# Patient Record
Sex: FEMALE | Race: WHITE | Employment: OTHER | URBAN - METROPOLITAN AREA
[De-identification: names, ages, dates, MRNs, and addresses within clinical notes are randomized per-mention and may not be internally consistent; named-entity substitution may affect disease eponyms.]

---

## 2017-02-01 ENCOUNTER — OFFICE VISIT (OUTPATIENT)
Dept: CARDIOLOGY CLINIC | Age: 82
End: 2017-02-01

## 2017-02-01 VITALS
SYSTOLIC BLOOD PRESSURE: 158 MMHG | DIASTOLIC BLOOD PRESSURE: 72 MMHG | HEIGHT: 62 IN | HEART RATE: 60 BPM | BODY MASS INDEX: 26.13 KG/M2 | WEIGHT: 142 LBS | OXYGEN SATURATION: 98 %

## 2017-02-01 DIAGNOSIS — R61 DIAPHORESIS: ICD-10-CM

## 2017-02-01 DIAGNOSIS — I25.10 CORONARY ARTERY DISEASE INVOLVING NATIVE CORONARY ARTERY OF NATIVE HEART WITHOUT ANGINA PECTORIS: Primary | ICD-10-CM

## 2017-02-01 DIAGNOSIS — R06.09 DOE (DYSPNEA ON EXERTION): ICD-10-CM

## 2017-02-01 DIAGNOSIS — E78.00 HYPERCHOLESTEROLEMIA: ICD-10-CM

## 2017-02-01 DIAGNOSIS — I10 ESSENTIAL HYPERTENSION: ICD-10-CM

## 2017-02-01 RX ORDER — RANOLAZINE 500 MG/1
TABLET, EXTENDED RELEASE ORAL 2 TIMES DAILY
COMMUNITY
End: 2017-04-21 | Stop reason: ALTCHOICE

## 2017-02-01 RX ORDER — ASPIRIN 81 MG/1
162 TABLET ORAL DAILY
Status: ON HOLD | COMMUNITY
End: 2022-09-22 | Stop reason: SDUPTHER

## 2017-02-01 RX ORDER — ATORVASTATIN CALCIUM 40 MG/1
40 TABLET, FILM COATED ORAL DAILY
Qty: 30 TAB | Refills: 6 | Status: SHIPPED | OUTPATIENT
Start: 2017-02-01 | End: 2017-03-07 | Stop reason: SDUPTHER

## 2017-02-01 RX ORDER — NITROGLYCERIN 0.4 MG/1
TABLET SUBLINGUAL
COMMUNITY

## 2017-02-01 RX ORDER — ACETAMINOPHEN 325 MG/1
TABLET ORAL
COMMUNITY
End: 2022-09-18

## 2017-02-01 RX ORDER — AMLODIPINE BESYLATE 5 MG/1
5 TABLET ORAL DAILY
Qty: 90 TAB | Refills: 3 | Status: SHIPPED | OUTPATIENT
Start: 2017-02-01 | End: 2018-01-07 | Stop reason: SDUPTHER

## 2017-02-01 RX ORDER — ATENOLOL 50 MG/1
50 TABLET ORAL DAILY
Qty: 90 TAB | Refills: 3 | Status: SHIPPED | OUTPATIENT
Start: 2017-02-01 | End: 2018-03-21

## 2017-02-01 NOTE — PATIENT INSTRUCTIONS
Ranolazine (By mouth)   Ranolazine (an-OAC-um-zeen)  Treats chronic angina (chest pain). Brand Name(s):Ranexa   There may be other brand names for this medicine. When This Medicine Should Not Be Used: This medicine is not right for everyone. Do not use it if you had an allergic reaction to ranolazine, or if you have liver cirrhosis. How to Use This Medicine:   Long Acting Tablet  · Take your medicine as directed. Your dose may need to be changed several times to find what works best for you. · Swallow the extended-release tablet whole. Do not crush, break, or chew it. · Read and follow the patient instructions that come with this medicine. Talk to your doctor or pharmacist if you have any questions. · Missed dose: If you miss a dose of this medicine, skip the missed dose and go back to your regular dosing schedule. Do not double doses. · Store the medicine in a closed container at room temperature, away from heat, moisture, and direct light. Drugs and Foods to Avoid:   Ask your doctor or pharmacist before using any other medicine, including over-the-counter medicines, vitamins, and herbal products. · Do not use this medicine together with carbamazepine, clarithromycin, itraconazole, ketoconazole, nefazodone, phenobarbital, phenytoin, rifabutin, rifampin, rifapentine, certain medicines to treat HIV or AIDS (such as indinavir, lopinavir, nelfinavir, ritonavir, saquinavir), or Karan's wort. · Some foods and medicines can affect how ranolazine works. Tell your doctor if you are using cyclosporine, digoxin, diltiazem, erythromycin, fluconazole, lovastatin, metformin, simvastatin, sirolimus, tacrolimus, verapamil, medicine for a heart rhythm problem (such as amiodarone, dofetilide, quinidine, sotalol), medicine for depression, or medicine to treat a mental illness (such as thioridazine, ziprasidone). · Do not eat grapefruit or drink grapefruit juice while you are using this medicine.   Warnings While Using This Medicine:   · Tell your doctor if you are pregnant or breastfeeding, or if you have kidney disease, liver disease, or heart rhythm problems. · This medicine may cause the following problems:  ¨ QT prolongation (heart rhythm problem)  ¨ Kidney failure  · Do not use this medicine to treat a sudden onset of chest pain. · This medicine may make you dizzy or drowsy. Do not drive or do anything else that could be dangerous until you know how this medicine affects you. · Your doctor will do lab tests at regular visits to check on the effects of this medicine. Keep all appointments. · Keep all medicine out of the reach of children. Never share your medicine with anyone. Possible Side Effects While Using This Medicine:   Call your doctor right away if you notice any of these side effects:  · Allergic reaction: Itching or hives, swelling in your face or hands, swelling or tingling in your mouth or throat, chest tightness, trouble breathing  · Decrease in how much or how often you urinate  · Fainting, dizziness, or lightheadedness  · Fast, pounding, or uneven heartbeat  · Rapid weight gain, swelling of your hands, ankles, or feet  · Severe or increased chest pain  If you notice other side effects that you think are caused by this medicine, tell your doctor. Call your doctor for medical advice about side effects. You may report side effects to FDA at 1-881-FDA-6807  © 2016 [address] is for End User's use only and may not be sold, redistributed or otherwise used for commercial purposes. The above information is an  only. It is not intended as medical advice for individual conditions or treatments. Talk to your doctor, nurse or pharmacist before following any medical regimen to see if it is safe and effective for you.

## 2017-02-01 NOTE — PROGRESS NOTES
1. Have you been to the ER, urgent care clinic since your last visit? Hospitalized since your last visit? No  2. Have you seen or consulted any other health care providers outside of the [address] [place] [address] since your last visit? Include any pap smears or colon screening.  No

## 2017-02-01 NOTE — PROGRESS NOTES
HISTORY OF PRESENT ILLNESS  Temitope Canada is a 80 y.o. female. HPI  She is referred to my office for continued cardiac care. She is a very pleasant, young-looking, [de-identified] year-old lady who looks like she is in her early [de-identified]. She does have issues with joint pain and balance and walks with a walker, but she wants to just use a cane. She denied any cardiac symptoms at first, but upon further questioning, she admits to having had frequent episodes of diaphoresis, particularly with exertion such as lifting heavy objects, which is associated with funny feelings in the chest as some quivering and discomfort in the lower substernal and epigastric area, which subsides in about thirty minutes or so (by her estimation) with resting. She does have mild dyspnea on exertion, but she denies exertional chest tightness or pressure. She has had no exertional throat tightness, jaw pain or upper back pain. She has no resting dyspnea, orthopnea or PND. She denies palpitations, dizziness or syncope. She has had no symptoms to indicate TIA or amaurosis fugax. She is from [place]. She has history of cardiac catheterization in 2009, which apparently demonstrated:    1. Patent left main trunk                   2. 50-90% long stenosis of the LAD in the proximal to mid segment                 3. 90% significant stenosis of the proximal first diagonal branch                  4. 90% stenosis of the second diagonal branch                  5. 80% stenosis of the circumflex artery at the origin of the obtuse marginal branch                6. 80% stenosis of the proximal dominant RCA with diffuse changes. She was recommended to have stenting or bypass surgery, which she declined. She has done well over the eight years with no acute coronary event. In the past few years, she has been experiencing diaphoresis and some funny feelings in the chest with lifting heavy objects, but not by walking. She is a nonsmoker.   She has history of hypertension, but no diabetes mellitus. She has been on Zocor for hypercholesterolemia. She does have a family history of coronary artery disease. Review of Systems   Constitutional: Positive for diaphoresis. Negative for malaise/fatigue and weight loss. HENT: Negative for hearing loss. Eyes: Negative for blurred vision and double vision. Respiratory: Positive for shortness of breath. Cardiovascular: Positive for chest pain. Negative for palpitations, orthopnea, claudication, leg swelling and PND. Gastrointestinal: Negative for blood in stool, heartburn and melena. Genitourinary: Negative for dysuria, frequency, hematuria and urgency. Musculoskeletal: Positive for back pain, falls and joint pain. Skin: Negative for itching and rash. Neurological: Positive for dizziness. Negative for loss of consciousness, weakness and headaches. Psychiatric/Behavioral: Negative for depression and memory loss. Physical Exam   Constitutional: She is oriented to person, place, and time. She appears well-developed and well-nourished. HENT:   Head: Normocephalic and atraumatic. Eyes: Conjunctivae are normal. Pupils are equal, round, and reactive to light. Neck: Normal range of motion. Neck supple. No JVD present. Cardiovascular: Normal rate, regular rhythm, S1 normal and S2 normal.   No extrasystoles are present. PMI is not displaced. Exam reveals no gallop and no friction rub. Murmur heard. Harsh early systolic murmur is present with a grade of 1/6  at the upper right sternal border  Pulses:       Carotid pulses are 3+ on the right side, and 3+ on the left side. Pulmonary/Chest: Effort normal. She has no rales. Abdominal: Soft. There is no tenderness. Musculoskeletal: She exhibits no edema. Neurological: She is alert and oriented to person, place, and time. No cranial nerve deficit. Skin: Skin is warm and dry.    Psychiatric: She has a normal mood and affect. Her behavior is normal.     Visit Vitals    /72    Pulse 60    Ht 5' 2\" (1.575 m)    Wt 64.4 kg (142 lb)    SpO2 98%    BMI 25.97 kg/m2       Past Medical History   Diagnosis Date    CAD (coronary artery disease) 2008    Fibromyalgia     Heart failure (HCC)      Pt. states sge gas 5 heart blocks.  Hypercholesterolemia     Hypertension     Thyroid disease        Social History     Social History    Marital status:      Spouse name: N/A    Number of children: N/A    Years of education: N/A     Occupational History    Not on file. Social History Main Topics    Smoking status: Never Smoker    Smokeless tobacco: Never Used    Alcohol use No    Drug use: No    Sexual activity: Not on file     Other Topics Concern    Not on file     Social History Narrative       History reviewed. No pertinent family history. Past Surgical History   Procedure Laterality Date    Hx cholecystectomy         Current Outpatient Prescriptions   Medication Sig Dispense Refill    aspirin delayed-release 81 mg tablet Take  by mouth daily.  acetaminophen (TYLENOL) 325 mg tablet Take  by mouth every four (4) hours as needed for Pain.  DOCUSATE CALCIUM (STOOL SOFTENER PO) Take  by mouth.  nitroglycerin (NITROSTAT) 0.4 mg SL tablet by SubLINGual route every five (5) minutes as needed for Chest Pain.  ANTIOX#10/OM3/DHA/EPA/LUT/ZEAX (I-CAPS PO) Take  by mouth.  ATENOLOL PO Take 50 mg by mouth daily.  levothyroxine (SYNTHROID) 75 mcg tablet Take 75 mcg by mouth Daily (before breakfast).  simvastatin (ZOCOR) 20 mg tablet Take 20 mg by mouth nightly.  amLODIPine (NORVASC) 5 mg tablet Take 5 mg by mouth daily.  Milnacipran (SAVELLA) 50 mg tablet Take 100 mg by mouth daily.  celecoxib (CELEBREX) 200 mg capsule Take 200 mg by mouth two (2) times a day.  CALCIUM CARBONATE/VITAMIN D3 (CALCIUM + D PO) Take 1,200 mg by mouth daily.       psyllium (METAMUCIL) powd Take 1 Packet by mouth. EKG: normal sinus rhythm, nonspecific ST and T waves changes, left axis deviation. ASSESSMENT and PLAN  Encounter Diagnoses   Name Primary?  History of coronary artery disease s/p cath 2009 3vCAD 90%LAD,90% D1,80% Cx,80% prox dRCA Yes    POP (dyspnea on exertion)     Essential hypertension     Hypercholesterolemia     Diaphoresis  Possible exertional angina    This is a very intelligent, young-looking, 55-year-old lady who looks like she is [de-identified] years old. She has known three vessel coronary artery disease, which has been treated medically, although, bypass surgery or stenting was recommended eight years ago. She has not experienced any exertional chest pain, but she does have some dyspnea on exertion with walking. She particularly has had the symptoms of diaphoresis and a funny feeling in her chest when she lifts a heavy object, which subsides upon resting. This has been occurring for the past few years. This may be new anginal symptoms, although, it is not quite diagnostic. It is surprising that she has not had many symptoms despite the severe triple vessel coronary artery disease and minimal medical treatment. We went over her coronary anatomy in detail using charts and discussed the possibility of anginal symptoms by her diaphoresis and funny feeling in her chest.  We discussed possible stenting or coronary bypass grafting despite her advanced age and also further medical treatment. The patient and her son opted for continued medical treatment. She will be continued on the betablocker (atenolol) and aspirin. She is currently on Zocor 20 mg per day, which will be changed to Lipitor 40 mg per day to avoid a drug interaction, as she will be tried on Ranexa. I will try to get some samples of Ranexa from the pharmaceutical company and try her on this to see if her symptoms will improve.   I spent over forty-five minutes with the patient and her son in face-to-face consultation of which greater than fifty percent was spent in counseling and discussion.

## 2017-02-16 ENCOUNTER — TELEPHONE (OUTPATIENT)
Dept: CARDIOLOGY CLINIC | Age: 82
End: 2017-02-16

## 2017-02-16 NOTE — TELEPHONE ENCOUNTER
Patient's son called stating they decided to stop Renexa d/t GI upset, including nausea, heartburn, and diarrhea, denies SOB, chest pain, and diaphoresis. Please advise.   Thank you

## 2017-02-28 NOTE — TELEPHONE ENCOUNTER
Son states patients has discontinued one dose of Ranexa and symptoms subsided. Will discontinue other dosage if symptoms arise.

## 2017-03-07 DIAGNOSIS — E78.00 HYPERCHOLESTEROLEMIA: ICD-10-CM

## 2017-03-07 RX ORDER — ATORVASTATIN CALCIUM 40 MG/1
40 TABLET, FILM COATED ORAL DAILY
Qty: 90 TAB | Refills: 3 | Status: SHIPPED | OUTPATIENT
Start: 2017-03-07 | End: 2017-12-29 | Stop reason: SDUPTHER

## 2017-04-06 ENCOUNTER — HOSPITAL ENCOUNTER (OUTPATIENT)
Dept: PHYSICAL THERAPY | Age: 82
Discharge: HOME | End: 2017-04-06
Payer: MEDICARE

## 2017-04-06 ENCOUNTER — HOSPITAL ENCOUNTER (OUTPATIENT)
Dept: GENERAL RADIOLOGY | Age: 82
Discharge: HOME | End: 2017-04-06
Payer: MEDICARE

## 2017-04-06 ENCOUNTER — HOSPITAL ENCOUNTER (OUTPATIENT)
Dept: VASCULAR SURGERY | Age: 82
Discharge: HOME | End: 2017-04-06
Payer: MEDICARE

## 2017-04-06 DIAGNOSIS — R60.0 EDEMA, LEG: ICD-10-CM

## 2017-04-06 DIAGNOSIS — R60.0 PEDAL EDEMA: ICD-10-CM

## 2017-04-06 PROCEDURE — G8984 CARRY CURRENT STATUS: HCPCS

## 2017-04-06 PROCEDURE — G8985 CARRY GOAL STATUS: HCPCS

## 2017-04-06 PROCEDURE — 71020 XR CHEST PA LAT: CPT

## 2017-04-06 PROCEDURE — 97032 APPL MODALITY 1+ESTIM EA 15: CPT

## 2017-04-06 PROCEDURE — 97162 PT EVAL MOD COMPLEX 30 MIN: CPT

## 2017-04-06 PROCEDURE — 93970 EXTREMITY STUDY: CPT

## 2017-04-06 NOTE — PROGRESS NOTES
[facility] [address]  [address] [address] [facility] [address] [address]  [phone] fax    Plan of Care/ Statement of Necessity for Physical Therapy Services    Patient name: Sharyle Bearded Start of Care: 2017   Referral source: Jose Angel Boggs MD : [id number]    Medical Diagnosis: Pain in right wrist [M25.531]  Pain in right elbow [M25.521]   Onset Date:longstanding    Treatment Diagnosis: decrease tolerance to ADLs and activities due to R lateral elbow and R lateral wrist pain, LOM, decrease strength   Prior Hospitalization: see medical history Provider#: [id number]   Medications: Verified on Patient summary List    Comorbidities: Carpal tunnel R UE , fibromyalgia, heart disease, arthritis, thyroid problems, HTN, hearing impaired   Prior Level of Function: I,uses Rollator, I all areas of ADLs and activities, enjoys knitting and reading      The Plan of Care and following information is based on the information from the initial evaluation. Assessment/ key information: 81 YO female CCO pain in the right wrist and elbow that she describes as being long standing. insideous onset of R UE involvement approx 10 years ago. Eventually leading to surgery for a pinched nerve and she has had trouble with it since that time. She is right hand dominant. Pain today is a 7 in the R wrist and elbow but it can radiate up into the R arm musculature. Previous Treatment/Compliance: medication, 2 wrist injections, R elbow surgery for the pinched nerve, she reports this is the first PT for this involvement. Pain 7, FOTO wrist 40 , Elbow 40. AROM R elbow F/E 130/-5  Wrist F/E 50/50, MMT R shoulder F 4+, Abd 4+, Elbow F/E 4+ and wrist F/E 4+. Moderate to severe TTP R lateral epicondyle region of the elbow and R snuff box tendons, TTP and STC R forearm extensor muscle belly.     Patient demonstrates the potential to make gains with improved ROM, strength, endurance/activity tolerance, functional FOTO survey score and all within a reasonable time frame so as to increase their functional independence with ADLs and activities for carryover to Improved quality of life , tolerance to knitting and reading, and tolerance to household activities. Patient requires skilled Physical Therapy so as to monitor their response to and modify their treatment plan accordingly. Patient appears to be an appropriate candidate for skilled outpatient Physical Therapy.       Evaluation Complexity History HIGH Complexity :3+ comorbidities / personal factors will impact the outcome/ POC ; Examination HIGH Complexity : 4+ Standardized tests and measures addressing body structure, function, activity limitation and / or participation in recreation  ;Presentation LOW Complexity : Stable, uncomplicated  ;Clinical Decision Making MEDIUM Complexity : FOTO score of 26-74  Overall Complexity Rating: LOW   Problem List: pain affecting function, decrease ROM, decrease strength, decrease ADL/ functional abilitiies, decrease activity tolerance and other FOTO Elbow 44, Wrist 40   Treatment Plan may include any combination of the following: Therapeutic exercise, Therapeutic activities, Neuromuscular re-education, Physical agent/modality, Manual therapy and Patient education  Patient / Family readiness to learn indicated by: asking questions, trying to perform skills and interest  Persons(s) to be included in education: patient (P)  Barriers to Learning/Limitations: None  Patient Goal (s): lessen the pain  Patient Self Reported Health Status: good  Rehabilitation Potential: good    Short Term Goals:  To be accomplished in 5 treatments:   1 patient will have established and be independent with HEP to aid with progression of skilled PT program   EVAL issued   CURRENT   2 patient will have pain 5/10 to aid with increase tolerance to ADLS and activties   EVAL 7   CURRENT   3 patient will have FOTO wrist improved to 49 to show increase tolerance to ADLS   EVAL 40   CURRENT     Long Term Goals: To be accomplished in 10 treatments:   1 patient will have pain 3/10 to aid with increase tolerance to ADLS and activties   EVAL 7   CURRENT   2 patient will have FOTO wrist improved to 58 to show increase tolerance to ADLS   EVAL 40   CURRENT   3 patient will have MMT R elbow, wrist to 5 to aid with increase tolerance to ADLS and activities   EVAL 4+ for F/E at the elbow and wrist    CURRENT   4 patient will report overall 50% improvement to aid with increase tolerance to ADLS and activities ie knitting   EVAL   CURRENT  Frequency / Duration: Patient to be seen 2 times per week for 10 treatments. Patient/ Caregiver education and instruction: Diagnosis, prognosis, self care, activity modification and exercises   [x]  Plan of care has been reviewed with LASHON    G-Chirag (GP)   Carry   Current  CL= 60-79%    Goal  CK= 40-59%   The severity rating is based on clinical judgment and the FOTO score. Certification Period: 4/6/17-5/6/17  Elle Alexis, PT 4/6/2017 1:01 PM    ________________________________________________________________________    I certify that the above Therapy Services are being furnished while the patient is under my care. I agree with the treatment plan and certify that this therapy is necessary.     Physician's Signature:____________________  Date:____________Time: _________    Please sign and return to [facility] [address] [place] [address], [address] [place] [address] [address]  [phone] fax

## 2017-04-06 NOTE — PROCEDURES
[facility]  *** FINAL REPORT ***    Name: Gerardo Newby  MRN: [id number]    Outpatient  : 1924  HIS Order #: [id number]  [address] [facility] Visit #: [id number]  Date: 2017    TYPE OF TEST: Peripheral Venous Testing    REASON FOR TEST  Limb swelling    Right Leg:-  Deep venous thrombosis:           No  Superficial venous thrombosis:    No  Deep venous insufficiency:        Not examined  Superficial venous insufficiency: Not examined    Left Leg:-  Deep venous thrombosis:           Not examined  Superficial venous thrombosis:    Not examined  Deep venous insufficiency:        No  Superficial venous insufficiency: Not examined      INTERPRETATION/FINDINGS  Duplex images were obtained using 2-D gray scale, color flow, and  spectral Doppler analysis. Right leg :  1. Deep vein(s) visualized include the common femoral, proximal  femoral, mid femoral, distal femoral, popliteal(above knee),  popliteal(fossa), popliteal(below knee), posterior tibial and peroneal   veins. 2. No evidence of deep venous thrombosis detected in the veins  visualized. 3. Superficial vein(s) visualized include the great saphenous vein. 4. No evidence of superficial thrombosis detected. Left leg :  1. Deep vein(s) visualized include the common femoral, proximal  femoral, mid femoral, distal femoral, popliteal(above knee),  popliteal(fossa), popliteal(below knee), posterior tibial and peroneal   veins. 2. Superficial vein(s) visualized include the great saphenous vein. ADDITIONAL COMMENTS  Deep venous reflux noted in the right peroneal vein. The reflux time is   4.2 sec. I have personally reviewed the data relevant to the interpretation of  this  study. TECHNOLOGIST: Daniel Painter, [facility], RVT/  Signed: 2017 09:37 AM    PHYSICIAN: Alfonzo Durán D.O.   Signed: 2017 09:10 AM

## 2017-04-06 NOTE — PROGRESS NOTES
PT DAILY TREATMENT NOTE/ELBOW EVAL 3-16    Patient Name: Adryan Del Valle  Date:2017  : 1924  [x]  Patient  Verified  Payor: [place] MEDICARE / Plan: [place] MEDICARE PART A & B / Product Type: Medicare /    In time:105 Out time:149  Total Treatment Time (min): 44  Total Timed Codes (min): 16  1:1 Treatment Time ( [address] only):16  Visit #:1 of 10    Treatment Area: Pain in right wrist [M25.531]  Pain in right elbow [M25.521]    SUBJECTIVE  Pain Level (0-10 scale): 7  [x]constant []intermittent []improving [x]worsening []no change since onset  Worse with knitting, using the arm (ie washing windows), Better with grabbing and holding the wrist when it spasms, heat  Any medication changes, allergies to medications, adverse drug reactions, diagnosis change, or new procedure performed?: [x] No    [] Yes (see summary sheet for update)  Subjective functional status/changes:     PLOF: I with Rollator,   Limitations to PLOF: pain, spasms in the R arm  Mechanism of Injury: insideous onset of R UE involvement approx 10 years ago. Eventually leading to surgery for a pinched nerve and she has had trouble with it since that time  Current symptoms/Complaints:89 YO female CCO pain in the right wrist and elbow that she describes as being long standing. insideous onset of R UE involvement approx 10 years ago. Eventually leading to surgery for a pinched nerve and she has had trouble with it since that time. She is right hand dominant. Pain today is a 7 in the R wrist and elbow but it can radiate up into the R arm musculature. Previous Treatment/Compliance: medication, 2 wrist injections, R elbow surgery for the pinched nerve, she reports this is the first PT for this involvement. PMHx/Surgical Hx:Carpal tunnel R UE , fibromyalgia, heart disease, arthritis, thyroid problems, HTN, hearing  impaired  Work Hx: retired  Living Situation: lives in a 2 story house, resides on the first floor , 4 steps to enter with a rail.  Lives with son and his wife  Pt Goals: lessen the pain   Barriers: [x]pain []financial []time []transportation []other  Motivation: high  Substance use: []Alcohol []Tobacco []other:   FABQ Score: []low []elevate  Cognition: A & O x 4  Other:    OBJECTIVE/EXAMINATION  Domestic Life: retired, enjoys knitting and reading  Activity/Recreational Limitations: pain  Mobility: I with rollator   Self Care:  I         Modality rationale: decrease inflammation, decrease pain and increase tissue extensibility to improve the patients ability to aid with increase tolerance to ADLS and activities   Min Type Additional Details    [] Estim:  []Unatt       []IFC  []Premod                        []Other:  []w/ice   []w/heat  Position:  Location:   8 [x] Estim: [x]Att    []TENS instruct  []NMES                    [x]Other: LTO []w/US   []w/ice   []w/heat  Position:seated  Location:R wrist laterally at snuff box and R elbow laterally at epicondyle    []  Traction: [] Cervical       []Lumbar                       [] Prone          []Supine                       []Intermittent   []Continuous Lbs:  [] before manual  [] after manual    []  Ultrasound: []Continuous   [] Pulsed                           []1MHz   []3MHz Location:  W/cm2:    []  Iontophoresis with dexamethasone         Location: [] Take home patch   [] In clinic    []  Ice     []  heat  []  Ice massage  []  Laser   []  Anodyne Position:  Location:    []  Laser with stim  []  Other: Position:  Location:    []  Vasopneumatic Device Pressure:       [] lo [] med [] hi   Temperature: [] lo [] med [] hi   [] Skin assessment post-treatment:  []intact []redness- no adverse reaction    []redness  adverse reaction:     28 min [x]Eval                  []Re-Eval       8 min Therapeutic Exercise:  [x] See flow sheet :   Rationale: increase ROM, increase strength and improve coordination to improve the patients ability to aid with increase tolerance to ADLs and activities     min Therapeutic Activity:  []  See flow sheet :   Rationale:   to improve the patients ability to       min Neuromuscular Re-education:  []  See flow sheet :   Rationale:   to improve the patients ability to      min Manual Therapy:     Rationale:  to      min Gait Training:  ___ feet with ___ device on level surfaces with ___ level of assist   Rationale:           With   [] TE   [] TA   [] neuro   [] other: Patient Education: [x] Review HEP    [] Progressed/Changed HEP based on:   [] positioning   [] body mechanics   [] transfers   [] heat/ice application    [] other:      Other Objective/Functional Measures: Falls risk is low to moderate, uses Rollator , UA to tolerate SLS    Physical Therapy Evaluation- Elbow    Upper Extremity ROM                        [] Unable to assess at this time   WNL N/A ROM as follows:    Left Shoulder [] []  F 160   Right Shoulder [] [] F 160       WNL N/A Flex Ext Sup Pro Pain   Left Elbow []  [] 145 0 90 90 [] Yes   [] No   Right Elbow [] [] 130 -5 90 90 [] Yes   [] No   Left Wrist [] [] 50 40 pulling   [] Yes   [] No   Right Wrist [] [] 50 50 pulling along radial border   [] Yes   [] No     Upper Extremity Strength: (0-5)  [] Unable to assess at this time   WNL N/A Flex Ext IR ER Abd Add   L Shoulder [] [] 4+    4+    R Shoulder [] [] 4+    4+       WNL N/A Flex Ext Sup Pro Pain   Left Elbow [] [] 5 5   [] Yes   [] No   Right Elbow [] [] 4+ cubital fossa 4+   [x] Yes   [] No       MMT     R   Wrist F/E  4+    Flexibility:                    [] Unable to assess at this time        Pain  Common Flexor Group WNL Min Mod Severe [] Yes   [] No   (L) Tightness = [] [] [] [] [] Yes   [] No   (R) Tightness =  [] [] [] [] [] Yes   [] No   Common Extensor Group     [] Yes   [] No   (L) Tightness = [] [] [] [] [] Yes   [] No   (R) Tightness = [] [x] [] [] [] Yes   [] No     Palpation  [] Min  [x] Mod  [x] Severe    Location:TTP R lateral epicondyle region of the elbow and R snuff box tendons  [] Min  [x] Mod [] Severe    Location:TTP and STC R forearm extensor muscle belly  [] Min  [] Mod  [] Severe    Location:         Strength:  Dynamometer position 2   Right (lbs) Left (lbs) Pain/location   Elbow Flexed: (90 deg)      Elbow Extended:         Optional Tests:  Resisted Finger Test:   2nd & 3rd finger extension (ECRB or ECRL):[] Neg   [] Pos  3rd finger extension (PIN): [] Neg   [] Pos    Neural Mobility Test:    Radial Nerve: [] Neg   [] Pos    Describe:  Median Nerve: [] Neg   [] Pos    Describe:  Ulnar Nerve:  [] Neg   [] Pos    Describe:       Pain Level (0-10 scale) post treatment:7    ASSESSMENT/Changes in Function:Patient demonstrates the potential to make gains with improved ROM, strength, endurance/activity tolerance, functional FOTO survey score and all within a reasonable time frame so as to increase their functional independence with ADLs and activities for carryover to  Improved quality of life , tolerance to knitting and reading, and tolerance to household activities. Patient requires skilled Physical Therapy so as to monitor their response to and modify their treatment plan accordingly. Patient appears to be an appropriate candidate for skilled outpatient Physical Therapy. Patient will continue to benefit from skilled PT services to modify and progress therapeutic interventions, address functional mobility deficits, address ROM deficits, address strength deficits, analyze and address soft tissue restrictions, analyze and cue movement patterns, analyze and modify body mechanics/ergonomics, assess and modify postural abnormalities and instruct in home and community integration to attain remaining goals.      [x]  See Plan of Care  []  See progress note/recertification  []  See Discharge Summary         Progress towards goals / Updated goals:       PLAN  [x]  Upgrade activities as tolerated     [x]  Continue plan of care  []  Update interventions per flow sheet       []  Discharge due to:_  [] Other:_      Marcus Martell, PT 4/6/2017  1:00 PM

## 2017-04-11 ENCOUNTER — HOSPITAL ENCOUNTER (OUTPATIENT)
Dept: PHYSICAL THERAPY | Age: 82
Discharge: HOME | End: 2017-04-11
Payer: MEDICARE

## 2017-04-11 PROCEDURE — 97110 THERAPEUTIC EXERCISES: CPT

## 2017-04-11 PROCEDURE — 97140 MANUAL THERAPY 1/> REGIONS: CPT

## 2017-04-11 PROCEDURE — 97032 APPL MODALITY 1+ESTIM EA 15: CPT

## 2017-04-13 ENCOUNTER — HOSPITAL ENCOUNTER (OUTPATIENT)
Dept: PHYSICAL THERAPY | Age: 82
Discharge: HOME | End: 2017-04-13
Payer: MEDICARE

## 2017-04-13 PROCEDURE — 97140 MANUAL THERAPY 1/> REGIONS: CPT

## 2017-04-13 PROCEDURE — 97110 THERAPEUTIC EXERCISES: CPT

## 2017-04-13 PROCEDURE — 97035 APP MDLTY 1+ULTRASOUND EA 15: CPT

## 2017-04-13 NOTE — PROGRESS NOTES
PT DAILY TREATMENT NOTE - [facility] 3-16    Patient Name: Sharifa Woods  Date:2017  : 1924  [x]  Patient  Verified  Payor: Chip Richardson / Plan: [place] MEDICARE PART A & B / Product Type: Medicare /    In time:1030  Out time:1129  Total Treatment Time (min): 54  Total Timed Codes (min):44  1:1 Treatment Time ( [address] only): 40  Visit #: 3 of 10    Treatment Area: Pain in right wrist [M25.531]  Pain in right elbow [M25.521]    SUBJECTIVE  Pain Level (0-10 scale): 8  Any medication changes, allergies to medications, adverse drug reactions, diagnosis change, or new procedure performed?: [x] No    [] Yes (see summary sheet for update)  Subjective functional status/changes:   [] No changes reported  Increase pain today in the wrist > elbow. She states she was holding a baby.      OBJECTIVE    Modality rationale: decrease inflammation, decrease pain and increase tissue extensibility to improve the patients ability to aid with increase tolerance to ADLs and activities   Min Type Additional Details    [] Estim:  []Unatt       []IFC  []Premod                        []Other:  []w/ice   []w/heat  Position:  Location:    [] Estim: []Att    []TENS instruct  []NMES                    []Other:  []w/US   []w/ice   []w/heat  Position:  Location:    []  Traction: [] Cervical       []Lumbar                       [] Prone          []Supine                       []Intermittent   []Continuous Lbs:  [] before manual  [] after manual   14 [x]  Ultrasound: [x]Continuous   [] Pulsed                   14        []1MHz   [x]3MHz Location:R wrist and radial border and lateral R elbow  W/cm2:1.3    []  Iontophoresis with dexamethasone         Location: [] Take home patch   [] In clinic   10 []  Ice     [x]  Heat PRE  []  Ice massage  []  Laser   []  Anodyne Position:seated  Location:R elbow and wrist    []  Laser with stim  []  Other: Position:  Location:    []  Vasopneumatic Device Pressure:       [] lo [] med [] hi   Temperature: [] lo [] med [] hi   [] Skin assessment post-treatment:  []intact []redness- no adverse reaction    []redness  adverse reaction:      min []Eval                  []Re-Eval       17 min Therapeutic Exercise:  [x] See flow sheet :   Rationale: increase ROM, increase strength and improve coordination to improve the patients ability to aid with increase tolerance to ADLS and activities     min Therapeutic Activity:  []  See flow sheet :   Rationale:   to improve the patients ability to       min Neuromuscular Re-education:  []  See flow sheet :   Rationale:   to improve the patients ability to     13 min Manual Therapy:  STM DTM Effleurage R elbow and wrist and forearm   Rationale: decrease pain, increase ROM, increase tissue extensibility and decrease trigger points to aid with increase tolerance to ADLs and activities     min Gait Training:  ___ feet with ___ device on level surfaces with ___ level of assist   Rationale: With   [] TE   [] TA   [] neuro   [] other: Patient Education: [x] Review HEP    [] Progressed/Changed HEP based on:   [] positioning   [] body mechanics   [] transfers   [] heat/ice application    [] other:      Other Objective/Functional Measures:  VC exercises and technique. Changed LTO to US today and will monitor response. Pain Level (0-10 scale) post treatment: <8    ASSESSMENT/Changes in Function: increased pain today at the wrist> elbow and ? Due to holding 15# baby the last couple of days. Changed LTO to US and we will monitor her response. Patient will continue to benefit from skilled PT services to modify and progress therapeutic interventions, address functional mobility deficits, address ROM deficits, address strength deficits, analyze and address soft tissue restrictions, analyze and cue movement patterns, analyze and modify body mechanics/ergonomics, assess and modify postural abnormalities and instruct in home and community integration to attain remaining goals.      [x] See Plan of Care  []  See progress note/recertification  []  See Discharge Summary         Progress towards goals / Updated goals:   Short Term Goals: To be accomplished in 5 treatments:  1 patient will have established and be independent with HEP to aid with progression of skilled PT program  EVAL issued  CURRENT progressing 4/11/17, 4/13/17  2 patient will have pain 5/10 to aid with increase tolerance to ADLS and activties  EVAL 7  CURRENT 5 4/11/17   8 4/13/17  3 patient will have FOTO wrist improved to 49 to show increase tolerance to ADLS  EVAL 40  CURRENT   Long Term Goals:  To be accomplished in 10 treatments:  1 patient will have pain 3/10 to aid with increase tolerance to ADLS and activties  EVAL 7  CURRENT progressing 5 4/11/17    8 4/13/17  2 patient will have FOTO wrist improved to 58 to show increase tolerance to ADLS  EVAL 40  CURRENT  3 patient will have MMT R elbow, wrist to 5 to aid with increase tolerance to ADLS and activities  EVAL 4+ for F/E at the elbow and wrist   CURRENT  4 patient will report overall 50% improvement to aid with increase tolerance to ADLS and activities ie knitting  EVAL  CURRENT    PLAN  [x]  Upgrade activities as tolerated     [x]  Continue plan of care  []  Update interventions per flow sheet       []  Discharge due to:_  []  Other:_      Sergey Sands PT 4/13/2017  10:33 AM

## 2017-04-14 DIAGNOSIS — I25.10 CORONARY ARTERY DISEASE INVOLVING NATIVE CORONARY ARTERY OF NATIVE HEART WITHOUT ANGINA PECTORIS: ICD-10-CM

## 2017-04-14 RX ORDER — RANOLAZINE 500 MG/1
TABLET, EXTENDED RELEASE ORAL 2 TIMES DAILY
Status: CANCELLED | OUTPATIENT
Start: 2017-04-14

## 2017-04-18 ENCOUNTER — HOSPITAL ENCOUNTER (OUTPATIENT)
Dept: PHYSICAL THERAPY | Age: 82
Discharge: HOME | End: 2017-04-18
Payer: MEDICARE

## 2017-04-18 PROCEDURE — 97035 APP MDLTY 1+ULTRASOUND EA 15: CPT

## 2017-04-18 PROCEDURE — 97110 THERAPEUTIC EXERCISES: CPT

## 2017-04-18 PROCEDURE — 97140 MANUAL THERAPY 1/> REGIONS: CPT

## 2017-04-18 NOTE — PROGRESS NOTES
PT DAILY TREATMENT NOTE - [facility] 3-16    Patient Name: Myron Mejias  Date:2017  : 1924  [x]  Patient  Verified  Payor: [place] MEDICARE / Plan: [place] MEDICARE PART A & B / Product Type: Medicare /    In time:1038  Out time:1139  Total Treatment Time (min): 59  Total Timed Codes (min):49  1:1 Treatment Time [facility] only):49  Visit #: 4 of 10    Treatment Area: Pain in right wrist [M25.531]  Pain in right elbow [M25.521]    SUBJECTIVE  Pain Level (0-10 scale): 4  Any medication changes, allergies to medications, adverse drug reactions, diagnosis change, or new procedure performed?: [x] No    [] Yes (see summary sheet for update)  Subjective functional status/changes:   [] No changes reported  Doing better    OBJECTIVE    Modality rationale: decrease inflammation, decrease pain and increase tissue extensibility to improve the patients ability to aid with increase tolerance to ADLs and activities   Min Type Additional Details    [] Estim:  []Unatt       []IFC  []Premod                        []Other:  []w/ice   []w/heat  Position:  Location:    [] Estim: []Att    []TENS instruct  []NMES                    []Other:  []w/US   []w/ice   []w/heat  Position:  Location:    []  Traction: [] Cervical       []Lumbar                       [] Prone          []Supine                       []Intermittent   []Continuous Lbs:  [] before manual  [] after manual    [x]  Ultrasound: [x]Continuous   [] Pulsed                  16         [x]1MHz   []3MHz Location: R lateral elbow and radial wrist  W/cm2:1.3    []  Iontophoresis with dexamethasone         Location: [] Take home patch   [] In clinic   10 []  Ice     [x]  Heat pre[]  Ice massage  []  Laser   []  Anodyne Position:seated  Location:R elbow and wrist    []  Laser with stim  []  Other: Position:  Location:    []  Vasopneumatic Device Pressure:       [] lo [] med [] hi   Temperature: [] lo [] med [] hi   [] Skin assessment post-treatment:  []intact []redness- no adverse reaction    []redness  adverse reaction:      min []Eval                  []Re-Eval       20 min Therapeutic Exercise:  [x] See flow sheet :   Rationale: increase ROM, increase strength and improve coordination to improve the patients ability to aid with increase tolerance to ADLs and activities     min Therapeutic Activity:  []  See flow sheet :   Rationale:   to improve the patients ability to       min Neuromuscular Re-education:  []  See flow sheet :   Rationale:   to improve the patients ability to     13 min Manual Therapy:  STM Effleurage and TPR R forearm, elbow and wrist    Rationale: decrease pain, increase ROM, increase tissue extensibility and decrease trigger points to aid with increase tolerance to ADLs     min Gait Training:  ___ feet with ___ device on level surfaces with ___ level of assist   Rationale: With   [] TE   [] TA   [] neuro   [] other: Patient Education: [x] Review HEP    [] Progressed/Changed HEP based on:   [] positioning   [] body mechanics   [] transfers   [] heat/ice application    [] other:      Other Objective/Functional Measures: added velcro board     Pain Level (0-10 scale) post treatment: <4    ASSESSMENT/Changes in Function: tolerated well. Decreased pain. Patient will continue to benefit from skilled PT services to modify and progress therapeutic interventions, address functional mobility deficits, address ROM deficits, address strength deficits, analyze and address soft tissue restrictions, analyze and cue movement patterns, analyze and modify body mechanics/ergonomics, assess and modify postural abnormalities and instruct in home and community integration to attain remaining goals. [x]  See Plan of Care  []  See progress note/recertification  []  See Discharge Summary         Progress towards goals / Updated goals:   Short Term Goals:  To be accomplished in 5 treatments:  1 patient will have established and be independent with HEP to aid with progression of skilled PT program  EVAL issued  CURRENT progressing 4/11/17, 4/13/17 4/18/17  2 patient will have pain 5/10 to aid with increase tolerance to ADLS and activties  EVAL 7  CURRENT 4 4/18/17  3 patient will have FOTO wrist improved to 49 to show increase tolerance to ADLS  EVAL 40  CURRENT recheck next session  Long Term Goals:  To be accomplished in 10 treatments:  1 patient will have pain 3/10 to aid with increase tolerance to ADLS and activties  EVAL 7  CURRENT 4 4/18/17  2 patient will have FOTO wrist improved to 58 to show increase tolerance to ADLS  EVAL 40  CURRENT recheck next session  3 patient will have MMT R elbow, wrist to 5 to aid with increase tolerance to ADLS and activities  EVAL 4+ for F/E at the elbow and wrist   CURRENT  4 patient will report overall 50% improvement to aid with increase tolerance to ADLS and activities ie knitting  EVAL  CURRENT       PLAN  [x]  Upgrade activities as tolerated     [x]  Continue plan of care  []  Update interventions per flow sheet       []  Discharge due to:_  [x]  Other:_  [address], PT 4/18/2017  10:35 AM

## 2017-04-20 ENCOUNTER — HOSPITAL ENCOUNTER (OUTPATIENT)
Dept: PHYSICAL THERAPY | Age: 82
Discharge: HOME | End: 2017-04-20
Payer: MEDICARE

## 2017-04-20 PROCEDURE — 97035 APP MDLTY 1+ULTRASOUND EA 15: CPT

## 2017-04-20 PROCEDURE — 97140 MANUAL THERAPY 1/> REGIONS: CPT

## 2017-04-20 NOTE — PROGRESS NOTES
PT DAILY TREATMENT NOTE - [facility] 3-16    Patient Name: China Caldera  Date:2017  : 1924  [x]  Patient  Verified  Payor: Romana Estimable / Plan: [place] MEDICARE PART A & B / Product Type: Medicare /    In time:1035  Out time:1136  Total Treatment Time (min): 61  Total Timed Codes (min): 51  1:1 Treatment Time ( [address] only)33  Visit #: 5 of 10    Treatment Area: Pain in right wrist [M25.531]  Pain in right elbow [M25.521]    SUBJECTIVE  Pain Level (0-10 scale): 8  Any medication changes, allergies to medications, adverse drug reactions, diagnosis change, or new procedure performed?: [x] No    [] Yes (see summary sheet for update)  Subjective functional status/changes:   [] No changes reported  Increased pain , pains in the forearm last night (radial border up to elbow and the shoulder)- not sure why, I have not been holding the baby.     OBJECTIVE    Modality rationale: decrease inflammation, decrease pain and increase tissue extensibility to improve the patients ability to aid with increase tolerance to ADLs and activities   Min Type Additional Details    [] Estim:  []Unatt       []IFC  []Premod                        []Other:  []w/ice   []w/heat  Position:  Location:    [] Estim: []Att    []TENS instruct  []NMES                    []Other:  []w/US   []w/ice   []w/heat  Position:  Location:    []  Traction: [] Cervical       []Lumbar                       [] Prone          []Supine                       []Intermittent   []Continuous Lbs:  [] before manual  [] after manual    [x]  Ultrasound: [x]Continuous   [] Pulsed        16                   []1MHz   [x]3MHz Location:R lateral elbow and extensor muscles, thumb and radial border of forearm  W/cm2:1.3    []  Iontophoresis with dexamethasone         Location: [] Take home patch   [] In clinic   10 []  Ice     [x]  Heat pre []  Ice massage  []  Laser   []  Anodyne Position:seated  Location:R elbow and wrist/forearm    []  Laser with stim  []  Other: Position:  Location:    []  Vasopneumatic Device Pressure:       [] lo [] med [] hi   Temperature: [] lo [] med [] hi   [] Skin assessment post-treatment:  []intact []redness- no adverse reaction    []redness  adverse reaction:      min []Eval                  []Re-Eval       22 min Therapeutic Exercise:  [x] See flow sheet :   Rationale: increase ROM, increase strength and improve coordination to improve the patients ability to aid with increase tolerance to ADLs and activities     min Therapeutic Activity:  []  See flow sheet :   Rationale:   to improve the patients ability to       min Neuromuscular Re-education:  []  See flow sheet :   Rationale:   to improve the patients ability to     13 min Manual Therapy:  STM effleurage R elbow and forearm   Rationale: decrease pain, increase ROM, increase tissue extensibility and decrease trigger points to aid with increase tolerance to ADLs and activities     min Gait Training:  ___ feet with ___ device on level surfaces with ___ level of assist   Rationale: With   [] TE   [] TA   [] neuro   [] other: Patient Education: [x] Review HEP    [] Progressed/Changed HEP based on:   [] positioning   [] body mechanics   [] transfers   [] heat/ice application    [] other:      Other Objective/Functional Measures: VC and supervision with exercises    Pain Level (0-10 scale) post treatment: 5    ASSESSMENT/Changes in Function: residual pain in the forearm and elbow, slow progress overall with carryover with decreased pain. Both FOTO scores slightly decreased.     Patient will continue to benefit from skilled PT services to modify and progress therapeutic interventions, address functional mobility deficits, address ROM deficits, address strength deficits, analyze and address soft tissue restrictions, analyze and cue movement patterns, analyze and modify body mechanics/ergonomics, assess and modify postural abnormalities and instruct in home and community integration to attain remaining goals. [x]  See Plan of Care  []  See progress note/recertification  []  See Discharge Summary         Progress towards goals / Updated goals:  Short Term Goals: To be accomplished in 5 treatments:  1 patient will have established and be independent with HEP to aid with progression of skilled PT program  EVAL issued  CURRENT progressing 4/11/17, 4/13/17 4/18/17   2 patient will have pain 5/10 to aid with increase tolerance to ADLS and activties  EVAL 7  CURRENT 8 4/20/173 patient will have FOTO wrist improved to 49 to show increase tolerance to ADLS  EVAL 40  CURRENT elbow 43, wrist 41    4/20/17  Long Term Goals:  To be accomplished in 10 treatments:  1 patient will have pain 3/10 to aid with increase tolerance to ADLS and activties  EVAL 7  CURRENT 8 4/20/17  2 patient will have FOTO wrist improved to 58 to show increase tolerance to ADLS  EVAL 40  CURRENT elbow 43, wrist 41 4/20/17  3 patient will have MMT R elbow, wrist to 5 to aid with increase tolerance to ADLS and activities  EVAL 4+ for F/E at the elbow and wrist   CURRENT  4 patient will report overall 50% improvement to aid with increase tolerance to ADLS and activities ie knitting  EVAL  CURRENT          PLAN  [x]  Upgrade activities as tolerated     [x]  Continue plan of care  []  Update interventions per flow sheet       []  Discharge due to:_  []  Other:_      Rosie Castillo, PT 4/20/2017  11:56 AM

## 2017-04-21 RX ORDER — ISOSORBIDE MONONITRATE 60 MG/1
60 TABLET, EXTENDED RELEASE ORAL
Qty: 30 TAB | Refills: 6 | Status: SHIPPED | OUTPATIENT
Start: 2017-04-21 | End: 2017-10-05 | Stop reason: SDUPTHER

## 2017-04-21 NOTE — TELEPHONE ENCOUNTER
Patient's son called inquiring if there was a less expensive alternative to Ranexa. Per liliane Bautista to change to Imdur 60 mg one tab daily.  Verbal order and read back per Jacklyn Mcgarry MD    Son aware

## 2017-04-24 ENCOUNTER — HOSPITAL ENCOUNTER (OUTPATIENT)
Dept: PHYSICAL THERAPY | Age: 82
Discharge: HOME | End: 2017-04-24
Payer: MEDICARE

## 2017-04-24 PROCEDURE — 97110 THERAPEUTIC EXERCISES: CPT

## 2017-04-24 PROCEDURE — 97140 MANUAL THERAPY 1/> REGIONS: CPT

## 2017-04-24 PROCEDURE — 97032 APPL MODALITY 1+ESTIM EA 15: CPT

## 2017-04-24 NOTE — PROGRESS NOTES
PT DAILY TREATMENT NOTE - [facility]     Patient Name: Jet Marc  Date:2017  : 1924  [x]  Patient  Verified  Payor: Mima Tristan / Plan: [place] MEDIC[place] A & B / Product Type: Medicare /    In time:10:30  Out time:11:22  Total Treatment Time (min): 48  Total Timed Codes (min): 38  1:1 Treatment Time ( [address] only): 38  Visit #: 6 of 10    Treatment Area: Pain in right wrist [M25.531]  Pain in right elbow [M25.521]    SUBJECTIVE  Pain Level (0-10 scale): 0  Any medication changes, allergies to medications, adverse drug reactions, diagnosis change, or new procedure performed?: [x] No    [] Yes (see summary sheet for update)  Subjective functional status/changes:   [] No changes reported  No pain.     OBJECTIVE    Modality rationale: decrease edema, decrease inflammation, decrease pain and increase tissue extensibility to improve the patients ability to perform ADL   Min Type Additional Details    [] Estim:  []Unatt       []IFC  []Premod                        []Other:  []w/ice   []w/heat  Position:  Location:    [] Estim: []Att    []TENS instruct  []NMES                    []Other:  []w/US   []w/ice   []w/heat  Position:  Location:    []  Traction: [] Cervical       []Lumbar                       [] Prone          []Supine                       []Intermittent   []Continuous Lbs:  [] before manual  [] after manual    []  Ultrasound: []Continuous   [] Pulsed                           []1MHz   []3MHz W/cm2:  Location:    []  Iontophoresis with dexamethasone         Location: [] Take home patch   [] In clinic   10 []  Ice     [x]  heat   post  []  Ice massage  []  Laser   []  Anodyne Position:seated  Location:(R)  Elbow/wrist    []  Laser with stim  []  Other:  Position:  Location:    []  Vasopneumatic Device Pressure:       [] lo [] med [] hi   Temperature: [] lo [] med [] hi   [x] Skin assessment post-treatment:  [x]intact []redness- no adverse reaction    []redness  adverse reaction:      min []Eval []Re-Eval       23 min Therapeutic Exercise:  [x] See flow sheet :   Rationale: increase ROM and increase strength to improve the patients ability to perform ADL      min Therapeutic Activity:  []  See flow sheet :   Rationale:   to improve the patients ability to       min Neuromuscular Re-education:  []  See flow sheet :   Rationale:   to improve the patients ability to     15 min Manual Therapy:  Effleruage/PROM  Elbow/wrist   Rationale: decrease pain, increase ROM, increase tissue extensibility and decrease edema  to perform  ADL      min Gait Training:  ___ feet with ___ device on level surfaces with ___ level of assist   Rationale: With   [x] TE   [] TA   [] neuro   [] other: Patient Education: [x] Review HEP    [] Progressed/Changed HEP based on:   [] positioning   [] body mechanics   [] transfers   [] heat/ice application    [] other:      Other Objective/Functional Measures:  Tender at  Medial  elbow    Pain Level (0-10 scale) post treatment: 0    ASSESSMENT/Changes in Function: Responded  Fairly  Well  To each there ex. Discussed  With  Pt  On  Not  Using  US  Today  Due  To no pain want  To see  It  There ex  Is  Helping. Patient will continue to benefit from skilled PT services to address functional mobility deficits, address ROM deficits, address strength deficits, analyze and address soft tissue restrictions and instruct in home and community integration to attain remaining goals. [x]  See Plan of Care  []  See progress note/recertification  []  See Discharge Summary         Progress towards goals / Updated goals:  Short Term Goals:  To be accomplished in 5 treatments:  1 patient will have established and be independent with HEP to aid with progression of skilled PT program  EVAL issued  CURRENT progressing 4/11/17, 4/13/17 4/18/17   2 patient will have pain 5/10 to aid with increase tolerance to ADLS and activties  EVAL 7  CURRENT 8 4/20/173 patient will have FOTO wrist improved to 49 to show increase tolerance to ADLS  EVAL 40  CURRENT elbow 43, wrist 41 4/20/17  Long Term Goals:  To be accomplished in 10 treatments:  1 patient will have pain 3/10 to aid with increase tolerance to ADLS and activties  EVAL 7  CURRENT 8 4/20/17  2 patient will have FOTO wrist improved to 58 to show increase tolerance to ADLS  EVAL 40  CURRENT elbow 43, wrist 41 4/20/17  3 patient will have MMT R elbow, wrist to 5 to aid with increase tolerance to ADLS and activities  EVAL 4+ for F/E at the elbow and wrist   CURRENT  4 patient will report overall 50% improvement to aid with increase tolerance to ADLS and activities ie knitting  EVAL  CURRENT    PLAN  []  Upgrade activities as tolerated     []  Continue plan of care  []  Update interventions per flow sheet       []  Discharge due to:_  []  Other:_      Balbina Michael, PTA 4/24/2017  10:31 AM    Future Appointments  Date Time Provider Olga Aragon   4/25/2017 10:30 AM Migdalia Anaya, PT [facility]   5/2/2017 10:30 AM Migdaliatj Anaya, PT [facility]   5/3/2017 8:40 AM Emma Webster MD [address] [place] [address]   5/4/2017 10:30 AM Migdalia Anaya, PT [facility]

## 2017-04-25 ENCOUNTER — HOSPITAL ENCOUNTER (OUTPATIENT)
Dept: PHYSICAL THERAPY | Age: 82
Discharge: HOME | End: 2017-04-25
Payer: MEDICARE

## 2017-04-25 PROCEDURE — 97140 MANUAL THERAPY 1/> REGIONS: CPT

## 2017-04-25 NOTE — PROGRESS NOTES
PT DAILY TREATMENT NOTE - [facility]    Patient Name: Hussain Amor  Date:2017  : 1924  [x]  Patient  Verified  Payor: Windy Lieberman / Plan: [place] MEDICARE PART A & B / Product Type: Medicare /    In time:1030  Out time:1130  Total Treatment Time (min):51  Total Timed Codes (min): 41  1:1 Treatment Time [facility] only):28  Visit #: 7 of 10    Treatment Area: Pain in right wrist [M25.531]  Pain in right elbow [M25.521]    SUBJECTIVE  Pain Level (0-10 scale):0  Any medication changes, allergies to medications, adverse drug reactions, diagnosis change, or new procedure performed?: [x] No    [] Yes (see summary sheet for update)  Subjective functional status/changes:   [] No changes reported  States she had a pain from elbow down to the inner arm to the wrist. It just hit me and then it went away.    She reports numbness in R hand at arrival.  OBJECTIVE    Modality rationale: decrease inflammation, decrease pain and increase tissue extensibility to improve the patients ability to aid with increase tolerance to ADLs and activities   Min Type Additional Details    [] Estim:  []Unatt       []IFC  []Premod                        []Other:  []w/ice   []w/heat  Position:  Location:    [] Estim: []Att    []TENS instruct  []NMES                    []Other:  []w/US   []w/ice   []w/heat  Position:  Location:    []  Traction: [] Cervical       []Lumbar                       [] Prone          []Supine                       []Intermittent   []Continuous Lbs:  [] before manual  [] after manual    []  Ultrasound: []Continuous   [] Pulsed                           []1MHz   []3MHz Location:  W/cm2:    []  Iontophoresis with dexamethasone         Location: [] Take home patch   [] In clinic   10 []  Ice     [x]  heat  post  []  Ice massage  []  Laser   []  Anodyne Position:seated  Location:R elbow and wrist    []  Laser with stim  []  Other: Position:  Location:    []  Vasopneumatic Device Pressure:       [] lo [] med [] hi Temperature: [] lo [] med [] hi   [] Skin assessment post-treatment:  []intact []redness- no adverse reaction    []redness  adverse reaction:       min []Eval                  []Re-Eval       21 min Therapeutic Exercise:  [x] See flow sheet :   Rationale: increase ROM, increase strength and improve coordination to improve the patients ability to aid with increase tolerance to ADLS and activities     min Therapeutic Activity:  []  See flow sheet :   Rationale:   to improve the patients ability to       min Neuromuscular Re-education:  []  See flow sheet :   Rationale:   to improve the patients ability to     20 min Manual Therapy:  STM Effleurage, TPR R forearm, gentle mobs R forearm   Rationale: decrease pain, increase ROM, increase tissue extensibility and decrease trigger points to aid with increase tolerance to ADLS and activities     min Gait Training:  ___ feet with ___ device on level surfaces with ___ level of assist   Rationale: With   [] TE   [] TA   [] neuro   [] other: Patient Education: [x] Review HEP    [] Progressed/Changed HEP based on:   [] positioning   [] body mechanics   [] transfers   [] heat/ice application    [] other:      Other Objective/Functional Measures: VC exercises and technique     Pain Level (0-10 scale) post treatment: 0    ASSESSMENT/Changes in Function: decrease pain past 2 sessions. Patient will continue to benefit from skilled PT services to modify and progress therapeutic interventions, address functional mobility deficits, address ROM deficits, address strength deficits, analyze and address soft tissue restrictions, analyze and cue movement patterns, analyze and modify body mechanics/ergonomics, assess and modify postural abnormalities and instruct in home and community integration to attain remaining goals.      [x]  See Plan of Care  []  See progress note/recertification  []  See Discharge Summary         Progress towards goals / Updated goals:   Short Term Goals: To be accomplished in 5 treatments:  1 patient will have established and be independent with HEP to aid with progression of skilled PT program  EVAL issued  CURRENT progressing 4/11/17, 4/13/17 4/18/17 4/25/17  2 patient will have pain 5/10 to aid with increase tolerance to ADLS and activties  EVAL 7  CURRENT0 4/25/17  3 patient will have FOTO wrist improved to 49 to show increase tolerance to ADLS  EVAL 40  CURRENT elbow 43, wrist 41 4/20/17  Long Term Goals:  To be accomplished in 10 treatments:  1 patient will have pain 3/10 to aid with increase tolerance to ADLS and activties  EVAL 7  CURRENT 0 4/25/17  2 patient will have FOTO wrist improved to 58 to show increase tolerance to ADLS  EVAL 40  CURRENT elbow 43, wrist 41 4/20/17  3 patient will have MMT R elbow, wrist to 5 to aid with increase tolerance to ADLS and activities  EVAL 4+ for F/E at the elbow and wrist   CURRENT  4 patient will report overall 50% improvement to aid with increase tolerance to ADLS and activities ie knitting  EVAL  CURRENT    PLAN  [x]  Upgrade activities as tolerated     [x]  Continue plan of care  []  Update interventions per flow sheet       []  Discharge due to:_  []  Other:_      Dulce Maria Wellington, PT 4/25/2017  10:56 AM

## 2017-05-02 ENCOUNTER — HOSPITAL ENCOUNTER (OUTPATIENT)
Dept: PHYSICAL THERAPY | Age: 82
Discharge: HOME | End: 2017-05-02
Payer: MEDICARE

## 2017-05-02 PROCEDURE — 97110 THERAPEUTIC EXERCISES: CPT

## 2017-05-02 PROCEDURE — 97140 MANUAL THERAPY 1/> REGIONS: CPT

## 2017-05-02 PROCEDURE — 97035 APP MDLTY 1+ULTRASOUND EA 15: CPT

## 2017-05-03 ENCOUNTER — OFFICE VISIT (OUTPATIENT)
Dept: CARDIOLOGY CLINIC | Age: 82
End: 2017-05-03

## 2017-05-03 VITALS
OXYGEN SATURATION: 98 % | HEART RATE: 48 BPM | DIASTOLIC BLOOD PRESSURE: 70 MMHG | HEIGHT: 62 IN | BODY MASS INDEX: 26.68 KG/M2 | SYSTOLIC BLOOD PRESSURE: 150 MMHG | WEIGHT: 145 LBS

## 2017-05-03 DIAGNOSIS — Z98.890 HISTORY OF CARDIAC CATH: ICD-10-CM

## 2017-05-03 DIAGNOSIS — I10 ESSENTIAL HYPERTENSION: ICD-10-CM

## 2017-05-03 DIAGNOSIS — R61 DIAPHORESIS: ICD-10-CM

## 2017-05-03 DIAGNOSIS — I20.8 EXERTIONAL ANGINA (HCC): Primary | ICD-10-CM

## 2017-05-03 DIAGNOSIS — R06.09 DOE (DYSPNEA ON EXERTION): ICD-10-CM

## 2017-05-03 DIAGNOSIS — E78.00 HYPERCHOLESTEROLEMIA: ICD-10-CM

## 2017-05-03 NOTE — MR AVS SNAPSHOT
Visit Information Date & Time Provider Department Dept. Phone Encounter #  
 5/3/2017  8:40 AM Sanket Laws MD [facility] [phone] [id number] Follow-up Instructions Return in about 3 months (around 8/3/2017). Upcoming Health Maintenance Date Due DTaP/Tdap/Td series (1 - Tdap) 2/17/1945 ZOSTER VACCINE AGE 60> 2/17/1984 GLAUCOMA SCREENING Q2Y 2/17/1989 OSTEOPOROSIS SCREENING (DEXA) 2/17/1989 Pneumococcal 65+ Low/Medium Risk (1 of 2 - PCV13) 2/17/1989 MEDICARE YEARLY EXAM 2/17/1989 INFLUENZA AGE 9 TO ADULT 8/1/2017 Allergies as of 5/3/2017  Review Complete On: 5/3/2017 By: Sanket Laws MD  
  
 Severity Noted Reaction Type Reactions Codeine Medium 11/03/2016    Hives Dapiprazole  11/03/2016    Vertigo Elavil  11/03/2016    Vertigo Voltaren [Diclofenac Sodium]  11/03/2016    Vertigo Current Immunizations  Never Reviewed No immunizations on file. Not reviewed this visit You Were Diagnosed With   
  
 Codes Comments History of cardiac cath    -  Primary ICD-10-CM: E60.902 ICD-9-CM: V45.89   
 POP (dyspnea on exertion)     ICD-10-CM: R06.09 
ICD-9-CM: 786.09 Essential hypertension     ICD-10-CM: I10 
ICD-9-CM: 401.9 Hypercholesterolemia     ICD-10-CM: E78.00 ICD-9-CM: 272.0 Diaphoresis     ICD-10-CM: R61 
ICD-9-CM: 780.8 Vitals BP Pulse Height(growth percentile) Weight(growth percentile) SpO2 BMI  
 150/70 (!) 48 5' 2\" (1.575 m) 145 lb (65.8 kg) 98% 26.52 kg/m2 Smoking Status Never Smoker Vitals History BMI and BSA Data Body Mass Index Body Surface Area  
 26.52 kg/m 2 1.7 m 2 Preferred Pharmacy Pharmacy Name Phone [facility], [address] [place] [address] [address] Your Updated Medication List  
  
   
This list is accurate as of: 5/3/17  9:47 AM.  Always use your most recent med list.  
  
  
 amLODIPine 5 mg tablet Commonly known as:  Herlene Pupa Take 1 Tab by mouth daily. aspirin delayed-release 81 mg tablet Take 162 mg by mouth daily. atenolol 50 mg tablet Commonly known as:  TENORMIN Take 1 Tab by mouth daily. atorvastatin 40 mg tablet Commonly known as:  LIPITOR Take 1 Tab by mouth daily. CALCIUM + D PO Take 1,200 mg by mouth daily. CeleBREX 200 mg capsule Generic drug:  celecoxib Take 200 mg by mouth two (2) times a day. I-CAPS PO Take  by mouth.  
  
 isosorbide mononitrate ER 60 mg CR tablet Commonly known as:  IMDUR Take 1 Tab by mouth every morning. nitroglycerin 0.4 mg SL tablet Commonly known as:  NITROSTAT  
by SubLINGual route every five (5) minutes as needed for Chest Pain. psyllium Powd Commonly known as:  METAMUCIL Take 1 Packet by mouth. STOOL SOFTENER PO Take  by mouth. SYNTHROID 75 mcg tablet Generic drug:  levothyroxine Take 75 mcg by mouth Daily (before breakfast). TYLENOL 325 mg tablet Generic drug:  acetaminophen Take  by mouth every four (4) hours as needed for Pain. We Performed the Following AMB POC EKG ROUTINE W/ 12 LEADS, INTER & REP [67651 CPT(R)] Follow-up Instructions Return in about 3 months (around 8/3/2017). To-Do List   
 05/04/2017 10:30 AM  
  Appointment with Valentina Bhatia PT at [facility] ([phone]) Introducing [facility]! Tha Rivera introduces [employer] patient portal. Now you can access parts of your medical record, email your doctor's office, and request medication refills online. 1. In your internet browser, go to [email or web address] 2. Click on the First Time User? Click Here link in the Sign In box. You will see the New Member Sign Up page. 3. Enter your [employer] Access Code exactly as it appears below.  You will not need to use this code after youve completed the sign-up process. If you do not sign up before the expiration date, you must request a new code. · [employer] Access Code: [email or web address] Expires: 7/3/2017  1:51 PM 
 
4. Enter the last four digits of your Social Security Number (xxxx) and Date of Birth (mm/dd/yyyy) as indicated and click Submit. You will be taken to the next sign-up page. 5. Create a [employer] ID. This will be your [employer] login ID and cannot be changed, so think of one that is secure and easy to remember. 6. Create a [employer] password. You can change your password at any time. 7. Enter your Password Reset Question and Answer. This can be used at a later time if you forget your password. 8. Enter your e-mail address. You will receive e-mail notification when new information is available in [address]. 9. Click Sign Up. You can now view and download portions of your medical record. 10. Click the Download Summary menu link to download a portable copy of your medical information. If you have questions, please visit the Frequently Asked Questions section of the [employer] website. Remember, [employer] is NOT to be used for urgent needs. For medical emergencies, dial 911. Now available from your iPhone and Android! Please provide this summary of care documentation to your next provider. Your primary care clinician is listed as Arti Leo. If you have any questions after today's visit, please call [phone].

## 2017-05-03 NOTE — PROGRESS NOTES
1. Have you been to the ER, urgent care clinic since your last visit? Hospitalized since your last visit? no  2. Have you seen or consulted any other health care providers outside of the [facility] since your last visit? Include any pap smears or colon screening.  no

## 2017-05-03 NOTE — PROGRESS NOTES
HISTORY OF PRESENT ILLNESS  Kodi Wilson is a 80 y.o. female. HPI  She has run out of the Ranexa and she has not filled the prescription for Imdur. She continues with the exertional diaphoresis, chest tightness and pressure, and shortness of breath. She has not been doing much activity because of the fear of sweating and chest tightness. She denies resting dyspnea, orthopnea or PND. She has had no resting chest tightness. She denies palpitations, dizziness or syncope. She has had no symptoms to indicate TIA or amaurosis fugax. She states that she cannot tell the difference on or off of Ranexa since she avoided any physical activities while she was on the Ranexa. [place]. She has history of cardiac catheterization in 2009, which apparently demonstrated:   1. Patent left main trunk    2. 50-90% long stenosis of the LAD in the proximal to mid segment    3. 90% significant stenosis of the proximal first diagonal branch    4. 90% stenosis of the second diagonal branch    5. 80% stenosis of the circumflex artery at the origin of the obtuse marginal branch    6. 80% stenosis of the proximal dominant RCA with diffuse changes.      She was recommended to have stenting or bypass surgery, which she declined. She has done well over the eight years with no acute coronary event. In the past few years, she has been experiencing diaphoresis and some funny feelings in the chest with lifting heavy objects, but not by walking.      She is a nonsmoker. She has history of hypertension, but no diabetes mellitus. She has been on Zocor for hypercholesterolemia. She does have a family history of coronary artery disease. Review of Systems   Constitutional: Positive for diaphoresis and malaise/fatigue. Negative for weight loss. HENT: Negative for hearing loss. Eyes: Negative for blurred vision and double vision. Respiratory: Positive for shortness of breath. Cardiovascular: Positive for chest pain. Negative for palpitations, orthopnea, claudication, leg swelling and PND. Gastrointestinal: Negative for blood in stool, heartburn and melena. Genitourinary: Negative for dysuria, frequency, hematuria and urgency. Musculoskeletal: Negative for back pain and joint pain. Skin: Negative for itching and rash. Neurological: Negative for dizziness, loss of consciousness, weakness and headaches. Psychiatric/Behavioral: Negative for depression and memory loss. Physical Exam   Constitutional: She is oriented to person, place, and time. She appears well-developed and well-nourished. HENT:   Head: Normocephalic and atraumatic. Eyes: Conjunctivae are normal. Pupils are equal, round, and reactive to light. Neck: Normal range of motion. Neck supple. No JVD present. Cardiovascular: Regular rhythm, S1 normal and S2 normal.   No extrasystoles are present. Bradycardia present. PMI is not displaced. Exam reveals no gallop and no friction rub. Murmur heard. Harsh early systolic murmur is present with a grade of 1/6  at the upper right sternal border  Pulses:       Carotid pulses are 3+ on the right side, and 3+ on the left side. Pulmonary/Chest: Effort normal. She has no rales. Abdominal: Soft. There is no tenderness. Musculoskeletal: She exhibits no edema. Neurological: She is alert and oriented to person, place, and time. No cranial nerve deficit. Skin: Skin is warm and dry. Psychiatric: She has a normal mood and affect. Her behavior is normal.     Visit Vitals    /70    Pulse (!) 48    Ht 5' 2\" (1.575 m)    Wt 65.8 kg (145 lb)    SpO2 98%    BMI 26.52 kg/m2       Past Medical History:   Diagnosis Date    CAD (coronary artery disease) 2008    Fibromyalgia     Heart failure (HCC)     Pt. states sge gas 5 heart blocks.     Hypercholesterolemia     Hypertension     Thyroid disease        Social History     Social History    Marital status:      Spouse name: N/A    Number of children: N/A    Years of education: N/A     Occupational History    Not on file. Social History Main Topics    Smoking status: Never Smoker    Smokeless tobacco: Never Used    Alcohol use No    Drug use: No    Sexual activity: Not on file     Other Topics Concern    Not on file     Social History Narrative       History reviewed. No pertinent family history. Past Surgical History:   Procedure Laterality Date    HX CHOLECYSTECTOMY         Current Outpatient Prescriptions   Medication Sig Dispense Refill    atorvastatin (LIPITOR) 40 mg tablet Take 1 Tab by mouth daily. 90 Tab 3    aspirin delayed-release 81 mg tablet Take 162 mg by mouth daily.  acetaminophen (TYLENOL) 325 mg tablet Take  by mouth every four (4) hours as needed for Pain.  DOCUSATE CALCIUM (STOOL SOFTENER PO) Take  by mouth.  nitroglycerin (NITROSTAT) 0.4 mg SL tablet by SubLINGual route every five (5) minutes as needed for Chest Pain.  ANTIOX#10/OM3/DHA/EPA/LUT/ZEAX (I-CAPS PO) Take  by mouth.  amLODIPine (NORVASC) 5 mg tablet Take 1 Tab by mouth daily. 90 Tab 3    atenolol (TENORMIN) 50 mg tablet Take 1 Tab by mouth daily. 90 Tab 3    levothyroxine (SYNTHROID) 75 mcg tablet Take 75 mcg by mouth Daily (before breakfast).  celecoxib (CELEBREX) 200 mg capsule Take 200 mg by mouth two (2) times a day.  CALCIUM CARBONATE/VITAMIN D3 (CALCIUM + D PO) Take 1,200 mg by mouth daily.  psyllium (METAMUCIL) powd Take 1 Packet by mouth.  isosorbide mononitrate ER (IMDUR) 60 mg CR tablet Take 1 Tab by mouth every morning. (Patient not taking: Reported on 5/3/2017) 30 Tab 6       EKG: nonspecific ST and T waves changes, sinus bradycardia, PAC's noted, HR slower than 2/1/17  . ASSESSMENT and PLAN  Encounter Diagnoses   Name Primary?     Exertional angina (HCC) Yes    History of coronary artery disease s/p cath 2009 3vCAD 90%LAD,90% D1,80% Cx,80% prox dRCA     POP (dyspnea on exertion)     Essential hypertension     Hypercholesterolemia     Diaphoresis    This patient appears to have had a great deal of exertional angina, which has been minimized by reducing activities. Her exertional diaphoresis, chest fullness, and shortness of breath appear to be all related to exertional angina. This was discussed with the patient and her son and they seemed to have a good understanding. They are reluctant to go back on the Ranexa because of the high cost.  We will try Imdur first.  They were advised that she does have enough coronary anatomy to cause anginal symptoms. At age [de-identified], after eight years, I would be quite reluctant to consider coronary bypass grafting at this time. I am not able to increase the betablocker any further because of the resting bradycardia. Her blood pressure of 150/70 appears to be good enough for this patient at age [de-identified] particularly with her ongoing exertional angina.

## 2017-05-04 ENCOUNTER — HOSPITAL ENCOUNTER (OUTPATIENT)
Dept: PHYSICAL THERAPY | Age: 82
Discharge: HOME | End: 2017-05-04
Payer: MEDICARE

## 2017-05-04 PROCEDURE — 97035 APP MDLTY 1+ULTRASOUND EA 15: CPT

## 2017-05-04 PROCEDURE — 97110 THERAPEUTIC EXERCISES: CPT

## 2017-05-04 PROCEDURE — 97140 MANUAL THERAPY 1/> REGIONS: CPT

## 2017-05-04 NOTE — PROGRESS NOTES
PT DAILY TREATMENT NOTE - [facility] 3-16    Patient Name: Lainey Patel  Date:2017  : 1924  [x]  Patient  Verified  Payor: [place] MEDICARE / Plan: [place] MEDICARE PART A & B / Product Type: Medicare /    In time:1020  Out time:1130  Total Treatment Time (min): 67  Total Timed Codes (min):57  1:1 Treatment Time ( [address] only): 62  Visit #: 8 of 10    Treatment Area: Pain in right wrist [M25.531]  Pain in right elbow [M25.521]    SUBJECTIVE  Pain Level (0-10 scale): 3  Any medication changes, allergies to medications, adverse drug reactions, diagnosis change, or new procedure performed?: [x] No    [] Yes (see summary sheet for update)  Subjective functional status/changes:   [] No changes reported  It is hurting some today. I get the pains shooting sometimes still in the elbow and arm.     OBJECTIVE    Modality rationale: decrease inflammation, decrease pain and increase tissue extensibility to improve the patients ability to aid with increase tolerance to ADLs   Min Type Additional Details    [] Estim:  []Unatt       []IFC  []Premod                        []Other:  []w/ice   []w/heat  Position:  Location:    [] Estim: []Att    []TENS instruct  []NMES                    []Other:  []w/US   []w/ice   []w/heat  Position:  Location:    []  Traction: [] Cervical       []Lumbar                       [] Prone          []Supine                       []Intermittent   []Continuous Lbs:  [] before manual  [] after manual   1 [x]  Ultrasound: [x]Continuous   [] Pulsed         12                  [x]1MHz   []3MHz Location: R Radial forearm and medial elbow/lateral elbow/olecranon  W/cm2:    []  Iontophoresis with dexamethasone         Location: [] Take home patch   [] In clinic   10 []  Ice     [x]  Heat pre  []  Ice massage  []  Laser   []  Anodyne Position:seated  Location:R elbow and R wrist    []  Laser with stim  []  Other: Position:  Location:    []  Vasopneumatic Device Pressure:       [] lo [] med [] hi   Temperature: [] lo [] med [] hi   [] Skin assessment post-treatment:  []intact []redness- no adverse reaction    []redness  adverse reaction:       min []Eval                  []Re-Eval       35 min Therapeutic Exercise:  [x] See flow sheet :   Rationale: increase ROM, increase strength and improve coordination to improve the patients ability to aid with increase tolerance to ADLS and activities     min Therapeutic Activity:  []  See flow sheet :   Rationale:   to improve the patients ability to       min Neuromuscular Re-education:  []  See flow sheet :   Rationale:   to improve the patients ability to     10 min Manual Therapy:  STM Effleurage R forearm and elbow   Rationale: decrease pain, increase ROM, increase tissue extensibility and decrease trigger points to aid with increase tolerance to ADLS and activities     min Gait Training:  ___ feet with ___ device on level surfaces with ___ level of assist   Rationale: With   [] TE   [] TA   [] neuro   [] other: Patient Education: [x] Review HEP    [] Progressed/Changed HEP based on:   [] positioning   [] body mechanics   [] transfers   [] heat/ice application    [] other:      Other Objective/Functional Measures: Increased web to red level, tbar to green level, added 1/2 kg to bicep and wrist curls. Pain Level (0-10 scale) post treatment: <3    ASSESSMENT/Changes in Function:residual olecranon and medial/lateral elbow pain, radial forearm TTP and pain. Patient will continue to benefit from skilled PT services to modify and progress therapeutic interventions, address functional mobility deficits, address ROM deficits, address strength deficits, analyze and address soft tissue restrictions, analyze and cue movement patterns, analyze and modify body mechanics/ergonomics, assess and modify postural abnormalities and instruct in home and community integration to attain remaining goals.      [x]  See Plan of Care  []  See progress note/recertification  []  See Discharge Summary         Progress towards goals / Updated goals:   Short Term Goals: To be accomplished in 5 treatments:  1 patient will have established and be independent with HEP to aid with progression of skilled PT program  EVAL issued  CURRENT progressing 4/11/17, 4/13/17 4/18/17 4/25/17  2 patient will have pain 5/10 to aid with increase tolerance to ADLS and activties  EVAL 7  CURRENT0 4/25/17  3 patient will have FOTO wrist improved to 49 to show increase tolerance to ADLS  EVAL 40  CURRENT elbow 43, wrist 41 4/20/17  Long Term Goals: To be accomplished in 10 treatments:  1 patient will have pain 3/10 to aid with increase tolerance to ADLS and activties  EVAL 7  CURRENT 0 4/25/17  2 patient will have FOTO wrist improved to 58 to show increase tolerance to ADLS  EVAL 40  CURRENT elbow 43, wrist 41 4/20/17  3 patient will have MMT R elbow, wrist to 5 to aid with increase tolerance to ADLS and activities  EVAL 4+ for F/E at the elbow and wrist   CURRENT  4 patient will report overall 50% improvement to aid with increase tolerance to ADLS and activities ie knitting  EVAL  CURRENT    PLAN  [x]  Upgrade activities as tolerated     [x]  Continue plan of care  []  Update interventions per flow sheet       []  Discharge due to:_  [x]  Other:_ recheck goals for MD note, recheck FOTO, ?  DC to attempted self management     Edward Bassett, PT 5/4/2017  10:32 AM

## 2017-05-09 ENCOUNTER — HOSPITAL ENCOUNTER (OUTPATIENT)
Dept: PHYSICAL THERAPY | Age: 82
Discharge: HOME | End: 2017-05-09
Payer: MEDICARE

## 2017-05-09 PROCEDURE — 97140 MANUAL THERAPY 1/> REGIONS: CPT

## 2017-05-09 PROCEDURE — G8985 CARRY GOAL STATUS: HCPCS

## 2017-05-09 PROCEDURE — 97035 APP MDLTY 1+ULTRASOUND EA 15: CPT

## 2017-05-09 PROCEDURE — G8986 CARRY D/C STATUS: HCPCS

## 2017-05-09 NOTE — PROGRESS NOTES
PT DISCHARGE DAILY NOTE AND [id number]    Date:2017  Patient name: Gaby Page Start of Care: 2017   Referral source: Kelly Nassar MD : [id number]    Medical Diagnosis: Pain in right wrist [M25.531]  Pain in right elbow [M25.521] Onset Date:longstanding    Treatment Diagnosis: decrease tolerance to ADLs and activities due to R lateral elbow and R lateral wrist pain, LOM, decrease strength   Prior Hospitalization: see medical history Provider#: [id number]   Medications: Verified on Patient summary List   Comorbidities: Carpal tunnel R UE , fibromyalgia, heart disease, arthritis, thyroid problems, HTN, hearing impaired  Prior Level of Function: I,uses Rollator, I all areas of ADLs and activities, enjoys knitting and reading        Visits from Start of Care: 10    Missed Visits: 0    Reporting Period : 17 to 17    [x]  Patient  Verified  Payor: [place] MEDICARE / Plan: VA MEDICARE PART A & B / Product Type: Medicare /    In time:1128  Out time:1240  Total Treatment Time (min): 48  Total Timed Codes (min): 48  1:1 Treatment Time ( [address] only): 30   Visit #: 10 of 10    SUBJECTIVE  Pain Level (0-10 scale): 1  Any medication changes, allergies to medications, adverse drug reactions, diagnosis change, or new procedure performed?: [x] No    [] Yes (see summary sheet for update)  Subjective functional status/changes:   [] No changes reported  Pt reports that she is doing better and is able to perform most of her usual activities. She reports increased pain after turning her mattress. Pt reports 85% improvement since start of treatment.   Pain is no longer waking me up at night    OBJECTIVE    Modality rationale: decrease inflammation, decrease pain and increase tissue extensibility to improve the patients ability to perform increased ADL   Min Type Additional Details    [] Estim:  []Unatt       []IFC  []Premod                        []Other:  []w/ice   []w/heat  Position:  Location:    [] Estim: []Att []TENS instruct  []NMES                    []Other:  []w/US   []w/ice   []w/heat  Position:  Location:    []  Traction: [] Cervical       []Lumbar                       [] Prone          []Supine                       []Intermittent   []Continuous Lbs:  [] before manual  [] after manual    [x]  Ultrasound: [x]Continuous   [] Pulsed      8                     [x]1MHz   []3MHz W/cm2: 1.8  Location: (R) wrist extensors and distal triceps tendon    []  Iontophoresis with dexamethasone         Location: [] Take home patch   [] In clinic    []  Ice     []  heat  []  Ice massage  []  Laser   []  Anodyne Position:  Location:    []  Laser with stim  []  Other:  Position:  Location:    []  Vasopneumatic Device Pressure:       [] lo [] med [] hi   Temperature: [] lo [] med [] hi   [x] Skin assessment post-treatment:  [x]intact []redness- no adverse reaction    []redness  adverse reaction:     20 min Therapeutic Exercise:  [x] See flow sheet :   Rationale: increase ROM, increase strength and improve coordination to improve the patients ability to perform increased ADL    20 min Manual Therapy:  STM/DTM (R) forearm and distal triceps tendon   Rationale: decrease pain, increase ROM, increase tissue extensibility and decrease trigger points to perform increased ADL        With   [x] TE   [] TA   [] neuro   [] other: Patient Education: [x] Review HEP    [] Progressed/Changed HEP based on:   [] positioning   [] body mechanics   [] transfers   [] heat/ice application    [] other:      Other Objective/Functional Measures:   - Pt continues to have multiple tender points in (R) wrist extensor muscles  - MMT (R) Wrist Ext 5 Flx 4+, elbow Flx 4+ Ext 5-  - Good ROM (R) Elbow and wrist      Pain Level (0-10 scale) post treatment: 0    Summary of Care:  Progress towards goals / Updated goals:  Short Term Goals:  To be accomplished in 5 treatments:  1 patient will have established and be independent with HEP to aid with progression of skilled PT program  EVAL issued  CURRENT Pt reports compliance 5/9/17  2 patient will have pain 5/10 to aid with increase tolerance to ADLS and activties  EVAL 7  CURRENT Met 0 4/25/17  3 patient will have FOTO wrist improved to 49 to show increase tolerance to ADLS  EVAL 40  CURRENT Elbow 63, Wrist 61 5/9/17  Long Term Goals: To be accomplished in 10 treatments:  1 patient will have pain 3/10 to aid with increase tolerance to ADLS and activties  EVAL 7  CURRENT Met at pain 1 5/9/17  2 patient will have FOTO wrist improved to 58 to show increase tolerance to ADLS  EVAL 40  CURRENT Met at Wrist 61 and Elbow 63 5/9/17  3 patient will have MMT R elbow, wrist to 5 to aid with increase tolerance to ADLS and activities  EVAL 4+ for F/E at the elbow and wrist   CURRENT Progressing at Gross 4+-5-/5 5/9/17  4 patient will report overall 50% improvement to aid with increase tolerance to ADLS and activities ie knitting  EVAL  CURRENT Met at 85% 5/9/17    ASSESSMENT/Changes in Function: Patient has shown good progress with this treatment program. Pain as of last visit was 1/10. Patient has shown decreased pain and increased strength and mobility. Patient reports 85% improvement with overall involvement. FOTO score is Elbow 63 and Wrist 61. All STG/LTGs achieved as identified above. Fall Risk Assessment: Patient demonstrates a low Fall Risk with use of 4WW aid. G-Codes (GP)  Carry    Goal  CK= 40-59%   D/C  CJ= 20-39%    The severity rating is based on clinical judgment and the FOTO score.       Thank you for this referral!     PLAN  [x]Discontinue therapy: [x]Patient has reached or is progressing toward set goals      []Patient is non-compliant or has abdicated      []Due to lack of appreciable progress towards set goals    Pritesh Diamond, PT 5/9/2017  12:17 PM

## 2017-08-09 ENCOUNTER — OFFICE VISIT (OUTPATIENT)
Dept: CARDIOLOGY CLINIC | Age: 82
End: 2017-08-09

## 2017-08-09 VITALS
HEIGHT: 62 IN | SYSTOLIC BLOOD PRESSURE: 140 MMHG | OXYGEN SATURATION: 98 % | WEIGHT: 143 LBS | DIASTOLIC BLOOD PRESSURE: 80 MMHG | BODY MASS INDEX: 26.31 KG/M2 | HEART RATE: 46 BPM

## 2017-08-09 DIAGNOSIS — R00.1 BRADYCARDIA: ICD-10-CM

## 2017-08-09 DIAGNOSIS — R61 DIAPHORESIS: ICD-10-CM

## 2017-08-09 DIAGNOSIS — I10 ESSENTIAL HYPERTENSION: ICD-10-CM

## 2017-08-09 DIAGNOSIS — I25.10 CORONARY ARTERY DISEASE INVOLVING NATIVE CORONARY ARTERY OF NATIVE HEART WITHOUT ANGINA PECTORIS: Primary | ICD-10-CM

## 2017-08-09 DIAGNOSIS — E78.00 HYPERCHOLESTEROLEMIA: ICD-10-CM

## 2017-08-09 DIAGNOSIS — I20.8 EXERTIONAL ANGINA (HCC): ICD-10-CM

## 2017-08-09 NOTE — MR AVS SNAPSHOT
Visit Information Date & Time Provider Department Dept. Phone Encounter #  
 8/9/2017  9:40 AM Monica Rondon MD [facility] [address] [id number] Upcoming Health Maintenance Date Due DTaP/Tdap/Td series (1 - Tdap) 2/17/1945 ZOSTER VACCINE AGE 60> 12/17/1983 GLAUCOMA SCREENING Q2Y 2/17/1989 OSTEOPOROSIS SCREENING (DEXA) 2/17/1989 Pneumococcal 65+ Low/Medium Risk (1 of 2 - PCV13) 2/17/1989 MEDICARE YEARLY EXAM 2/17/1989 INFLUENZA AGE 9 TO ADULT 8/1/2017 Allergies as of 8/9/2017  Review Complete On: 8/9/2017 By: Monica Rondon MD  
  
 Severity Noted Reaction Type Reactions Codeine Medium 11/03/2016    Hives Dapiprazole  11/03/2016    Vertigo Elavil  11/03/2016    Vertigo Voltaren [Diclofenac Sodium]  11/03/2016    Vertigo Current Immunizations  Never Reviewed No immunizations on file. Not reviewed this visit You Were Diagnosed With   
  
 Codes Comments Exertional angina (HCC)    -  Primary ICD-10-CM: I20.8 ICD-9-CM: 413.9 Coronary artery disease involving native coronary artery of native heart without angina pectoris     ICD-10-CM: I25.10 ICD-9-CM: 414.01 Essential hypertension     ICD-10-CM: I10 
ICD-9-CM: 401.9 Hypercholesterolemia     ICD-10-CM: E78.00 ICD-9-CM: 272.0 Diaphoresis     ICD-10-CM: R61 
ICD-9-CM: 780.8 Vitals BP Pulse Height(growth percentile) Weight(growth percentile) SpO2 BMI  
 140/80 (!) 46 5' 2\" (1.575 m) 143 lb (64.9 kg) 98% 26.16 kg/m2 Smoking Status Never Smoker Vitals History BMI and BSA Data Body Mass Index Body Surface Area  
 26.16 kg/m 2 1.68 m 2 Preferred Pharmacy Pharmacy Name Phone 305 [facility], [address] [place] [address] [address] Your Updated Medication List  
  
   
This list is accurate as of: 8/9/17 11:15 AM.  Always use your most recent med list.  
  
  
  
  
 amLODIPine 5 mg tablet Commonly known as:  [place] Take 1 Tab by mouth daily. aspirin delayed-release 81 mg tablet Take 162 mg by mouth daily. atenolol 50 mg tablet Commonly known as:  TENORMIN Take 1 Tab by mouth daily. atorvastatin 40 mg tablet Commonly known as:  LIPITOR Take 1 Tab by mouth daily. CALCIUM + D PO Take 1,200 mg by mouth daily. CeleBREX 200 mg capsule Generic drug:  celecoxib Take 200 mg by mouth two (2) times a day. I-CAPS PO Take  by mouth.  
  
 isosorbide mononitrate ER 60 mg CR tablet Commonly known as:  IMDUR Take 1 Tab by mouth every morning. nitroglycerin 0.4 mg SL tablet Commonly known as:  NITROSTAT  
by SubLINGual route every five (5) minutes as needed for Chest Pain. psyllium Powd Commonly known as:  METAMUCIL Take 1 Packet by mouth. STOOL SOFTENER PO Take  by mouth. SYNTHROID 75 mcg tablet Generic drug:  levothyroxine Take 75 mcg by mouth Daily (before breakfast). TYLENOL 325 mg tablet Generic drug:  acetaminophen Take  by mouth every four (4) hours as needed for Pain. We Performed the Following AMB POC EKG ROUTINE W/ 12 LEADS, INTER & REP [71500 CPT(R)] Patient Instructions Continue current medications. If you have any further questions or concerns, please contact our office. [phone] Introducing [facility]! José Miguel Morales introduces [employer] patient portal. Now you can access parts of your medical record, email your doctor's office, and request medication refills online. 1. In your internet browser, go to [email or web address] 2. Click on the First Time User? Click Here link in the Sign In box. You will see the New Member Sign Up page. 3. Enter your [employer] Access Code exactly as it appears below. You will not need to use this code after youve completed the sign-up process.  If you do not sign up before the expiration date, you must request a new code. · [employer] Access Code: [email or web address] Expires: 10/25/2017  4:27 PM 
 
4. Enter the last four digits of your Social Security Number (xxxx) and Date of Birth (mm/dd/yyyy) as indicated and click Submit. You will be taken to the next sign-up page. 5. Create a [employer] ID. This will be your [employer] login ID and cannot be changed, so think of one that is secure and easy to remember. 6. Create a [employer] password. You can change your password at any time. 7. Enter your Password Reset Question and Answer. This can be used at a later time if you forget your password. 8. Enter your e-mail address. You will receive e-mail notification when new information is available in [address] 9. Click Sign Up. You can now view and download portions of your medical record. 10. Click the Download Summary menu link to download a portable copy of your medical information. If you have questions, please visit the Frequently Asked Questions section of the [employer] website. Remember, [employer] is NOT to be used for urgent needs. For medical emergencies, dial 911. Now available from your iPhone and Android! Please provide this summary of care documentation to your next provider. Your primary care clinician is listed as [facility] [employer]. If you have any questions after today's visit, please call [phone].

## 2017-08-09 NOTE — PROGRESS NOTES
1. Have you been to the ER, urgent care clinic since your last visit? Hospitalized since your last visit? no  2. Have you seen or consulted any other health care providers outside of the [facility] since your last visit? Include any pap smears or colon screening.   no

## 2017-08-09 NOTE — PROGRESS NOTES
HISTORY OF PRESENT ILLNESS  Angelina Stephen is a 80 y.o. female. HPI  She is still looking very good for her age and is quite sharp in her mentality. She indicates that she has not had much chest pain or shortness of breath since she has been using Imdur twice daily. She has not been taking Ranexa. She denies dyspnea, orthopnea or PND. She denies palpitations, dizziness or syncope. She also denies excessive diaphoresis with exertion. She denies any symptoms to indicate TIA or amaurosis fugax. [place] is from [place]. She has history of cardiac catheterization in 2009, which apparently demonstrated:   1. Patent left main trunk    2. 50-90% long stenosis of the LAD in the proximal to mid segment    3. 90% significant stenosis of the proximal first diagonal branch    4. 90% stenosis of the second diagonal branch    5. 80% stenosis of the circumflex artery at the origin of the obtuse marginal branch    6. 80% stenosis of the proximal dominant RCA with diffuse changes.       She was recommended to have stenting or bypass surgery, which she declined. She has done well over the eight years with no acute coronary event. In the past few years, she has been experiencing diaphoresis and some funny feelings in the chest with lifting heavy objects, but not by walking.       She is a nonsmoker. She has history of hypertension, but no diabetes mellitus. She has been on Zocor for hypercholesterolemia. She does have a family history of coronary artery disease. Review of Systems   Constitutional: Negative for malaise/fatigue and weight loss. HENT: Positive for hearing loss. Eyes: Negative for blurred vision and double vision. Respiratory: Negative for shortness of breath. Cardiovascular: Positive for chest pain. Negative for palpitations, orthopnea, claudication, leg swelling and PND. Gastrointestinal: Negative for blood in stool, heartburn and melena.    Genitourinary: Negative for dysuria, frequency, hematuria and urgency. Musculoskeletal: Positive for falls. Negative for back pain and joint pain. Skin: Negative for itching and rash. Neurological: Positive for headaches. Negative for dizziness, loss of consciousness and weakness. Psychiatric/Behavioral: Negative for depression and memory loss. Physical Exam   Constitutional: She is oriented to person, place, and time. She appears well-developed and well-nourished. HENT:   Head: Normocephalic and atraumatic. Eyes: Conjunctivae are normal. Pupils are equal, round, and reactive to light. Neck: Normal range of motion. Neck supple. No JVD present. Cardiovascular: Normal rate, regular rhythm, S1 normal and S2 normal.   No extrasystoles are present. PMI is not displaced. Exam reveals no gallop and no friction rub. Murmur heard. Harsh early systolic murmur is present with a grade of 1/6  at the upper right sternal border  Pulses:       Carotid pulses are 3+ on the right side, and 3+ on the left side. Pulmonary/Chest: Effort normal. She has no rales. Abdominal: Soft. There is no tenderness. Musculoskeletal: She exhibits no edema. Neurological: She is alert and oriented to person, place, and time. No cranial nerve deficit. Skin: Skin is warm and dry. Psychiatric: She has a normal mood and affect. Her behavior is normal.     Visit Vitals    /80    Pulse (!) 46    Ht 5' 2\" (1.575 m)    Wt 64.9 kg (143 lb)    SpO2 98%    BMI 26.16 kg/m2       Past Medical History:   Diagnosis Date    CAD (coronary artery disease) 2008    Fibromyalgia     Heart failure (HCC)     Pt. states sge gas 5 heart blocks.  Hypercholesterolemia     Hypertension     Thyroid disease        Social History     Social History    Marital status:      Spouse name: N/A    Number of children: N/A    Years of education: N/A     Occupational History    Not on file.      Social History Main Topics    Smoking status: Never Smoker    Smokeless tobacco: Never Used    Alcohol use No    Drug use: No    Sexual activity: Not on file     Other Topics Concern    Not on file     Social History Narrative       History reviewed. No pertinent family history. Past Surgical History:   Procedure Laterality Date    HX CHOLECYSTECTOMY         Current Outpatient Prescriptions   Medication Sig Dispense Refill    isosorbide mononitrate ER (IMDUR) 60 mg CR tablet Take 1 Tab by mouth every morning. 30 Tab 6    atorvastatin (LIPITOR) 40 mg tablet Take 1 Tab by mouth daily. 90 Tab 3    aspirin delayed-release 81 mg tablet Take 162 mg by mouth daily.  acetaminophen (TYLENOL) 325 mg tablet Take  by mouth every four (4) hours as needed for Pain.  DOCUSATE CALCIUM (STOOL SOFTENER PO) Take  by mouth.  nitroglycerin (NITROSTAT) 0.4 mg SL tablet by SubLINGual route every five (5) minutes as needed for Chest Pain.  ANTIOX#10/OM3/DHA/EPA/LUT/ZEAX (I-CAPS PO) Take  by mouth.  amLODIPine (NORVASC) 5 mg tablet Take 1 Tab by mouth daily. 90 Tab 3    atenolol (TENORMIN) 50 mg tablet Take 1 Tab by mouth daily. 90 Tab 3    levothyroxine (SYNTHROID) 75 mcg tablet Take 75 mcg by mouth Daily (before breakfast).  celecoxib (CELEBREX) 200 mg capsule Take 200 mg by mouth two (2) times a day.  CALCIUM CARBONATE/VITAMIN D3 (CALCIUM + D PO) Take 1,200 mg by mouth daily.  psyllium (METAMUCIL) powd Take 1 Packet by mouth. EKG: unchanged from previous tracings, nonspecific ST and T waves changes, sinus bradycardia. ASSESSMENT and PLAN  Encounter Diagnoses   Name Primary?  History of coronary artery disease s/p cath 2009 3vCAD 90%LAD,90% D1,80% Cx,80% prox dRCA Yes    Exertional angina ([address]     Essential hypertension     Hypercholesterolemia     Diaphoresis     Bradycardia    She has been doing very well.   She is no longer experiencing exertional-type chest pain as angina since she has been on Imdur despite the fact that she has not been taking Ranexa. She still looks pretty good for her age of [de-identified]. She has no physical findings of decompensated congestive heart failure. She does have sinus bradycardia, but she has had no symptoms from the bradycardia. Because of the bradycardia, I have not been able to increase the dose of her betablocker thus far. It does not appear to be necessary at this time since she has been essentially asymptomatic, surprisingly, since being on the Imdur. At this point, I would simply continue on the current medical regimen. There appears to be no clear-cut indication for pacemaker implantation at this time.

## 2017-08-09 NOTE — PATIENT INSTRUCTIONS
Continue current medications.   If you have any further questions or concerns, please contact our office. [phone]

## 2017-10-05 RX ORDER — ISOSORBIDE MONONITRATE 60 MG/1
TABLET, EXTENDED RELEASE ORAL
Qty: 90 TAB | Refills: 3 | Status: SHIPPED | OUTPATIENT
Start: 2017-10-05 | End: 2018-10-05 | Stop reason: SDUPTHER

## 2017-12-20 ENCOUNTER — HOSPITAL ENCOUNTER (OUTPATIENT)
Dept: GENERAL RADIOLOGY | Age: 82
Discharge: HOME | End: 2017-12-20
Attending: NURSE PRACTITIONER
Payer: MEDICARE

## 2017-12-20 ENCOUNTER — HOSPITAL ENCOUNTER (OUTPATIENT)
Dept: MAMMOGRAPHY | Age: 82
Discharge: HOME | End: 2017-12-20
Attending: NURSE PRACTITIONER
Payer: MEDICARE

## 2017-12-20 DIAGNOSIS — Z12.31 VISIT FOR SCREENING MAMMOGRAM: ICD-10-CM

## 2017-12-20 DIAGNOSIS — R05.9 COUGH: ICD-10-CM

## 2017-12-20 PROCEDURE — 77067 SCR MAMMO BI INCL CAD: CPT

## 2017-12-20 PROCEDURE — 71020 XR CHEST PA LAT: CPT

## 2017-12-29 DIAGNOSIS — E78.00 HYPERCHOLESTEROLEMIA: ICD-10-CM

## 2017-12-29 RX ORDER — ATORVASTATIN CALCIUM 40 MG/1
TABLET, FILM COATED ORAL
Qty: 90 TAB | Refills: 3 | Status: SHIPPED | OUTPATIENT
Start: 2017-12-29 | End: 2018-12-05 | Stop reason: SDUPTHER

## 2018-01-08 RX ORDER — AMLODIPINE BESYLATE 5 MG/1
TABLET ORAL
Qty: 90 TAB | Refills: 3 | Status: SHIPPED | OUTPATIENT
Start: 2018-01-08 | End: 2018-12-02 | Stop reason: SDUPTHER

## 2018-01-23 ENCOUNTER — HOSPITAL ENCOUNTER (OUTPATIENT)
Dept: PHYSICAL THERAPY | Age: 83
Discharge: HOME | End: 2018-01-23
Payer: MEDICARE

## 2018-01-23 PROCEDURE — G8985 CARRY GOAL STATUS: HCPCS

## 2018-01-23 PROCEDURE — 97110 THERAPEUTIC EXERCISES: CPT

## 2018-01-23 PROCEDURE — G8984 CARRY CURRENT STATUS: HCPCS

## 2018-01-23 PROCEDURE — 97161 PT EVAL LOW COMPLEX 20 MIN: CPT

## 2018-01-23 NOTE — PROGRESS NOTES
PT DAILY TREATMENT NOTE/ELBOW EVAL 3-16    Patient Name: Amber Praedes  Date:2018  : 1924  [x]  Patient  Verified  [place]or: [facility] [place] / Plan: [place] MEDICARE PART A & B / Product Type: Medicare /    In time:915  Out time:1000  Total Treatment Time (min): 45  Total Timed Codes (min): 16  1:1 Treatment Time ( only): 16   Visit #: 1 of     Treatment Area: Pain in left elbow [M25.522]    SUBJECTIVE  Pain Level (0-10 scale): 5  [x]constant []intermittent []improving [x]worsening []no change since onset  WORSE  Not wearing the brace , using it for activities, doing dishes, BETTER wearing the brace, keeping it warm  Any medication changes, allergies to medications, adverse drug reactions, diagnosis change, or new procedure performed?: [x] No    [] Yes (see summary sheet for update)  Subjective functional status/changes:     PLOF: I all areas of ADLs and activities, no issues with the right wrist and UE. Has been using rollator for mobility, household chores  Limitations to PLOF: pain  Mechanism of Injury: onset approx 1+ year ago, insideous onset  Current symptoms/Complaints: 79 YO female diagnosed as above and with S/S consistent with above diagnosis presents to skilled outpatient PT. CCO pain in the R arm, wrist and forearm to the elbow on the radial side from the thumb. Insideous onset 1+ year ago. Pain today is 5/10 WORSE  Not wearing the brace , using it for activities, doing dishes, BETTER wearing the brace, keeping it warm. She is not able to help as much with the household chores due to the pain , nor is she able to tolerate knitting due to the pain. Previous Treatment/Compliance: semi rigid canvas wrist supports B, heat, injection X 3. PMHx/Surgical Hx: heart disease, fibromyalgia, arthritis, thyroid problems, HTN, hearing impaired  Work Hx: retired  Living Situation:  Lives in a 2 story house, resides on the 1st floor, lives with son and daughter in law.    Pt Goals: no pain  Barriers: [x]pain []financial []time []transportation []other  Motivation: good  Substance use: []Alcohol []Tobacco []other:   FABQ Score: []low []elevate  Cognition: A & O x 4   Other:    OBJECTIVE/EXAMINATION  Domestic Life: sedentary lifestyle. Decrease tolerance to household chores and knitting due to the pain  Activity/Recreational Limitations: pain  Mobility: Rollator  Self Care:  I         Modality rationale: decrease inflammation and decrease pain to improve the patients ability to aid with increase tolerance to ADLs and activiities   Min Type Additional Details    [] Estim:  []Unatt       []IFC  []Premod                        []Other:  []w/ice   []w/heat  Position:  Location:   8 [x] Estim: [x]Att    []TENS instruct  []NMES                    [x]Other: LTO []w/US   []w/ice   []w/heat  Position:seated  Location:R snuff box    []  Traction: [] Cervical       []Lumbar                       [] Prone          []Supine                       []Intermittent   []Continuous Lbs:  [] before manual  [] after manual    []  Ultrasound: []Continuous   [] Pulsed                           []1MHz   []3MHz Location:  W/cm2:    []  Iontophoresis with dexamethasone         Location: [] Take home patch   [] In clinic    []  Ice     []  heat  []  Ice massage  []  Laser   []  Anodyne Position:  Location:    []  Laser with stim  []  Other: Position:  Location:    []  Vasopneumatic Device Pressure:       [] lo [] med [] hi   Temperature: [] lo [] med [] hi   [] Skin assessment post-treatment:  []intact []redness- no adverse reaction    []redness  adverse reaction:     29 min [x]Eval                  []Re-Eval       8 min Therapeutic Exercise:  [x] See flow sheet :   Rationale: increase ROM, increase strength and improve coordination to improve the patients ability to aid with increase tolerance to ADLS and activities     min Therapeutic Activity:  []  See flow sheet :   Rationale:   to improve the patients ability to       min Neuromuscular Re-education:  []  See flow sheet :   Rationale:   to improve the patients ability to      min Manual Therapy:     Rationale:  to       min Gait Training:  ___ feet with ___ device on level surfaces with ___ level of assist   Rationale: With   [] TE   [] TA   [] neuro   [] other: Patient Education: [x] Review HEP    [] Progressed/Changed HEP based on:   [] positioning   [] body mechanics   [] transfers   [] heat/ice application    [] other:      Other Objective/Functional Measures: FALLS risk is low to moderate. Recently reports a fall and demonstrates hands out in front to catch herself.      Physical Therapy Evaluation- Elbow    Upper Extremity ROM                        [] Unable to assess at this time   WNL N/A ROM as follows:    Left Shoulder [x] []     Right Shoulder [x] []       WNL N/A Flex Ext Sup Pro Pain   Left Elbow []  []     [] Yes   [] No   Right Elbow [] [] 140 0 UD [address] Pain [address] 30 [] Yes   [x] No   Left Wrist [] []     [] Yes   [] No   Right Wrist [] [] 30 pain 52 85 70 pulling [x] Yes   [] No     Upper Extremity Strength: (0-5)   [] Unable to assess at this time   WNL N/A Flex Ext IR ER Abd Add   L Shoulder [] []         R Shoulder [] []            WNL N/A Flex Ext Sup Pro Pain   Left Elbow [] []     [] Yes   [] No   Right Elbow [] []     [] Yes   [] No     Flexibility:                    [] Unable to assess at this time        Pain  Common Flexor Group WNL Min Mod Severe [] Yes   [] No   (L) Tightness = [] [] [] [] [] Yes   [] No   (R) Tightness =  [] [] [] [] [] Yes   [] No   Common Extensor Group     [] Yes   [] No   (L) Tightness = [] [] [] [] [] Yes   [] No   (R) Tightness = [] [] [] [] [] Yes   [] No     Palpation  [] Min  [x] Mod  [x] Severe    Location: TTP at the anatomical snuff box R hand,   [] Min  [x] Mod  [x] Severe    Location: pain with resisted thumb extension  [] Min  [x] Mod  [x] Severe    Location:hypersensitive to light touch Radial forearm       Strength:  Dynamometer position 2   Right (lbs) Left (lbs) Pain/location   Elbow Flexed: (90 deg) 20,22,25 45,35,30    Lateral pinch: lbs 10,12,11 13,13,12      Optional Tests:  Resisted Finger Test:   2nd & 3rd finger extension (ECRB or ECRL):[] Neg   [] Pos  3rd finger extension (PIN): [] Neg   [] Pos    Neural Mobility Test:    Radial Nerve: [] Neg   [] Pos    Describe:  Median Nerve: [] Neg   [] Pos    Describe:  Ulnar Nerve:  [] Neg   [] Pos    Describe:       Pain Level (0-10 scale) post treatment: 5    ASSESSMENT/Changes in Function: Patient demonstrates the potential to make gains with improved ROM, strength, endurance/activity tolerance, functional FOTO survey score and all within a reasonable time frame so as to increase their functional independence with ADLs and activities for carryover to  Improved quality of life and tolerance to household chores and knitting. Patient requires skilled Physical Therapy so as to monitor their response to and modify their treatment plan accordingly. Patient appears to be an appropriate candidate for skilled outpatient Physical Therapy. Patient will continue to benefit from skilled PT services to modify and progress therapeutic interventions, address functional mobility deficits, address ROM deficits, address strength deficits, analyze and address soft tissue restrictions, analyze and cue movement patterns, analyze and modify body mechanics/ergonomics, assess and modify postural abnormalities and instruct in home and community integration to attain remaining goals.      [x]  See Plan of Care  []  See progress note/recertification  []  See Discharge Summary         Progress towards goals / Updated goals:       PLAN  [x]  Upgrade activities as tolerated     [x]  Continue plan of care  []  Update interventions per flow sheet       []  Discharge due to:_  []  Other:_      Ashwini Mccracken, PT 1/23/2018  9:16 AM

## 2018-01-23 NOTE — MR AVS SNAPSHOT
[address] [place] [address] [phone] Patient: Eva Selby MRN: [id number] :[id number] Visit Information Date & Time Provider Department Dept. Phone Encounter #  
 2018  9:00 AM Tali Adams, PT [facility]  [facility] [phone] [id number] Your Appointments 2018 10:40 AM  
Follow Up with Emma Henry MD  
[facility] ([facility]) Appt Note: 6 month follow up [place] [address] [address] [address] [phone] [address] [place] [address] [address] Upcoming Health Maintenance Date Due DTaP/Tdap/Td series (1 - Tdap) 1945 ZOSTER VACCINE AGE 60> 1983 GLAUCOMA SCREENING Q2Y 1989 OSTEOPOROSIS SCREENING (DEXA) 1989 Pneumococcal 65+ Low/Medium Risk (1 of 2 - PCV13) 1989 MEDICARE YEARLY EXAM 1989 Influenza Age 5 to Adult 2017 Allergies as of 2018  Review Complete On: 2017 By: Sary Huynh Severity Noted Reaction Type Reactions Codeine Medium 2016    Hives Dapiprazole  2016    Vertigo Elavil  2016    Vertigo Voltaren [Diclofenac Sodium]  2016    Vertigo Current Immunizations  Never Reviewed No immunizations on file. Not reviewed this visit Vitals OB Status Smoking Status Postmenopausal Never Smoker Your Updated Medication List  
  
ASK your doctor about these medications   
 amLODIPine 5 mg tablet Commonly known as:  Naomi Raymond TAKE 1 TABLET BY MOUTH  DAILY  
  
 aspirin delayed-release 81 mg tablet Take 162 mg by mouth daily. atenolol 50 mg tablet Commonly known as:  TENORMIN Take 1 Tab by mouth daily. atorvastatin 40 mg tablet Commonly known as:  LIPITOR  
TAKE 1 TABLET BY MOUTH  DAILY  CALCIUM + D PO  
 Take 1,200 mg by mouth daily. CeleBREX 200 mg capsule Generic drug:  celecoxib Take 200 mg by mouth two (2) times a day. I-CAPS PO Take  by mouth.  
  
 isosorbide mononitrate ER 60 mg CR tablet Commonly known as:  IMDUR  
TAKE 1 TABLET BY MOUTH  EVERY MORNING  
  
 nitroglycerin 0.4 mg SL tablet Commonly known as:  NITROSTAT  
by SubLINGual route every five (5) minutes as needed for Chest Pain. psyllium Powd Commonly known as:  METAMUCIL Take 1 Packet by mouth. STOOL SOFTENER PO Take  by mouth. SYNTHROID 75 mcg tablet Generic drug:  levothyroxine Take 75 mcg by mouth Daily (before breakfast). TYLENOL 325 mg tablet Generic drug:  acetaminophen Take  by mouth every four (4) hours as needed for Pain. To-Do List   
 01/23/2018  9:00 AM  
  Appointment with Opal Zee PT at [facility] ([phone]) Introducing [address]! Randall Villalba introduces [employer] patient portal. Now you can access parts of your medical record, email your doctor's office, and request medication refills online. 1. In your internet browser, go to [email or web address] 2. Click on the First Time User? Click Here link in the Sign In box. You will see the New Member Sign Up page. 3. Enter your [employer] Access Code exactly as it appears below. You will not need to use this code after youve completed the sign-up process. If you do not sign up before the expiration date, you must request a new code. · [employer] Access Code: [email or web address] Expires: 2/27/2018  3:09 PM 
 
4. Enter the last four digits of your Social Security Number (xxxx) and Date of Birth (mm/dd/yyyy) as indicated and click Submit. You will be taken to the next sign-up page. 5. Create a [employer] ID. This will be your [employer] login ID and cannot be changed, so think of one that is secure and easy to remember. 6. Create a [employer] password. You can change your password at any time. 7. Enter your Password Reset Question and Answer. This can be used at a later time if you forget your password. 8. Enter your e-mail address. You will receive e-mail notification when new information is available in [address] 9. Click Sign Up. You can now view and download portions of your medical record. 10. Click the Download Summary menu link to download a portable copy of your medical information. If you have questions, please visit the Frequently Asked Questions section of the [employer] website. Remember, [employer] is NOT to be used for urgent needs. For medical emergencies, dial 911. Now available from your iPhone and Android! Please provide this summary of care documentation to your next provider. Your primary care clinician is listed as [facility]. If you have any questions after today's visit, please call [phone].

## 2018-01-23 NOTE — PROGRESS NOTES
[facility] [address], [address] [place] [address], [address]  [phone] fax      Plan of Care/ Statement of Necessity for Physical Therapy Services    Patient name: Marianna De Anda Start of Care: 2018   Referral source: Keyla Garza MD : 1924    Medical Diagnosis: Pain in left elbow [M25.522]   Onset Date:1+ year ago    Treatment Diagnosis: decrease tolerance to ADLS and activities due to R wrist pain, LOM, decrease strength   Prior Hospitalization: see medical history Provider#: [id number]   Medications: Verified on Patient summary List    Comorbidities: PMHx/Surgical Hx: heart disease, fibromyalgia, arthritis, thyroid problems, HTN, hearing impaired   Prior Level of Function: I all areas of ADLs and activities, no issues with the right wrist and UE. Has been using rollator for mobility, household chores      The Plan of Care and following information is based on the information from the initial evaluation. Assessment/ figueredo information:89 YO female diagnosed as above and with S/S consistent with above diagnosis presents to skilled outpatient PT. CCO pain in the R arm, wrist and forearm to the elbow on the radial side from the thumb. Insideous onset 1+ year ago. Pain today is 5/10 WORSE  Not wearing the brace , using it for activities, doing dishes, BETTER wearing the brace, keeping it warm. She is not able to help as much with the household chores due to the pain , nor is she able to tolerate knitting due to the pain. Previous Treatment/Compliance: semi rigid canvas wrist supports B, heat, injection X 3. Pain 5, FOTO 53, AROM R shoulder WNL, R elbow F 140, E 0, R wrist F 30    E 52 UD and RD 30 Pron 70  Supin 85  MMT NA. Moderate to severe TTP at the anatomical snuff box R hand, pain with resisted thumb extension  and hypersensitive to light touch Radial forearm .  Strength R UE in pounds average 33, lateral pinch average 11   Patient demonstrates the potential to make gains with improved ROM, strength, endurance/activity tolerance, functional FOTO survey score and all within a reasonable time frame so as to increase their functional independence with ADLs and activities for carryover to  Improved quality of life and tolerance to household chores and knitting. Patient requires skilled Physical Therapy so as to monitor their response to and modify their treatment plan accordingly. Patient appears to be an appropriate candidate for skilled outpatient Physical Therapy.                             Evaluation Complexity History MEDIUM  Complexity : 1-2 comorbidities / personal factors will impact the outcome/ POC ; Examination HIGH Complexity : 4+ Standardized tests and measures addressing body structure, function, activity limitation and / or participation in recreation  ;Presentation LOW Complexity : Stable, uncomplicated  ;Clinical Decision Making MEDIUM Complexity : FOTO score of 26-74  Overall Complexity Rating: LOW   Problem List: pain affecting function, decrease ROM, decrease strength, edema affecting function, impaired gait/ balance, decrease ADL/ functional abilitiies, decrease activity tolerance and other FOTO 53   Treatment Plan may include any combination of the following: Therapeutic exercise, Therapeutic activities, Neuromuscular re-education, Physical agent/modality, Manual therapy and Patient education   IONOTOPHORESIS WITH DEXAMETHASONE  Patient / Family readiness to learn indicated by: asking questions, trying to perform skills and interest  Persons(s) to be included in education: patient (P) family/son and daughter in law  Barriers to Learning/Limitations: None  Patient Goal (s): no pain  Patient Self Reported Health Status: good  Rehabilitation Potential: good    Short Term Goals:  To be accomplished in 5 treatments:   1 patient will have established and be I with HEP to aid with progression of skilled PT program   ASHLEY issued    CURRENT   2 patient will have pain 3/10 to aid with increase tolerance to ADLS and activities   EVAL 5   CURRENT   3 patient will have FOTO 56 to aid with increase tolerance to activities and ADLS including knitting and grandchild interaction   EVAL 53   CURRENT  Long Term Goals: To be accomplished in 12-20 treatments:   1 patient will have pain 2/10 to aid with increase tolerance to ADLS and activities   EVAL 5   CURRENT   2 patient will have FOTO 59 to aid with increase tolerance to activities and ADLS including knitting and grandchild interaction   EVAL 53   CURRENT   3 patient will report 30% improvement to aid with increase tolerance to knitting and washing dishes   EVAL     CURRENT    Frequency / Duration: Patient to be seen 2-3 times per week for 12-20 treatments. Patient/ Caregiver education and instruction: Diagnosis, prognosis, self care, activity modification, brace/ splint application and exercises   [x]  Plan of care has been reviewed with LASHON MCMILLAN-Chirag (GP)   Carry   Current  CK= 40-59%    Goal  CK= 40-59%   The severity rating is based on clinical judgment and the FOTO score. Certification Period: 1/23/18- 3/24/18  Simone Cotter, PT 1/23/2018 10:04 AM    ________________________________________________________________________    I certify that the above Therapy Services are being furnished while the patient is under my care. I agree with the treatment plan and certify that this therapy is necessary.     [address] [place] [address] Signature:____________________  Date:____________Time: _________    Please sign and return to [facility] [address] [place] [address], [address] [place] [address], [address] [place], [place] [address] [address]  [phone] fax

## 2018-01-26 ENCOUNTER — HOSPITAL ENCOUNTER (OUTPATIENT)
Dept: PHYSICAL THERAPY | Age: 83
Discharge: HOME | End: 2018-01-26
Payer: MEDICARE

## 2018-01-26 PROCEDURE — 97035 APP MDLTY 1+ULTRASOUND EA 15: CPT | Performed by: PHYSICAL THERAPIST

## 2018-01-26 PROCEDURE — 97140 MANUAL THERAPY 1/> REGIONS: CPT | Performed by: PHYSICAL THERAPIST

## 2018-01-26 PROCEDURE — 97110 THERAPEUTIC EXERCISES: CPT | Performed by: PHYSICAL THERAPIST

## 2018-01-26 NOTE — PROGRESS NOTES
PT DAILY TREATMENT NOTE - [facility]     Patient Name: Eva Selby  Date:2018  : 1924  [x]  Patient  Verified  Payor: Guzman Dotson / Plan: [place] MED[place]ARE PART [place] & B / Product Type: Medicare /    In time:11:30  Out time:12:20  Total Treatment Time (min): 50  Total Timed Codes (min): 40  1:1 Treatment Time ( only): 40   Visit #: 2 of     Treatment Area: Pain in left elbow [M25.522]    SUBJECTIVE  Pain Level (0-10 scale): 6  Any medication changes, allergies to medications, adverse drug reactions, diagnosis change, or new procedure performed?: [x] No    [] Yes (see summary sheet for update)  Subjective functional status/changes:   [] No changes reported  I have more soreness today. OBJECTIVE    Modality rationale: decrease inflammation, decrease pain and increase tissue extensibility to improve the patients ability to improve mobility    Min Type Additional Details    [] Estim:  []Unatt       []IFC  []Premod                        []Other:  []w/ice   []w/heat  Position:  Location:    [] Estim: []Att    []TENS instruct  []NMES                    []Other:  []w/US   []w/ice   []w/heat  Position:  Location:    []  Traction: [] Cervical       []Lumbar                       [] Prone          []Supine                       []Intermittent   []Continuous Lbs:  [] before manual  [] after manual   8 [x]  Ultrasound: []Continuous   [x] Pulsed                           []1MHz   [x]3MHz W/cm2: 1.0  Location: rt thenar evonne. snuff box, and post wrist     []  Iontophoresis with dexamethasone         Location: [] Take home patch   [] In clinic   10 []  Ice     [x]  heat  []  Ice massage  []  Laser   []  Anodyne Position:  Location:    []  Laser with stim  []  Other:  Position:  Location:    []  Vasopneumatic Device Pressure:       [] lo [] med [] hi   Temperature: [] lo [] med [] hi   [x] Skin assessment post-treatment:  [x]intact [x]redness- no adverse reaction    []redness  adverse reaction:          22 min Therapeutic Exercise:  [x] See flow sheet :   Rationale: increase ROM, increase strength, improve coordination, improve balance and increase proprioception to improve the patients ability to improve mobility       10 min Manual Therapy:  STM, Carpal bone mobs, R/U ant/post mobs, MFR/TPR in wrist ext. Wrist distraction tolerated fair-   Rationale: decrease pain, increase ROM, increase tissue extensibility and decrease trigger points to improve ADL tolerance. With   [x] TE   [] TA   [] neuro   [] other: Patient Education: [x] Review HEP    [x] Progressed/Changed HEP based on:   [] positioning   [] body mechanics   [] transfers   [] heat/ice application    [] other:      Other Objective/Functional Measures: DeQuervain's test (+) still today, moderate TTP in wrist ext at origin and insertion. Pain and restriction with capitate joint mobs in both directions. Pain Level (0-10 scale) post treatment: 0    ASSESSMENT/Changes in Function: pt responded very well to US and therex, however fait to manual therapy due to moderate TTP in reported areas. Pt did report relief of pain post session. Continue as needed. No signed order for Ionto this date. Patient will continue to benefit from skilled PT services to modify and progress therapeutic interventions, address functional mobility deficits, address ROM deficits, address strength deficits, analyze and address soft tissue restrictions, analyze and cue movement patterns, analyze and modify body mechanics/ergonomics and assess and modify postural abnormalities to attain remaining goals. []  See Plan of Care  []  See progress note/recertification  []  See Discharge Summary         Progress towards goals / Updated goals:  Short Term Goals:  To be accomplished in 5 treatments:                        1 patient will have established and be I with HEP to aid with progression of skilled PT program                        EVAL issued CURRENT: reported compliance with HEP and brace                          2 patient will have pain 3/10 to aid with increase tolerance to ADLS and activities                        EVAL 5                        CURRENT: 6 today decreased to 0 post session. 3 patient will have FOTO 56 to aid with increase tolerance to activities and ADLS including knitting and grandchild interaction                        EVAL 53                        CURRENT: n/a      Long Term Goals:  To be accomplished in 12-20 treatments:                        1 patient will have pain 2/10 to aid with increase tolerance to ADLS and activities                        EVAL 5                        CURRENT  6                          2 patient will have FOTO 59 to aid with increase tolerance to activities and ADLS including knitting and grandchild interaction                        EVAL 53                        CURRENT: n/a                          3 patient will report 30% improvement to aid with increase tolerance to knitting and washing dishes                        EVAL                                     CURRENT: no change     PLAN  [x]  Upgrade activities as tolerated     [x]  Continue plan of care  []  Update interventions per flow sheet       []  Discharge due to:_  []  Other:_      Susie Cruz PT 1/26/2018  12:24 PM    Future Appointments  Date Time Provider Olga Aragon   1/30/2018 11:30 AM Marilia Maya PTA [facility]   2/1/2018 1:30 PM Marilia Moseley PTA [facility]   2/6/2018 11:30 AM Simone Cotter PT [facility]   2/8/2018 10:30 AM Simone Cotter PT [facility]   2/13/2018 10:40 AM Coleman Solano MD [address] [place] [address]   2/14/2018 11:30 AM Simone Cotter PT [facility]   2/16/2018 11:30 AM Simone Cotter PT [facility]   2/20/2018 11:30 AM Marilia Moseley PTA [facility]   2/22/2018 1:30 PM Marilia Moseley PTA [facility]   2/27/2018 11:30 AM Simone Cotter PT [facility]

## 2018-01-30 ENCOUNTER — HOSPITAL ENCOUNTER (OUTPATIENT)
Dept: PHYSICAL THERAPY | Age: 83
Discharge: HOME | End: 2018-01-30
Payer: MEDICARE

## 2018-01-30 PROCEDURE — 97140 MANUAL THERAPY 1/> REGIONS: CPT

## 2018-01-30 PROCEDURE — 97110 THERAPEUTIC EXERCISES: CPT

## 2018-02-01 ENCOUNTER — APPOINTMENT (OUTPATIENT)
Dept: PHYSICAL THERAPY | Age: 83
End: 2018-02-01
Payer: MEDICARE

## 2018-02-06 ENCOUNTER — HOSPITAL ENCOUNTER (OUTPATIENT)
Dept: PHYSICAL THERAPY | Age: 83
Discharge: HOME | End: 2018-02-06
Payer: MEDICARE

## 2018-02-06 PROCEDURE — 97140 MANUAL THERAPY 1/> REGIONS: CPT

## 2018-02-06 PROCEDURE — 97110 THERAPEUTIC EXERCISES: CPT

## 2018-02-06 PROCEDURE — 97032 APPL MODALITY 1+ESTIM EA 15: CPT

## 2018-02-06 NOTE — PROGRESS NOTES
PT DAILY TREATMENT NOTE - [facility]     Patient Name: Gustavo Muñiz  Date:2018  : 1924  [x]  Patient  Verified  Payor: [place] MEDICARE / Plan: [place] MEDICARE PART A & B / Product Type: Medicare /    In time:1134  Out time:1223  Total Treatment Time (min): 45  Total Timed Codes (min): 45  1:1 Treatment Time ( [address] only): 45   Visit #:3 of     Treatment Area: Pain in left elbow [M25.522]    SUBJECTIVE  Pain Level (0-10 scale): 5  Any medication changes, allergies to medications, adverse drug reactions, diagnosis change, or new procedure performed?: [x] No    [] Yes (see summary sheet for update)  Subjective functional status/changes:   [] No changes reported  My wrist is hurting today.      OBJECTIVE    Modality rationale: decrease inflammation, decrease pain and increase tissue extensibility to improve the patients ability to aid with increase tolerance to ADLS and activities   Min Type Additional Details    [] Estim:  []Unatt       []IFC  []Premod                        []Other:  []w/ice   []w/heat  Position:  Location:   10 [x] Estim: [x]Att    []TENS instruct  []NMES                    [x]Other:LTO  []w/US   []w/ice   []w/heat  Position:seated  Location:R extensor pollicus region    []  Traction: [] Cervical       []Lumbar                       [] Prone          []Supine                       []Intermittent   []Continuous Lbs:  [] before manual  [] after manual    []  Ultrasound: []Continuous   [] Pulsed                           []1MHz   []3MHz W/cm2:  Location:    []  Iontophoresis with dexamethasone         Location: [] Take home patch   [] In clinic   PD []  Ice     []  heat  []  Ice massage  []  Laser   []  Anodyne Position:  Location:    []  Laser with stim  []  Other:  Position:  Location:    []  Vasopneumatic Device Pressure:       [] lo [] med [] hi   Temperature: [] lo [] med [] hi   [] Skin assessment post-treatment:  []intact []redness- no adverse reaction    []redness  adverse reaction: min []Eval                  []Re-Eval       20 min Therapeutic Exercise:  [x] See flow sheet :   Rationale: increase ROM, increase strength and improve coordination to improve the patients ability to aid with increase tolerance to ADLs and activities     min Therapeutic Activity:  []  See flow sheet :   Rationale:   to improve the patients ability to       min Neuromuscular Re-education:  []  See flow sheet :   Rationale:   to improve the patients ability to     15 min Manual Therapy:  STM/DTM/TPR effleurage R forearm and PROM R wrist.    Rationale: decrease pain, increase ROM, increase tissue extensibility and decrease trigger points to aid with increase tolerance to ADLs and activities     min Gait Training:  ___ feet with ___ device on level surfaces with ___ level of assist   Rationale: With   [] TE   [] TA   [] neuro   [] other: Patient Education: [x] Review HEP    [] Progressed/Changed HEP based on:   [] positioning   [] body mechanics   [] transfers   [] heat/ice application    [] other:      Other Objective/Functional Measures: VC exercises and tech. Held velcro board due to increased pain today in the wrist     Pain Level (0-10 scale) post treatment: no number, <5 ,\" it is feeling better\"    ASSESSMENT/Changes in Function: tolerated well with reports of decrease pain post session. Receives benefit from semirigid wrist support for decrease shooting pains. Patient will continue to benefit from skilled PT services to modify and progress therapeutic interventions, address functional mobility deficits, address ROM deficits, address strength deficits, analyze and address soft tissue restrictions, analyze and cue movement patterns, analyze and modify body mechanics/ergonomics, assess and modify postural abnormalities and instruct in home and community integration to attain remaining goals.      [x]  See Plan of Care  []  See progress note/recertification  []  See Discharge Summary Progress towards goals / Updated goals:  Short Term Goals: To be accomplished in 5 treatments:                        1 patient will have established and be I with HEP to aid with progression of skilled PT program                        EVAL issued                                     CURRENT: reported compliance with HEP and brace  Progressing 2/6/18                            9 patient will have pain 3/10 to aid with increase tolerance to ADLS and activities                        EVAL 5                        CURRENT:  4   Before  Treatment   Following  0  1/30/18   5 2/6/18                            3 patient will have FOTO 56 to aid with increase tolerance to activities and ADLS including knitting and grandchild interaction                        EVAL 48  [address] [facility] [address] be accomplished in 12-20 treatments:                        1 patient will have pain 2/10 to aid with increase tolerance to ADLS and activities                        EVAL 5                        CURRENT  6     5 2/6/18                            2 patient will have FOTO 59 to aid with increase tolerance to activities and ADLS including knitting and grandchild interaction                        EVAL 48                        CURRENT:  RECHECK NEXT [address] [facility]                        1 patient will report 30% improvement to aid with increase tolerance to knitting and washing dishes                        EVAL                                     CURRENT: no change        PLAN  [x]  Upgrade activities as tolerated     [x]  Continue plan of care  []  Update interventions per flow sheet       []  Discharge due to:_  []  Other:_      Lennie Tariq PT 2/6/2018  11:38 AM    Future Appointments  Date Time Provider Olga Aragon   2/8/2018 10:30 AM Lennie Tariq PT [facility]   2/13/2018 10:40 AM Tod Cates MD [address] [place] [address]   2/14/2018 11:30 AM Lennie Tariq PT [facility] 2/16/2018 11:30 AM Richard Salinas, PT [facility]   2/20/2018 11:30 AM Marilia Moseley, PTA [facility]   2/22/2018 1:30 PM Marilia Moseley, PTA [facility]   2/27/2018 11:30 AM Chantale Lyons, PT [facility]   3/1/2018 10:00 AM Chantale Lyons, PT [facility]

## 2018-02-08 ENCOUNTER — HOSPITAL ENCOUNTER (OUTPATIENT)
Dept: PHYSICAL THERAPY | Age: 83
Discharge: HOME | End: 2018-02-08
Payer: MEDICARE

## 2018-02-08 PROCEDURE — 97032 APPL MODALITY 1+ESTIM EA 15: CPT

## 2018-02-08 PROCEDURE — 97140 MANUAL THERAPY 1/> REGIONS: CPT

## 2018-02-08 NOTE — PROGRESS NOTES
PT DAILY TREATMENT NOTE - [facility]     Patient Name: Meg Bonilla  Date:2018  : 1924  [x]  Patient  Verified  Payor: VA MEDICARE / Plan: [place] MEDICARE PART A & B / Product Type: Medicare /    In EAGX:1104  Out time:1130  Total Treatment Time (min): 47  Total Timed Codes (min): 47  1:1 Treatment Time ( [address] only): 25   Visit #: 5 of     Treatment Area: Pain in left elbow [M25.522]    SUBJECTIVE  Pain Level (0-10 scale): 5  Any medication changes, allergies to medications, adverse drug reactions, diagnosis change, or new procedure performed?: [x] No    [] Yes (see summary sheet for update)  Subjective functional status/changes:   [] No changes reported  I woke up this morning and it wasn't bothering me. I thought Maldonado Najera is wrong? \" well it started bothering me the more I started doing things with it. Sometimes I wear the brace at night and sometimes I do not. Last night I did not wear it. I can't do my crocheting or knitting anymore.      OBJECTIVE    Modality rationale: decrease inflammation, decrease pain and increase tissue extensibility to improve the patients ability to aid with increase toelrance ot ADLs and activities   Min Type Additional Details    [] Estim:  []Unatt       []IFC  []Premod                        []Other:  []w/ice   []w/heat  Position:  Location:   10 [x] Estim: [x]Att    []TENS instruct  []NMES                    [x]Other: LTO []w/US   []w/ice   []w/heat  Position:seated  Location:R wrist, radial forearm, snuff box    []  Traction: [] Cervical       []Lumbar                       [] Prone          []Supine                       []Intermittent   []Continuous Lbs:  [] before manual  [] after manual    []  Ultrasound: []Continuous   [] Pulsed                           []1MHz   []3MHz W/cm2:  Location:    []  Iontophoresis with dexamethasone         Location: [] Take home patch   [] In clinic   PD []  Ice     []  heat  []  Ice massage  []  Laser   []  Anodyne Position:  Location: []  Laser with stim  []  Other:  Position:  Location:    []  Vasopneumatic Device Pressure:       [] lo [] med [] hi   Temperature: [] lo [] med [] hi   [] Skin assessment post-treatment:  []intact []redness- no adverse reaction    []redness  adverse reaction:      min []Eval                  []Re-Eval       22 min Therapeutic Exercise:  [x] See flow sheet :   Rationale: increase ROM, increase strength and improve coordination to improve the patients ability to aid with increase tolerance to ADLs and activities     min Therapeutic Activity:  []  See flow sheet :   Rationale:   to improve the patients ability to       min Neuromuscular Re-education:  []  See flow sheet :   Rationale:  to improve the patients ability to     15 min Manual Therapy:   STM/DTM/TPR effleurage R forearm and PROM R wrist.    Rationale: decrease pain, increase ROM, increase tissue extensibility and decrease trigger points to aid with increase tolerance to ADLS and activities     min Gait Training:  ___ feet with ___ device on level surfaces with ___ level of assist   Rationale: With   [] TE   [] TA   [] neuro   [] other: Patient Education: [x] Review HEP    [] Progressed/Changed HEP based on:   [] positioning   [] body mechanics   [] transfers   [] heat/ice application    [] other:      Other Objective/Functional Measures: VC exercises and tech   FOTO 47  Pain Level (0-10 scale) post treatment: <5    ASSESSMENT/Changes in Function: residual pain and TTP along R snuff box region. Decreased FOTO noted today.     Patient will continue to benefit from skilled PT services to modify and progress therapeutic interventions, address functional mobility deficits, address ROM deficits, address strength deficits, analyze and address soft tissue restrictions, analyze and cue movement patterns, analyze and modify body mechanics/ergonomics, assess and modify postural abnormalities and instruct in home and community integration to attain remaining goals.      [x]  See Plan of Care  []  See progress note/recertification  []  See Discharge Summary         Progress towards goals / Updated goals:  Short Term Goals: To be accomplished in 5 treatments:                        5 patient will have established and be I with HEP to aid with progression of skilled PT program                        EVAL issued                                     CURRENT: reported compliance with HEP and brace  Progressing 2/6/18 2/8/18                            1 patient will have pain 3/10 to aid with increase tolerance to ADLS and activities                        EVAL 5                        CURRENT:  4   Before  Treatment   Following  0  1/30/18   5 2/6/18 2/8/18                            0 patient will have FOTO 56 to aid with increase tolerance to activities and ADLS including knitting and grandchild interaction                        EVAL 48                        CURRENT:47  2/8/18  [address] be accomplished in 12-20 treatments:                        5 patient will have pain 2/10 to aid with increase tolerance to ADLS and activities                        EVAL 5                        CURRENT  6     5 2/6/18 2/8/18                            9 patient will have FOTO 59 to aid with increase tolerance to activities and ADLS including knitting and grandchild interaction                        EVAL 48                        CURRENT:  47  2/8/18                        3 patient will report 30% improvement to aid with increase tolerance to knitting and washing dishes                        EVAL                                     CURRENT: no change     PLAN  [x]  Upgrade activities as tolerated     [x]  Continue plan of care  []  Update interventions per flow sheet       []  Discharge due to:_  []  Other:_      Tariq Blake PT 2/8/2018  10:28 AM    Future Appointments  Date Time Provider Olga Aragon   2/8/2018 10:30 AM Tariq Blake PT [facility] 2/13/2018 10:40 AM Torrey Cruz MD [address] [place] [address]   2/14/2018 11:30 AM Ivis Pap, PT [facility]   2/16/2018 11:30 AM Pantera Trent, PT [facility]   2/20/2018 11:30 AM Marilia Moseley, PTA [facility]   2/22/2018 1:30 PM Marilia Moseley, PTA [facility]   2/27/2018 11:30 AM Ivis Pap, PT [facility]   3/1/2018 10:00 AM Ivis Pap, PT [facility]

## 2018-02-12 RX ORDER — METOPROLOL SUCCINATE 50 MG/1
50 TABLET, EXTENDED RELEASE ORAL DAILY
Qty: 30 TAB | Refills: 6 | Status: SHIPPED | OUTPATIENT
Start: 2018-02-12 | End: 2018-02-13 | Stop reason: SDUPTHER

## 2018-02-12 RX ORDER — ATENOLOL 50 MG/1
50 TABLET ORAL DAILY
Qty: 90 TAB | Refills: 3 | Status: CANCELLED | OUTPATIENT
Start: 2018-02-12

## 2018-02-13 RX ORDER — METOPROLOL SUCCINATE 50 MG/1
50 TABLET, EXTENDED RELEASE ORAL DAILY
Qty: 90 TAB | Refills: 3 | Status: SHIPPED | OUTPATIENT
Start: 2018-02-13 | End: 2018-12-02 | Stop reason: SDUPTHER

## 2018-02-14 ENCOUNTER — HOSPITAL ENCOUNTER (OUTPATIENT)
Dept: PHYSICAL THERAPY | Age: 83
Discharge: HOME | End: 2018-02-14
Payer: MEDICARE

## 2018-02-14 PROCEDURE — 97110 THERAPEUTIC EXERCISES: CPT

## 2018-02-14 PROCEDURE — 97032 APPL MODALITY 1+ESTIM EA 15: CPT

## 2018-02-14 PROCEDURE — 97140 MANUAL THERAPY 1/> REGIONS: CPT

## 2018-02-14 NOTE — PROGRESS NOTES
PT DAILY TREATMENT NOTE - [facility]     Patient Name: Mary Ling  Date:2018  : 1924  [x]  Patient  Verified  Payor: Branden Bill / Plan: [place] MEDICARE PART A & B / Product Type: Medicare /    In time:1137  Out time:1216  Total Treatment Time (min): 39  Total Timed Codes (min): 39  1:1 Treatment Time ( [address] only): 44   Visit #: 6 of     Treatment Area: Pain in left elbow [M25.522]    SUBJECTIVE  Pain Level (0-10 scale): 6  Any medication changes, allergies to medications, adverse drug reactions, diagnosis change, or new procedure performed?: [x] No    [] Yes (see summary sheet for update)  Subjective functional status/changes:   [] No changes reported  Shooting pains up my forearm. It is hurting more today.      OBJECTIVE    Modality rationale: decrease inflammation, decrease pain and increase tissue extensibility to improve the patients ability to aid with increase tolerance to ADLs and activities   Min Type Additional Details    [] Estim:  []Unatt       []IFC  []Premod                        []Other:  []w/ice   []w/heat  Position:  Location:   10 [x] Estim: [x]Att    []TENS instruct  []NMES                    [x]Other: LTO []w/US   []w/ice   []w/heat  Position:  Location:    []  Traction: [] Cervical       []Lumbar                       [] Prone          []Supine                       []Intermittent   []Continuous Lbs:  [] before manual  [] after manual    []  Ultrasound: []Continuous   [] Pulsed                           []1MHz   []3MHz W/cm2:  Location:    []  Iontophoresis with dexamethasone         Location: [] Take home patch   [] In clinic    []  Ice     []  heat  []  Ice massage  []  Laser   []  Anodyne Position:  Location:    []  Laser with stim  []  Other:  Position:  Location:    []  Vasopneumatic Device Pressure:       [] lo [] med [] hi   Temperature: [] lo [] med [] hi   [] Skin assessment post-treatment:  []intact []redness- no adverse reaction    []redness  adverse reaction: min []Eval                  []Re-Eval       17 min Therapeutic Exercise:  [x] See flow sheet :   Rationale: increase ROM, increase strength and improve coordination to improve the patients ability to aid with increase tolerance to ADLS and activities     min Therapeutic Activity:  []  See flow sheet :   Rationale:   to improve the patients ability to       min Neuromuscular Re-education:  []  See flow sheet :   Rationale:   to improve the patients ability to     12 min Manual Therapy:  Gentle thumb mobs, effleurage wrist and forearm R, gentle CFM at snuff box   Rationale: decrease pain and inflammation, increase joint moblity to increase tolerance to ADLs and activities     min Gait Training:  ___ feet with ___ device on level surfaces with ___ level of assist   Rationale: With   [] TE   [] TA   [] neuro   [] other: Patient Education: [x] Review HEP    [] Progressed/Changed HEP based on:   [] positioning   [] body mechanics   [] transfers   [] heat/ice application    [] other:      Other Objective/Functional Measures: VC exercises and tech     Pain Level (0-10 scale) post treatment: 5    ASSESSMENT/Changes in Function: justo well. Increase pain today with reports of shooting pain up the forearm to the elbow. Patient will continue to benefit from skilled PT services to modify and progress therapeutic interventions, address functional mobility deficits, address ROM deficits, address strength deficits, analyze and address soft tissue restrictions, analyze and cue movement patterns, analyze and modify body mechanics/ergonomics, assess and modify postural abnormalities and instruct in home and community integration to attain remaining goals.      [x]  See Plan of Care  []  See progress note/recertification  []  See Discharge Summary         Progress towards goals / Updated goals:   Short Term Goals: To be accomplished in 5 treatments:                        7 patient will have established and be I with HEP to aid with progression of skilled PT program                        EVAL issued                                     CURRENT: reported compliance with HEP and brace  Progressing 2/6/18 2/8/18 2/14/18                            5 patient will have pain 3/10 to aid with increase tolerance to ADLS and activities                        EVAL 5                        CURRENT:  4   Before  Treatment   Following  0  1/30/18   5 2/6/18 2/8/18    6 2/14/18                            3 patient will have FOTO 56 to aid with increase tolerance to activities and ADLS including knitting and grandchild interaction                        EVAL 48                        CURRENT:47  2/8/18  [address] be accomplished in 12-20 treatments:                        9 patient will have pain 2/10 to aid with increase tolerance to ADLS and activities                        EVAL 5                        CURRENT  6     5 2/6/18 2/8/18    6 2/14/18                            2 patient will have FOTO 59 to aid with increase tolerance to activities and ADLS including knitting and grandchild interaction                        EVAL 53                        CURRENT:  47  2/8/18                        3 patient will report 30% improvement to aid with increase tolerance to knitting and washing dishes                        EVAL                                     CURRENT: no change   PLAN  [x]  Upgrade activities as tolerated     [x]  Continue plan of care  []  Update interventions per flow sheet       []  Discharge due to:_  []  Other:_      Hadley Avery, PT 2/14/2018  11:33 AM    Future Appointments  Date Time Provider Olga Aragon   2/16/2018 11:30 AM Festus Arevalo PT [facility]   2/20/2018 11:30 AM Marilia Moseley, PTA [facility]   2/22/2018 1:30 PM Marilia Moseley, PTA [facility]   2/27/2018 11:30 AM Hadley Avery PT [facility]   3/1/2018 10:00 AM Hadley Avery PT [facility]   3/21/2018 9:40 AM Tomeka Kelsey MD [facility] [address]

## 2018-02-16 ENCOUNTER — APPOINTMENT (OUTPATIENT)
Dept: PHYSICAL THERAPY | Age: 83
End: 2018-02-16
Payer: MEDICARE

## 2018-02-20 ENCOUNTER — HOSPITAL ENCOUNTER (OUTPATIENT)
Dept: PHYSICAL THERAPY | Age: 83
Discharge: HOME | End: 2018-02-20
Payer: MEDICARE

## 2018-02-20 PROCEDURE — 97110 THERAPEUTIC EXERCISES: CPT

## 2018-02-20 PROCEDURE — 97140 MANUAL THERAPY 1/> REGIONS: CPT

## 2018-02-20 NOTE — PROGRESS NOTES
PT DAILY TREATMENT NOTE - [facility]     Patient Name: Jerry Mcmahon  Date:2018  : 1924  [x]  Patient  Verified  Payor: Roes Offer / Plan: [place] MEDICARE PART A & B / Product Type: Medicare /    In time: 11:37 Out time:12:12  Total Treatment Time (min): 28  Total Timed Codes (min): 28  1:1 Treatment Time ( only): 28  Visit #: 7 of     Treatment Area: Pain in left elbow [M25.522]    SUBJECTIVE  Pain Level (0-10 scale): 9  Any medication changes, allergies to medications, adverse drug reactions, diagnosis change, or new procedure performed?: [x] No    [] Yes (see summary sheet for update)  Subjective functional status/changes:   [] No changes reported  Its  Aching.     OBJECTIVE    Modality rationale: decrease edema, decrease inflammation, decrease pain and increase tissue extensibility to improve the patients ability to perform ADL    Min Type Additional Details    [] Estim:  []Unatt       []IFC  []Premod                        []Other:  []w/ice   []w/heat  Position:  Location:    [] Estim: []Att    []TENS instruct  []NMES                    []Other:  []w/US   []w/ice   []w/heat  Position:  Location:    []  Traction: [] Cervical       []Lumbar                       [] Prone          []Supine                       []Intermittent   []Continuous Lbs:  [] before manual  [] after manual   8 [x]  Ultrasound: [x]Continuous   [] Pulsed                           [x]1MHz   []3MHz W/cm2: 1.3  Location:(R)  Forearm/wrist/thumb    []  Iontophoresis with dexamethasone         Location: [] Take home patch   [] In clinic    []  Ice     []  heat  []  Ice massage  []  Laser   []  Anodyne Position:  Location:    []  Laser with stim  []  Other:  Position:  Location:    []  Vasopneumatic Device Pressure:       [] lo [] med [] hi   Temperature: [] lo [] med [] hi   [x] Skin assessment post-treatment:  [x]intact []redness- no adverse reaction    []redness  adverse reaction:      min []Eval                  []Re-Eval 12 min Therapeutic Exercise:  [x] See flow sheet :   Rationale: increase ROM and increase strength to improve the patients ability to perform ADL      min Therapeutic Activity:  []  See flow sheet :   Rationale:   to improve the patients ability to      min Neuromuscular Re-education:  []  See flow sheet :   Rationale:   to improve the patients ability to     8 min Manual Therapy:  effleurage   Rationale: decrease pain, increase ROM, increase tissue extensibility and decrease edema  to perform ADL      min Gait Training:  ___ feet with ___ device on level surfaces with ___ level of assist   Rationale: With   [x] TE   [] TA   [] neuro   [] other: Patient Education: [x] Review HEP    [] Progressed/Changed HEP based on:   [] positioning   [] body mechanics   [] transfers   [] heat/ice application    [] other:      Other Objective/Functional Measures: Fair  Response  To each there ex. Pain Level (0-10 scale) post treatment: 9  ASSESSMENT/Changes in Function: Continued  With moderate  Pain level. TTP  At  Forearm. Patient will continue to benefit from skilled PT services to address functional mobility deficits, address ROM deficits, address strength deficits, analyze and address soft tissue restrictions, analyze and cue movement patterns and instruct in home and community integration to attain remaining goals.      [x]  See Plan of Care  []  See progress note/recertification  []  See Discharge Summary         Progress towards goals / Updated goals:  Short Term Goals: To be accomplished in 5 treatments:                        1 patient will have established and be I with HEP to aid with progression of skilled PT program                        EVAL issued                                     CURRENT: reported compliance with HEP and brace  Progressing 2/6/18 2/8/18 2/14/18                            4 patient will have pain 3/10 to aid with increase tolerance to ADLS and activities                        EVAL 5                        CURRENT:  4   Before  Treatment   Following  0  1/30/18   5 2/6/18 2/8/18    6 2/14/18                            2 patient will have FOTO 56 to aid with increase tolerance to activities and ADLS including knitting and grandchild interaction                        EVAL 48                        CURRENT:47  2/8/18  [address] be accomplished in 12-20 treatments:                        9 patient will have pain 2/10 to aid with increase tolerance to ADLS and activities                        EVAL 5                        CURRENT  6     5 2/6/18 2/8/18    6 2/14/18                            2 patient will have FOTO 59 to aid with increase tolerance to activities and ADLS including knitting and grandchild interaction                        EVAL 53                        CURRENT: [facility]  2/8/18                        3 patient will report 30% improvement to aid with increase tolerance to knitting and washing dishes                        EVAL                                     CURRENT: no change     PLAN  []  Upgrade activities as tolerated     [x]  Continue plan of care  []  Update interventions per flow sheet       []  Discharge due to:_  [x]  Other:_  RECERT   [place]     [facility] 2/20/2018  11:38 AM    Future Appointments  Date Time Provider Olga Aragon   2/22/2018 1:30 PM [facility]   2/27/2018 11:30 AM Reyes Daniels, PT [facility]   3/1/2018 10:00 AM Reyes Daniels, PT [facility]   3/8/2018 10:30 AM Itzel Joseph PT [facility]   3/21/2018 9:40 AM Orlando Jurado MD [address] [place] [address]

## 2018-02-22 ENCOUNTER — HOSPITAL ENCOUNTER (OUTPATIENT)
Dept: PHYSICAL THERAPY | Age: 83
Discharge: HOME | End: 2018-02-22
Payer: MEDICARE

## 2018-02-22 PROCEDURE — 97035 APP MDLTY 1+ULTRASOUND EA 15: CPT

## 2018-02-22 NOTE — PROGRESS NOTES
PT DAILY TREATMENT NOTE - [facility]     Patient Name: Mary Ling  Date:2018  : 1924  [x]  Patient  Verified  Payor: VA MEDICARE / Plan: [place] MEDICARE PART A & B / Product Type: Medicare /    In time:1:33  Out time:2:28  Total Treatment Time (min): 30  Total Timed Codes (min):  20  1:1 Treatment Time ( [address] only): 20  Visit #: 8 of     Treatment Area: Pain in left elbow [M25.522]    SUBJECTIVE  Pain Level (0-10 scale): 7-8  Any medication changes, allergies to medications, adverse drug reactions, diagnosis change, or new procedure performed?: [x] No    [] Yes (see summary sheet for update)  Subjective functional status/changes:   [] No changes reported  I  Can give  You my  Wrist.    OBJECTIVE    Modality rationale: decrease edema, decrease inflammation, decrease pain and increase tissue extensibility to improve the patients ability to perform ADL   Min Type Additional Details    [] Estim:  []Unatt       []IFC  []Premod                        []Other:  []w/ice   []w/heat  Position:  Location:    [] Estim: []Att    []TENS instruct  []NMES                    []Other:  []w/US   []w/ice   []w/heat  Position:  Location:    []  Traction: [] Cervical       []Lumbar                       [] Prone          []Supine                       []Intermittent   []Continuous Lbs:  [] before manual  [] after manual   8 [x]  Ultrasound: [x]Continuous   [] Pulsed                           [x]1MHz   []3MHz W/cm2:1.3  Location:(R) wrist    []  Iontophoresis with dexamethasone         Location: [] Take home patch   [] In clinic   10 []  Ice     [x]  Heat  post  []  Ice massage  []  Laser   []  Anodyne Position:seated  Location:(R) wrist    []  Laser with stim  []  Other:  Position:  Location:    []  Vasopneumatic Device Pressure:       [] lo [] med [] hi   Temperature: [] lo [] med [] hi   [x] Skin assessment post-treatment:  [x]intact []redness- no adverse reaction    []redness  adverse reaction:      min []Eval []Re-Eval       12 min Therapeutic Exercise:  [] See flow sheet :   Rationale: increase ROM and increase strength to improve the patients ability to perform ADL     min Therapeutic Activity:  []  See flow sheet :   Rationale:   to improve the patients ability to       min Neuromuscular Re-education:  []  See flow sheet :   Rationale:   to improve the patients ability to      min Manual Therapy:     Rationale:  to      min Gait Training:  ___ feet with ___ device on level surfaces with ___ level of assist   Rationale: With   [x] TE   [] TA   [] neuro   [] other: Patient Education: [x] Review HEP    [] Progressed/Changed HEP based on:   [] positioning   [] body mechanics   [] transfers   [] heat/ice application    [] other:      Other Objective/Functional Measures:  TTP at  wrist    Pain Level (0-10 scale) post treatment: 5    ASSESSMENT/Changes in Function: Fair  Response  To each there ex. Patient will continue to benefit from skilled PT services to address functional mobility deficits, address ROM deficits, address strength deficits, analyze and address soft tissue restrictions, analyze and cue movement patterns and instruct in home and community integration to attain remaining goals.      [x]  See Plan of Care  []  See progress note/recertification  []  See Discharge Summary         Progress towards goals / Updated goals:  Short Term Goals: To be accomplished in 5 treatments:                        1 patient will have established and be I with HEP to aid with progression of skilled PT program                        EVAL issued                                     CURRENT: reported compliance with HEP and brace  Progressing 2/6/18 2/8/18 2/14/18                            3 patient will have pain 3/10 to aid with increase tolerance to ADLS and activities                        EVAL 5                        CURRENT:  4   Before  Treatment   Following  0  1/30/18   5 2/6/18 2/8/18    6 2/14/18                            3 patient will have FOTO 56 to aid with increase tolerance to activities and ADLS including knitting and grandchild interaction                        EVAL 48                        CURRENT:47  2/8/18  [address] be accomplished in 12-20 treatments:                        6 patient will have pain 2/10 to aid with increase tolerance to ADLS and activities                        EVAL 5                        CURRENT  6     5 2/6/18 2/8/18    6 2/14/18                            0 patient will have FOTO 59 to aid with increase tolerance to activities and ADLS including knitting and grandchild interaction                        EVAL 53                        CURRENT: [facility]  2/8/18                        3 patient will report 30% improvement to aid with increase tolerance to knitting and washing dishes                        EVAL                                     CURRENT: no change      PLAN  []  Upgrade activities as tolerated     []  Continue plan of care  []  Update interventions per flow sheet       []  Discharge due to:_  [x]  Other:_  REASSESS GOALS/RECERT  [place]    Marilia Moseley, PTA 2/22/2018  2:38 PM    Future Appointments  Date Time Provider Olga Aragon   2/27/2018 11:30 AM Rivas Thompson, PT [facility]   3/1/2018 10:00 AM Rivas Thompson PT [facility]   3/8/2018 10:30 AM Kinga Gao, PT [facility]   3/21/2018 9:40 AM Melony Jesus MD [address] [place] [address]

## 2018-02-27 ENCOUNTER — HOSPITAL ENCOUNTER (OUTPATIENT)
Dept: PHYSICAL THERAPY | Age: 83
Discharge: HOME | End: 2018-02-27
Payer: MEDICARE

## 2018-02-27 PROCEDURE — 97140 MANUAL THERAPY 1/> REGIONS: CPT

## 2018-02-27 PROCEDURE — 97035 APP MDLTY 1+ULTRASOUND EA 15: CPT

## 2018-02-27 NOTE — PROGRESS NOTES
PT DAILY TREATMENT NOTE - [facility]     Patient Name: Valerie Horton  Date:2018  : 1924  [x]  Patient  Verified  Payor: Meena Jasmine / Plan: VA MEDICARE PART A & B / Product Type: Medicare /    In time:1133  Out time:1223  Total Treatment Time (min): 50  Total Timed Codes (min): 40  1:1 Treatment Time ( [address] only): 23   Visit #: 9 of     Treatment Area: Pain in left elbow [M25.522]    SUBJECTIVE  Pain Level (0-10 scale): 5  Any medication changes, allergies to medications, adverse drug reactions, diagnosis change, or new procedure performed?: [x] No    [] Yes (see summary sheet for update)  Subjective functional status/changes:   [] No changes reported  The ultrasound helped some.  I had some shooting pains up the arm on  (extensor forearm )    OBJECTIVE    Modality rationale: decrease inflammation, decrease pain and increase tissue extensibility to improve the patients ability to aid with increase tolerance to ADLs and activities   Min Type Additional Details    [] Estim:  []Unatt       []IFC  []Premod                        []Other:  []w/ice   []w/heat  Position:  Location:    [] Estim: []Att    []TENS instruct  []NMES                    []Other:  []w/US   []w/ice   []w/heat  Position:  Location:    []  Traction: [] Cervical       []Lumbar                       [] Prone          []Supine                       []Intermittent   []Continuous Lbs:  [] before manual  [] after manual   8 [x]  Ultrasound: [x]Continuous   [] Pulsed                           [x]1MHz   []3MHz W/cm2:1.3  Location:right wrist, thenar extensor tendons, forearm extensors    []  Iontophoresis with dexamethasone         Location: [] Take home patch   [] In clinic   10 []  Ice     [x]  Heat post exercise  []  Ice massage  []  Laser   []  Anodyne Position:  Location:    []  Laser with stim  []  Other:  Position:  Location:    []  Vasopneumatic Device Pressure:       [] lo [] med [] hi   Temperature: [] lo [] med [] hi   [x] Skin assessment post-treatment:  [x]intact [x]redness- no adverse reaction    []redness  adverse reaction:      min []Eval                  []Re-Eval       17 min Therapeutic Exercise:  [x] See flow sheet :   Rationale: increase ROM, increase strength and improve coordination to improve the patients ability to aid with increase tolerance to ADLS nd activities     min Therapeutic Activity:  []  See flow sheet :   Rationale:   to improve the patients ability to       min Neuromuscular Re-education:  []  See flow sheet :   Rationale:   to improve the patients ability to     15 min Manual Therapy:  STM Effleurage R wrist and forearm   Rationale: decrease pain, increase ROM, increase tissue extensibility and decrease trigger points to aid with increase tolerance to ADLs and activities     min Gait Training:  ___ feet with ___ device on level surfaces with ___ level of assist   Rationale: With   [] TE   [] TA   [] neuro   [] other: Patient Education: [x] Review HEP    [] Progressed/Changed HEP based on:   [] positioning   [] body mechanics   [] transfers   [] heat/ice application    [] other:      Other Objective/Functional Measures: VC exercises and tech     Pain Level (0-10 scale) post treatment: 5    ASSESSMENT/Changes in Function: residual pain right forearm and thenar extensor muscles. STC at extensor muscle bellies    Patient will continue to benefit from skilled PT services to modify and progress therapeutic interventions, address functional mobility deficits, address ROM deficits, address strength deficits, analyze and address soft tissue restrictions, analyze and cue movement patterns, analyze and modify body mechanics/ergonomics, assess and modify postural abnormalities and instruct in home and community integration to attain remaining goals.      [x]  See Plan of Care  []  See progress note/recertification  []  See Discharge Summary         Progress towards goals / Updated goals:  Short Term Goals: To be accomplished in 5 treatments:                        9 patient will have established and be I with HEP to aid with progression of skilled PT program                        EVAL issued                                     CURRENT: reported compliance with HEP and brace  Progressing 2/6/18 2/8/18 2/14/18 2/27/18                            2 patient will have pain 3/10 to aid with increase tolerance to ADLS and activities                        EVAL 5                        CURRENT:  4   Before  Treatment   Following  0  1/30/18   5 2/6/18 2/8/18    6 2/14/18    5 2/27/18                            3 patient will have FOTO 56 to aid with increase tolerance to activities and ADLS including knitting and grandchild interaction                        EVAL 48                        CURRENT:47  2/8/18  [address] be accomplished in 12-20 treatments:                        5 patient will have pain 2/10 to aid with increase tolerance to ADLS and activities                        EVAL 5                        CURRENT  6     5 2/6/18 2/8/18    6 2/14/18   5 2/27/18                          2 patient will have FOTO 59 to aid with increase tolerance to activities and ADLS including knitting and grandchild interaction                        EVAL 53                        CURRENT: [facility]  2/8/18                        3 patient will report 30% improvement to aid with increase tolerance to knitting and washing dishes                        EVAL                                     CURRENT: no change     PLAN  [x]  Upgrade activities as tolerated     [x]  Continue plan of care  []  Update interventions per flow sheet       []  Discharge due to:_  []  Other:_      Kun Zaldivar PT 2/27/2018  11:27 AM    Future Appointments  Date Time Provider Olga Aragon   2/27/2018 11:30 AM Kun Zaldivar PT [facility]   3/1/2018 10:00 AM Kun [facility]divar PT [facility]   3/8/2018 10:30 AM Allie Hill PT [facility]   3/21/2018 9:40 AM April Gramajo MD [address] [place] [address]

## 2018-03-01 ENCOUNTER — HOSPITAL ENCOUNTER (OUTPATIENT)
Dept: PHYSICAL THERAPY | Age: 83
Discharge: HOME | End: 2018-03-01
Payer: MEDICARE

## 2018-03-01 PROCEDURE — 97110 THERAPEUTIC EXERCISES: CPT

## 2018-03-01 PROCEDURE — 97140 MANUAL THERAPY 1/> REGIONS: CPT

## 2018-03-01 PROCEDURE — G8985 CARRY GOAL STATUS: HCPCS

## 2018-03-01 PROCEDURE — 97035 APP MDLTY 1+ULTRASOUND EA 15: CPT

## 2018-03-01 PROCEDURE — G8984 CARRY CURRENT STATUS: HCPCS

## 2018-03-01 NOTE — PROGRESS NOTES
PT DAILY TREATMENT NOTE - [facility]     Patient Name: Ramírez Azevedo  Date:3/1/2018  : 1924  [x]  Patient  Verified  Payor: [place] MED[place]ARE / Plan: [place] MEDICARE PART A & B / Product Type: Medicare /    In time:955  Out time:1054  Total Treatment Time (min): 53  Total Timed Codes (min): 43  1:1 Treatment Time ( only): 37  Visit #: 10 of     Treatment Area: Pain in left elbow [M25.522]    SUBJECTIVE  Pain Level (0-10 scale): 6  Any medication changes, allergies to medications, adverse drug reactions, diagnosis change, or new procedure performed?: [x] No    [] Yes (see summary sheet for update)  Subjective functional status/changes:   [] No changes reported  Sometimes when I try to hold the pen to do the puzzle in the morning it will shoot up my thumb. It is about 20% better.      OBJECTIVE    Modality rationale: decrease inflammation, decrease pain and increase tissue extensibility to improve the patients ability to aid with increase tolerance to ADLS and activiities   Min Type Additional Details    [] Estim:  []Unatt       []IFC  []Premod                        []Other:  []w/ice   []w/heat  Position:  Location:    [] Estim: []Att    []TENS instruct  []NMES                    []Other:  []w/US   []w/ice   []w/heat  Position:  Location:    []  Traction: [] Cervical       []Lumbar                       [] Prone          []Supine                       []Intermittent   []Continuous Lbs:  [] before manual  [] after manual   8 [x]  Ultrasound: [x]Continuous   [] Pulsed                           [x]1MHz   []3MHz W/cm2:1.3  Location:right wrist, thenar extensor tendons, forearm extensors    []  Iontophoresis with dexamethasone         Location: [] Take home patch   [] In clinic   10 []  Ice     [x]  Heat post exercises  []  Ice massage  []  Laser   []  Anodyne Position:seated  Location:Right wrist    []  Laser with stim  []  Other:  Position:  Location:    []  Vasopneumatic Device Pressure:       [] lo [] med [] hi   Temperature: [] lo [] med [] hi   [] Skin assessment post-treatment:  []intact []redness- no adverse reaction    []redness  adverse reaction:      min []Eval                  []Re-Eval       20 min Therapeutic Exercise:  [] See flow sheet :   Rationale: increase ROM, increase strength and improve coordination to improve the patients ability to aid with increase tolerance to ADLs and activities     min Therapeutic Activity:  []  See flow sheet :   Rationale:   to improve the patients ability to       min Neuromuscular Re-education:  []  See flow sheet :   Rationale:   to improve the patients ability to     15 min Manual Therapy:  STM CFM Thumb joint mobs, TPR extensor muscle bellies, effleurage Right wrist and forearm   Rationale: decrease pain, increase ROM, increase tissue extensibility and decrease trigger points to aid with increase tolerance to ADLS and activities     min Gait Training:  ___ feet with ___ device on level surfaces with ___ level of assist   Rationale: With   [] TE   [] TA   [] neuro   [] other: Patient Education: [x] Review HEP    [] Progressed/Changed HEP based on:   [] positioning   [] body mechanics   [] transfers   [] heat/ice application    [] other:      Other Objective/Functional Measures: VC exercises and tech FOTO 50     Pain Level (0-10 scale) post treatment: 5    ASSESSMENT/Changes in Function: tolerated well. Patient will continue to benefit from skilled PT services to modify and progress therapeutic interventions, address functional mobility deficits, address ROM deficits, address strength deficits, analyze and address soft tissue restrictions, analyze and cue movement patterns, analyze and modify body mechanics/ergonomics and assess and modify postural abnormalities to attain remaining goals.      [x]  See Plan of Care  [x]  See progress note/recertification  []  See Discharge Summary         Progress towards goals / Updated goals:  Short Term Goals: To be accomplished in 5 treatments:                        9 patient will have established and be I with HEP to aid with progression of skilled PT program                        EVAL issued                                     CURRENT: reported compliance with HEP and brace  Progressing 2/6/18   2/8/18   2/14/18  2/27/18   3/1/18                            0 patient will have pain 3/10 to aid with increase tolerance to ADLS and activities                        EVAL 5                        CURRENT:  4   Before  Treatment   Following  0  1/30/18   5 2/6/18 2/8/18    6 2/14/18    5 2/27/18  6 3/1/18                            3 patient will have FOTO 56 to aid with increase tolerance to activities and ADLS including knitting and grandchild interaction                        EVAL 48                        CURRENT:47  2/8/18   50   3/1/18  [address] be accomplished in 12-20 treatments:                        5 patient will have pain 2/10 to aid with increase tolerance to ADLS and activities                        EVAL 5                        CURRENT  6     5 2/6/18 2/8/18    6 2/14/18   5 2/27/18     6 3/1/18                        2 patient will have FOTO 59 to aid with increase tolerance to activities and ADLS including knitting and grandchild interaction                        EVAL 53                        CURRENT:  47  2/8/18     50 3/1/18                        3 patient will report 30% improvement to aid with increase tolerance to knitting and washing dishes                        EVAL                                     CURRENT: 20%    3/1/18    PLAN  [x]  Upgrade activities as tolerated     [x]  Continue plan of care  []  Update interventions per flow sheet       []  Discharge due to:_  []  Other:_      Jacquie Fairchild PT 3/1/2018  10:19 AM    Future Appointments  Date Time Provider Olga Aragon   3/8/2018 10:30 AM Abeba Grimes PT [id number]   3/21/2018 9:40 AM MD Dmitriy Nielsen [address]ass

## 2018-03-01 NOTE — PROGRESS NOTES
[facility] CHELITA [address], [address] [place] [address], [address] [place], [place] [address]y [address],[address]  [phone] fax      Medicare Progress Report    Patient name: Skyla Moore Start of Care: 18   Referral source: Suze Fisher MD : 1924   Medical/Treatment Diagnosis: Pain in left elbow [M25.522] Onset Date:1+ year agp     Prior Hospitalization: see medical history Provider#: [id number]   Medications: Verified on Patient Summary List    Comorbidities: PMHx/Surgical Hx: heart disease, fibromyalgia, arthritis, thyroid problems, HTN, hearing impaired   Prior Level of Function: I all areas of ADLs and activities, no issues with the right wrist and UE. Has been using rollator for mobility, household chores    Visits from Start of Care: 10    Missed Visits: 0    Reporting Period: 18 to 3/1/18    Subjective Reports: It is about 20% better. It still shoots pain at times up the arm and I have trouble even holding a pen some times. Current Status/ treatment goals Objective measures   1. patient will have established and be I with HEP to aid with progression of skilled PT program               [x] met                 [] not met  [x] progressing  reports compliance   2. patient will have pain 3/10 to aid with increase tolerance to ADLS and activities   [] met                 [x] not met  [] progressing 6/10   3. patient will have FOTO 56 to aid with increase tolerance to activities and ADLS including knitting and grandchild interaction                  [] met                 [] not met  [x] progressing 50   4.   patient will report 30% improvement to aid with increase tolerance to knitting and washing dishes           [] met                 [] not met  [x] progressing 20%     Key functional changes: increased tolerance to the exercises     Problems/ barriers to goal attainment: residual pain     Assessment / Recommendations:Patient demonstrates the potential to make further gains with improving ROM, strength, endurance/activity tolerance, functional FOTO survey score  and all within a reasonable time frame so as to further increase their functional independence with ADLs and activities for carryover to  Improved quality of life and tolerance to household chores and crocheting. Patient requires skilled Physical Therapy so as to monitor their response to and modify their treatment plan accordingly. Patient appears to be an appropriate candidate for skilled outpatient Physical Therapy. Problem List: pain affecting function, decrease ROM, decrease strength, decrease ADL/ functional abilitiies, decrease activity tolerance and decrease flexibility/ joint mobility   Treatment Plan: Therapeutic exercise, Therapeutic activities, Neuromuscular re-education, Physical agent/modality, Manual therapy and Patient education    Patient Goal (s) has been updated and includes: decrease the pain     Updated Goals to be accomplished in 6-10 treatments:                        1 patient will have pain 4/10 to aid with increase tolerance to ADLS and activities                        RH 6                        CURRENT                          8 patient will have FOTO 59 to aid with increase tolerance to activities and ADLS including knitting and grandchild interaction                        DY 41                        LISANEZ:                          6 patient will report 50% improvement to aid with increase tolerance to knitting and washing dishes                        PN 20%                                 CURRENT:      Frequency / Duration: Patient to be seen 2-3 times per week for 6-10 treatments    G-Codes (GP)   Carry   Current  CK= 40-59%    Goal  CK= 40-59%   The severity rating is based on clinical judgment and the FOTO score.         Tali Adams, PT 3/1/2018 10:23 AM

## 2018-03-08 ENCOUNTER — HOSPITAL ENCOUNTER (OUTPATIENT)
Dept: PHYSICAL THERAPY | Age: 83
Discharge: HOME | End: 2018-03-08
Payer: MEDICARE

## 2018-03-08 PROCEDURE — 97140 MANUAL THERAPY 1/> REGIONS: CPT

## 2018-03-08 PROCEDURE — 97032 APPL MODALITY 1+ESTIM EA 15: CPT

## 2018-03-08 NOTE — PROGRESS NOTES
PT DAILY TREATMENT NOTE - [facility] 3-16    Patient Name: Homer Saha  Date:3/8/2018  : 1924  [x]  Patient  Verified  Payor: [place] MEDICARE / Plan: [place] MEDICARE PART A & B / Product Type: Medicare /    In JOGM:9465  Out time:`37  Total Treatment Time (min): 45  Total Timed Codes (min): 35  1:1 Treatment Time ( only): 24   Visit #: 1  of 6-10    Treatment Area: Pain in left elbow [M25.522]    SUBJECTIVE  Pain Level (0-10 scale): 6  Any medication changes, allergies to medications, adverse drug reactions, diagnosis change, or new procedure performed?: [x] No    [] Yes (see summary sheet for update)  Subjective functional status/changes:   [] No changes reported  Wrist is sore    OBJECTIVE  Modality rationale: decrease inflammation, decrease pain and increase tissue extensibility to improve the patients ability to perform increased ADL   Min Type Additional Details    [] Estim:  []Unatt       []IFC  []Premod                        []Other:  []w/ice   []w/heat  Position:  Location:   10 [x] Estim: [x]Att    []TENS instruct  []NMES                    [x]Other: LTO []w/US   []w/ice   []w/heat  Position: Seated  Location: Right wrist extensor muscles    []  Traction: [] Cervical       []Lumbar                       [] Prone          []Supine                       []Intermittent   []Continuous Lbs:  [] before manual  [] after manual    []  Ultrasound: []Continuous   [] Pulsed                           []1MHz   []3MHz Location:  W/cm2:    []  Iontophoresis with dexamethasone         Location: [] Take home patch   [] In clinic   10 []  Ice     [x]  heat  []  Ice massage  []  Laser   []  Anodyne Position: seated  Location: Right Wrist    []  Laser with stim  []  Other: Position:  Location:    []  Vasopneumatic Device Pressure:       [] lo [] med [] hi   Temperature: [] lo [] med [] hi   [x] Skin assessment post-treatment:  [x]intact []redness- no adverse reaction    []redness  adverse reaction:     11 min Therapeutic Exercise:  [x] See flow sheet :   Rationale: increase ROM, increase strength and improve coordination to improve the patients ability to perform increased ADL  14 min Manual Therapy:  STM/DTM/CFM/TPR Right wrist extensor muscles, Effleurage right wrist extensor muscles   Rationale: decrease pain, increase ROM, increase tissue extensibility and decrease trigger points to tolerate increased activity levels    With   [x] TE   [] TA   [] neuro   [] other: Patient Education: [x] Review HEP    [] Progressed/Changed HEP based on:   [] positioning   [] body mechanics   [] transfers   [] heat/ice application    [] other:      Other Objective/Functional Measures:   - Pain with some of the exercises  - Pain relieved with MHP at wrist  - Multiple tender points along the right wrist extensor muscles  - Pt reports feeling better after treatment today      Pain Level (0-10 scale) post treatment: 6    ASSESSMENT/Changes in Function:      Patient will continue to benefit from skilled PT services to modify and progress therapeutic interventions, address functional mobility deficits, address ROM deficits, address strength deficits, analyze and address soft tissue restrictions and analyze and cue movement patterns to attain remaining goals.      []  See Plan of Care  []  See progress note/recertification  []  See Discharge Summary         Progress towards goals / Updated goals:  Updated Goals to be accomplished in 6-10 treatments:                        1 patient will have pain 4/10 to aid with increase tolerance to ADLS and activities                        PN 6                        CURRENT  Pain continues at 6/10 3/8/18                        2 patient will have FOTO 59 to aid with increase tolerance to activities and ADLS including knitting and grandchild interaction                        TY 56                        LDWDXXH:                          3 patient will report 50% improvement to aid with increase tolerance to knitting and washing dishes                        RI 20%                                 CURRENT:      PLAN  [x]  Upgrade activities as tolerated     [x]  Continue plan of care  []  Update interventions per flow sheet       []  Discharge due to:_  []  Other:_      Allie Hill, PT 3/8/2018  11:00 AM    Future Appointments  Date Time Provider Olga Aragon   3/9/2018 10:30 AM [facility] CT RM 2 [facility]   3/21/2018 9:40 AM Shemar Young MD [address] [place] [address]

## 2018-03-09 ENCOUNTER — HOSPITAL ENCOUNTER (OUTPATIENT)
Dept: CT IMAGING | Age: 83
Discharge: HOME | End: 2018-03-09
Attending: NURSE PRACTITIONER
Payer: MEDICARE

## 2018-03-09 DIAGNOSIS — R91.1 PULMONARY NODULE: ICD-10-CM

## 2018-03-09 PROCEDURE — 71250 CT THORAX DX C-: CPT

## 2018-03-21 ENCOUNTER — OFFICE VISIT (OUTPATIENT)
Dept: CARDIOLOGY CLINIC | Age: 83
End: 2018-03-21

## 2018-03-21 VITALS
HEIGHT: 62 IN | SYSTOLIC BLOOD PRESSURE: 140 MMHG | OXYGEN SATURATION: 98 % | BODY MASS INDEX: 26.87 KG/M2 | HEART RATE: 62 BPM | WEIGHT: 146 LBS | DIASTOLIC BLOOD PRESSURE: 60 MMHG

## 2018-03-21 DIAGNOSIS — I10 ESSENTIAL HYPERTENSION: ICD-10-CM

## 2018-03-21 DIAGNOSIS — I25.10 CORONARY ARTERY DISEASE INVOLVING NATIVE CORONARY ARTERY OF NATIVE HEART WITHOUT ANGINA PECTORIS: Primary | ICD-10-CM

## 2018-03-21 DIAGNOSIS — I20.8 EXERTIONAL ANGINA (HCC): ICD-10-CM

## 2018-03-21 DIAGNOSIS — E78.00 HYPERCHOLESTEROLEMIA: ICD-10-CM

## 2018-03-21 RX ORDER — HYDROCHLOROTHIAZIDE 12.5 MG/1
12.5 CAPSULE ORAL AS NEEDED
COMMUNITY
End: 2020-06-18 | Stop reason: ALTCHOICE

## 2018-03-21 NOTE — PROGRESS NOTES
1. Have you been to the ER, urgent care clinic since your last visit? Hospitalized since your last visit? No    2. Have you seen or consulted any other health care providers outside of the [address] [place] [address] since your last visit? Include any pap smears or colon screening.  No

## 2018-03-21 NOTE — MR AVS SNAPSHOT
[address] [address] [address] [address] [phone] Patient: Laura Duong MRN: [id number] :[id number] Visit Information Date & Time Provider Department Dept. Phone Encounter #  
 3/21/2018  9:40 AM Florence Le MD [facility] [address] [id number] Upcoming Health Maintenance Date Due DTaP/Tdap/Td series (1 - Tdap) 1945 ZOSTER VACCINE AGE 60> 1983 GLAUCOMA SCREENING Q2Y 1989 Bone Densitometry (Dexa) Screening 1989 Pneumococcal 65+ Low/Medium Risk (1 of 2 - PCV13) 1989 Influenza Age 5 to Adult 2017 MEDICARE YEARLY EXAM 3/14/2018 Allergies as of 3/21/2018  Review Complete On: 3/21/2018 By: Florence Le MD  
  
 Severity Noted Reaction Type Reactions Codeine Medium 2016    Hives Dapiprazole  2016    Vertigo Elavil  2016    Vertigo Voltaren [Diclofenac Sodium]  2016    Vertigo Current Immunizations  Never Reviewed No immunizations on file. Not reviewed this visit You Were Diagnosed With   
  
 Codes Comments Exertional angina (HCC)    -  Primary ICD-10-CM: I20.8 ICD-9-CM: 413.9 Essential hypertension     ICD-10-CM: I10 
ICD-9-CM: 401.9 Hypercholesterolemia     ICD-10-CM: E78.00 ICD-9-CM: 272.0 Bradycardia     ICD-10-CM: R00.1 ICD-9-CM: 427.89 Diaphoresis     ICD-10-CM: R61 
ICD-9-CM: 780.8 Vitals BP Pulse Height(growth percentile) Weight(growth percentile) SpO2 BMI  
 140/60 62 5' 2\" (1.575 m) 146 lb (66.2 kg) 98% 26.7 kg/m2 OB Status Smoking Status Postmenopausal Never Smoker BMI and BSA Data Body Mass Index Body Surface Area  
 26.7 kg/m 2 1.7 m 2 Preferred Pharmacy Pharmacy Name Phone [address] [facility], [address] [facility] [address] Your Updated Medication List  
  
   
 This list is accurate as of 3/21/18 11:06 AM.  Always use your most recent med list. amLODIPine 5 mg tablet Commonly known as:  Berlin Nearing TAKE 1 TABLET BY MOUTH  DAILY  
  
 aspirin delayed-release 81 mg tablet Take 162 mg by mouth daily. atorvastatin 40 mg tablet Commonly known as:  LIPITOR  
TAKE 1 TABLET BY MOUTH  DAILY CALCIUM + D PO Take 1,200 mg by mouth daily. CeleBREX 200 mg capsule Generic drug:  celecoxib Take 200 mg by mouth two (2) times a day. hydroCHLOROthiazide 12.5 mg capsule Commonly known as:  Nany Shutters Take 12.5 mg by mouth daily. isosorbide mononitrate ER 60 mg CR tablet Commonly known as:  IMDUR  
TAKE 1 TABLET BY MOUTH  EVERY MORNING  
  
 metoprolol succinate 50 mg XL tablet Commonly known as:  TOPROL-XL Take 1 Tab by mouth daily. nitroglycerin 0.4 mg SL tablet Commonly known as:  NITROSTAT  
by SubLINGual route every five (5) minutes as needed for Chest Pain. psyllium Powd Commonly known as:  METAMUCIL Take 1 Packet by mouth. STOOL SOFTENER PO Take  by mouth. SYNTHROID 75 mcg tablet Generic drug:  levothyroxine Take 75 mcg by mouth Daily (before breakfast). TYLENOL 325 mg tablet Generic drug:  acetaminophen Take  by mouth every four (4) hours as needed for Pain. We Performed the Following AMB POC EKG ROUTINE W/ 12 LEADS, INTER & REP [06837 CPT(R)] Introducing [address]! Hafsa Peters introduces [employer] patient portal. Now you can access parts of your medical record, email your doctor's office, and request medication refills online. 1. In your internet browser, go to [email or web address]. Call [email or web address] 2. Click on the First Time User? Click Here link in the Sign In box. You will see the New Member Sign Up page. 3. Enter your [facility] Access Code exactly as it appears below.  You will not need to use this code after youve completed the sign-up process. If you do not sign up before the expiration date, you must request a new code. · [employer] Access Code: [email or web address] Expires: 5/30/2018 11:15 AM 
 
4. Enter the last four digits of your Social Security Number (xxxx) and Date of Birth (mm/dd/yyyy) as indicated and click Submit. You will be taken to the next sign-up page. 5. Create a [employer] ID. This will be your [employer] login ID and cannot be changed, so think of one that is secure and easy to remember. 6. Create a [employer] password. You can change your password at any time. 7. Enter your Password Reset Question and Answer. This can be used at a later time if you forget your password. 8. Enter your e-mail address. You will receive e-mail notification when new information is available in [address]. 9. Click Sign Up. You can now view and download portions of your medical record. 10. Click the Download Summary menu link to download a portable copy of your medical information. If you have questions, please visit the Frequently Asked Questions section of the [employer] website. Remember, [employer] is NOT to be used for urgent needs. For medical emergencies, dial 911. Now available from your iPhone and Android! Please provide this summary of care documentation to your next provider. Your primary care clinician is listed as [facility]. If you have any questions after today's visit, please call [phone].

## 2018-03-21 NOTE — PROGRESS NOTES
HISTORY OF PRESENT ILLNESS  Laura Duong is a 80 y.o. female. HPI  Cardiacwise she has been feeling well. She denies any cardiac symptoms. She has had no chest pain, dyspnea, orthopnea or PND. She has had no palpitations, dizziness or syncope. She denies any symptoms to indicate TIA or amaurosis fugax. She states that Ranexa did not make any difference in the past.  She has been evaluated for a mass density in the right upper lobe. She is from [place]. She has history of cardiac catheterization in 2009, which apparently demonstrated:   1. Patent left main trunk    2. 50-90% long stenosis of the LAD in the proximal to mid segment    3. 90% significant stenosis of the proximal first diagonal branch    4. 90% stenosis of the second diagonal branch    5. 80% stenosis of the circumflex artery at the origin of the obtuse marginal branch    6. 80% stenosis of the proximal dominant RCA with diffuse changes.       She was recommended to have stenting or bypass surgery, which she declined. She has done well over the eight years with no acute coronary event. In the past few years, she has been experiencing diaphoresis and some funny feelings in the chest with lifting heavy objects, but not by walking.       She is a nonsmoker. She has history of hypertension, but no diabetes mellitus. She has been on Zocor for hypercholesterolemia. She does have a family history of coronary artery disease. Review of Systems   Constitutional: Negative for malaise/fatigue and weight loss. HENT: Positive for hearing loss. Eyes: Negative for blurred vision and double vision. Respiratory: Negative for shortness of breath. Cardiovascular: Negative for chest pain, palpitations, orthopnea, claudication, leg swelling and PND. Gastrointestinal: Negative for blood in stool, heartburn and melena. Genitourinary: Positive for frequency. Negative for dysuria, hematuria and urgency.    Musculoskeletal: Negative for back pain and joint pain. Skin: Negative for itching and rash. Neurological: Negative for dizziness, loss of consciousness and weakness. Psychiatric/Behavioral: Negative for depression and memory loss. Physical Exam   Constitutional: She is oriented to person, place, and time. She appears well-developed and well-nourished. HENT:   Head: Normocephalic and atraumatic. Eyes: Conjunctivae are normal. Pupils are equal, round, and reactive to light. Neck: Normal range of motion. Neck supple. No JVD present. Cardiovascular: Normal rate, regular rhythm, S1 normal and S2 normal.   No extrasystoles are present. PMI is not displaced. Exam reveals no gallop and no friction rub. Murmur heard. Harsh early systolic murmur is present with a grade of 1/6  at the upper right sternal border  Pulses:       Carotid pulses are 3+ on the right side, and 3+ on the left side. Pulmonary/Chest: Effort normal. She has no rales. Abdominal: Soft. There is no tenderness. Musculoskeletal: She exhibits no edema. Neurological: She is alert and oriented to person, place, and time. No cranial nerve deficit. Skin: Skin is warm and dry. Psychiatric: She has a normal mood and affect. Her behavior is normal.     Visit Vitals    /60    Pulse 62    Ht 5' 2\" (1.575 m)    Wt 66.2 kg (146 lb)    SpO2 98%    BMI 26.7 kg/m2       Past Medical History:   Diagnosis Date    CAD (coronary artery disease) 2008    Fibromyalgia     Heart failure (HCC)     Pt. states sge gas 5 heart blocks.  Hypercholesterolemia     Hypertension     Menopause     Thyroid disease        Social History     Social History    Marital status:      Spouse name: N/A    Number of children: N/A    Years of education: N/A     Occupational History    Not on file.      Social History Main Topics    Smoking status: Never Smoker    Smokeless tobacco: Never Used    Alcohol use No    Drug use: No    Sexual activity: Not on file     Other Topics Concern    Not on file     Social History Narrative       Family History   Problem Relation Age of Onset    Breast Cancer Mother [de-identified]       Past Surgical History:   Procedure Laterality Date    HX CHOLECYSTECTOMY      HX HYSTERECTOMY         Current Outpatient Prescriptions   Medication Sig Dispense Refill    hydroCHLOROthiazide (MICROZIDE) 12.5 mg capsule Take 12.5 mg by mouth daily.  metoprolol succinate (TOPROL-XL) 50 mg XL tablet Take 1 Tab by mouth daily. 90 Tab 3    amLODIPine (NORVASC) 5 mg tablet TAKE 1 TABLET BY MOUTH  DAILY 90 Tab 3    atorvastatin (LIPITOR) 40 mg tablet TAKE 1 TABLET BY MOUTH  DAILY 90 Tab 3    isosorbide mononitrate ER (IMDUR) 60 mg CR tablet TAKE 1 TABLET BY MOUTH  EVERY MORNING 90 Tab 3    aspirin delayed-release 81 mg tablet Take 162 mg by mouth daily.  levothyroxine (SYNTHROID) 75 mcg tablet Take 75 mcg by mouth Daily (before breakfast).  CALCIUM CARBONATE/VITAMIN D3 (CALCIUM + D PO) Take 1,200 mg by mouth daily.  psyllium (METAMUCIL) powd Take 1 Packet by mouth.  acetaminophen (TYLENOL) 325 mg tablet Take  by mouth every four (4) hours as needed for Pain.  DOCUSATE CALCIUM (STOOL SOFTENER PO) Take  by mouth.  nitroglycerin (NITROSTAT) 0.4 mg SL tablet by SubLINGual route every five (5) minutes as needed for Chest Pain.  celecoxib (CELEBREX) 200 mg capsule Take 200 mg by mouth two (2) times a day. EKG: normal EKG, normal sinus rhythm, unchanged from previous tracings, nonspecific ST and T waves changes, left axis deviation. ASSESSMENT and PLAN  Encounter Diagnoses   Name Primary?  CAD of native heart without angina pectoris,History of coronary artery disease s/p cath 2009 3vCAD 90%LAD,90% D1,80% Cx,80% prox dRCA Yes    Exertional angina ([address]     Essential hypertension     Hypercholesterolemia    She has been doing very well, in fact suprisingly well with her known multivessel coronary disease.   With her day-to-day activities she has had no anginal chest pain. She shows no signs of cardiac decompensation. Her blood pressure has been adequate. At this time, she will be continued on current medical regimen.

## 2018-04-09 ENCOUNTER — HOSPITAL ENCOUNTER (OUTPATIENT)
Dept: GENERAL RADIOLOGY | Age: 83
Discharge: HOME | End: 2018-04-09
Payer: MEDICARE

## 2018-04-09 DIAGNOSIS — J40 BRONCHITIS: ICD-10-CM

## 2018-04-09 PROCEDURE — 71046 X-RAY EXAM CHEST 2 VIEWS: CPT

## 2018-04-13 NOTE — PROGRESS NOTES
[facility] [address], [address] [place] [address], [address]  [phone] fax      Discharge Summary    Patient name: Rui Robbins     Start of Care: 18  Referral source: Rowena Francisco MD    : 1924  Medical/Treatment Diagnosis: Pain in left elbow [M25.522]  Onset Date:1+ year ago  Prior Hospitalization: see medical history   Provider#: [id number]  Comorbidities: PMHx/Surgical Hx: heart disease, fibromyalgia, arthritis, thyroid problems, HTN, hearing impaired   Prior Level of Function: I all areas of ADLs and activities, no issues with the right wrist and UE. Has been using rollator for mobility, household choresMedications: Verified on Patient Summary List    Visits from Start of Care: 11   Missed Visits: 0  Reporting Period : 18 to 3/8/18      Summary of Care:Please see latest MD note for specifics. Patient last session pain level was 6. She has exercises for her HEP and she should follow up with her MD as needed. Thank you.    Goal:patient will have established and be I with HEP to aid with progression of skilled PT program  Status at last note/certification:eval  Status at discharge: met    Goal: patient will have pain 3/10 to aid with increase tolerance to ADLS and activities  Status at last note/certification:eval  Status at discharge: not met    Goal:patient will have FOTO 56 to aid with increase tolerance to activities and ADLS including knitting and grandchild interaction  Status at last note/certification:eval  Status at discharge: not met, 50    Goal: patient will report 30% improvement to aid with increase tolerance to knitting and washing dishes  Status at last note/certification:eval  Status at discharge: not met, 20%    G-Codes (GP)  No G codes to report due to unplanned discharge    ASSESSMENT/RECOMMENDATIONS:  []Discontinue therapy progressing towards or have reached established goals  []Discontinue therapy due to lack of appreciable progress towards goals  [x]Discontinue therapy due to lack of attendance or compliance  [x]Other:no further contact  Thank you for this referral.     Reyes Daniels, PT 4/13/2018 9:33 AM

## 2018-10-02 ENCOUNTER — OFFICE VISIT (OUTPATIENT)
Dept: CARDIOLOGY CLINIC | Age: 83
End: 2018-10-02

## 2018-10-02 VITALS
HEART RATE: 61 BPM | WEIGHT: 145 LBS | SYSTOLIC BLOOD PRESSURE: 142 MMHG | DIASTOLIC BLOOD PRESSURE: 58 MMHG | OXYGEN SATURATION: 97 % | BODY MASS INDEX: 26.68 KG/M2 | HEIGHT: 62 IN

## 2018-10-02 DIAGNOSIS — I25.10 CORONARY ARTERY DISEASE INVOLVING NATIVE CORONARY ARTERY OF NATIVE HEART WITHOUT ANGINA PECTORIS: Primary | ICD-10-CM

## 2018-10-02 DIAGNOSIS — I10 ESSENTIAL HYPERTENSION: ICD-10-CM

## 2018-10-02 DIAGNOSIS — E78.00 HYPERCHOLESTEROLEMIA: ICD-10-CM

## 2018-10-02 NOTE — PROGRESS NOTES
HISTORY OF PRESENT ILLNESS  Zunilda Morales is a 80 y.o. female. HPI  She has been doing very well. She looks very good, much younger than her stated age of 80. She even looks younger than age 80. She denies any cardiac symptoms. She denies chest pain, dyspnea, orthopnea, PND. She denies palpitation, dizziness or syncope. She denies any symptoms of TIA or amaurosis fugax. Her blood pressure has been under control and adequate. She is from [place]. She has history of cardiac catheterization in 2009, which apparently demonstrated:   1. Patent left main trunk    2. 50-90% long stenosis of the LAD in the proximal to mid segment    3. 90% significant stenosis of the proximal first diagonal branch    4. 90% stenosis of the second diagonal branch    5. 80% stenosis of the circumflex artery at the origin of the obtuse marginal branch    6. 80% stenosis of the proximal dominant RCA with diffuse changes.       She was recommended to have stenting or bypass surgery, which she declined. She has done well over the eight years with no acute coronary event. In the past few years, she has been experiencing diaphoresis and some funny feelings in the chest with lifting heavy objects, but not by walking.       She is a nonsmoker. She has history of hypertension, but no diabetes mellitus. She has been on Zocor for hypercholesterolemia. She does have a family history of coronary artery disease. Review of Systems   Constitutional: Negative for malaise/fatigue and weight loss. HENT: Negative for hearing loss. Eyes: Negative for blurred vision and double vision. Respiratory: Negative for shortness of breath. Cardiovascular: Negative for chest pain, palpitations, orthopnea, claudication, leg swelling and PND. Gastrointestinal: Negative for blood in stool, heartburn and melena. Genitourinary: Negative for dysuria, frequency, hematuria and urgency. Musculoskeletal: Positive for joint pain. Negative for back pain. Skin: Negative for itching and rash. Neurological: Negative for dizziness, loss of consciousness and weakness. Psychiatric/Behavioral: Negative for depression and memory loss. Physical Exam   Constitutional: She is oriented to person, place, and time. She appears well-developed and well-nourished. HENT:   Head: Normocephalic and atraumatic. Eyes: Conjunctivae are normal. Pupils are equal, round, and reactive to light. Neck: Normal range of motion. Neck supple. No JVD present. Cardiovascular: Normal rate, regular rhythm, S1 normal and S2 normal.   No extrasystoles are present. PMI is not displaced. Exam reveals no gallop and no friction rub. Murmur heard. Harsh early systolic murmur is present with a grade of 1/6  at the upper right sternal border  Pulses:       Carotid pulses are 3+ on the right side, and 3+ on the left side. Pulmonary/Chest: Effort normal. She has no rales. Abdominal: Soft. There is no tenderness. Musculoskeletal: She exhibits no edema. Neurological: She is alert and oriented to person, place, and time. No cranial nerve deficit. Skin: Skin is warm and dry. Psychiatric: She has a normal mood and affect. Visit Vitals    /58    Pulse 61    Ht 5' 2\" (1.575 m)    Wt 65.8 kg (145 lb)    SpO2 97%    BMI 26.52 kg/m2       Past Medical History:   Diagnosis Date    CAD (coronary artery disease) 2008    Fibromyalgia     Heart failure (HCC)     Pt. states sge gas 5 heart blocks.  Hypercholesterolemia     Hypertension     Menopause     Thyroid disease        Social History     Social History    Marital status:      Spouse name: N/A    Number of children: N/A    Years of education: N/A     Occupational History    Not on file.      Social History Main Topics    Smoking status: Never Smoker    Smokeless tobacco: Never Used    Alcohol use No    Drug use: No    Sexual activity: Not on file     Other Topics Concern    Not on file     Social History Narrative       Family History   Problem Relation Age of Onset    Breast Cancer Mother [de-identified]       Past Surgical History:   Procedure Laterality Date    HX CHOLECYSTECTOMY      HX HYSTERECTOMY         Current Outpatient Prescriptions   Medication Sig Dispense Refill    hydroCHLOROthiazide (MICROZIDE) 12.5 mg capsule Take 12.5 mg by mouth daily.  metoprolol succinate (TOPROL-XL) 50 mg XL tablet Take 1 Tab by mouth daily. 90 Tab 3    amLODIPine (NORVASC) 5 mg tablet TAKE 1 TABLET BY MOUTH  DAILY 90 Tab 3    atorvastatin (LIPITOR) 40 mg tablet TAKE 1 TABLET BY MOUTH  DAILY 90 Tab 3    isosorbide mononitrate ER (IMDUR) 60 mg CR tablet TAKE 1 TABLET BY MOUTH  EVERY MORNING 90 Tab 3    aspirin delayed-release 81 mg tablet Take 162 mg by mouth daily.  acetaminophen (TYLENOL) 325 mg tablet Take  by mouth every four (4) hours as needed for Pain.  DOCUSATE CALCIUM (STOOL SOFTENER PO) Take  by mouth.  nitroglycerin (NITROSTAT) 0.4 mg SL tablet by SubLINGual route every five (5) minutes as needed for Chest Pain.  levothyroxine (SYNTHROID) 75 mcg tablet Take 75 mcg by mouth Daily (before breakfast).  celecoxib (CELEBREX) 200 mg capsule Take 200 mg by mouth two (2) times a day.  CALCIUM CARBONATE/VITAMIN D3 (CALCIUM + D PO) Take 1,200 mg by mouth daily. EKG: normal EKG, normal sinus rhythm, unchanged from previous tracings, nonspecific ST and T waves changes, left axis deviation. ASSESSMENT and PLAN  Encounter Diagnoses   Name Primary?  Coronary artery disease involving native coronary artery of native heart without angina pectoris,s/p cath 2009 3vCAD 90%LAD,90% D1,80% Cx,80% prox dRCA Yes    Essential hypertension     Hypercholesterolemia    She looks wonderful and younger than her age, at least by 8 years. She denies any cardiac symptoms. She has had no symptoms to indicate angina or cardiac decompensation. Her blood pressure has been under control and adequate. Since she has been doing so well, I would not interfere with it and I will simply continue her on current treatment for now.

## 2018-10-02 NOTE — MR AVS SNAPSHOT
[address] [address] [address] [address] [phone] Patient: Richelle Ribera MRN: [id number] :[id number] Visit Information Date & Time Provider Department Dept. Phone Encounter #  
 10/2/2018 10:40 AM Valeriano Armenta MD [facility] [address] [id number] Upcoming Health Maintenance Date Due DTaP/Tdap/Td series (1 - Tdap) 1945 Shingrix Vaccine Age 50> (1 of 2) 1974 GLAUCOMA SCREENING Q2Y 1989 Bone Densitometry (Dexa) Screening 1989 Pneumococcal 65+ Low/Medium Risk (1 of 2 - PCV13) 1989 MEDICARE YEARLY EXAM 3/14/2018 Influenza Age 5 to Adult 2018 Allergies as of 10/2/2018  Review Complete On: 3/21/2018 By: Valeriano Armenta MD  
  
 Severity Noted Reaction Type Reactions Codeine Medium 2016    Hives Dapiprazole  2016    Vertigo Elavil  2016    Vertigo Voltaren [Diclofenac Sodium]  2016    Vertigo Current Immunizations  Never Reviewed No immunizations on file. Not reviewed this visit You Were Diagnosed With   
  
 Codes Comments Coronary artery disease involving native coronary artery of native heart without angina pectoris    -  Primary ICD-10-CM: I25.10 ICD-9-CM: 414.01 Exertional angina (HCC)     ICD-10-CM: I20.8 ICD-9-CM: 413.9 Essential hypertension     ICD-10-CM: I10 
ICD-9-CM: 401.9 Hypercholesterolemia     ICD-10-CM: E78.00 ICD-9-CM: 272.0 Vitals BP Pulse Height(growth percentile) Weight(growth percentile) SpO2 BMI  
 142/58 61 5' 2\" (1.575 m) 145 lb (65.8 kg) 97% 26.52 kg/m2 OB Status Smoking Status Postmenopausal Never Smoker Vitals History BMI and BSA Data Body Mass Index Body Surface Area  
 26.52 kg/m 2 1.7 m 2 Preferred Pharmacy Pharmacy Name Phone 305 [facility], [address] [place] [address] [address] Alexx Lindsey Your Updated Medication List  
  
   
This list is accurate as of 10/2/18 11:19 AM.  Always use your most recent med list. amLODIPine 5 mg tablet Commonly known as:  Deveron Reji TAKE 1 TABLET BY MOUTH  DAILY  
  
 aspirin delayed-release 81 mg tablet Take 162 mg by mouth daily. atorvastatin 40 mg tablet Commonly known as:  LIPITOR  
TAKE 1 TABLET BY MOUTH  DAILY CALCIUM + D PO Take 1,200 mg by mouth daily. CeleBREX 200 mg capsule Generic drug:  celecoxib Take 200 mg by mouth two (2) times a day. hydroCHLOROthiazide 12.5 mg capsule Commonly known as:  Mara Lull Take 12.5 mg by mouth daily. isosorbide mononitrate ER 60 mg CR tablet Commonly known as:  IMDUR  
TAKE 1 TABLET BY MOUTH  EVERY MORNING  
  
 metoprolol succinate 50 mg XL tablet Commonly known as:  TOPROL-XL Take 1 Tab by mouth daily. nitroglycerin 0.4 mg SL tablet Commonly known as:  NITROSTAT  
by SubLINGual route every five (5) minutes as needed for Chest Pain. STOOL SOFTENER PO Take  by mouth. SYNTHROID 75 mcg tablet Generic drug:  levothyroxine Take 75 mcg by mouth Daily (before breakfast). TYLENOL 325 mg tablet Generic drug:  acetaminophen Take  by mouth every four (4) hours as needed for Pain. We Performed the Following AMB POC EKG ROUTINE W/ 12 LEADS, INTER & REP [01586 CPT(R)] Introducing [facility]! Tania Oreilly introduces [facility] patient portal. Now you can access parts of your medical record, email your doctor's office, and request medication refills online. 1. In your internet browser, go to [email or web address] 2. Click on the First Time User? Click Here link in the Sign In box. You will see the New Member Sign Up page. 3. Enter your [facility] Access Code exactly as it appears below.  You will not need to use this code after youve completed the sign-up process. If you do not sign up before the expiration date, you must request a new code. · [employer] Access Code: [email or web address] Expires: 12/31/2018 11:19 AM 
 
4. Enter the last four digits of your Social Security Number (xxxx) and Date of Birth (mm/dd/yyyy) as indicated and click Submit. You will be taken to the next sign-up page. 5. Create a [employer] ID. This will be your [employer] login ID and cannot be changed, so think of one that is secure and easy to remember. 6. Create a [employer] password. You can change your password at any time. 7. Enter your Password Reset Question and Answer. This can be used at a later time if you forget your password. 8. Enter your e-mail address. You will receive e-mail notification when new information is available in [address] 9. Click Sign Up. You can now view and download portions of your medical record. 10. Click the Download Summary menu link to download a portable copy of your medical information. If you have questions, please visit the Frequently Asked Questions section of the [employer] website. Remember, [facility] is NOT to be used for urgent needs. For medical emergencies, dial 911. Now available from your iPhone and Android! Please provide this summary of care documentation to your next provider. Your primary care clinician is listed as [facility]. If you have any questions after today's visit, please call [phone].

## 2018-10-05 RX ORDER — ISOSORBIDE MONONITRATE 60 MG/1
TABLET, EXTENDED RELEASE ORAL
Qty: 90 TAB | Refills: 3 | Status: SHIPPED | OUTPATIENT
Start: 2018-10-05

## 2018-12-05 DIAGNOSIS — E78.00 HYPERCHOLESTEROLEMIA: ICD-10-CM

## 2018-12-05 RX ORDER — AMLODIPINE BESYLATE 5 MG/1
TABLET ORAL
Qty: 90 TAB | Refills: 3 | Status: SHIPPED | OUTPATIENT
Start: 2018-12-05 | End: 2020-06-22 | Stop reason: SDUPTHER

## 2018-12-05 RX ORDER — ATORVASTATIN CALCIUM 40 MG/1
TABLET, FILM COATED ORAL
Qty: 90 TAB | Refills: 3 | Status: SHIPPED | OUTPATIENT
Start: 2018-12-05 | End: 2019-10-07 | Stop reason: ALTCHOICE

## 2018-12-05 RX ORDER — METOPROLOL SUCCINATE 50 MG/1
TABLET, EXTENDED RELEASE ORAL
Qty: 90 TAB | Refills: 3 | Status: SHIPPED | OUTPATIENT
Start: 2018-12-05 | End: 2021-06-01

## 2019-01-11 ENCOUNTER — HOSPITAL ENCOUNTER (OUTPATIENT)
Dept: GENERAL RADIOLOGY | Age: 84
Discharge: HOME | End: 2019-01-11
Payer: MEDICARE

## 2019-01-11 DIAGNOSIS — J40 BRONCHITIS: ICD-10-CM

## 2019-01-11 PROCEDURE — 71046 X-RAY EXAM CHEST 2 VIEWS: CPT

## 2019-04-02 ENCOUNTER — OFFICE VISIT (OUTPATIENT)
Dept: CARDIOLOGY CLINIC | Age: 84
End: 2019-04-02

## 2019-04-02 VITALS
SYSTOLIC BLOOD PRESSURE: 160 MMHG | HEIGHT: 62 IN | WEIGHT: 144 LBS | BODY MASS INDEX: 26.5 KG/M2 | DIASTOLIC BLOOD PRESSURE: 52 MMHG | OXYGEN SATURATION: 97 % | HEART RATE: 57 BPM

## 2019-04-02 DIAGNOSIS — E78.00 HYPERCHOLESTEROLEMIA: ICD-10-CM

## 2019-04-02 DIAGNOSIS — I10 ESSENTIAL HYPERTENSION: ICD-10-CM

## 2019-04-02 DIAGNOSIS — I25.10 CORONARY ARTERY DISEASE INVOLVING NATIVE CORONARY ARTERY OF NATIVE HEART WITHOUT ANGINA PECTORIS: Primary | ICD-10-CM

## 2019-04-02 NOTE — PROGRESS NOTES
HISTORY OF PRESENT ILLNESS  Adilia Rhoades is a 80 y.o. female. HPI  She looks amazingly good. She looks much younger than her stated age of 80. She denies any cardiac symptoms whatsoever. She has had no chest pain, dyspnea, orthopnea or PND. She has had no palpitations, dizziness or syncope. She denies any symptoms to indicate TIA or amaurosis fugax. Her blood pressure has been under control, particularly at home. She is from [place]. She has history of cardiac catheterization in 2009, which apparently demonstrated:   1. Patent left main trunk    2. 50-90% long stenosis of the LAD in the proximal to mid segment    3. 90% significant stenosis of the proximal first diagonal branch    4. 90% stenosis of the second diagonal branch    5. 80% stenosis of the circumflex artery at the origin of the obtuse marginal branch    6. 80% stenosis of the proximal dominant RCA with diffuse changes.       She was recommended to have stenting or bypass surgery, which she declined. She has done well over the eight years with no acute coronary event. In the past few years, she has been experiencing diaphoresis and some funny feelings in the chest with lifting heavy objects, but not by walking.       She is a nonsmoker. She has history of hypertension, but no diabetes mellitus. She has been on Zocor for hypercholesterolemia. She does have a family history of coronary artery disease. Review of Systems   Constitutional: Negative for malaise/fatigue and weight loss. HENT: Positive for hearing loss. Eyes: Negative for blurred vision and double vision. Respiratory: Negative for shortness of breath. Cardiovascular: Negative for chest pain, palpitations, orthopnea, claudication, leg swelling and PND. Gastrointestinal: Negative for blood in stool, heartburn and melena. Genitourinary: Negative for dysuria, frequency, hematuria and urgency. Musculoskeletal: Positive for joint pain. Negative for back pain.    Skin: Negative for itching and rash. Neurological: Negative for dizziness and loss of consciousness. Psychiatric/Behavioral: Negative for depression and memory loss. Physical Exam   Constitutional: She is oriented to person, place, and time. She appears well-developed and well-nourished. HENT:   Head: Normocephalic and atraumatic. Eyes: Pupils are equal, round, and reactive to light. Conjunctivae are normal.   Neck: Normal range of motion. Neck supple. No JVD present. Cardiovascular: Normal rate, regular rhythm, S1 normal and S2 normal.  No extrasystoles are present. PMI is not displaced. Exam reveals no gallop and no friction rub. Murmur heard. Harsh early systolic murmur is present with a grade of 1/6 at the upper right sternal border. Pulses:       Carotid pulses are 3+ on the right side, and 3+ on the left side. Pulmonary/Chest: Effort normal. She has no rales. Abdominal: Soft. There is no tenderness. Musculoskeletal: She exhibits no edema. Neurological: She is alert and oriented to person, place, and time. No cranial nerve deficit. Skin: Skin is warm and dry. Psychiatric: She has a normal mood and affect. Her behavior is normal.     Visit Vitals  /52   Pulse (!) 57   Ht 5' 2\" (1.575 m)   Wt 65.3 kg (144 lb)   SpO2 97%   BMI 26.34 kg/m²       Past Medical History:   Diagnosis Date    CAD (coronary artery disease) 2008    Fibromyalgia     Heart failure (HCC)     Pt. states sge gas 5 heart blocks.     Hypercholesterolemia     Hypertension     Menopause     Thyroid disease        Social History     Socioeconomic History    Marital status:      Spouse name: Not on file    Number of children: Not on file    Years of education: Not on file    Highest education level: Not on file   Occupational History    Not on file   Social Needs    Financial resource strain: Not on file    Food insecurity:     Worry: Not on file     Inability: Not on file    Transportation needs: Medical: Not on file     Non-medical: Not on file   Tobacco Use    Smoking status: Never Smoker    Smokeless tobacco: Never Used   Substance and Sexual Activity    Alcohol use: No    Drug use: No    Sexual activity: Not on file   Lifestyle    Physical activity:     Days per week: Not on file     Minutes per session: Not on file    Stress: Not on file   Relationships    Social connections:     Talks on phone: Not on file     Gets together: Not on file     Attends Catholic service: Not on file     Active member of club or organization: Not on file     Attends meetings of clubs or organizations: Not on file     Relationship status: Not on file    Intimate partner violence:     Fear of current or ex partner: Not on file     Emotionally abused: Not on file     Physically abused: Not on file     Forced sexual activity: Not on file   Other Topics Concern    Not on file   Social History Narrative    Not on file       Family History   Problem Relation Age of Onset    Breast Cancer Mother [de-identified]       Past Surgical History:   Procedure Laterality Date    HX CHOLECYSTECTOMY      HX HYSTERECTOMY         Current Outpatient Medications   Medication Sig Dispense Refill    amLODIPine (NORVASC) 5 mg tablet TAKE 1 TABLET BY MOUTH  DAILY 90 Tab 3    metoprolol succinate (TOPROL-XL) 50 mg XL tablet TAKE 1 TABLET BY MOUTH  DAILY 90 Tab 3    atorvastatin (LIPITOR) 40 mg tablet TAKE 1 TABLET BY MOUTH  DAILY 90 Tab 3    isosorbide mononitrate ER (IMDUR) 60 mg CR tablet TAKE 1 TABLET BY MOUTH  EVERY MORNING 90 Tab 3    hydroCHLOROthiazide (MICROZIDE) 12.5 mg capsule Take 12.5 mg by mouth as needed.  aspirin delayed-release 81 mg tablet Take 162 mg by mouth daily.  acetaminophen (TYLENOL) 325 mg tablet Take  by mouth every four (4) hours as needed for Pain.  DOCUSATE CALCIUM (STOOL SOFTENER PO) Take  by mouth.       nitroglycerin (NITROSTAT) 0.4 mg SL tablet by SubLINGual route every five (5) minutes as needed for Chest Pain.  levothyroxine (SYNTHROID) 75 mcg tablet Take 75 mcg by mouth Daily (before breakfast).  celecoxib (CELEBREX) 200 mg capsule Take 200 mg by mouth daily.  CALCIUM CARBONATE/VITAMIN D3 (CALCIUM + D PO) Take 1,200 mg by mouth daily. EKG: normal EKG, normal sinus rhythm, unchanged from previous tracings, nonspecific ST and T waves changes, left axis deviation. ASSESSMENT and PLAN  Encounter Diagnoses   Name Primary?  Coronary artery disease involving native coronary artery of native heart without angina pectoris,s/p cath 2009 3vCAD 90%LAD,90% D1,80% Cx,80% prox dRCA Yes    Essential hypertension     Hypercholesterolemia    She has been doing extremely well. She looks very good and much younger than her stated age of 80, at least ten years younger. Despite the known multivessel high grade coronary artery disease, she has been essentially asymptomatic on current medical regimen, which will be continued. I would not consider any intervention at this time. Her blood pressure has been under control.

## 2019-04-02 NOTE — PROGRESS NOTES
Jonathon Alvarez presents today for   Chief Complaint   Patient presents with    Follow-up     6 month - no cardiac complaints       Marquita Mo A [address] preferred language for health care discussion is english/other. Is someone accompanying this pt? Yes, son     Is the patient using any DME equipment during [address]? Walker     Depression Screening:  No flowsheet data found. Learning Assessment:  Learning Assessment 2/1/2017   PRIMARY LEARNER Patient   HIGHEST LEVEL OF EDUCATION - PRIMARY LEARNER  GRADUATED HIGH SCHOOL OR GED   BARRIERS PRIMARY LEARNER NONE   CO-LEARNER CAREGIVER No   PRIMARY LANGUAGE ENGLISH    NEED No   LEARNER PREFERENCE PRIMARY READING   ANSWERED BY patient   RELATIONSHIP SELF       Abuse Screening:  No flowsheet data found. Fall Risk  No flowsheet data found. Pt currently taking Antiplatelet therapy? ASA     Coordination of Care:  1. Have you been to the ER, urgent care clinic since your last visit? Hospitalized since your last visit? NO     2. Have you seen or consulted any other health care providers outside of the [address] [place] [address] since your last visit? Include any pap smears or colon screening.  NO

## 2019-07-08 ENCOUNTER — HOSPITAL ENCOUNTER (OUTPATIENT)
Dept: NON INVASIVE DIAGNOSTICS | Age: 84
Discharge: HOME | End: 2019-07-08
Attending: NURSE PRACTITIONER
Payer: MEDICARE

## 2019-07-08 ENCOUNTER — HOSPITAL ENCOUNTER (OUTPATIENT)
Dept: ULTRASOUND IMAGING | Age: 84
Discharge: HOME | End: 2019-07-08
Attending: NURSE PRACTITIONER
Payer: MEDICARE

## 2019-07-08 DIAGNOSIS — R74.8 ELEVATED LIVER ENZYMES: ICD-10-CM

## 2019-07-08 DIAGNOSIS — R55 SYNCOPE AND COLLAPSE: ICD-10-CM

## 2019-07-08 PROCEDURE — 93226 XTRNL ECG REC<48 HR SCAN A/R: CPT

## 2019-07-08 PROCEDURE — 76705 ECHO EXAM OF ABDOMEN: CPT

## 2019-08-20 ENCOUNTER — HOSPITAL ENCOUNTER (OUTPATIENT)
Dept: LAB | Age: 84
Discharge: HOME | End: 2019-08-20
Payer: MEDICARE

## 2019-08-20 LAB
ALBUMIN SERPL-MCNC: 3.6 G/DL (ref 3.4–5)
ALBUMIN/GLOB SERPL: 1.2 {RATIO} (ref 0.8–1.7)
ALP SERPL-CCNC: 89 U/L (ref 45–117)
ALT SERPL-CCNC: 27 U/L (ref 13–56)
ANION GAP SERPL CALC-SCNC: 4 MMOL/L (ref 3–18)
AST SERPL-CCNC: 26 U/L (ref 10–38)
BASOPHILS # BLD: 0 K/UL (ref 0–0.1)
BASOPHILS NFR BLD: 0 % (ref 0–2)
BILIRUB SERPL-MCNC: 2 MG/DL (ref 0.2–1)
BUN SERPL-MCNC: 15 MG/DL (ref 7–18)
BUN/CREAT SERPL: 19 (ref 12–20)
CALCIUM SERPL-MCNC: 8.2 MG/DL (ref 8.5–10.1)
CHLORIDE SERPL-SCNC: 105 MMOL/L (ref 100–111)
CHOLEST SERPL-MCNC: 165 MG/DL
CO2 SERPL-SCNC: 31 MMOL/L (ref 21–32)
CREAT SERPL-MCNC: 0.79 MG/DL (ref 0.6–1.3)
DIFFERENTIAL METHOD BLD: ABNORMAL
EOSINOPHIL # BLD: 0 K/UL (ref 0–0.4)
EOSINOPHIL NFR BLD: 0 % (ref 0–5)
ERYTHROCYTE [DISTWIDTH] IN BLOOD BY AUTOMATED COUNT: 12.9 % (ref 11.6–14.5)
GLOBULIN SER CALC-MCNC: 2.9 G/DL (ref 2–4)
GLUCOSE SERPL-MCNC: 94 MG/DL (ref 74–99)
HCT VFR BLD AUTO: 37.6 % (ref 35–45)
HDLC SERPL-MCNC: 47 MG/DL (ref 40–60)
HDLC SERPL: 3.5 {RATIO} (ref 0–5)
HGB BLD-MCNC: 12.7 G/DL (ref 12–16)
LDLC SERPL CALC-MCNC: 86.8 MG/DL (ref 0–100)
LIPID PROFILE,FLP: ABNORMAL
LYMPHOCYTES # BLD: 1.3 K/UL (ref 0.9–3.6)
LYMPHOCYTES NFR BLD: 27 % (ref 21–52)
MCH RBC QN AUTO: 31.1 PG (ref 24–34)
MCHC RBC AUTO-ENTMCNC: 33.8 G/DL (ref 31–37)
MCV RBC AUTO: 91.9 FL (ref 74–97)
MONOCYTES # BLD: 0.5 K/UL (ref 0.05–1.2)
MONOCYTES NFR BLD: 10 % (ref 3–10)
NEUTS SEG # BLD: 2.9 K/UL (ref 1.8–8)
NEUTS SEG NFR BLD: 63 % (ref 40–73)
PLATELET # BLD AUTO: 135 K/UL (ref 135–420)
PMV BLD AUTO: 9.6 FL (ref 9.2–11.8)
POTASSIUM SERPL-SCNC: 3.8 MMOL/L (ref 3.5–5.5)
PROT SERPL-MCNC: 6.5 G/DL (ref 6.4–8.2)
RBC # BLD AUTO: 4.09 M/UL (ref 4.2–5.3)
SODIUM SERPL-SCNC: 140 MMOL/L (ref 136–145)
T4 FREE SERPL-MCNC: 1.3 NG/DL (ref 0.7–1.5)
TRIGL SERPL-MCNC: 156 MG/DL (ref ?–150)
TSH SERPL DL<=0.05 MIU/L-ACNC: 0.85 UIU/ML (ref 0.36–3.74)
VLDLC SERPL CALC-MCNC: 31.2 MG/DL
WBC # BLD AUTO: 4.7 K/UL (ref 4.6–13.2)

## 2019-08-20 PROCEDURE — 80061 LIPID PANEL: CPT

## 2019-08-20 PROCEDURE — 84439 ASSAY OF FREE THYROXINE: CPT

## 2019-08-20 PROCEDURE — 85025 COMPLETE CBC W/AUTO DIFF WBC: CPT

## 2019-08-20 PROCEDURE — 80053 COMPREHEN METABOLIC PANEL: CPT

## 2019-08-20 PROCEDURE — 84443 ASSAY THYROID STIM HORMONE: CPT

## 2019-08-20 PROCEDURE — 36415 COLL VENOUS BLD VENIPUNCTURE: CPT

## 2019-10-07 ENCOUNTER — OFFICE VISIT (OUTPATIENT)
Dept: CARDIOLOGY CLINIC | Age: 84
End: 2019-10-07

## 2019-10-07 VITALS
OXYGEN SATURATION: 98 % | SYSTOLIC BLOOD PRESSURE: 130 MMHG | HEIGHT: 62 IN | WEIGHT: 138 LBS | HEART RATE: 61 BPM | BODY MASS INDEX: 25.4 KG/M2 | DIASTOLIC BLOOD PRESSURE: 72 MMHG

## 2019-10-07 DIAGNOSIS — I25.10 CORONARY ARTERY DISEASE INVOLVING NATIVE CORONARY ARTERY OF NATIVE HEART WITHOUT ANGINA PECTORIS: Primary | ICD-10-CM

## 2019-10-07 DIAGNOSIS — E78.00 HYPERCHOLESTEREMIA: ICD-10-CM

## 2019-10-07 DIAGNOSIS — I10 ESSENTIAL HYPERTENSION: ICD-10-CM

## 2019-10-07 NOTE — PROGRESS NOTES
HISTORY OF PRESENT ILLNESS  Matt Gardner is a 80 y.o. female. HPI  She is looking amazingly good for 89. She is very alert and intelligent. She hears well and sees well. She denies any cardiac symptoms. She has had no chest pain, dyspnea, orthopnea, PND. She denies palpitations, dizziness or syncope. She has had no symptoms to indicate TIA or amaurosis fugax. She does do grocery shopping and walks with a walker. She is from [place]. She has history of cardiac catheterization in 2009, which apparently demonstrated:   1. Patent left main trunk    2. 50-90% long stenosis of the LAD in the proximal to mid segment    3. 90% significant stenosis of the proximal first diagonal branch    4. 90% stenosis of the second diagonal branch    5. 80% stenosis of the circumflex artery at the origin of the obtuse marginal branch    6. 80% stenosis of the proximal dominant RCA with diffuse changes.       She was recommended to have stenting or bypass surgery, which she declined. She has done well over the eight years with no acute coronary event. In the past few years, she has been experiencing diaphoresis and some funny feelings in the chest with lifting heavy objects, but not by walking.       She is a nonsmoker. She has history of hypertension, but no diabetes mellitus. She has been on Zocor for hypercholesterolemia. She does have a family history of coronary artery disease.   Review of Systems   Constitutional: Negative for malaise/fatigue and weight loss. HENT: Negative for hearing loss. Eyes: Negative for blurred vision and double vision. Respiratory: Negative for shortness of breath. Cardiovascular: Negative for chest pain, palpitations, orthopnea, claudication, leg swelling and PND. Gastrointestinal: Negative for blood in stool, heartburn and melena. Genitourinary: Negative for dysuria, frequency, hematuria and urgency. Musculoskeletal: Positive for joint pain. Negative for back pain.    Skin: Negative for itching and rash. Neurological: Negative for dizziness and loss of consciousness. Psychiatric/Behavioral: Negative for depression and memory loss. Physical Exam   Constitutional: She is oriented to person, place, and time. She appears well-developed and well-nourished. HENT:   Head: Normocephalic and atraumatic. Eyes: Pupils are equal, round, and reactive to light. Conjunctivae are normal.   Neck: Normal range of motion. Neck supple. No JVD present. Cardiovascular: Normal rate, regular rhythm, S1 normal and S2 normal.  No extrasystoles are present. PMI is not displaced. Exam reveals no gallop and no friction rub. Murmur heard. Harsh early systolic murmur is present with a grade of 1/6 at the upper right sternal border. Pulses:       Carotid pulses are 3+ on the right side, and 3+ on the left side. Pulmonary/Chest: Effort normal. She has no rales. Abdominal: Soft. There is no tenderness. Musculoskeletal: She exhibits no edema. Neurological: She is alert and oriented to person, place, and time. No cranial nerve deficit. Skin: Skin is warm and dry. Psychiatric: She has a normal mood and affect. Her behavior is normal.     Visit Vitals  /72   Pulse 61   Ht 5' 2\" (1.575 m)   Wt 62.6 kg (138 lb)   SpO2 98%   BMI 25.24 kg/m²       Past Medical History:   Diagnosis Date    CAD (coronary artery disease) 2008    Fibromyalgia     Heart failure (HCC)     Pt. states sge gas 5 heart blocks.     Hypercholesterolemia     Hypertension     Menopause     Thyroid disease        Social History     Socioeconomic History    Marital status:      Spouse name: Not on file    Number of children: Not on file    Years of education: Not on file    Highest education level: Not on file   Occupational History    Not on file   Social Needs    Financial resource strain: Not on file    Food insecurity:     Worry: Not on file     Inability: Not on file    Transportation needs:     Medical: Not on file     Non-medical: Not on file   Tobacco Use    Smoking status: Never Smoker    Smokeless tobacco: Never Used   Substance and Sexual Activity    Alcohol use: No    Drug use: No    Sexual activity: Not on file   Lifestyle    Physical activity:     Days per week: Not on file     Minutes per session: Not on file    Stress: Not on file   Relationships    Social connections:     Talks on phone: Not on file     Gets together: Not on file     Attends Confucianism service: Not on file     Active member of club or organization: Not on file     Attends meetings of clubs or organizations: Not on file     Relationship status: Not on file    Intimate partner violence:     Fear of current or ex partner: Not on file     Emotionally abused: Not on file     Physically abused: Not on file     Forced sexual activity: Not on file   Other Topics Concern    Not on file   Social History Narrative    Not on file       Family History   Problem Relation Age of Onset    Breast Cancer Mother [de-identified]       Past Surgical History:   Procedure Laterality Date    HX CHOLECYSTECTOMY      HX HYSTERECTOMY         Current Outpatient Medications   Medication Sig Dispense Refill    amLODIPine (NORVASC) 5 mg tablet TAKE 1 TABLET BY MOUTH  DAILY 90 Tab 3    metoprolol succinate (TOPROL-XL) 50 mg XL tablet TAKE 1 TABLET BY MOUTH  DAILY 90 Tab 3    isosorbide mononitrate ER (IMDUR) 60 mg CR tablet TAKE 1 TABLET BY MOUTH  EVERY MORNING 90 Tab 3    aspirin delayed-release 81 mg tablet Take 162 mg by mouth daily.  acetaminophen (TYLENOL) 325 mg tablet Take  by mouth every four (4) hours as needed for Pain.  DOCUSATE CALCIUM (STOOL SOFTENER PO) Take  by mouth.  nitroglycerin (NITROSTAT) 0.4 mg SL tablet by SubLINGual route every five (5) minutes as needed for Chest Pain.  levothyroxine (SYNTHROID) 75 mcg tablet Take 75 mcg by mouth Daily (before breakfast).       CALCIUM CARBONATE/VITAMIN D3 (CALCIUM + D PO) Take 1,200 mg by mouth daily.  hydroCHLOROthiazide (MICROZIDE) 12.5 mg capsule Take 12.5 mg by mouth as needed. EKG: unchanged from previous tracings, normal sinus rhythm, nonspecific ST and T waves changes, LAE  . ASSESSMENT and PLAN  Encounter Diagnoses   Name Primary?  Coronary artery disease involving native coronary artery of native heart without angina pectoris,s/p cath 2009 3vCAD 90%LAD,90% D1,80% Cx,80% prox dRCA Yes    Hypercholesteremia     Essential hypertension    She has been doing extremely well. She has had no symptoms to indicate angina or cardiac decompensation. She looks very good for her age and much younger than her stated age of 80. She  is still able to do grocery shopping walking with a walker. Her blood pressure has been under control. For now, continue on current medical regimen.

## 2019-10-07 NOTE — PROGRESS NOTES
Maira George presents today for   Chief Complaint   Patient presents with    Coronary Artery Disease       Denisha Fries [facility] preferred language for health care discussion is english/other. Is someone accompanying this pt? Yes son    Is the patient using any DME equipment during OV? Yes walker    Depression Screening:  3 most recent PHQ Screens 10/7/2019   Little interest or pleasure in doing things Not at all   Feeling down, depressed, irritable, or hopeless Not at all   Total Score PHQ 2 0       Learning Assessment:  Learning Assessment 2/1/2017   PRIMARY LEARNER Patient   HIGHEST LEVEL OF EDUCATION - PRIMARY LEARNER  GRADUATED HIGH SCHOOL OR GED   BARRIERS PRIMARY LEARNER NONE   CO-LEARNER CAREGIVER No   PRIMARY LANGUAGE ENGLISH    NEED No   LEARNER PREFERENCE PRIMARY READING   ANSWERED BY patient   RELATIONSHIP SELF       Abuse Screening:  No flowsheet data found. Fall Risk  Fall Risk Assessment, last 12 mths 10/7/2019   Able to walk? Yes   Fall in past 12 months? No       Pt currently taking Anticoagulant therapy? no    Coordination of Care:  1. Have you been to the ER, urgent care clinic since your last visit? Hospitalized since your last visit? no    2. Have you seen or consulted any other health care providers outside of the [address] [place] [address] since your last visit? Include any pap smears or colon screening.  No

## 2020-06-18 ENCOUNTER — OFFICE VISIT (OUTPATIENT)
Dept: CARDIOLOGY CLINIC | Age: 85
End: 2020-06-18

## 2020-06-18 VITALS
DIASTOLIC BLOOD PRESSURE: 74 MMHG | OXYGEN SATURATION: 97 % | HEIGHT: 62 IN | SYSTOLIC BLOOD PRESSURE: 202 MMHG | HEART RATE: 56 BPM | WEIGHT: 136 LBS | BODY MASS INDEX: 25.03 KG/M2

## 2020-06-18 DIAGNOSIS — I25.10 CORONARY ARTERY DISEASE INVOLVING NATIVE CORONARY ARTERY OF NATIVE HEART WITHOUT ANGINA PECTORIS: Primary | ICD-10-CM

## 2020-06-18 NOTE — PROGRESS NOTES
HISTORY OF PRESENT ILLNESS  Janell Lanier is a 80 y.o. female. ASSESSMENT and PLAN    Ms. Rosa Caro has history of CAD. Because of abnormal testing back in 2009, she underwent coronary angiography at the [facility] showing proximal LAD disease, proximal circumflex/OM disease, and proximal dominant RCA disease. At that time, options for percutaneous revascularization as well as surgical revascularization were given; she declined and chose to go with medical therapy. For the last 11 years, she has done quite well. From cardiac standpoint, she is doing well. As she has never had chest pains in the past, she continues without chest pains. Despite her age, she remains active and denies any changes in her exercise capacity. Her exercise capacity is limited by bilateral lower extremity claudication. Her claudication is not bad enough to change her lifestyle. Her blood pressure is elevated today. I have increased her Norvasc. I do not want to be aggressively treating her blood pressure if she has symptoms of orthostasis. Obviously, if her blood pressure remains elevated, further increase in her antihypertensive medication is needed. Her heart rate shows asymptomatic sinus bradycardia. There is no evidence of decompensated CHF noted. Her weight today is 136 pounds which is near her baseline. Her target LDL is less than 70, she is unable to tolerate statins. She remains on aspirin daily. I will see her back in 6 months. Thank you. Encounter Diagnoses   Name Primary?     Coronary artery disease involving native coronary artery of native heart without angina pectoris,s/p cath 2009 3vCAD 90%LAD,90% D1,80% Cx,80% prox dRCA Yes     the following changes in treatment are made: See above  lab results and schedule of future lab studies reviewed with patient  reviewed diet, exercise and weight control  cardiovascular risk and specific lipid/LDL goals reviewed  use of aspirin to prevent MI and TIA's discussed      HPI   Today, Ms. [address] has no complaints of chest pains or increased dyspnea on exertion. She denies any changes in her exercise capacity. However, she has recently noted bilateral lower extremity below knee discomfort when she stands up. She denies any orthopnea or PND. She denies any palpitations or dizziness. She is accompanied by her son, with whom she lives. Review of Systems   Respiratory: Negative for shortness of breath. Cardiovascular: Positive for claudication. Negative for chest pain, palpitations, orthopnea, leg swelling and PND. All other systems reviewed and are negative. Physical Exam  Vitals signs and nursing note reviewed. Constitutional:       Appearance: Normal appearance. HENT:      Head: Normocephalic. Eyes:      Extraocular Movements: Extraocular movements intact. Conjunctiva/sclera: Conjunctivae normal.   Neck:      Musculoskeletal: No neck rigidity. Cardiovascular:      Rate and Rhythm: Regular rhythm. Bradycardia present. Pulmonary:      Breath sounds: Normal breath sounds. Abdominal:      General: Bowel sounds are normal.      Palpations: Abdomen is soft. Musculoskeletal:         General: No swelling. Skin:     General: Skin is warm and dry. Neurological:      General: No focal deficit present. Mental Status: She is alert and oriented to person, place, and time. Psychiatric:         Mood and Affect: Mood normal.         Behavior: Behavior normal.         Thought Content: Thought content normal.         PCP: Valencia Kay NP    Past Medical History:   Diagnosis Date    CAD (coronary artery disease) 2008    Fibromyalgia     Heart failure ([facility] [address]     Pt. states sge gas 5 heart blocks.     Hypercholesterolemia     Hypertension     Menopause     Thyroid disease        Past Surgical History:   Procedure Laterality Date    HX CHOLECYSTECTOMY      HX HYSTERECTOMY         Current Outpatient Medications   Medication Sig Dispense Refill    amLODIPine (NORVASC) 5 mg tablet TAKE 1 TABLET BY MOUTH  DAILY 90 Tab 3    metoprolol succinate (TOPROL-XL) 50 mg XL tablet TAKE 1 TABLET BY MOUTH  DAILY 90 Tab 3    isosorbide mononitrate ER (IMDUR) 60 mg CR tablet TAKE 1 TABLET BY MOUTH  EVERY MORNING 90 Tab 3    aspirin delayed-release 81 mg tablet Take 162 mg by mouth daily.  acetaminophen (TYLENOL) 325 mg tablet Take  by mouth every four (4) hours as needed for Pain.  nitroglycerin (NITROSTAT) 0.4 mg SL tablet by SubLINGual route every five (5) minutes as needed for Chest Pain.  levothyroxine (SYNTHROID) 75 mcg tablet Take 75 mcg by mouth Daily (before breakfast). The patient has a family history of    Social History     Tobacco Use    Smoking status: Never Smoker    Smokeless tobacco: Never Used   Substance Use Topics    Alcohol use: No    Drug use: No       Lab Results   Component Value Date/Time    Cholesterol, total 165 08/20/2019 09:43 AM    HDL Cholesterol 47 08/20/2019 09:43 AM    LDL, calculated 86.8 08/20/2019 09:43 AM    Triglyceride 156 (H) 08/20/2019 09:43 AM    CHOL/HDL Ratio 3.5 08/20/2019 09:43 AM        BP Readings from Last 3 Encounters:   06/18/20 (!) 202/74   10/07/19 130/72   04/02/19 160/52        Pulse Readings from Last 3 Encounters:   06/18/20 (!) 56   10/07/19 61   04/02/19 (!) 57       Wt Readings from Last 3 Encounters:   06/18/20 61.7 kg (136 lb)   10/07/19 62.6 kg (138 lb)   04/02/19 65.3 kg (144 lb)         EKG: unchanged from previous tracings, nonspecific ST and T waves changes, sinus bradycardia.

## 2020-06-18 NOTE — PROGRESS NOTES
Anna Manning presents today for   Chief Complaint   Patient presents with    Coronary Artery Disease     6 month follow up     Claudication     burning in both legs from knees down to ankles, pain when walking, x2 months        Anna Manning preferred language for health care discussion is english/other. Is someone accompanying this pt? Son     Is the patient using any DME equipment during [address]? Rolator     Depression Screening:  3 most recent PHQ Screens 6/18/2020   Little interest or pleasure in doing things Not at all   Feeling down, depressed, irritable, or hopeless Not at all   Total Score PHQ 2 0       Learning Assessment:  Learning Assessment 2/1/2017   PRIMARY LEARNER Patient   HIGHEST LEVEL OF EDUCATION - PRIMARY LEARNER  GRADUATED HIGH SCHOOL OR GED   BARRIERS PRIMARY LEARNER NONE   CO-LEARNER CAREGIVER No   PRIMARY LANGUAGE ENGLISH    NEED No   LEARNER PREFERENCE PRIMARY READING   ANSWERED BY patient   RELATIONSHIP SELF       Abuse Screening:  No flowsheet data found. Fall Risk  Fall Risk Assessment, last 12 mths 6/18/2020   Able to walk? Yes   Fall in past 12 months? Yes   Fall with injury? No   Number of falls in past 12 months 8 or more   Fall Risk Score 8       Pt currently taking Anticoagulant therapy? ASA 162mg every day     Coordination of Care:  1. Have you been to the ER, urgent care clinic since your last visit? Hospitalized since your last visit? no    2. Have you seen or consulted any other health care providers outside of the [address] [place] [address] since your last visit? Include any pap smears or colon screening.  no

## 2020-06-22 RX ORDER — AMLODIPINE BESYLATE 10 MG/1
TABLET ORAL
Qty: 30 TAB | Refills: 6 | Status: SHIPPED | OUTPATIENT
Start: 2020-06-22 | End: 2021-02-19

## 2020-12-01 ENCOUNTER — OFFICE VISIT (OUTPATIENT)
Dept: CARDIOLOGY CLINIC | Age: 85
End: 2020-12-01
Payer: MEDICARE

## 2020-12-01 VITALS
OXYGEN SATURATION: 96 % | HEIGHT: 62 IN | SYSTOLIC BLOOD PRESSURE: 206 MMHG | HEART RATE: 61 BPM | DIASTOLIC BLOOD PRESSURE: 66 MMHG | BODY MASS INDEX: 24.84 KG/M2 | WEIGHT: 135 LBS

## 2020-12-01 DIAGNOSIS — R55 SYNCOPE AND COLLAPSE: ICD-10-CM

## 2020-12-01 DIAGNOSIS — I25.10 CORONARY ARTERY DISEASE INVOLVING NATIVE CORONARY ARTERY OF NATIVE HEART WITHOUT ANGINA PECTORIS: Primary | ICD-10-CM

## 2020-12-01 DIAGNOSIS — E78.00 HYPERCHOLESTEREMIA: ICD-10-CM

## 2020-12-01 DIAGNOSIS — I10 ESSENTIAL HYPERTENSION: ICD-10-CM

## 2020-12-01 PROCEDURE — G8427 DOCREV CUR MEDS BY ELIG CLIN: HCPCS | Performed by: INTERNAL MEDICINE

## 2020-12-01 PROCEDURE — G8510 SCR DEP NEG, NO PLAN REQD: HCPCS | Performed by: INTERNAL MEDICINE

## 2020-12-01 PROCEDURE — G8536 NO DOC ELDER MAL SCRN: HCPCS | Performed by: INTERNAL MEDICINE

## 2020-12-01 PROCEDURE — G8420 CALC BMI NORM PARAMETERS: HCPCS | Performed by: INTERNAL MEDICINE

## 2020-12-01 PROCEDURE — 93000 ELECTROCARDIOGRAM COMPLETE: CPT | Performed by: INTERNAL MEDICINE

## 2020-12-01 PROCEDURE — 99214 OFFICE O/P EST MOD 30 MIN: CPT | Performed by: INTERNAL MEDICINE

## 2020-12-01 RX ORDER — ATORVASTATIN CALCIUM 40 MG/1
TABLET, FILM COATED ORAL DAILY
COMMUNITY
End: 2021-12-01

## 2020-12-01 NOTE — PROGRESS NOTES
HISTORY OF PRESENT ILLNESS  Vilma Flannery is a 80 y.o. female. ASSESSMENT and PLAN    Ms. Laura Scherer has history of CAD. Because of abnormal testing back in 2009, she underwent coronary angiography at the [facility] showing proximal LAD disease, proximal circumflex/OM disease, and proximal dominant RCA disease. At that time, options for percutaneous revascularization as well as surgical revascularization were given; she declined and chose to go with medical therapy. For the last 11 years, she has done quite well. · CAD:    Symptomatically stable. · BP:    Elevated today. She remains on amlodipine 10, lisinopril 10, Imdur 60.  · HR:    Stable. · CHF:    There is no evidence of decompensated CHF noted. · Weight:    Her weight today is 135 pounds. This is at baseline. · Cholesterol:   Target LDL <70. Lipitor 40. · Anti-platelet:   Remains on ASA. Her blood pressure is elevated as noted above. She states that her home cuff measures blood pressure in the normal range. She has had this correlated in the past.  I advised her to bring in her blood pressure cuff to her next doctor's visit which will be on Monday. If her blood pressure remains elevated, I would increase her lisinopril initially. All this may be white coat syndrome. This was discussed at length with the patient and her son who accompanied her. All questions were answered. I will see her back in 6 months. Thank you. Encounter Diagnoses   Name Primary?     Coronary artery disease involving native coronary artery of native heart without angina pectoris,s/p cath 2009 3vCAD 90%LAD,90% D1,80% Cx,80% prox dRCA Yes    Hypercholesteremia     Essential hypertension     Syncope and collapse      current treatment plan is effective, no change in therapy  lab results and schedule of future lab studies reviewed with patient  cardiovascular risk and specific lipid/LDL goals reviewed  use of aspirin to prevent MI and TIA's discussed      HPI   Today, Ms. [address] has no complaints of chest pains, or increased dyspnea on exertion. Despite her age, she still takes care of her self. She does use a walker to ambulate. She denies any orthopnea or PND. She denies any palpitations or dizziness. Review of Systems   Respiratory: Negative for shortness of breath. Cardiovascular: Negative for chest pain, palpitations, orthopnea, claudication, leg swelling and PND. All other systems reviewed and are negative. Physical Exam  Vitals signs and nursing note reviewed. Constitutional:       Appearance: Normal appearance. HENT:      Head: Normocephalic. Eyes:      Conjunctiva/sclera: Conjunctivae normal.   Neck:      Musculoskeletal: No neck rigidity. Cardiovascular:      Rate and Rhythm: Normal rate and regular rhythm. Pulmonary:      Breath sounds: Normal breath sounds. Abdominal:      Palpations: Abdomen is soft. Musculoskeletal:         General: No swelling. Skin:     General: Skin is warm and dry. Neurological:      General: No focal deficit present. Mental Status: She is alert and oriented to person, place, and time. Psychiatric:         Mood and Affect: Mood normal.         Behavior: Behavior normal.         PCP: Sharon Greer NP    Past Medical History:   Diagnosis Date    CAD (coronary artery disease) 2008    Fibromyalgia     Heart failure ([facility] [address]     Pt. states sge gas 5 heart blocks.  Hypercholesterolemia     Hypertension     Menopause     Thyroid disease        Past Surgical History:   Procedure Laterality Date    HX CHOLECYSTECTOMY      HX HYSTERECTOMY         Current Outpatient Medications   Medication Sig Dispense Refill    atorvastatin (Lipitor) 40 mg tablet Take  by mouth daily.       amLODIPine (NORVASC) 10 mg tablet TAKE 1 TABLET BY MOUTH  DAILY 30 Tab 6    metoprolol succinate (TOPROL-XL) 50 mg XL tablet TAKE 1 TABLET BY MOUTH  DAILY 90 Tab 3    isosorbide mononitrate ER (IMDUR) 60 mg CR tablet TAKE 1 TABLET BY MOUTH  EVERY MORNING 90 Tab 3    aspirin delayed-release 81 mg tablet Take 162 mg by mouth daily.  acetaminophen (TYLENOL) 325 mg tablet Take  by mouth every four (4) hours as needed for Pain.  nitroglycerin (NITROSTAT) 0.4 mg SL tablet by SubLINGual route every five (5) minutes as needed for Chest Pain.  levothyroxine (SYNTHROID) 75 mcg tablet Take 75 mcg by mouth Daily (before breakfast). The patient has a family history of    Social History     Tobacco Use    Smoking status: Never Smoker    Smokeless tobacco: Never Used   Substance Use Topics    Alcohol use: No    Drug use: No       Lab Results   Component Value Date/Time    Cholesterol, total 165 08/20/2019 09:43 AM    HDL Cholesterol 47 08/20/2019 09:43 AM    LDL, calculated 86.8 08/20/2019 09:43 AM    Triglyceride 156 (H) 08/20/2019 09:43 AM    CHOL/HDL Ratio 3.5 08/20/2019 09:43 AM        BP Readings from Last 3 Encounters:   12/01/20 (!) 206/66   06/18/20 (!) 202/74   10/07/19 130/72        Pulse Readings from Last 3 Encounters:   12/01/20 61   06/18/20 (!) 56   10/07/19 61       Wt Readings from Last 3 Encounters:   12/01/20 61.2 kg (135 lb)   06/18/20 61.7 kg (136 lb)   10/07/19 62.6 kg (138 lb)         EKG: unchanged from previous tracings, normal sinus rhythm, nonspecific ST and T waves changes.

## 2020-12-01 NOTE — PROGRESS NOTES
Prabhakar Guzman presents today for   Chief Complaint   Patient presents with    Follow-up     6 month follow up     Swelling     ankles, mild       Trudy A [address] preferred language for health care discussion is english/other. Is someone accompanying this pt? Son     Is the patient using any DME equipment during [address]? Walker     Depression Screening:  3 most recent PHQ Screens 12/1/2020   Little interest or pleasure in doing things Not at all   Feeling down, depressed, irritable, or hopeless Not at all   Total Score PHQ 2 0       Learning Assessment:  Learning Assessment 2/1/2017   PRIMARY LEARNER Patient   HIGHEST LEVEL OF EDUCATION - PRIMARY LEARNER  GRADUATED HIGH SCHOOL OR GED   BARRIERS PRIMARY LEARNER NONE   CO-LEARNER CAREGIVER No   PRIMARY LANGUAGE ENGLISH    NEED No   LEARNER PREFERENCE PRIMARY READING   ANSWERED BY patient   RELATIONSHIP SELF       Abuse Screening:  No flowsheet data found. Fall Risk  Fall Risk Assessment, last 12 mths 12/1/2020   Able to walk? Yes   Fall in past 12 months? No   Fall with injury? -   Number of falls in past 12 months -   Fall Risk Score -       Pt currently taking Anticoagulant therapy? ASA 81mg every day     Coordination of Care:  1. Have you been to the ER, urgent care clinic since your last visit? Hospitalized since your last visit? no    2. Have you seen or consulted any other health care providers outside of the [address] [place] [address] since your last visit? Include any pap smears or colon screening.  no

## 2020-12-19 ENCOUNTER — HOSPITAL ENCOUNTER (EMERGENCY)
Age: 85
Discharge: HOME | End: 2020-12-19
Attending: EMERGENCY MEDICINE
Payer: MEDICARE

## 2020-12-19 ENCOUNTER — APPOINTMENT (OUTPATIENT)
Dept: GENERAL RADIOLOGY | Age: 85
End: 2020-12-19
Attending: EMERGENCY MEDICINE
Payer: MEDICARE

## 2020-12-19 VITALS
RESPIRATION RATE: 16 BRPM | DIASTOLIC BLOOD PRESSURE: 58 MMHG | TEMPERATURE: 98.2 F | HEART RATE: 54 BPM | SYSTOLIC BLOOD PRESSURE: 158 MMHG | OXYGEN SATURATION: 100 %

## 2020-12-19 DIAGNOSIS — S39.012A LUMBAR STRAIN, INITIAL ENCOUNTER: ICD-10-CM

## 2020-12-19 DIAGNOSIS — S30.0XXA LUMBAR CONTUSION, INITIAL ENCOUNTER: Primary | ICD-10-CM

## 2020-12-19 PROCEDURE — 99283 EMERGENCY DEPT VISIT LOW MDM: CPT

## 2020-12-19 PROCEDURE — 72100 X-RAY EXAM L-S SPINE 2/3 VWS: CPT

## 2020-12-19 PROCEDURE — 74011250637 HC RX REV CODE- 250/637: Performed by: EMERGENCY MEDICINE

## 2020-12-19 RX ORDER — TRAMADOL HYDROCHLORIDE 50 MG/1
50 TABLET ORAL
Qty: 12 TAB | Refills: 0 | Status: SHIPPED | OUTPATIENT
Start: 2020-12-19 | End: 2020-12-22

## 2020-12-19 RX ORDER — ACETAMINOPHEN 325 MG/1
650 TABLET ORAL ONCE
Status: DISCONTINUED | OUTPATIENT
Start: 2020-12-19 | End: 2020-12-19

## 2020-12-19 RX ORDER — HYDROCODONE BITARTRATE AND ACETAMINOPHEN 5; 325 MG/1; MG/1
1 TABLET ORAL ONCE
Status: COMPLETED | OUTPATIENT
Start: 2020-12-19 | End: 2020-12-19

## 2020-12-19 RX ADMIN — HYDROCODONE BITARTRATE AND ACETAMINOPHEN 1 TABLET: 5; 325 TABLET ORAL at 13:54

## 2020-12-19 NOTE — ED PROVIDER NOTES
EMERGENCY DEPARTMENT HISTORY AND PHYSICAL EXAM    1:16 PM      Date: 12/19/2020  Patient Name: Lanette Arreola    History of Presenting Illness     Chief Complaint   Patient presents with    Fall         History Provided By: Patient    Additional History (Context): Lanette Arreola is a 80 y.o. female with Past medical history of CAD, hypertension who presents with chief complaint of moderate to severe low back pain for the past week. States that the pain began when she lost her balance falling against the flat side of the door, hitting her low back. She reports that she was situating some close at the time of the injury, but she denies falling down at all, although triage note mentions she fell on some close. She reports that she had back surgery over 30 years ago. She took a Tylenol about 5 AM today but that did not help. No bowel or bladder dysfunction, numbness, weakness, abdominal pain, chest pain, dizziness, fever, shortness of breath, nausea, and no other complaints. PCP: Serjio Mclean NP        Past History     Past Medical History:  Past Medical History:   Diagnosis Date    CAD (coronary artery disease) 2008    Fibromyalgia     Heart failure ([facility] [address]     Pt. states sge gas 5 heart blocks.  Hypercholesterolemia     Hypertension     Menopause     Thyroid disease        Past Surgical History:  Past Surgical History:   Procedure Laterality Date    HX CHOLECYSTECTOMY      HX HYSTERECTOMY         Family History:  Family History   Problem Relation Age of Onset    Breast Cancer Mother [de-identified]       Social History:  Social History     Tobacco Use    Smoking status: Never Smoker    Smokeless tobacco: Never Used   Substance Use Topics    Alcohol use: No    Drug use: No       Allergies:   Allergies   Allergen Reactions    Codeine Hives    Dapiprazole Vertigo    Elavil Vertigo    Voltaren [Diclofenac Sodium] Vertigo         Review of Systems       Review of Systems   Constitutional: Negative for chills and fever. HENT: Negative for rhinorrhea and trouble swallowing. Eyes: Negative for visual disturbance. Respiratory: Negative for cough and shortness of breath. Cardiovascular: Negative for chest pain. Gastrointestinal: Negative for abdominal pain, nausea and vomiting. Endocrine: Negative for polyuria. Genitourinary: Negative for difficulty urinating, dysuria and flank pain. Musculoskeletal: Positive for back pain. Negative for arthralgias and neck pain. Skin: Negative for pallor and rash. Neurological: Negative for dizziness, syncope, weakness, numbness and headaches. Hematological: Does not bruise/bleed easily. Psychiatric/Behavioral: Negative for confusion and dysphoric mood. All other systems reviewed and are negative. Physical Exam     Visit Vitals  BP (!) 158/58   Pulse (!) 54   Temp 98.2 °F (36.8 °C)   Resp 16   SpO2 100%         Physical Exam  Vitals signs and nursing note reviewed. Constitutional:       Appearance: She is well-developed. She is not ill-appearing, toxic-appearing or diaphoretic. Comments: Appears in pain   HENT:      Head: Normocephalic and atraumatic. Eyes:      General: No scleral icterus. Conjunctiva/sclera: Conjunctivae normal.      Pupils: Pupils are equal, round, and reactive to light. Neck:      Musculoskeletal: Normal range of motion and neck supple. No muscular tenderness. Cardiovascular:      Rate and Rhythm: Normal rate. Pulses: Normal pulses. Comments: Capillary refill < 3 seconds  Pulmonary:      Effort: Pulmonary effort is normal. No respiratory distress. Breath sounds: Normal breath sounds. No wheezing. Abdominal:      General: Bowel sounds are normal. There is no distension. Palpations: Abdomen is soft. Tenderness: There is no abdominal tenderness. Musculoskeletal: Normal range of motion. General: Tenderness and signs of injury present. Right lower leg: No edema.       Left lower leg: No edema. Comments: Tender to palpate low midline lumbar region, no bony step-offs or crepitus. Has tenderness of the right paraspinal lumbar region    Her pain appears worse than when she turns or moves in certain directions, but able to sit up without difficulty. Negative straight leg    Has full range of motion bilateral upper and lower extremity but does have some pain at the low back associated when she flexes the right knee    Has symmetric distal dorsalis pedal pulses normal capillary refill. Skin:     General: Skin is warm and dry. Neurological:      General: No focal deficit present. Mental Status: She is alert and oriented to person, place, and time. Cranial Nerves: No cranial nerve deficit. Sensory: No sensory deficit. Motor: No weakness. Psychiatric:         Mood and Affect: Mood normal.           Diagnostic Study Results     Labs -  No results found for this or any previous visit (from the past 12 hour(s)). Radiologic Studies -   XR SPINE LUMB 2 OR 3 V   Final Result   IMPRESSION:      1. Nothing definitely acute. 2.  Moderate degenerative changes as above. 3.  Significant atherosclerotic vascular calcification. 4.  Probable bilateral renal calculi. Medical Decision Making   I am the first provider for this patient. I reviewed the vital signs, available nursing notes, past medical history, past surgical history, family history and social history. Vital Signs-Reviewed the patient's vital signs. Records Reviewed: Nursing Notes and Old Medical Records (Time of Review: 1:38 PM)    Provider Notes (Medical Decision Making): DDX: Lumbar contusion, fracture, subluxation, spasm, strain    No saddle anesthesia, bladder or bowel dysfunction, no red flags for compression.   Get x-ray lumbar spine, give analgesia    MDM    Medications   HYDROcodone-acetaminophen (NORCO) 5-325 mg per tablet 1 Tab (1 Tab Oral Given 12/19/20 4784)           ED Course: Progress Notes, Reevaluation, and Consults: We will have her follow-up orthopedic and spine. I have reassessed the patient. I have discussed the workup, results and plan with the patient and patient is in agreement. Patient is feeling better. Patient will be prescribed tramadol. Patient was discharge in stable condition. Patient was given outpatient follow up. Patient is to return to emergency department if any new or worsening condition. Diagnosis     Clinical Impression:   1. Lumbar contusion, initial encounter    2. Lumbar strain, initial encounter        Disposition: Discharged    Follow-up Information     Follow up With Specialties Details Why Contact Info    [address]  Schedule an appointment as soon as possible for a visit in 2 days  [address]    Susan Mendoza NP Nurse Practitioner Schedule an appointment as soon as possible for a visit in 1 week  [address] [facility]  [address]  [phone]      [address] [facility] EMERGENCY DEPT Emergency Medicine  As needed, If symptoms worsen [address] [facility]  [phone]           Patient's Medications   Start Taking    TRAMADOL (ULTRAM) 50 MG TABLET    Take 1 Tab by mouth every six (6) hours as needed for Pain for up to 3 days. Max Daily Amount: 200 mg. Continue Taking    ACETAMINOPHEN (TYLENOL) 325 MG TABLET    Take  by mouth every four (4) hours as needed for Pain. AMLODIPINE (NORVASC) 10 MG TABLET    TAKE 1 TABLET BY MOUTH  DAILY    ASPIRIN DELAYED-RELEASE 81 MG TABLET    Take 162 mg by mouth daily. ATORVASTATIN (LIPITOR) 40 MG TABLET    Take  by mouth daily. ISOSORBIDE MONONITRATE ER (IMDUR) 60 MG CR TABLET    TAKE 1 TABLET BY MOUTH  EVERY MORNING    LEVOTHYROXINE (SYNTHROID) 75 MCG TABLET    Take 75 mcg by mouth Daily (before breakfast). METOPROLOL SUCCINATE (TOPROL-XL) 50 MG XL TABLET    TAKE 1 TABLET BY MOUTH  DAILY    NITROGLYCERIN (NITROSTAT) 0.4 MG SL TABLET    by SubLINGual route every five (5) minutes as needed for Chest Pain. These Medications have changed    No medications on file   Stop Taking    No medications on file         DO Zack Montemayor medical dictation software was used for portions of this report. Unintended transcription errors may occur. My signature above authenticates this document and my orders, the final    diagnosis (es), discharge prescription (s), and instructions in the Epic    record.

## 2020-12-19 NOTE — ED NOTES
Francisco Vega is a 80 y.o. female that was discharged in stable condition. The patients diagnosis, condition and treatment were explained to  patient and aftercare instructions were given. The patient verbalized understanding. Patient armband removed and shredded.

## 2020-12-19 NOTE — DISCHARGE INSTRUCTIONS
Patient Education      If you were prescribed any medication take as directed. Do not drive or use heavy equipment if prescribed narcotics. Follow up with your primary care physician or with specialist as directed. Return to the emergency room with any new or worsening conditions. Back Strain: Care Instructions  Overview     A back strain happens when you overstretch, or pull, a muscle in your back. You may hurt your back in an accident or when you exercise or lift something. Sometimes you may not know how you hurt your back. Most back pain will get better with rest and time. You can take care of yourself at home to help your back heal.  Follow-up care is a key part of your treatment and safety. Be sure to make and go to all appointments, and call your doctor if you are having problems. It's also a good idea to know your test results and keep a list of the medicines you take. How can you care for yourself at home? · Try to stay as active as you can, but stop or reduce any activity that causes pain. · Put ice or a cold pack on the sore muscle for 10 to 20 minutes at a time to stop swelling. Try this every 1 to 2 hours for 3 days (when you are awake) or until the swelling goes down. Put a thin cloth between the ice pack and your skin. · After 2 or 3 days, apply a heating pad on low or a warm cloth to your back. Some doctors suggest that you go back and forth between hot and cold treatments. · Take pain medicines exactly as directed. ? If the doctor gave you a prescription medicine for pain, take it as prescribed. ? If you are not taking a prescription pain medicine, ask your doctor if you can take an over-the-counter medicine. · Try sleeping on your side with a pillow between your legs. Or put a pillow under your knees when you lie on your back. These measures can ease pain in your lower back. · Return to your usual level of activity slowly. When should you call for help?    Call 911 anytime you think you may need emergency care. For example, call if:    · You are unable to move a leg at all. Call your doctor now or seek immediate medical care if:    · You have new or worse symptoms in your legs, belly, or buttocks. Symptoms may include:  ? Numbness or tingling. ? Weakness. ? Pain.     · You lose bladder or bowel control. Watch closely for changes in your health, and be sure to contact your doctor if:    · You have a fever, lose weight, or don't feel well.     · You are not getting better as expected. Where can you learn more? Go to http://www.gray.com/  Enter B868 in the search box to learn more about \"Back Strain: Care Instructions. \"  Current as of: March 2, 2020               Content Version: 12.6  © 4169-2536 [employer]. Care instructions adapted under license by [employer] (which disclaims liability or warranty for this information). If you have questions about a medical condition or this instruction, always ask your healthcare professional. [place] [address] any warranty or liability for your use of this information. Patient Education        Low Back Contusion: Care Instructions  Your Care Instructions     Contusion is the medical term for a bruise. When you have a low back bruise, it's often caused by a direct blow or an impact, such as falling against a counter or table. Bruises are common sports injuries. Most people think of a bruise as a black-and-blue spot. This happens when small blood vessels get torn and leak blood under the skin. But bones, muscles, and organs can also get bruised. If these deep tissues are damaged, you may not always see a bruise. The doctor will examine your bruise. You may also have tests to make sure you do not have a more serious injury, such as a broken bone or nerve damage. Tests may include X-rays or other imaging tests like a CT scan or MRI.   Low back bruises may cause pain and swelling. But if there is no serious damage, they will often get better with home treatment in several days to a few weeks. The doctor has checked you carefully, but problems can develop later. If you notice any problems or new symptoms, get medical treatment right away. Follow-up care is a key part of your treatment and safety. Be sure to make and go to all appointments, and call your doctor if you are having problems. It's also a good idea to know your test results and keep a list of the medicines you take. How can you care for yourself at home? · Put ice or a cold pack on the sore area for 10 to 20 minutes at a time to stop swelling. Put a thin cloth between the ice pack and your skin. · Be safe with medicines. Read and follow all instructions on the label. ? If the doctor gave you a prescription medicine for pain, take it as prescribed. ? If you are not taking a prescription pain medicine, ask your doctor if you can take an over-the-counter medicine. · For the first day or two of pain, take it easy. But as soon as possible, get back to your normal daily life and activities. · Get gentle exercise, such as walking. Movement keeps your spine flexible and helps your muscles stay strong. When should you call for help? Call 911 anytime you think you may need emergency care. For example, call if:    · You are unable to move a leg at all. Call your doctor now or seek immediate medical care if:    · You have new or worse symptoms in your legs or buttocks. Symptoms may include:  ? Numbness or tingling. ? Weakness. ? Pain.     · You lose bladder or bowel control.     · You have blood in your urine. Watch closely for changes in your health, and be sure to contact your doctor if:    · You do not get better as expected. Where can you learn more? Go to http://www.gray.com/  Enter V337 in the search box to learn more about \"Low Back Contusion: Care Instructions. \"  Current as of: June 26, 2019               Content Version: 12.6  © 5085-0753 [employer]. Care instructions adapted under license by [employer] (which disclaims liability or warranty for this information). If you have questions about a medical condition or this instruction, always ask your healthcare professional. [address] any warranty or liability for your use of this information.

## 2020-12-19 NOTE — ED TRIAGE NOTES
Patient fell either the 10th or the 11th of this month, she c/o pain to the lower back she had back surgery over 30 years ago. Rate pain 10/10. Patient hit her back on the wall when she fell and she landed on some clothes.

## 2021-02-19 RX ORDER — AMLODIPINE BESYLATE 10 MG/1
TABLET ORAL
Qty: 90 TAB | Refills: 2 | Status: SHIPPED | OUTPATIENT
Start: 2021-02-19 | End: 2021-11-10

## 2021-06-01 ENCOUNTER — OFFICE VISIT (OUTPATIENT)
Dept: CARDIOLOGY CLINIC | Age: 86
End: 2021-06-01
Payer: MEDICARE

## 2021-06-01 VITALS
WEIGHT: 129 LBS | BODY MASS INDEX: 23.74 KG/M2 | HEART RATE: 52 BPM | OXYGEN SATURATION: 99 % | HEIGHT: 62 IN | DIASTOLIC BLOOD PRESSURE: 58 MMHG | SYSTOLIC BLOOD PRESSURE: 150 MMHG

## 2021-06-01 DIAGNOSIS — I25.10 CORONARY ARTERY DISEASE INVOLVING NATIVE CORONARY ARTERY OF NATIVE HEART WITHOUT ANGINA PECTORIS: Primary | ICD-10-CM

## 2021-06-01 PROCEDURE — 1090F PRES/ABSN URINE INCON ASSESS: CPT | Performed by: INTERNAL MEDICINE

## 2021-06-01 PROCEDURE — 1101F PT FALLS ASSESS-DOCD LE1/YR: CPT | Performed by: INTERNAL MEDICINE

## 2021-06-01 PROCEDURE — 99214 OFFICE O/P EST MOD 30 MIN: CPT | Performed by: INTERNAL MEDICINE

## 2021-06-01 PROCEDURE — G8427 DOCREV CUR MEDS BY ELIG CLIN: HCPCS | Performed by: INTERNAL MEDICINE

## 2021-06-01 PROCEDURE — 93000 ELECTROCARDIOGRAM COMPLETE: CPT | Performed by: INTERNAL MEDICINE

## 2021-06-01 PROCEDURE — G8420 CALC BMI NORM PARAMETERS: HCPCS | Performed by: INTERNAL MEDICINE

## 2021-06-01 PROCEDURE — G8510 SCR DEP NEG, NO PLAN REQD: HCPCS | Performed by: INTERNAL MEDICINE

## 2021-06-01 PROCEDURE — G8536 NO DOC ELDER MAL SCRN: HCPCS | Performed by: INTERNAL MEDICINE

## 2021-06-01 RX ORDER — ASCORBIC ACID 500 MG
500 TABLET ORAL DAILY
COMMUNITY

## 2021-06-01 RX ORDER — METOPROLOL SUCCINATE 25 MG/1
25 TABLET, EXTENDED RELEASE ORAL DAILY
Qty: 30 TABLET | Refills: 5 | Status: SHIPPED | OUTPATIENT
Start: 2021-06-01 | End: 2021-11-23

## 2021-06-01 NOTE — PROGRESS NOTES
Ray Hummel presents today for   Chief Complaint   Patient presents with    Follow-up     6 month f/u       Ray Hummel preferred language for health care discussion is english/other. Is someone accompanying this pt? no    Is the patient using any DME equipment during [address]? no    Depression Screening:  3 most recent PHQ Screens 6/1/2021   Little interest or pleasure in doing things Not at all   Feeling down, depressed, irritable, or hopeless Not at all   Total Score PHQ 2 0       Learning Assessment:  Learning Assessment 2/1/2017   PRIMARY LEARNER Patient   HIGHEST LEVEL OF EDUCATION - PRIMARY LEARNER  GRADUATED HIGH SCHOOL OR GED   BARRIERS PRIMARY LEARNER NONE   CO-LEARNER CAREGIVER No   PRIMARY LANGUAGE ENGLISH    NEED No   LEARNER PREFERENCE PRIMARY READING   ANSWERED BY patient   RELATIONSHIP SELF       Abuse Screening:  Abuse Screening Questionnaire 6/1/2021   Do you ever feel afraid of your partner? N   Are you in a relationship with someone who physically or mentally threatens you? N   Is it safe for you to go home? Y       Fall Risk  Fall Risk Assessment, last 12 mths 6/1/2021   Able to walk? Yes   Fall in past 12 months? 0   Do you feel unsteady? 0   Are you worried about falling 0   Number of falls in past 12 months -   Fall with injury? -       Pt currently taking Anticoagulant therapy? ASA 81mg    Coordination of Care:  1. Have you been to the ER, urgent care clinic since your last visit? Hospitalized since your last visit? no    2. Have you seen or consulted any other health care providers outside of the [address] [place] [address] since your last visit? Include any pap smears or colon screening.  no

## 2021-06-01 NOTE — PROGRESS NOTES
HISTORY OF PRESENT ILLNESS  Kendra Mcguire is a 80 y.o. female. ASSESSMENT and PLAN    Ms. Napier Haydee history of CAD.  Because of abnormal testing back in 2009, she underwent coronary angiography at the [facility] showing proximal LAD disease, proximal circumflex/OM disease, and proximal dominant RCA disease.  At that time, options for percutaneous revascularization as well as surgical revascularization were given; she declined and chose to go with medical therapy.  For the last 11 years, she has done quite well. · CAD:    Symptomatically stable. · BP:    Mildly elevated today but acceptable. · Rhythm:    Asymptomatic sinus bradycardia. Her Toprol-XL will be decreased to 25 mg daily. · CHF:    There is no evidence of decompensated CHF noted. · Weight:     Her weight today is 129 pounds. Her baseline weight is 135 pounds. · Cholesterol:   Target LDL <70. Her Lipitor was discontinued in 2020. · Anti-platelet:   Remains on ASA. I will see her back in 6 months.  Thank you. Encounter Diagnoses   Name Primary?  Coronary artery disease involving native coronary artery of native heart without angina pectoris Yes     current treatment plan is effective, no change in therapy  lab results and schedule of future lab studies reviewed with patient  cardiovascular risk and specific lipid/LDL goals reviewed  use of aspirin to prevent MI and TIA's discussed      HPI   Today, Ms. [address] has no complaints of chest pains. She has occasional headaches. Her exercise capacity is limited by her poor balance and weakness. She denies any orthopnea or PND. She denies any palpitations. She has fallen a few times when she has lost her balance but denies syncope. Review of Systems   Respiratory: Negative for shortness of breath. Cardiovascular: Negative for chest pain, palpitations, orthopnea, claudication, leg swelling and PND. Musculoskeletal: Positive for falls.    All other systems reviewed and are negative. Physical Exam  Vitals and nursing note reviewed. Constitutional:       Appearance: Normal appearance. HENT:      Head: Normocephalic. Eyes:      Conjunctiva/sclera: Conjunctivae normal.   Neck:      Vascular: No carotid bruit. Cardiovascular:      Rate and Rhythm: Regular rhythm. Bradycardia present. Pulmonary:      Breath sounds: Normal breath sounds. Abdominal:      Palpations: Abdomen is soft. Musculoskeletal:         General: No swelling. Cervical back: No rigidity. Skin:     General: Skin is warm and dry. Neurological:      General: No focal deficit present. Mental Status: She is alert and oriented to person, place, and time. Psychiatric:         Mood and Affect: Mood normal.         Behavior: Behavior normal.         PCP: Cydney Andre NP    Past Medical History:   Diagnosis Date    CAD (coronary artery disease) 2008    Fibromyalgia     Heart failure ([facility] [address]     Pt. states sge gas 5 heart blocks.  Hypercholesterolemia     Hypertension     Menopause     Thyroid disease        Past Surgical History:   Procedure Laterality Date    HX CHOLECYSTECTOMY      HX HYSTERECTOMY         Current Outpatient Medications   Medication Sig Dispense Refill    ascorbic acid, vitamin C, (Vitamin C) 500 mg tablet Take  by mouth.  amLODIPine (NORVASC) 10 mg tablet TAKE 1 TABLET BY MOUTH EVERY DAY 90 Tab 2    metoprolol succinate (TOPROL-XL) 50 mg XL tablet TAKE 1 TABLET BY MOUTH  DAILY 90 Tab 3    isosorbide mononitrate ER (IMDUR) 60 mg CR tablet TAKE 1 TABLET BY MOUTH  EVERY MORNING 90 Tab 3    aspirin delayed-release 81 mg tablet Take 162 mg by mouth daily.  acetaminophen (TYLENOL) 325 mg tablet Take  by mouth every four (4) hours as needed for Pain.  nitroglycerin (NITROSTAT) 0.4 mg SL tablet by SubLINGual route every five (5) minutes as needed for Chest Pain.  atorvastatin (Lipitor) 40 mg tablet Take  by mouth daily.  (Patient not taking: Reported on 6/1/2021)      levothyroxine (SYNTHROID) 75 mcg tablet Take 75 mcg by mouth Daily (before breakfast). The patient has a family history of    Social History     Tobacco Use    Smoking status: Never Smoker    Smokeless tobacco: Never Used   Substance Use Topics    Alcohol use: No    Drug use: No       Lab Results   Component Value Date/Time    Cholesterol, total 165 08/20/2019 09:43 AM    HDL Cholesterol 47 08/20/2019 09:43 AM    LDL, calculated 86.8 08/20/2019 09:43 AM    Triglyceride 156 (H) 08/20/2019 09:43 AM    CHOL/HDL Ratio 3.5 08/20/2019 09:43 AM        BP Readings from Last 3 Encounters:   06/01/21 (!) 150/58   12/19/20 (!) 158/58   12/01/20 (!) 206/66        Pulse Readings from Last 3 Encounters:   06/01/21 (!) 52   12/19/20 (!) 54   12/01/20 61       Wt Readings from Last 3 Encounters:   06/01/21 58.5 kg (129 lb)   12/01/20 61.2 kg (135 lb)   06/18/20 61.7 kg (136 lb)         EKG: unchanged from previous tracings, nonspecific ST and T waves changes, sinus bradycardia.

## 2021-11-10 RX ORDER — AMLODIPINE BESYLATE 10 MG/1
TABLET ORAL
Qty: 90 TABLET | Refills: 3 | Status: SHIPPED | OUTPATIENT
Start: 2021-11-10

## 2021-11-23 RX ORDER — METOPROLOL SUCCINATE 25 MG/1
TABLET, EXTENDED RELEASE ORAL
Qty: 90 TABLET | Refills: 1 | Status: SHIPPED | OUTPATIENT
Start: 2021-11-23 | End: 2022-06-01

## 2021-12-01 ENCOUNTER — OFFICE VISIT (OUTPATIENT)
Dept: CARDIOLOGY CLINIC | Age: 86
End: 2021-12-01
Payer: MEDICARE

## 2021-12-01 VITALS
DIASTOLIC BLOOD PRESSURE: 97 MMHG | HEART RATE: 70 BPM | SYSTOLIC BLOOD PRESSURE: 177 MMHG | WEIGHT: 131 LBS | OXYGEN SATURATION: 98 % | BODY MASS INDEX: 23.96 KG/M2

## 2021-12-01 DIAGNOSIS — I25.10 CORONARY ARTERY DISEASE INVOLVING NATIVE CORONARY ARTERY OF NATIVE HEART WITHOUT ANGINA PECTORIS: Primary | ICD-10-CM

## 2021-12-01 PROCEDURE — 1090F PRES/ABSN URINE INCON ASSESS: CPT | Performed by: INTERNAL MEDICINE

## 2021-12-01 PROCEDURE — G8510 SCR DEP NEG, NO PLAN REQD: HCPCS | Performed by: INTERNAL MEDICINE

## 2021-12-01 PROCEDURE — 93000 ELECTROCARDIOGRAM COMPLETE: CPT | Performed by: INTERNAL MEDICINE

## 2021-12-01 PROCEDURE — 99214 OFFICE O/P EST MOD 30 MIN: CPT | Performed by: INTERNAL MEDICINE

## 2021-12-01 PROCEDURE — 1101F PT FALLS ASSESS-DOCD LE1/YR: CPT | Performed by: INTERNAL MEDICINE

## 2021-12-01 PROCEDURE — G8427 DOCREV CUR MEDS BY ELIG CLIN: HCPCS | Performed by: INTERNAL MEDICINE

## 2021-12-01 PROCEDURE — G8420 CALC BMI NORM PARAMETERS: HCPCS | Performed by: INTERNAL MEDICINE

## 2021-12-01 PROCEDURE — G8536 NO DOC ELDER MAL SCRN: HCPCS | Performed by: INTERNAL MEDICINE

## 2021-12-01 NOTE — PROGRESS NOTES
HISTORY OF PRESENT ILLNESS  Theodore Arellano is a 80 y.o. female. ASSESSMENT and PLAN    Ms. Gill Latin history of CAD.  Because of abnormal testing back in 2009, she underwent coronary angiography at [facility] showing proximal LAD disease, proximal circumflex/OM disease, and proximal dominant RCA disease.  At that time, options for percutaneous revascularization as well as surgical revascularization were given; she declined and chose to go with medical therapy.  For the last 11 years, she has done quite well. She has normal blood pressure readings at home in 2021. Her home cuff correlates with our reading in December 2021. · CAD:    Clinically stable. She had a single episode of right chest pain on July 31 2021, at rest.  Since then, she has not had any discomfort. · BP:    It is elevated today. However, she brought her home BP cuff with her. She states that her home readings have been well controlled. Her blood pressure cuff does correlate with our reading. · Rhythm:    Stable sinus. · CHF:    There is no evidence of decompensated CHF noted. · Weight:     Her weight today is 131 pounds. Her baseline weight is 135 pounds. · Cholesterol:   Target LDL <70. Her Lipitor was discontinued in 2020. · Anti-platelet:   Remains on ASA. I will see her back in 6 months.  Thank you. Encounter Diagnoses   Name Primary?  Coronary artery disease involving native coronary artery of native heart without angina pectoris Yes     current treatment plan is effective, no change in therapy  lab results and schedule of future lab studies reviewed with patient  reviewed diet, exercise and weight control  cardiovascular risk and specific lipid/LDL goals reviewed  use of aspirin to prevent MI and TIA's discussed      HPI   Today, Ms. [address] has no complaints of chest pains. She did have a single episode of right-sided chest pain on July 31 when she was sitting around resting.   This lasted few minutes and then dissipated. Since that time, she has not had any further episodes of chest pains. She has not had any exertional chest pains. She denies orthopnea or PND. She denies palpitations or dizziness. Review of Systems   Respiratory: Negative for shortness of breath. Cardiovascular: Negative for chest pain, palpitations, orthopnea, claudication, leg swelling and PND. All other systems reviewed and are negative. Physical Exam  Vitals and nursing note reviewed. Constitutional:       Appearance: Normal appearance. HENT:      Head: Normocephalic. Eyes:      Conjunctiva/sclera: Conjunctivae normal.   Neck:      Vascular: No carotid bruit. Cardiovascular:      Rate and Rhythm: Normal rate and regular rhythm. Pulmonary:      Breath sounds: Normal breath sounds. Abdominal:      Palpations: Abdomen is soft. Musculoskeletal:         General: No swelling. Cervical back: No rigidity. Skin:     General: Skin is warm and dry. Neurological:      General: No focal deficit present. Mental Status: She is alert and oriented to person, place, and time. Psychiatric:         Mood and Affect: Mood normal.         Behavior: Behavior normal.         PCP: Zain Argueta NP    Past Medical History:   Diagnosis Date    CAD (coronary artery disease) 2008    Fibromyalgia     Heart failure ([facility] [address]     Pt. states sge gas 5 heart blocks.  Hypercholesterolemia     Hypertension     Menopause     Thyroid disease        Past Surgical History:   Procedure Laterality Date    HX CHOLECYSTECTOMY      HX HYSTERECTOMY         Current Outpatient Medications   Medication Sig Dispense Refill    metoprolol succinate (TOPROL-XL) 25 mg XL tablet TAKE 1 TABLET BY MOUTH EVERY DAY 90 Tablet 1    amLODIPine (NORVASC) 10 mg tablet TAKE 1 TABLET BY MOUTH EVERY DAY 90 Tablet 3    ascorbic acid, vitamin C, (Vitamin C) 500 mg tablet Take  by mouth.       isosorbide mononitrate ER (IMDUR) 60 mg CR tablet TAKE 1 TABLET BY MOUTH  EVERY MORNING 90 Tab 3    aspirin delayed-release 81 mg tablet Take 162 mg by mouth daily.  acetaminophen (TYLENOL) 325 mg tablet Take  by mouth every four (4) hours as needed for Pain.  nitroglycerin (NITROSTAT) 0.4 mg SL tablet by SubLINGual route every five (5) minutes as needed for Chest Pain.  levothyroxine (SYNTHROID) 75 mcg tablet Take 75 mcg by mouth Daily (before breakfast). The patient has a family history of    Social History     Tobacco Use    Smoking status: Never Smoker    Smokeless tobacco: Never Used   Substance Use Topics    Alcohol use: No    Drug use: No       Lab Results   Component Value Date/Time    Cholesterol, total 165 08/20/2019 09:43 AM    HDL Cholesterol 47 08/20/2019 09:43 AM    LDL, calculated 86.8 08/20/2019 09:43 AM    Triglyceride 156 (H) 08/20/2019 09:43 AM    CHOL/HDL Ratio 3.5 08/20/2019 09:43 AM        BP Readings from Last 3 Encounters:   12/01/21 (!) 177/97   06/01/21 (!) 150/58   12/19/20 (!) 158/58        Pulse Readings from Last 3 Encounters:   12/01/21 70   06/01/21 (!) 52   12/19/20 (!) 54       Wt Readings from Last 3 Encounters:   12/01/21 59.4 kg (131 lb)   06/01/21 58.5 kg (129 lb)   12/01/20 61.2 kg (135 lb)         EKG: unchanged from previous tracings, normal sinus rhythm, nonspecific ST and T waves changes, low R wave voltage in precordial leads.

## 2021-12-01 NOTE — PROGRESS NOTES
ANTICOAGULATION  MANAGEMENT    Assessment     Today's INR result of 2.10 is Therapeutic (goal INR of 2.0-3.0)        Warfarin taken as previously instructed    No new diet changes affecting INR    Potential interaction between Amiodarone and warfarin which may affect subsequent INRs    - Iinitiated on 7/24/20, Amiodarone 200mg two times a day for 14 days, thereafter, daily for 60 days, from 7/24 - 10/6/20.   - Tikosyn discontinued on 7/16/20.    Continues to tolerate warfarin with no reported s/s of bleeding or thromboembolism     Previous INR was Subtherapeutic at 1.90 on 7/23/20.     Scheduled for PVI / ablation on 8/25/20 with Dr. Sarwat Arreaga @ [facility] - r/t persistent atrial fibrillation. Will need weekly INR's.    Recent presentation in ED on 7/24/20 due to confusion.  Ambien was discontinued.    Plan:     Spoke with Jonatan regarding INR result and instructed:     Warfarin Dosing Instructions:    - Continue current warfarin dose 2.5 mg daily on Tuesdays; and 1.25 mg daily rest of week.    Instructed patient to follow up no later than:  One wk.   - INR scheduled on 8/4/20 @ [facility].    Education provided: target INR goal and significance of current INR result, potential interaction between warfarin and Amiodarone and importance of notifying clinic for changes in medications    Jonatan verbalizes understanding and agrees to warfarin dosing plan.    Instructed to call the [facility] for any changes, questions or concerns. (#[phone])   ?   Yamileth Flores RN    Subjective/Objective:      Trenton Nova, a 80 y.o. male is on warfarin.     Trenton reports:     Home warfarin dose: as updated on anticoagulation calendar per template     Missed doses: No     Medication changes:  Yes: initiated loading dose with Amiodarone 200mg two times a day for 14 days, thereafter 200mg daily for 60 days, from 7/24 - 10/6/20.   - discontinued Ambien on 7/24/20 due to confusion.   - last dose of Tikosyn was on 7/15, then discontinued  Terese Navarro presents today for No chief complaint on file. Joshua Sic [address] preferred language for health care discussion is english/other. Is someone accompanying this pt? no    Is the patient using any DME equipment during [address]? no    Depression Screening:  3 most recent PHQ Screens 6/1/2021   Little interest or pleasure in doing things Not at all   Feeling down, depressed, irritable, or hopeless Not at all   Total Score PHQ 2 0       Learning Assessment:  Learning Assessment 2/1/2017   PRIMARY LEARNER Patient   HIGHEST LEVEL OF EDUCATION - PRIMARY LEARNER  GRADUATED HIGH SCHOOL OR GED   BARRIERS PRIMARY LEARNER NONE   CO-LEARNER CAREGIVER No   PRIMARY LANGUAGE ENGLISH    NEED No   LEARNER PREFERENCE PRIMARY READING   ANSWERED BY patient   RELATIONSHIP SELF       Abuse Screening:  Abuse Screening Questionnaire 6/1/2021   Do you ever feel afraid of your partner? N   Are you in a relationship with someone who physically or mentally threatens you? N   Is it safe for you to go home? Y       Fall Risk  Fall Risk Assessment, last 12 mths 6/1/2021   Able to walk? Yes   Fall in past 12 months? 0   Do you feel unsteady? 0   Are you worried about falling 0   Number of falls in past 12 months -   Fall with injury? -       Pt currently taking Anticoagulant therapy? no    Coordination of Care:  1. Have you been to the ER, urgent care clinic since your last visit? Hospitalized since your last visit? no    2. Have you seen or consulted any other health care providers outside of the [address] [place] [address] since your last visit? Include any pap smears or colon screening.  no on 7/16/20.     S/S of bleeding or thromboembolism:  No     New Injury or illness:  No     Changes in diet or alcohol consumption:  No     Upcoming surgery, procedure or cardioversion:  Yes:  Scheduled for PVI / ablation on 8/25/20.    Anticoagulation Episode Summary     Current INR goal:   2.0-3.0   TTR:   47.9 % (1 y)   Next INR check:   8/4/2020   INR from last check:   2.10 (7/28/2020)   Weekly max warfarin dose:      Target end date:      INR check location:      Preferred lab:      Send INR reminders to:   [facility]    Indications    Atrial Fibrillation [I48.91]  Long term (current) use of anticoagulants [Z79.01]           Comments:            Anticoagulation Care Providers     Provider Role Specialty Phone number    Sarwat Mayorga MD Referring Internal Medicine [phone]

## 2022-02-14 ENCOUNTER — TRANSCRIBE ORDER (OUTPATIENT)
Dept: SCHEDULING | Age: 87
End: 2022-02-14

## 2022-02-14 DIAGNOSIS — M79.89 MASS OF SOFT TISSUE OF HIP: Primary | ICD-10-CM

## 2022-03-01 ENCOUNTER — HOSPITAL ENCOUNTER (OUTPATIENT)
Dept: GENERAL RADIOLOGY | Age: 87
Discharge: HOME | End: 2022-03-01
Attending: NURSE PRACTITIONER
Payer: MEDICARE

## 2022-03-01 ENCOUNTER — HOSPITAL ENCOUNTER (OUTPATIENT)
Dept: CT IMAGING | Age: 87
Discharge: HOME | End: 2022-03-01
Attending: NURSE PRACTITIONER
Payer: MEDICARE

## 2022-03-01 DIAGNOSIS — M79.89 MASS OF SOFT TISSUE OF HIP: ICD-10-CM

## 2022-03-01 DIAGNOSIS — M25.532 LEFT WRIST PAIN: ICD-10-CM

## 2022-03-01 DIAGNOSIS — M79.642 LEFT HAND PAIN: ICD-10-CM

## 2022-03-01 LAB — CREAT UR-MCNC: 0.9 MG/DL (ref 0.6–1.3)

## 2022-03-01 PROCEDURE — 73110 X-RAY EXAM OF WRIST: CPT

## 2022-03-01 PROCEDURE — 73130 X-RAY EXAM OF HAND: CPT

## 2022-03-01 PROCEDURE — 74011000636 HC RX REV CODE- 636

## 2022-03-01 PROCEDURE — 82565 ASSAY OF CREATININE: CPT

## 2022-03-01 PROCEDURE — 73701 CT LOWER EXTREMITY W/DYE: CPT

## 2022-03-01 RX ADMIN — IOPAMIDOL 80 ML: 612 INJECTION, SOLUTION INTRAVENOUS at 12:06

## 2022-03-19 PROBLEM — I20.8 EXERTIONAL ANGINA (HCC): Status: ACTIVE | Noted: 2017-05-03

## 2022-03-19 PROBLEM — R61 DIAPHORESIS: Status: ACTIVE | Noted: 2017-02-01

## 2022-03-19 PROBLEM — I20.89 EXERTIONAL ANGINA: Status: ACTIVE | Noted: 2017-05-03

## 2022-03-19 PROBLEM — R00.1 BRADYCARDIA: Status: ACTIVE | Noted: 2017-08-09

## 2022-03-19 PROBLEM — I25.10 CORONARY ARTERY DISEASE INVOLVING NATIVE CORONARY ARTERY: Status: ACTIVE | Noted: 2018-03-21

## 2022-05-31 ENCOUNTER — OFFICE VISIT (OUTPATIENT)
Dept: CARDIOLOGY CLINIC | Age: 87
End: 2022-05-31
Payer: MEDICARE

## 2022-05-31 VITALS
OXYGEN SATURATION: 97 % | BODY MASS INDEX: 24.11 KG/M2 | WEIGHT: 131 LBS | DIASTOLIC BLOOD PRESSURE: 70 MMHG | SYSTOLIC BLOOD PRESSURE: 150 MMHG | HEART RATE: 61 BPM | HEIGHT: 62 IN

## 2022-05-31 DIAGNOSIS — I10 ESSENTIAL HYPERTENSION: ICD-10-CM

## 2022-05-31 DIAGNOSIS — I25.10 CORONARY ARTERY DISEASE INVOLVING NATIVE CORONARY ARTERY OF NATIVE HEART WITHOUT ANGINA PECTORIS: Primary | ICD-10-CM

## 2022-05-31 DIAGNOSIS — E78.00 HYPERCHOLESTEREMIA: ICD-10-CM

## 2022-05-31 DIAGNOSIS — R55 SYNCOPE AND COLLAPSE: ICD-10-CM

## 2022-05-31 PROCEDURE — G8510 SCR DEP NEG, NO PLAN REQD: HCPCS | Performed by: INTERNAL MEDICINE

## 2022-05-31 PROCEDURE — 1090F PRES/ABSN URINE INCON ASSESS: CPT | Performed by: INTERNAL MEDICINE

## 2022-05-31 PROCEDURE — 1123F ACP DISCUSS/DSCN MKR DOCD: CPT | Performed by: INTERNAL MEDICINE

## 2022-05-31 PROCEDURE — G8427 DOCREV CUR MEDS BY ELIG CLIN: HCPCS | Performed by: INTERNAL MEDICINE

## 2022-05-31 PROCEDURE — G8536 NO DOC ELDER MAL SCRN: HCPCS | Performed by: INTERNAL MEDICINE

## 2022-05-31 PROCEDURE — 93000 ELECTROCARDIOGRAM COMPLETE: CPT | Performed by: INTERNAL MEDICINE

## 2022-05-31 PROCEDURE — 1101F PT FALLS ASSESS-DOCD LE1/YR: CPT | Performed by: INTERNAL MEDICINE

## 2022-05-31 PROCEDURE — G8420 CALC BMI NORM PARAMETERS: HCPCS | Performed by: INTERNAL MEDICINE

## 2022-05-31 PROCEDURE — 99214 OFFICE O/P EST MOD 30 MIN: CPT | Performed by: INTERNAL MEDICINE

## 2022-05-31 NOTE — PROGRESS NOTES
HISTORY OF PRESENT ILLNESS  Brodie Thomas is a 80 y.o. female. ASSESSMENT and PLAN    Ms. Nicole Morton history of CAD.  Because of abnormal testing back in 2009, she underwent coronary angiography at [facility] showing proximal LAD disease, proximal circumflex/OM disease, and proximal dominant RCA disease.  At that time, options for percutaneous revascularization as well as surgical revascularization were given; she declined and chose to go with medical therapy.  For the last 11 years, she has done quite well. She has normal blood pressure readings at home in 2021. Her home cuff correlates with our reading in December 2021. · CAD:    Symptomatically stable. She has not had any further episodes of chest pain. · BP:    It is elevated here today. However, she brought her blood pressure diary from home. It is well controlled. Her home blood pressure cuff has been correlated with our office readings with reasonable correlation. · Rhythm:    Stable sinus at 61 bpm.  · CHF:    There is no evidence of decompensated CHF noted. · Weight:     Her weight today is 131 pounds. This is unchanged. Her baseline weight is 135 pounds. · Cholesterol:   Target LDL <70. Her Lipitor was discontinued in 2020. · Anti-platelet:   Remains on ASA. I will see her back in 6 months.  Thank you. Encounter Diagnoses   Name Primary?  Coronary artery disease involving native coronary artery of native heart without angina pectoris Yes    Hypercholesteremia     Essential hypertension     Syncope and collapse      current treatment plan is effective, no change in therapy  lab results and schedule of future lab studies reviewed with patient  reviewed diet, exercise and weight control  cardiovascular risk and specific lipid/LDL goals reviewed  use of aspirin to prevent MI and TIA's discussed      HPI   Today, Ms. [address] has no complaints of chest pains.   She has baseline dyspnea on exertion without change. She denies any worsening exercise capacity. The biggest limitation to her her activity is poor balance and weakness requiring a walker. She fatigues quite easily especially when she is out shopping. However, she has no exertional chest pains. She denies any orthopnea or PND. She denies any palpitation sensation or dizziness. Review of Systems   Constitutional: Positive for malaise/fatigue. Respiratory: Negative for shortness of breath. Cardiovascular: Negative for chest pain, palpitations, orthopnea, claudication, leg swelling and PND. All other systems reviewed and are negative. Physical Exam  Vitals and nursing note reviewed. Constitutional:       Appearance: Normal appearance. HENT:      Head: Normocephalic. Eyes:      Conjunctiva/sclera: Conjunctivae normal.   Neck:      Vascular: No carotid bruit. Cardiovascular:      Rate and Rhythm: Normal rate and regular rhythm. Pulmonary:      Breath sounds: Normal breath sounds. Abdominal:      Palpations: Abdomen is soft. Musculoskeletal:         General: No swelling. Cervical back: No rigidity. Skin:     General: Skin is warm and dry. Neurological:      General: No focal deficit present. Mental Status: She is alert and oriented to person, place, and time. Psychiatric:         Mood and Affect: Mood normal.         Behavior: Behavior normal.         PCP: Tierney Hyde NP    Past Medical History:   Diagnosis Date    CAD (coronary artery disease) 2008    Fibromyalgia     Heart failure ([facility] [address]     Pt. states sge gas 5 heart blocks.     Hypercholesterolemia     Hypertension     Menopause     Thyroid disease        Past Surgical History:   Procedure Laterality Date    HX CHOLECYSTECTOMY      HX HYSTERECTOMY         Current Outpatient Medications   Medication Sig Dispense Refill    metoprolol succinate (TOPROL-XL) 25 mg XL tablet TAKE 1 TABLET BY MOUTH EVERY DAY 90 Tablet 1    amLODIPine (NORVASC) 10 mg tablet TAKE 1 TABLET BY MOUTH EVERY DAY 90 Tablet 3    ascorbic acid, vitamin C, (Vitamin C) 500 mg tablet Take  by mouth.  isosorbide mononitrate ER (IMDUR) 60 mg CR tablet TAKE 1 TABLET BY MOUTH  EVERY MORNING 90 Tab 3    aspirin delayed-release 81 mg tablet Take 162 mg by mouth daily.  acetaminophen (TYLENOL) 325 mg tablet Take  by mouth every four (4) hours as needed for Pain.  nitroglycerin (NITROSTAT) 0.4 mg SL tablet by SubLINGual route every five (5) minutes as needed for Chest Pain.  levothyroxine (SYNTHROID) 75 mcg tablet Take 75 mcg by mouth Daily (before breakfast). The patient has a family history of    Social History     Tobacco Use    Smoking status: Never Smoker    Smokeless tobacco: Never Used   Substance Use Topics    Alcohol use: No    Drug use: No       Lab Results   Component Value Date/Time    Cholesterol, total 165 08/20/2019 09:43 AM    HDL Cholesterol 47 08/20/2019 09:43 AM    LDL, calculated 86.8 08/20/2019 09:43 AM    Triglyceride 156 (H) 08/20/2019 09:43 AM    CHOL/HDL Ratio 3.5 08/20/2019 09:43 AM        BP Readings from Last 3 Encounters:   05/31/22 (!) 166/62   12/01/21 (!) 177/97   06/01/21 (!) 150/58        Pulse Readings from Last 3 Encounters:   05/31/22 61   12/01/21 70   06/01/21 (!) 52       Wt Readings from Last 3 Encounters:   05/31/22 59.4 kg (131 lb)   12/01/21 59.4 kg (131 lb)   06/01/21 58.5 kg (129 lb)         EKG: unchanged from previous tracings, normal sinus rhythm, nonspecific ST and T waves changes.

## 2022-05-31 NOTE — PROGRESS NOTES
Fátima Malin presents today for   Chief Complaint   Patient presents with    Follow-up     6 month follow up     Leg Swelling     had bilateral lower extremites edema 2 days ago        Fátima Malin preferred language for health care discussion is english/other. Is someone accompanying this pt? Yes, son     Is the patient using any DME equipment during [address]? Walker     Depression Screening:  3 most recent PHQ Screens 12/1/2021   Little interest or pleasure in doing things Not at all   Feeling down, depressed, irritable, or hopeless Not at all   Total Score PHQ 2 0       Learning Assessment:  Learning Assessment 2/1/2017   PRIMARY LEARNER Patient   HIGHEST LEVEL OF EDUCATION - PRIMARY LEARNER  GRADUATED HIGH SCHOOL OR GED   BARRIERS PRIMARY LEARNER NONE   CO-LEARNER CAREGIVER No   PRIMARY LANGUAGE ENGLISH    NEED No   LEARNER PREFERENCE PRIMARY READING   ANSWERED BY patient   RELATIONSHIP SELF       Abuse Screening:  Abuse Screening Questionnaire 6/1/2021   Do you ever feel afraid of your partner? N   Are you in a relationship with someone who physically or mentally threatens you? N   Is it safe for you to go home? Y       Fall Risk  Fall Risk Assessment, last 12 mths 12/1/2021   Able to walk? Yes   Fall in past 12 months? 0   Do you feel unsteady? 0   Are you worried about falling 0   Number of falls in past 12 months -   Fall with injury? -       Pt currently taking Anticoagulant therapy? no    Coordination of Care:  1. Have you been to the ER, urgent care clinic since your last visit? Hospitalized since your last visit? no    2. Have you seen or consulted any other health care providers outside of the [address] [place] [address] since your last visit? Include any pap smears or colon screening.  no

## 2022-06-01 RX ORDER — METOPROLOL SUCCINATE 25 MG/1
TABLET, EXTENDED RELEASE ORAL
Qty: 90 TABLET | Refills: 3 | Status: SHIPPED | OUTPATIENT
Start: 2022-06-01

## 2022-08-06 ENCOUNTER — HOSPITAL ENCOUNTER (EMERGENCY)
Age: 87
Discharge: HOME | End: 2022-08-06
Attending: STUDENT IN AN ORGANIZED HEALTH CARE EDUCATION/TRAINING PROGRAM
Payer: MEDICARE

## 2022-08-06 ENCOUNTER — APPOINTMENT (OUTPATIENT)
Dept: CT IMAGING | Age: 87
End: 2022-08-06
Attending: STUDENT IN AN ORGANIZED HEALTH CARE EDUCATION/TRAINING PROGRAM
Payer: MEDICARE

## 2022-08-06 VITALS
DIASTOLIC BLOOD PRESSURE: 57 MMHG | HEART RATE: 61 BPM | BODY MASS INDEX: 23.92 KG/M2 | OXYGEN SATURATION: 96 % | WEIGHT: 130 LBS | SYSTOLIC BLOOD PRESSURE: 155 MMHG | RESPIRATION RATE: 20 BRPM | HEIGHT: 62 IN | TEMPERATURE: 98.8 F

## 2022-08-06 DIAGNOSIS — R07.81 RIB PAIN ON LEFT SIDE: Primary | ICD-10-CM
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
## 2022-08-06 LAB
ATRIAL RATE: 64 BPM
CALCULATED P AXIS, ECG09: 11 DEGREES
CALCULATED R AXIS, ECG10: -29 DEGREES
CALCULATED T AXIS, ECG11: 61 DEGREES
DIAGNOSIS, 93000: NORMAL
P-R INTERVAL, ECG05: 156 MS
Q-T INTERVAL, ECG07: 438 MS
QRS DURATION, ECG06: 88 MS
QTC CALCULATION (BEZET), ECG08: 451 MS
VENTRICULAR RATE, ECG03: 64 BPM

## 2022-08-06 PROCEDURE — 93005 ELECTROCARDIOGRAM TRACING: CPT

## 2022-08-06 PROCEDURE — 99284 EMERGENCY DEPT VISIT MOD MDM: CPT

## 2022-08-06 PROCEDURE — 71250 CT THORAX DX C-: CPT

## 2022-08-06 PROCEDURE — 74011250637 HC RX REV CODE- 250/637: Performed by: STUDENT IN AN ORGANIZED HEALTH CARE EDUCATION/TRAINING PROGRAM

## 2022-08-06 RX ORDER — HYDROCODONE BITARTRATE AND ACETAMINOPHEN 5; 325 MG/1; MG/1
1 TABLET ORAL ONCE
Status: COMPLETED | OUTPATIENT
Start: 2022-08-06 | End: 2022-08-06

## 2022-08-06 RX ADMIN — HYDROCODONE BITARTRATE AND ACETAMINOPHEN 1 TABLET: 5; 325 TABLET ORAL at 09:40

## 2022-08-06 NOTE — ED PROVIDER NOTES
EMERGENCY DEPARTMENT HISTORY AND PHYSICAL EXAM    9:31 AM    Date: 8/6/2022  Patient Name: Marcus Busch    History of Presenting Illness     Chief Complaint   Patient presents with    Fall    Rib Injury       History Provided By: Patient  Location/Duration/Severity/Modifying factors   HPI  Marcus Busch is a 80 y.o. female with past medical history of hypertension, hyperlipidemia presenting for evaluation of left-sided rib pain after fall yesterday. She says that she was on her knees yesterday folding sheets and putting them away in her bedroom, she was holding onto the end of the bed with her left hand, but her hand slipped and she leaned forward into the wooden end of the bed with the left side of her ribs. She complains of an 8/10 pain in the rib area, improved only slightly with Tylenol earlier this morning. She has not had any difficulty breathing, fevers, chest pain or cough, abdominal pain, hematuria or other complaint. No other alleviating or aggravating factors. Did not hit her head or lose consciousness. Not on anticoagulation. PCP: Rob SON NP    Current Facility-Administered Medications   Medication Dose Route Frequency Provider Last Rate Last Admin    iopamidoL (ISOVUE 300) 61 % contrast injection 100 mL  100 mL IntraVENous RAD ONCE Joseph Mcdaniels MD         Current Outpatient Medications   Medication Sig Dispense Refill    metoprolol succinate (TOPROL-XL) 25 mg XL tablet TAKE 1 TABLET BY MOUTH EVERY DAY 90 Tablet 3    amLODIPine (NORVASC) 10 mg tablet TAKE 1 TABLET BY MOUTH EVERY DAY 90 Tablet 3    ascorbic acid, vitamin C, (Vitamin C) 500 mg tablet Take  by mouth.      isosorbide mononitrate ER (IMDUR) 60 mg CR tablet TAKE 1 TABLET BY MOUTH  EVERY MORNING 90 Tab 3    aspirin delayed-release 81 mg tablet Take 162 mg by mouth daily. acetaminophen (TYLENOL) 325 mg tablet Take  by mouth every four (4) hours as needed for Pain.       nitroglycerin (NITROSTAT) 0.4 mg SL tablet by SubLINGual route every five (5) minutes as needed for Chest Pain.      levothyroxine (SYNTHROID) 75 mcg tablet Take 75 mcg by mouth Daily (before breakfast). Past History     Past Medical History:  Past Medical History:   Diagnosis Date    CAD (coronary artery disease) 2008    Fibromyalgia     Heart failure ([address]     Pt. states sge gas 5 heart blocks. Hypercholesterolemia     Hypertension     Menopause     Thyroid disease        Past Surgical History:  Past Surgical History:   Procedure Laterality Date    HX CHOLECYSTECTOMY      HX HYSTERECTOMY         Family History:  Family History   Problem Relation Age of Onset    Breast Cancer Mother [de-identified]       Social History:  Social History     Tobacco Use    Smoking status: Never    Smokeless tobacco: Never   Substance Use Topics    Alcohol use: No    Drug use: No       Allergies: Allergies   Allergen Reactions    Codeine Hives    Dapiprazole Vertigo    Elavil Vertigo    Voltaren [Diclofenac Sodium] Vertigo       I reviewed and confirmed the above information with patient and updated as necessary. Review of Systems     Review of Systems   Constitutional:  Negative for diaphoresis and fever. HENT:  Negative for ear pain and sore throat. Eyes:         No acute change in vision   Respiratory:  Negative for cough and shortness of breath. Cardiovascular:  Negative for chest pain and leg swelling. Left lower rib pain   Gastrointestinal:  Negative for abdominal pain and vomiting. Genitourinary:  Negative for dysuria. Musculoskeletal:  Negative for neck pain. Skin:  Negative for wound. Neurological:  Negative for weakness and headaches. Physical Exam   Visit Vitals  BP (!) 155/57 (BP 1 Location: Right upper arm)   Pulse 61   Temp 98.8 °F (37.1 °C)   Resp 20   Ht 5' 2\" (1.575 m)   Wt 59 kg (130 lb)   SpO2 96%   BMI 23.78 kg/m²       Physical Exam  Vitals and nursing note reviewed.    HENT:      Mouth/Throat:      Mouth: Mucous membranes are moist.   Eyes:      Pupils: Pupils are equal, round, and reactive to light. Cardiovascular:      Rate and Rhythm: Normal rate and regular rhythm. Pulses: Normal pulses. Pulmonary:      Effort: Pulmonary effort is normal.      Breath sounds: Normal breath sounds. Chest:      Chest wall: Tenderness (Tender to palpation in the left lower ribs, no overlying ecchymosis, crepitus) present. Abdominal:      Palpations: Abdomen is soft. Tenderness: There is no abdominal tenderness. Musculoskeletal:         General: No signs of injury. Normal range of motion. Cervical back: Normal range of motion. Skin:     General: Skin is warm. Neurological:      General: No focal deficit present. Mental Status: She is alert and oriented to person, place, and time. Mental status is at baseline. Diagnostic Study Results     Labs -  Recent Results (from the past 24 hour(s))   EKG, 12 LEAD, INITIAL    Collection Time: 08/06/22  9:44 AM   Result Value Ref Range    Ventricular Rate 64 BPM    Atrial Rate 64 BPM    P-R Interval 156 ms    QRS Duration 88 ms    Q-T Interval 438 ms    QTC Calculation (Bezet) 451 ms    Calculated P Axis 11 degrees    Calculated R Axis -29 degrees    Calculated T Axis 61 degrees    Diagnosis       Normal sinus rhythm  Minimal voltage criteria for LVH, may be normal variant  Borderline ECG  When compared with ECG of 03-NOV-2016 10:14,  No significant change was found           Radiologic Studies -   CT CHEST ABD PELV WO CONT   Final Result   Reticular and groundglass pulmonary parenchymal opacities-favor hypoventilation;   minimal infiltrates or fibrosis could not be excluded. Small right upper lobe nodule unchanged from 2018   Nonobstructive right renal calculus. A soft tissue density is present in the right renal pelvis. Differential   considerations include clot and neoplasia. Nonemergent follow-up CT urogram to   include arterial phase is recommended.    Negative for acute sequelae of trauma              Medical Decision Making   I am the first provider for this patient. I reviewed the vital signs, available nursing notes, past medical history, past surgical history, family history and social history. Vital Signs-Reviewed the patient's vital signs. EKG: Nondiagnostic for ischemia or arrhythmia    Records Reviewed: Nursing Notes and Old Medical Records (Time of Review: 9:31 AM)    Provider Notes (Medical Decision Making):   MDM  19-year-old female here with left-sided rib pain, consider fracture, soft tissue injury, doubt ACS, underlying pneumonia. No hematuria to suspect kidney injury    ED Course: Progress Notes, Reevaluation, and Consults:  Patient afebrile, hemodynamically normal  Exam is overall reassuring, discussed care plan with the patient and her son, do not feel that any labs are indicated and they agree. We will obtain a CT of the chest abdomen pelvis, treat with a Norco tablet. CT imaging without any acute trauma, incidentals discussed with the family, has a lung nodule as well as a right kidney issue. Patient was discharged home in stable condition, will use Tylenol for her symptoms. Signs and symptoms prompt return to the ED were discussed with the patient and her son. Procedures    Diagnosis     Clinical Impression:   1. Rib pain on left side    2. Fall, initial encounter        Disposition: Home    Follow-up Information       Follow up With Specialties Details Why Contact Info    [address] [facility] EMERGENCY DEPT Emergency Medicine  As needed, If symptoms worsen [address]  [phone]             Patient's Medications   Start Taking    No medications on file   Continue Taking    ACETAMINOPHEN (TYLENOL) 325 MG TABLET    Take  by mouth every four (4) hours as needed for Pain.     AMLODIPINE (NORVASC) 10 MG TABLET    TAKE 1 TABLET BY MOUTH EVERY DAY    ASCORBIC ACID, VITAMIN C, (VITAMIN C) 500 MG TABLET    Take  by mouth.    ASPIRIN DELAYED-RELEASE 81 MG TABLET    Take 162 mg by mouth daily. ISOSORBIDE MONONITRATE ER (IMDUR) 60 MG CR TABLET    TAKE 1 TABLET BY MOUTH  EVERY MORNING    LEVOTHYROXINE (SYNTHROID) 75 MCG TABLET    Take 75 mcg by mouth Daily (before breakfast). METOPROLOL SUCCINATE (TOPROL-XL) 25 MG XL TABLET    TAKE 1 TABLET BY MOUTH EVERY DAY    NITROGLYCERIN (NITROSTAT) 0.4 MG SL TABLET    by SubLINGual route every five (5) minutes as needed for Chest Pain. These Medications have changed    No medications on file   Stop Taking    No medications on file       Shara Mohr MD   Emergency Medicine   August 6, 2022, 9:31 AM     This note is dictated utilizing Dragon voice recognition software. Unfortunately this leads to occasional typographical errors using the voice recognition. I apologize in advance if the situation occurs. If questions occur please do not hesitate to contact me directly.     Leoncio Dasilva MD

## 2022-08-06 NOTE — ED TRIAGE NOTES
Pt fell yesterday. Was on her knees putting blankets away and fell into footboard of her bed while trying to get up. Today c/o left rib pain. Also his her left hand.  Has bruise to top of hand

## 2022-09-15 ENCOUNTER — TRANSCRIBE ORDER (OUTPATIENT)
Dept: SCHEDULING | Age: 87
End: 2022-09-15

## 2022-09-15 DIAGNOSIS — R19.03 RLQ ABDOMINAL MASS: Primary | ICD-10-CM

## 2022-09-18 ENCOUNTER — ANESTHESIA EVENT (OUTPATIENT)
Dept: SURGERY | Age: 87
DRG: 522 | End: 2022-09-18
Payer: MEDICARE

## 2022-09-18 ENCOUNTER — APPOINTMENT (OUTPATIENT)
Dept: GENERAL RADIOLOGY | Age: 87
DRG: 522 | End: 2022-09-18
Attending: EMERGENCY MEDICINE
Payer: MEDICARE

## 2022-09-18 ENCOUNTER — APPOINTMENT (OUTPATIENT)
Dept: NON INVASIVE DIAGNOSTICS | Age: 87
DRG: 522 | End: 2022-09-18
Attending: EMERGENCY MEDICINE
Payer: MEDICARE

## 2022-09-18 ENCOUNTER — APPOINTMENT (OUTPATIENT)
Dept: CT IMAGING | Age: 87
DRG: 522 | End: 2022-09-18
Attending: EMERGENCY MEDICINE
Payer: MEDICARE

## 2022-09-18 ENCOUNTER — HOSPITAL ENCOUNTER (INPATIENT)
Age: 87
LOS: 4 days | Discharge: NURSING, REHAB OR LONG-TERM CARE | DRG: 522 | End: 2022-09-22
Attending: EMERGENCY MEDICINE | Admitting: EMERGENCY MEDICINE
Payer: MEDICARE

## 2022-09-18 DIAGNOSIS — S09.90XA CLOSED HEAD INJURY, INITIAL ENCOUNTER: ICD-10-CM

## 2022-09-18 DIAGNOSIS — R06.09 DOE (DYSPNEA ON EXERTION): ICD-10-CM

## 2022-09-18 DIAGNOSIS — I25.10 CORONARY ARTERY DISEASE INVOLVING NATIVE CORONARY ARTERY OF NATIVE HEART, UNSPECIFIED WHETHER ANGINA PRESENT: ICD-10-CM

## 2022-09-18 DIAGNOSIS — S72.001A CLOSED FRACTURE OF RIGHT HIP, INITIAL ENCOUNTER (HCC): ICD-10-CM

## 2022-09-18 DIAGNOSIS — S62.346A CLOSED NONDISPLACED FRACTURE OF BASE OF FIFTH METACARPAL BONE OF RIGHT HAND, INITIAL ENCOUNTER: ICD-10-CM

## 2022-09-18 DIAGNOSIS — Z01.810 PREOPERATIVE CARDIOVASCULAR EXAMINATION: ICD-10-CM

## 2022-09-18 DIAGNOSIS — I25.10 CORONARY ARTERY DISEASE INVOLVING NATIVE CORONARY ARTERY OF NATIVE HEART WITHOUT ANGINA PECTORIS: ICD-10-CM

## 2022-09-18 DIAGNOSIS — S72.001A CLOSED DISPLACED FRACTURE OF RIGHT FEMORAL NECK (HCC): Primary | ICD-10-CM

## 2022-09-18 PROBLEM — S72.009A HIP FRACTURE (HCC): Status: ACTIVE | Noted: 2022-09-18

## 2022-09-18 LAB
ALBUMIN SERPL-MCNC: 4.3 G/DL (ref 3.4–5)
ALBUMIN/GLOB SERPL: 1.3 {RATIO} (ref 0.8–1.7)
ALP SERPL-CCNC: 93 U/L (ref 45–117)
ALT SERPL-CCNC: 24 U/L (ref 13–56)
ANION GAP SERPL CALC-SCNC: 10 MMOL/L (ref 3–18)
APPEARANCE UR: CLEAR
AST SERPL-CCNC: 22 U/L (ref 10–38)
ATRIAL RATE: 91 BPM
BASOPHILS # BLD: 0 K/UL (ref 0–0.1)
BASOPHILS NFR BLD: 0 % (ref 0–2)
BILIRUB SERPL-MCNC: 1.5 MG/DL (ref 0.2–1)
BILIRUB UR QL: NEGATIVE
BUN SERPL-MCNC: 17 MG/DL (ref 7–18)
BUN/CREAT SERPL: 24 (ref 12–20)
CALCIUM SERPL-MCNC: 8.7 MG/DL (ref 8.5–10.1)
CALCULATED P AXIS, ECG09: 65 DEGREES
CALCULATED R AXIS, ECG10: -39 DEGREES
CALCULATED T AXIS, ECG11: 41 DEGREES
CHLORIDE SERPL-SCNC: 105 MMOL/L (ref 100–111)
CO2 SERPL-SCNC: 25 MMOL/L (ref 21–32)
COLOR UR: YELLOW
COVID-19 RAPID TEST, COVR: NOT DETECTED
CREAT SERPL-MCNC: 0.71 MG/DL (ref 0.6–1.3)
DIAGNOSIS, 93000: NORMAL
DIFFERENTIAL METHOD BLD: ABNORMAL
ECHO AO ASC DIAM: 3.3 CM
ECHO AO ASCENDING AORTA INDEX: 2.05 CM/M2
ECHO AO ROOT DIAM: 3.1 CM
ECHO AO ROOT INDEX: 1.93 CM/M2
ECHO LA DIAMETER INDEX: 1.49 CM/M2
ECHO LA DIAMETER: 2.4 CM
ECHO LA TO AORTIC ROOT RATIO: 0.77
ECHO LA VOL 2C: 33 ML (ref 22–52)
ECHO LA VOL 4C: 45 ML (ref 22–52)
ECHO LA VOLUME AREA LENGTH: 42 ML
ECHO LA VOLUME INDEX A2C: 20 ML/M2 (ref 16–34)
ECHO LA VOLUME INDEX A4C: 28 ML/M2 (ref 16–34)
ECHO LA VOLUME INDEX AREA LENGTH: 26 ML/M2 (ref 16–34)
ECHO LV E' LATERAL VELOCITY: 8 CM/S
ECHO LV FRACTIONAL SHORTENING: 43 % (ref 28–44)
ECHO LV INTERNAL DIMENSION DIASTOLE INDEX: 2.3 CM/M2
ECHO LV INTERNAL DIMENSION DIASTOLIC: 3.7 CM (ref 3.9–5.3)
ECHO LV INTERNAL DIMENSION SYSTOLIC INDEX: 1.3 CM/M2
ECHO LV INTERNAL DIMENSION SYSTOLIC: 2.1 CM
ECHO LV IVSD: 1.1 CM (ref 0.6–0.9)
ECHO LV MASS 2D: 138.2 G (ref 67–162)
ECHO LV MASS INDEX 2D: 85.8 G/M2 (ref 43–95)
ECHO LV POSTERIOR WALL DIASTOLIC: 1.2 CM (ref 0.6–0.9)
ECHO LV RELATIVE WALL THICKNESS RATIO: 0.65
ECHO RV FREE WALL PEAK S': 26 CM/S
ECHO RV TAPSE: 2.1 CM (ref 1.7–?)
ECHO TV REGURGITANT MAX VELOCITY: 2.98 M/S
ECHO TV REGURGITANT PEAK GRADIENT: 35 MMHG
EOSINOPHIL # BLD: 0 K/UL (ref 0–0.4)
EOSINOPHIL NFR BLD: 1 % (ref 0–5)
ERYTHROCYTE [DISTWIDTH] IN BLOOD BY AUTOMATED COUNT: 12.9 % (ref 11.6–14.5)
GLOBULIN SER CALC-MCNC: 3.3 G/DL (ref 2–4)
GLUCOSE SERPL-MCNC: 113 MG/DL (ref 74–99)
GLUCOSE UR STRIP.AUTO-MCNC: NEGATIVE MG/DL
HCT VFR BLD AUTO: 42.5 % (ref 35–45)
HGB BLD-MCNC: 14.4 G/DL (ref 12–16)
HGB UR QL STRIP: NEGATIVE
IMM GRANULOCYTES # BLD AUTO: 0.1 K/UL (ref 0–0.04)
IMM GRANULOCYTES NFR BLD AUTO: 2 % (ref 0–0.5)
KETONES UR QL STRIP.AUTO: 15 MG/DL
LEUKOCYTE ESTERASE UR QL STRIP.AUTO: NEGATIVE
LYMPHOCYTES # BLD: 0.9 K/UL (ref 0.9–3.6)
LYMPHOCYTES NFR BLD: 13 % (ref 21–52)
MCH RBC QN AUTO: 30.6 PG (ref 24–34)
MCHC RBC AUTO-ENTMCNC: 33.9 G/DL (ref 31–37)
MCV RBC AUTO: 90.2 FL (ref 78–100)
MONOCYTES # BLD: 0.3 K/UL (ref 0.05–1.2)
MONOCYTES NFR BLD: 5 % (ref 3–10)
NEUTS SEG # BLD: 5.7 K/UL (ref 1.8–8)
NEUTS SEG NFR BLD: 80 % (ref 40–73)
NITRITE UR QL STRIP.AUTO: NEGATIVE
NRBC # BLD: 0 K/UL (ref 0–0.01)
NRBC BLD-RTO: 0 PER 100 WBC
P-R INTERVAL, ECG05: 196 MS
PH UR STRIP: 6.5 [PH] (ref 5–8)
PLATELET # BLD AUTO: 156 K/UL (ref 135–420)
PMV BLD AUTO: 9.6 FL (ref 9.2–11.8)
POTASSIUM SERPL-SCNC: 3.5 MMOL/L (ref 3.5–5.5)
PROT SERPL-MCNC: 7.6 G/DL (ref 6.4–8.2)
PROT UR STRIP-MCNC: NEGATIVE MG/DL
Q-T INTERVAL, ECG07: 360 MS
QRS DURATION, ECG06: 84 MS
QTC CALCULATION (BEZET), ECG08: 442 MS
RBC # BLD AUTO: 4.71 M/UL (ref 4.2–5.3)
SODIUM SERPL-SCNC: 140 MMOL/L (ref 136–145)
SOURCE, COVRS: NORMAL
SP GR UR REFRACTOMETRY: 1.01 (ref 1–1.03)
UROBILINOGEN UR QL STRIP.AUTO: 0.2 EU/DL (ref 0.2–1)
VENTRICULAR RATE, ECG03: 91 BPM
WBC # BLD AUTO: 7.1 K/UL (ref 4.6–13.2)

## 2022-09-18 PROCEDURE — 74011250637 HC RX REV CODE- 250/637: Performed by: EMERGENCY MEDICINE

## 2022-09-18 PROCEDURE — APPSS60 APP SPLIT SHARED TIME 46-60 MINUTES: Performed by: NURSE PRACTITIONER

## 2022-09-18 PROCEDURE — 74176 CT ABD & PELVIS W/O CONTRAST: CPT

## 2022-09-18 PROCEDURE — 65270000046 HC RM TELEMETRY

## 2022-09-18 PROCEDURE — 93005 ELECTROCARDIOGRAM TRACING: CPT

## 2022-09-18 PROCEDURE — 94761 N-INVAS EAR/PLS OXIMETRY MLT: CPT

## 2022-09-18 PROCEDURE — 73502 X-RAY EXAM HIP UNI 2-3 VIEWS: CPT

## 2022-09-18 PROCEDURE — 87635 SARS-COV-2 COVID-19 AMP PRB: CPT

## 2022-09-18 PROCEDURE — 73130 X-RAY EXAM OF HAND: CPT

## 2022-09-18 PROCEDURE — 93306 TTE W/DOPPLER COMPLETE: CPT

## 2022-09-18 PROCEDURE — 85025 COMPLETE CBC W/AUTO DIFF WBC: CPT

## 2022-09-18 PROCEDURE — 74011250636 HC RX REV CODE- 250/636: Performed by: NURSE PRACTITIONER

## 2022-09-18 PROCEDURE — 72125 CT NECK SPINE W/O DYE: CPT

## 2022-09-18 PROCEDURE — 81003 URINALYSIS AUTO W/O SCOPE: CPT

## 2022-09-18 PROCEDURE — 70450 CT HEAD/BRAIN W/O DYE: CPT

## 2022-09-18 PROCEDURE — 99285 EMERGENCY DEPT VISIT HI MDM: CPT

## 2022-09-18 PROCEDURE — 99222 1ST HOSP IP/OBS MODERATE 55: CPT | Performed by: ORTHOPAEDIC SURGERY

## 2022-09-18 PROCEDURE — 80053 COMPREHEN METABOLIC PANEL: CPT

## 2022-09-18 PROCEDURE — 26600 TREAT METACARPAL FRACTURE: CPT | Performed by: ORTHOPAEDIC SURGERY

## 2022-09-18 PROCEDURE — 93306 TTE W/DOPPLER COMPLETE: CPT | Performed by: INTERNAL MEDICINE

## 2022-09-18 PROCEDURE — 74011000250 HC RX REV CODE- 250: Performed by: EMERGENCY MEDICINE

## 2022-09-18 RX ORDER — NITROGLYCERIN 0.4 MG/1
0.4 TABLET SUBLINGUAL
Status: DISCONTINUED | OUTPATIENT
Start: 2022-09-18 | End: 2022-09-22 | Stop reason: HOSPADM

## 2022-09-18 RX ORDER — METOPROLOL SUCCINATE 25 MG/1
25 TABLET, EXTENDED RELEASE ORAL DAILY
Status: DISCONTINUED | OUTPATIENT
Start: 2022-09-19 | End: 2022-09-22 | Stop reason: HOSPADM

## 2022-09-18 RX ORDER — ASPIRIN 81 MG/1
162 TABLET ORAL DAILY
Status: DISCONTINUED | OUTPATIENT
Start: 2022-09-19 | End: 2022-09-20

## 2022-09-18 RX ORDER — POLYETHYLENE GLYCOL 3350 17 G/17G
17 POWDER, FOR SOLUTION ORAL DAILY PRN
Status: DISCONTINUED | OUTPATIENT
Start: 2022-09-18 | End: 2022-09-22 | Stop reason: HOSPADM

## 2022-09-18 RX ORDER — SODIUM CHLORIDE 0.9 % (FLUSH) 0.9 %
5-40 SYRINGE (ML) INJECTION AS NEEDED
Status: DISCONTINUED | OUTPATIENT
Start: 2022-09-18 | End: 2022-09-22 | Stop reason: HOSPADM

## 2022-09-18 RX ORDER — HYDRALAZINE HYDROCHLORIDE 20 MG/ML
10 INJECTION INTRAMUSCULAR; INTRAVENOUS
Status: DISCONTINUED | OUTPATIENT
Start: 2022-09-18 | End: 2022-09-22 | Stop reason: HOSPADM

## 2022-09-18 RX ORDER — ACETAMINOPHEN 325 MG/1
650 TABLET ORAL
Status: DISCONTINUED | OUTPATIENT
Start: 2022-09-18 | End: 2022-09-20

## 2022-09-18 RX ORDER — HYDROMORPHONE HYDROCHLORIDE 1 MG/ML
0.5 INJECTION, SOLUTION INTRAMUSCULAR; INTRAVENOUS; SUBCUTANEOUS
Status: DISCONTINUED | OUTPATIENT
Start: 2022-09-18 | End: 2022-09-21

## 2022-09-18 RX ORDER — SODIUM CHLORIDE 0.9 % (FLUSH) 0.9 %
5-40 SYRINGE (ML) INJECTION EVERY 8 HOURS
Status: DISCONTINUED | OUTPATIENT
Start: 2022-09-18 | End: 2022-09-22 | Stop reason: HOSPADM

## 2022-09-18 RX ORDER — ISOSORBIDE MONONITRATE 60 MG/1
60 TABLET, EXTENDED RELEASE ORAL DAILY
Status: DISCONTINUED | OUTPATIENT
Start: 2022-09-19 | End: 2022-09-22 | Stop reason: HOSPADM

## 2022-09-18 RX ADMIN — SODIUM CHLORIDE, PRESERVATIVE FREE 10 ML: 5 INJECTION INTRAVENOUS at 14:00

## 2022-09-18 RX ADMIN — HYDROMORPHONE HYDROCHLORIDE 0.5 MG: 1 INJECTION, SOLUTION INTRAMUSCULAR; INTRAVENOUS; SUBCUTANEOUS at 17:04

## 2022-09-18 RX ADMIN — ACETAMINOPHEN 650 MG: 325 TABLET ORAL at 12:00

## 2022-09-18 NOTE — ED TRIAGE NOTES
Pt arrives from home via ems due to pain in right hip after mechanical fall this am. Pt does not report LOC. Abrasion noted on right scalp.

## 2022-09-18 NOTE — H&P (VIEW-ONLY)
Geriatric Hip Fracture Consult  Patient: Reva Green  YOB: 1924   MRN: [id number]      Consult Date: 9/18/2022     Consulting Physician: Santa Glover MD    Chief Complaint: Right hip pain status post fall and right hand pain. History of Present Illness: Patient reports falling onto her right hip and hand earlier this morning. She reports immediate pain in the hand and in the groin. Of note patient has a history of cardiovascular disease being treated medically by one of the local cardiologist.  Review of Systems: Pertinent items are noted in the History of Present Illness. ED Presentation Time: < 8 hours  Mechanism of Injury: Fall from standing  Ambulatory Status: Independent and lives with son. Past Medical History:   Past Medical History:   Diagnosis Date    CAD (coronary artery disease) 2008    Fibromyalgia     Heart failure ([facility] [address]     Pt. states sge gas 5 heart blocks. Hypercholesterolemia     Hypertension     Menopause     Thyroid disease        Allergies:    Allergies   Allergen Reactions    Codeine Hives    Dapiprazole Vertigo    Elavil Vertigo    Voltaren [Diclofenac Sodium] Vertigo      Past Surgical History:   Past Surgical History:   Procedure Laterality Date    HX CHOLECYSTECTOMY      HX HYSTERECTOMY        Social History:   Social History     Socioeconomic History    Marital status:      Spouse name: Not on file    Number of children: Not on file    Years of education: Not on file    Highest education level: Not on file   Occupational History    Not on file   Tobacco Use    Smoking status: Never    Smokeless tobacco: Never   Substance and Sexual Activity    Alcohol use: No    Drug use: No    Sexual activity: Not on file   Other Topics Concern    Not on file   Social History Narrative    Not on file     Social Determinants of Health     Financial Resource Strain: Not on file   Food Insecurity: Not on file   Transportation Needs: Not on file   Physical Activity: Not on file   Stress: Not on file   Social Connections: Not on file   Intimate Partner Violence: Not on file   Housing Stability: Not on file      Dwelling Status:  With son  Current Anticoagulant Medications: Aspirin  History of falls: NO  Prior Fractures:  None  Osteoporosis Medications: none  Bone Density Tests: NO  X-RAYS: Right Displaced Femoral Neck Fracture and nondisplaced right fifth metacarpal neck  Physical Exam:   alert, cooperative, no distress, appears stated age, eyes: Negative, neurological: Oriented, normal, no focal deficits, Cervical, supraclavicular, and axillary nodes normal. ,  Throat: normal and no erythema or exudates noted. ,  Lungs: No acute distress, normal respiratory effort; soft, non-tender. Nondistended. No masses,  no organomegaly,  pedal pulses normal and no edema, skin normal.,  Right lower extremity: Shortened and externally rotated. , pain femur on the right,    Right upper extremity: There is tenderness to palpation over the fifth metacarpal phalangeal joint. There is no deformity ecchymosis or edema noted. Range of motion is near full. Right lower extremity: Shortened externally rotated lower extremity with pain at the groin. Assessment / Plan: Right hip femoral neck fracture, displaced. Right fifth metacarpal neck fracture, nondisplaced. Plan for right hip hemiarthroplasty tomorrow late afternoon early evening. Ulnar gutter splint applied by ED today for right fifth metacarpal neck fracture. We will transition to brace either tomorrow after surgery or at a later date. I have spoken to the hospitalist given the patient's cardiovascular history, we have agreed would be best for the patient to be seen by cardiologist and for an echo to be done tomorrow. This will likely not change any of the treatment plan; and the patient should still have her hip fixed tomorrow evening within the 48-hour window.     Labs:    Lab Results   Component Value Date/Time    HGB 14.4 09/18/2022 07:52 AM    WBC 7.1 09/18/2022 07:52 AM    Albumin 4.3 09/18/2022 07:52 AM      Preoperative Clearance: YES by Hospitalist and Cardiologist

## 2022-09-18 NOTE — ED PROVIDER NOTES
EMERGENCY DEPARTMENT HISTORY AND PHYSICAL EXAM    8:16 AM      Date: 9/18/2022  Patient Name: Neema Garcia    History of Presenting Illness     Chief Complaint   Patient presents with    Fall         History Provided By: Patient  Location/Duration/Severity/Modifying factors   HPI  This is a very pleasant 79-year-old female brought to the emergency department for evaluation of a fall. Patient states that she left her balance while she was in the kitchen fell over striking her right hand right side of her head and right hip. No reported loss of consciousness. She is not on any blood thinners other than aspirin. Denies any head injury in the past 2 weeks. Patient is currently complaining of right hip pain and right hand pain. Denies new numbness tingling or coldness distal to the right hip. Denies knee pain ankle pain rib pain or neck pain. Patient says she does have a laceration to the right side of her head. She is thinks her tetanus shot has been updated within the past 10 years. Denies headache or other complaints.   No antecedent chest pain shortness of breath or lightheadedness prior to the fall      PCP: Francy Ortiz NP    Current Facility-Administered Medications   Medication Dose Route Frequency Provider Last Rate Last Admin    [START ON 9/19/2022] aspirin delayed-release tablet 162 mg  162 mg Oral DAILY Toño Fairbanks MD        [START ON 9/19/2022] isosorbide mononitrate ER (IMDUR) tablet 60 mg  60 mg Oral DAILY Toño Fairbanks MD        [START ON 9/19/2022] levothyroxine (SYNTHROID) tablet 75 mcg  75 mcg Oral ACB Toño Fairbanks MD        [START ON 9/19/2022] metoprolol succinate (TOPROL-XL) XL tablet 25 mg  25 mg Oral DAILY Toño Fairbanks MD        nitroglycerin (NITROSTAT) tablet 0.4 mg  0.4 mg SubLINGual Q5MIN PRN Toño Fairbanks MD        hydrALAZINE (APRESOLINE) 20 mg/mL injection 10 mg  10 mg IntraVENous Q6H PRN Toño Fairbanks MD         Current Outpatient Medications   Medication Sig Dispense Refill    metoprolol succinate (TOPROL-XL) 25 mg XL tablet TAKE 1 TABLET BY MOUTH EVERY DAY 90 Tablet 3    amLODIPine (NORVASC) 10 mg tablet TAKE 1 TABLET BY MOUTH EVERY DAY 90 Tablet 3    ascorbic acid, vitamin C, (Vitamin C) 500 mg tablet Take  by mouth.      isosorbide mononitrate ER (IMDUR) 60 mg CR tablet TAKE 1 TABLET BY MOUTH  EVERY MORNING 90 Tab 3    aspirin delayed-release 81 mg tablet Take 162 mg by mouth daily. acetaminophen (TYLENOL) 325 mg tablet Take  by mouth every four (4) hours as needed for Pain. nitroglycerin (NITROSTAT) 0.4 mg SL tablet by SubLINGual route every five (5) minutes as needed for Chest Pain.      levothyroxine (SYNTHROID) 75 mcg tablet Take 75 mcg by mouth Daily (before breakfast). Past History     Past Medical History:  Past Medical History:   Diagnosis Date    CAD (coronary artery disease) 2008    Fibromyalgia     Heart failure ([address]     Pt. states sge gas 5 heart blocks. Hypercholesterolemia     Hypertension     Menopause     Thyroid disease        Past Surgical History:  Past Surgical History:   Procedure Laterality Date    HX CHOLECYSTECTOMY      HX HYSTERECTOMY         Family History:  Family History   Problem Relation Age of Onset    Breast Cancer Mother [de-identified]       Social History:  Social History     Tobacco Use    Smoking status: Never    Smokeless tobacco: Never   Substance Use Topics    Alcohol use: No    Drug use: No       Allergies: Allergies   Allergen Reactions    Codeine Hives    Dapiprazole Vertigo    Elavil Vertigo    Voltaren [Diclofenac Sodium] Vertigo         Review of Systems       Review of Systems  At least 10 systems reviewed and otherwise acutely negative except as in the [address]     Physical Exam   Visit Vitals  BP (!) 182/75   Pulse 97   Temp 97.8 °F (36.6 °C)   Resp 16   Ht 5' 2\" (1.575 m)   Wt 61.1 kg (134 lb 11.2 oz)   SpO2 97%   BMI 24.64 kg/m²         Physical Exam  General: Well-appearing well-nourished in no acute distress  HEENT: Pupils equal round reactive light, moist mucous membranes. Extraocular movements intact no evidence of depressed skull fracture no raccoon eyes or avelar sign  Neck: Supple, no meningismus no posterior cervical tenderness or step-offs. Chest: Regular rate rhythm no murmurs rubs or gallops  Abdomen: Soft nontender nondistended no tenderness in right upper quadrant no pulsatile masses to deep palpation  Lungs: Clear to auscultation bilaterally no wheeze rales rhonchi or stridor  Extremities: No tenderness to palpation over bony prominences of clavicles shoulders humerus elbows radius ulna or scaphoid bilaterally. Patient's pelvis is stable no tenderness over proximal or distal femur knee tib-fib or ankle on the left. No tenderness over first or fifth metatarsal ankle tib-fib knee or proximal femur on the right. Patient with moderate tenderness but no crepitus when palpating throughout the greater trochanter on the right. No foreshortening or external rotation noted. Integument: No apparent rashes erythema contusions or petechiae laceration noted to the right scalp. No foreign bodies or pulsatile bleeding noted.   Neurologic: Cranial nerves II through XII grossly intact  Psychiatric: Alert oriented x3    Diagnostic Study Results     Labs -  Recent Results (from the past 12 hour(s))   EKG, 12 LEAD, INITIAL    Collection Time: 09/18/22  7:47 AM   Result Value Ref Range    Ventricular Rate 91 BPM    Atrial Rate 91 BPM    P-R Interval 196 ms    QRS Duration 84 ms    Q-T Interval 360 ms    QTC Calculation (Bezet) 442 ms    Calculated P Axis 65 degrees    Calculated R Axis -39 degrees    Calculated T Axis 41 degrees    Diagnosis       Normal sinus rhythm  Left axis deviation  Nonspecific ST abnormality  Abnormal ECG  When compared with ECG of 06-AUG-2022 09:44,  No significant change was found     CBC WITH AUTOMATED DIFF    Collection Time: 09/18/22  7:52 AM   Result Value Ref Range    WBC 7.1 4.6 - 13.2 K/uL    RBC 4.71 4.20 - 5.30 M/uL    HGB 14.4 12.0 - 16.0 g/dL    HCT 42.5 35.0 - 45.0 %    MCV 90.2 78.0 - 100.0 FL    MCH 30.6 24.0 - 34.0 PG    MCHC 33.9 31.0 - 37.0 g/dL    RDW 12.9 11.6 - 14.5 %    PLATELET 840 405 - 120 K/uL    MPV 9.6 9.2 - 11.8 FL    NRBC 0.0 0  WBC    ABSOLUTE NRBC 0.00 0.00 - 0.01 K/uL    NEUTROPHILS 80 (H) 40 - 73 %    LYMPHOCYTES 13 (L) 21 - 52 %    MONOCYTES 5 3 - 10 %    EOSINOPHILS 1 0 - 5 %    BASOPHILS 0 0 - 2 %    IMMATURE GRANULOCYTES 2 (H) 0.0 - 0.5 %    ABS. NEUTROPHILS 5.7 1.8 - 8.0 K/UL    ABS. LYMPHOCYTES 0.9 0.9 - 3.6 K/UL    ABS. MONOCYTES 0.3 0.05 - 1.2 K/UL    ABS. EOSINOPHILS 0.0 0.0 - 0.4 K/UL    ABS. BASOPHILS 0.0 0.0 - 0.1 K/UL    ABS. IMM. GRANS. 0.1 (H) 0.00 - 0.04 K/UL    DF AUTOMATED     METABOLIC PANEL, COMPREHENSIVE    Collection Time: 09/18/22  7:52 AM   Result Value Ref Range    Sodium 140 136 - 145 mmol/L    Potassium 3.5 3.5 - 5.5 mmol/L    Chloride 105 100 - 111 mmol/L    CO2 25 21 - 32 mmol/L    Anion gap 10 3.0 - 18 mmol/L    Glucose 113 (H) 74 - 99 mg/dL    BUN 17 7.0 - 18 MG/DL    Creatinine 0.71 0.6 - 1.3 MG/DL    BUN/Creatinine ratio 24 (H) 12 - 20      GFR est AA >60 >60 ml/min/1.73m2    GFR est non-AA >60 >60 ml/min/1.73m2    Calcium 8.7 8.5 - 10.1 MG/DL    Bilirubin, total 1.5 (H) 0.2 - 1.0 MG/DL    ALT (SGPT) 24 13 - 56 U/L    AST (SGOT) 22 10 - 38 U/L    Alk.  phosphatase 93 45 - 117 U/L    Protein, total 7.6 6.4 - 8.2 g/dL    Albumin 4.3 3.4 - 5.0 g/dL    Globulin 3.3 2.0 - 4.0 g/dL    A-G Ratio 1.3 0.8 - 1.7     URINALYSIS W/ RFLX MICROSCOPIC    Collection Time: 09/18/22 11:37 AM   Result Value Ref Range    Color YELLOW      Appearance CLEAR      Specific gravity 1.012 1.005 - 1.030      pH (UA) 6.5 5.0 - 8.0      Protein Negative NEG mg/dL    Glucose Negative NEG mg/dL    Ketone 15 (A) NEG mg/dL    Bilirubin Negative NEG      Blood Negative NEG      Urobilinogen 0.2 0.2 - 1.0 EU/dL    Nitrites Negative NEG      Leukocyte Esterase Negative NEG         Radiologic Studies -   CT HEAD WO CONT   Final Result   1. No acute intracranial process identified. 2.  Moderate parenchymal volume loss and chronic small vessel ischemic changes. 3.  Small right parietal scalp edema      CT SPINE CERV WO CONT   Final Result   1. No acute cervical spine fracture      CT ABD PELV WO CONT   Final Result    Right transcervical femoral neck fracture with posterior angulation and   significant gap in the fracture fragments anteriorly. Right ureteral 7 mm stone causing proximal hydroureteronephrosis. This is at the   L3 level. Multiple nonobstructing stones in the right kidney. Diverticulosis without evidence of diverticulitis. Degenerative changes at L3-L4 with moderate to severe canal narrowing. Findings discussed with Dr. Dee Dueñas. XR HAND RT MIN 3 V   Final Result   1. Oblique fracture of the fifth metacarpal at the head neck junction without   evidence of significant displacement or attenuation. 2.  Multi joint osteoarthritis. XR HIP RT W OR WO PELV 2-3 VWS   Final Result   1. Right transcervical femoral neck fracture. Minimal displacement or   angulation identified. Medical Decision Making   I am the first provider for this patient. I reviewed the vital signs, available nursing notes, past medical history, past surgical history, family history and social history. Vital Signs-Reviewed the patient's vital signs.       EKG:   Patient screening EKG as interpreted by me sinus rhythm rate 91   no ST elevation no ST depression I appreciate no acute ischemia or infarction on this EKG unchanged compared to patient's previous EKG from August 4, 2022    Records Reviewed: Nursing Notes, Old Medical Records, and Previous electrocardiograms (Time of Review: 8:16 AM)    ED Course: Progress Notes, Reevaluation, and Consults:         Provider Notes (Medical Decision Making):   [facility]    This is a very pleasant 51-year-old female brought to emergency department for evaluation status post losing her balance and falling landing on her right side. Based on patient's history physical would be concerned about possible right hip fracture or pelvic fracture. She does have very mild foreshortening but no external rotation of her right hip. She is neurovascular intact distal to the point of injury with palpable DP and PT pulses and capillary refills less than 3 seconds. Patient also has significant ecchymosis noted over her right mid fifth metacarpal area. Would be concerned about possible fracture of the metacarpal or carpal bone. She is right-hand dominant otherwise normal cascade of her fingers and no other acute injury noted. Does have an abrasion on the right side of her head but no evidence of depressed skull fracture. I did review patient's imaging studies and laboratory work as noted above patient does have a right femoral neck fracture noted. Also has 1/5 metacarpal fracture is appreciated. Incidental note of a 7 mm kidney stone. Patient denies any pain in her right flank or problems with urinating. Low clinical suspicion for acute pathology of this kidney stone at this time. I discussed the findings with patient and family at bedside. Patient does live with her son. She is ambulatory at baseline. Reports to me that she last had something to eat or drink last night. I discussed the case with orthopedics on-call Dr. Marycruz Dinero. Reviewed images of his laboratory work as well as patient's previous medical history with him. He requested patient be admitted to the hospitalist for continuation of care and treatment. Also discussed the case with Dr. Filiberto Flores, hospitalist medicine. He will admit the patient for continuation of care and treatment. A ulnar gutter splint was placed on patient's right hand. By nursing staff. Reviewed by myself after placement. She is neurovascularly intact. Patient will be kept n.p.o. for evaluation by orthopedics and possible operative fix of the patient's right hip. This was discussed with patient and family at bedside they are in full agreement with this plan. Procedures    Critical Care Time:         Diagnosis     Clinical Impression:   1. Closed displaced fracture of right femoral neck (HCC)    2. Closed nondisplaced fracture of base of fifth metacarpal bone of right hand, initial encounter    3. Closed head injury, initial encounter        Disposition:   \admit      Follow-up Information    None          Patient's Medications   Start Taking    No medications on file   Continue Taking    ACETAMINOPHEN (TYLENOL) 325 MG TABLET    Take  by mouth every four (4) hours as needed for Pain. AMLODIPINE (NORVASC) 10 MG TABLET    TAKE 1 TABLET BY MOUTH EVERY DAY    ASCORBIC ACID, VITAMIN C, (VITAMIN C) 500 MG TABLET    Take  by mouth. ASPIRIN DELAYED-RELEASE 81 MG TABLET    Take 162 mg by mouth daily. ISOSORBIDE MONONITRATE ER (IMDUR) 60 MG CR TABLET    TAKE 1 TABLET BY MOUTH  EVERY MORNING    LEVOTHYROXINE (SYNTHROID) 75 MCG TABLET    Take 75 mcg by mouth Daily (before breakfast). METOPROLOL SUCCINATE (TOPROL-XL) 25 MG XL TABLET    TAKE 1 TABLET BY MOUTH EVERY DAY    NITROGLYCERIN (NITROSTAT) 0.4 MG SL TABLET    by SubLINGual route every five (5) minutes as needed for Chest Pain. These Medications have changed    No medications on file   Stop Taking    No medications on file     Disclaimer: Sections of this note are dictated using utilizing voice recognition software. Minor typographical errors may be present. If questions arise, please do not hesitate to contact me or call our department.

## 2022-09-18 NOTE — PROGRESS NOTES
Brief Ortho note. Discussed case with medicine, and we agreed to move forward with a cardiac vascular consult as well as an echo given the patient's extensive cardiac history. Plan for right hip hemiarthroplasty tomorrow after 3 PM per OR availability. N.p.o. after 7 AM.  Hold blood thinners. Full consult to follow.

## 2022-09-18 NOTE — CONSULTS
Geriatric Hip Fracture Consult  Patient: Blanca Oliveira  YOB: 1924   MRN: [id number]      Consult Date: 9/18/2022     Consulting Physician: Delaney French MD    Chief Complaint: Right hip pain status post fall and right hand pain. History of Present Illness: Patient reports falling onto her right hip and hand earlier this morning. She reports immediate pain in the hand and in the groin. Of note patient has a history of cardiovascular disease being treated medically by one of the local cardiologist.  Review of Systems: Pertinent items are noted in the History of Present Illness. ED Presentation Time: < 8 hours  Mechanism of Injury: Fall from standing  Ambulatory Status: Independent and lives with son. Past Medical History:   Past Medical History:   Diagnosis Date    CAD (coronary artery disease) 2008    Fibromyalgia     Heart failure ([facility] [address]     Pt. states sge gas 5 heart blocks. Hypercholesterolemia     Hypertension     Menopause     Thyroid disease        Allergies:    Allergies   Allergen Reactions    Codeine Hives    Dapiprazole Vertigo    Elavil Vertigo    Voltaren [Diclofenac Sodium] Vertigo      Past Surgical History:   Past Surgical History:   Procedure Laterality Date    HX CHOLECYSTECTOMY      HX HYSTERECTOMY        Social History:   Social History     Socioeconomic History    Marital status:      Spouse name: Not on file    Number of children: Not on file    Years of education: Not on file    Highest education level: Not on file   Occupational History    Not on file   Tobacco Use    Smoking status: Never    Smokeless tobacco: Never   Substance and Sexual Activity    Alcohol use: No    Drug use: No    Sexual activity: Not on file   Other Topics Concern    Not on file   Social History Narrative    Not on file     Social Determinants of Health     Financial Resource Strain: Not on file   Food Insecurity: Not on file   Transportation Needs: Not on file   Physical Activity: Not on file   Stress: Not on file   Social Connections: Not on file   Intimate Partner Violence: Not on file   Housing Stability: Not on file      Dwelling Status:  With son  Current Anticoagulant Medications: Aspirin  History of falls: NO  Prior Fractures:  None  Osteoporosis Medications: none  Bone Density Tests: NO  X-RAYS: Right Displaced Femoral Neck Fracture and nondisplaced right fifth metacarpal neck  Physical Exam:   alert, cooperative, no distress, appears stated age, eyes: Negative, neurological: Oriented, normal, no focal deficits, Cervical, supraclavicular, and axillary nodes normal. ,  Throat: normal and no erythema or exudates noted. ,  Lungs: No acute distress, normal respiratory effort; soft, non-tender. Nondistended. No masses,  no organomegaly,  pedal pulses normal and no edema, skin normal.,  Right lower extremity: Shortened and externally rotated. , pain femur on the right,    Right upper extremity: There is tenderness to palpation over the fifth metacarpal phalangeal joint. There is no deformity ecchymosis or edema noted. Range of motion is near full. Right lower extremity: Shortened externally rotated lower extremity with pain at the groin. Assessment / Plan: Right hip femoral neck fracture, displaced. Right fifth metacarpal neck fracture, nondisplaced. Plan for right hip hemiarthroplasty tomorrow late afternoon early evening. Ulnar gutter splint applied by ED today for right fifth metacarpal neck fracture. We will transition to brace either tomorrow after surgery or at a later date. I have spoken to the hospitalist given the patient's cardiovascular history, we have agreed would be best for the patient to be seen by cardiologist and for an echo to be done tomorrow. This will likely not change any of the treatment plan; and the patient should still have her hip fixed tomorrow evening within the 48-hour window.     Labs:    Lab Results   Component Value Date/Time    HGB 14.4 09/18/2022 07:52 AM    WBC 7.1 09/18/2022 07:52 AM    Albumin 4.3 09/18/2022 07:52 AM      Preoperative Clearance: YES by Hospitalist and Cardiologist

## 2022-09-18 NOTE — H&P
Hospitalist Admission History and Physical    NAME:  Fco Coleman   :   1924   MRN:   [id number]     PCP:  Tonia Bejarano NP  Date/Time:  2022 2:37 PM    Subjective:   CHIEF COMPLAINT:  Right hip pain     HISTORY OF PRESENT ILLNESS:     Farhat Jean Baptiste is a 80 y.o.  female with PMH of Hypertension, hypercholesterolemia, CAD, hypothyroidism who presents with Right hip pain following fall earlier today. Patient reports being in her normal state of health until earlier today. Earlier this morning she was ambulating in her home and before she knew it she was on the floor. She denies any loss of consciousness, no chest pain or dizziness prior to this event. Patient does note that at times she does get dizziness first thing in the morning however she transitions slowly and this resolves. Patient denies tripping over anything resulting in falls. Patient reports approximately 6 falls in the past year and denies dizziness implicated in falls. Patient in significant amount of pain at time of evaluation, has received Tylenol only thus far in the ED. Patient notes that her worst pain is in her right groin. Other complaints patient reports some pain with initial urination, patient indicates recent long topical treatment for yeast infection. Patient also reports having some \"pink\" stools last week however last bowel movement on 2022 had less pink coloration to it. Patient indicates that she has been holding her aspirin for the last couple days due to this. She denies dark stools or evidence of bleeding otherwise. Patient notes that she has hemorrhoids and she questions if pain from this or from eating too many tomatoes at home.       Patient Active Problem List   Diagnosis Code    Hypertension I10    Hypercholesterolemia E78.00    Diaphoresis R61    Exertional angina ([address] I20.8    Bradycardia R00.1    Coronary artery disease involving native coronary artery I25.10    Hip fracture ([facility] [address] S72.009A Past Medical History:   Diagnosis Date    CAD (coronary artery disease) 2008    Fibromyalgia     Heart failure ([address]     Pt. states sge gas 5 heart blocks. Hypercholesterolemia     Hypertension     Menopause     Thyroid disease        Past Surgical History:   Procedure Laterality Date    HX CHOLECYSTECTOMY      HX HYSTERECTOMY         Social History     Tobacco Use    Smoking status: Never    Smokeless tobacco: Never   Substance Use Topics    Alcohol use: No        Family History   Problem Relation Age of Onset    Breast Cancer Mother [de-identified]        Allergies   Allergen Reactions    Codeine Hives    Dapiprazole Vertigo    Elavil Vertigo    Voltaren [Diclofenac Sodium] Vertigo        Prior to Admission Medications   Prescriptions Last Dose Informant Patient Reported? Taking?   acetaminophen (TYLENOL) 325 mg tablet   Yes No   Sig: Take  by mouth every four (4) hours as needed for Pain. amLODIPine (NORVASC) 10 mg tablet   No No   Sig: TAKE 1 TABLET BY MOUTH EVERY DAY   ascorbic acid, vitamin C, (Vitamin C) 500 mg tablet   Yes No   Sig: Take  by mouth. aspirin delayed-release 81 mg tablet   Yes No   Sig: Take 162 mg by mouth daily. isosorbide mononitrate ER (IMDUR) 60 mg CR tablet   No No   Sig: TAKE 1 TABLET BY MOUTH  EVERY MORNING   levothyroxine (SYNTHROID) 75 mcg tablet   Yes No   Sig: Take 75 mcg by mouth Daily (before breakfast). metoprolol succinate (TOPROL-XL) 25 mg XL tablet   No No   Sig: TAKE 1 TABLET BY MOUTH EVERY DAY   nitroglycerin (NITROSTAT) 0.4 mg SL tablet   Yes No   Sig: by SubLINGual route every five (5) minutes as needed for Chest Pain. Facility-Administered Medications: None       REVIEW OF SYSTEMS:     [] Unable to obtain  ROS due to  []mental status change  []sedated   []intubated   [x]Total of 12 systems reviewed as follows:    GENERAL: no fever or chills   HEENT: no sinus congestion, hearing / vision changes  NECK: No pain or stiffness.    PULMONARY: no shortness of breath or cough  Cardiovascular: denies palpitations or chest pain; no lower extremity edema  GASTROINTESTINAL: Denies abdominal pain, constipation, diarrhea, nausea, vomiting reports having some pink stools last week, improving as of 2022, denies dark stools  GENITOURINARY: Reports some vague discomfort with initial urination however then denies, no urinary frequency, urgency, hesitancy   MUSCULOSKELETAL: Reports pain to right groin most prominent, lesser pain to right hand at site of fracture, chronic back pain  DERMATOLOGIC: denies pruritis, rashes or open areas   ENDOCRINE: No polyuria, polydipsia, no heat or cold intolerance. HEMATOLOGICAL: No easy bruising or bleeding. No anemia, has been having some pink stools of recent  NEUROLOGIC:  no focal weakness,  no speech changes     Objective:   VITALS:    Visit Vitals  BP (!) 165/88   Pulse 97   Temp 97.8 °F (36.6 °C)   Resp 16   Ht 5' 2\" (1.575 m)   Wt 60.8 kg (134 lb)   SpO2 95%   BMI 24.51 kg/m²     Temp (24hrs), Av.8 °F (36.6 °C), Min:97.8 °F (36.6 °C), Max:97.8 °F (36.6 °C)    PHYSICAL EXAM:   General:          Alert, appears uncomfortable lying in bed, well-nourished  HEENT:          Sclera anicteric. Conjunctiva pink. Mucous membranes                          Moist, no ear or nasal discharge  Neck:               Supple. Trachea midline. No accessory muscle use. No jugular venous distention, no carotid bruit  CV:                  Regular rate and rhythm. S1S2+  Lungs:             Clear to auscultation bilaterally. No Wheezing or Rhonchi. No rales. Abdomen:        Soft, non-tender. Not distended. Bowel sounds normal.   Extremities:     No cyanosis. No edema. Pulses 2+ b/l  Neurologic:      Alert and oriented X 4. Follows commands, responds appropriately. No focal neurological deficit was noted  Skin:                Warm and dry. No rashes.   No erythema or irritation evident to groin bilaterally    LAB DATA REVIEWED:    No components found for: Jayda Fonseca     09/18/22  0752      K 3.5      CO2 25   BUN 17   CREA 0.71   *   CA 8.7   ALB 4.3   WBC 7.1   HGB 14.4   HCT 42.5        IMAGING RESULTS:     [x]  I have personally reviewed the actual   []CXR  [x]CT scan / hand xray    Assessment/Plan:      Active Problems:    Hip fracture ([address] (9/18/2022)     ___________________________________________________  PLAN:    Right hip fracture s/p fall - plan for surgical repair on afternoon/early evening of 9/19/2022, maintain bed rest; cardiology consult pre-op. Oblique fracture of fifth metacarpal, right hand -splint applied in ED, plan for transition to brace either postsurgery on 9/19/2022 vs later date per Ortho  Hypertension - continue Toprol, hold norvasc for now, hydralazine PRN  CAD -cardiology consult preop, patient without acute complaints, echocardiogram, patient has previously deferred surgical intervention and opted for medical management, continue Imdur, metoprolol. Has been off statin since 2020 per cardiology  Report of pink stools -reports some occurrence last week, no evidence of active bleeding? Impact of hemorrhoids, stool Hemoccult requested, monitor closely in setting of aspirin use  Initial complaint of pain with urination - ? In setting of higher pressure on purewick, currently resolved, U/A reviewed with some chronic WBCs otherwise no evidence of infection. Goals of care -patient is clear on her wish for DO NOT RESUSCITATE/DO NOT INTUBATE, also discussed with son via phone call whom shares patient had previously completed document this was several years ago ?   Paper records, palliative medicine consult requested -unable to complete POST form at this time    Consults called / follow up orthopedic surgery, cardiology    Prophylaxis:  []Lovenox  []Coumadin  []Hep SQ  [x]SCDs /ASA []H2B/PPI     Discussed Code Status:    []Full Code      [x]DNR  / DNI   ___________________________________________________  Admitting Provider: Spike Valdez, LEANNE

## 2022-09-18 NOTE — Clinical Note
Status[de-identified] INPATIENT [101]   Type of Bed: Surgical [18]   Inpatient Hospitalization Certified Necessary for the Following Reasons: 2.  Patient admitted for Inpatient Only Procedure (Surgical)   Admitting Diagnosis: Hip fracture [facility]) [[id number]]   Admitting Physician: Rohit Ye   Attending Physician: Rohit Ye   Estimated Length of Stay: 2 Midnights   Discharge Plan[de-identified] Extended Care Facility (e.g. Adult Home, Nursing Home, etc.)

## 2022-09-19 ENCOUNTER — ANESTHESIA (OUTPATIENT)
Dept: SURGERY | Age: 87
DRG: 522 | End: 2022-09-19
Payer: MEDICARE

## 2022-09-19 PROBLEM — R54 ADVANCED AGE: Status: ACTIVE | Noted: 2022-09-19

## 2022-09-19 PROBLEM — Z51.5 ENCOUNTER FOR PALLIATIVE CARE: Status: ACTIVE | Noted: 2022-09-19

## 2022-09-19 PROBLEM — R53.81 DEBILITY: Status: ACTIVE | Noted: 2022-09-19

## 2022-09-19 LAB
ANION GAP SERPL CALC-SCNC: 9 MMOL/L (ref 3–18)
BASOPHILS # BLD: 0 K/UL (ref 0–0.1)
BASOPHILS NFR BLD: 0 % (ref 0–2)
BUN SERPL-MCNC: 14 MG/DL (ref 7–18)
BUN/CREAT SERPL: 28 (ref 12–20)
CALCIUM SERPL-MCNC: 8.6 MG/DL (ref 8.5–10.1)
CHLORIDE SERPL-SCNC: 103 MMOL/L (ref 100–111)
CO2 SERPL-SCNC: 26 MMOL/L (ref 21–32)
CREAT SERPL-MCNC: 0.5 MG/DL (ref 0.6–1.3)
DIFFERENTIAL METHOD BLD: ABNORMAL
EOSINOPHIL # BLD: 0.1 K/UL (ref 0–0.4)
EOSINOPHIL NFR BLD: 1 % (ref 0–5)
ERYTHROCYTE [DISTWIDTH] IN BLOOD BY AUTOMATED COUNT: 13 % (ref 11.6–14.5)
GLUCOSE SERPL-MCNC: 105 MG/DL (ref 74–99)
HCT VFR BLD AUTO: 39.7 % (ref 35–45)
HGB BLD-MCNC: 13.8 G/DL (ref 12–16)
IMM GRANULOCYTES # BLD AUTO: 0 K/UL (ref 0–0.04)
IMM GRANULOCYTES NFR BLD AUTO: 1 % (ref 0–0.5)
INR PPP: 1.1 (ref 0.8–1.2)
LYMPHOCYTES # BLD: 0.9 K/UL (ref 0.9–3.6)
LYMPHOCYTES NFR BLD: 13 % (ref 21–52)
MAGNESIUM SERPL-MCNC: 2.3 MG/DL (ref 1.6–2.6)
MCH RBC QN AUTO: 30.8 PG (ref 24–34)
MCHC RBC AUTO-ENTMCNC: 34.8 G/DL (ref 31–37)
MCV RBC AUTO: 88.6 FL (ref 78–100)
MONOCYTES # BLD: 0.6 K/UL (ref 0.05–1.2)
MONOCYTES NFR BLD: 8 % (ref 3–10)
NEUTS SEG # BLD: 5.7 K/UL (ref 1.8–8)
NEUTS SEG NFR BLD: 78 % (ref 40–73)
NRBC # BLD: 0 K/UL (ref 0–0.01)
NRBC BLD-RTO: 0 PER 100 WBC
PLATELET # BLD AUTO: 150 K/UL (ref 135–420)
PMV BLD AUTO: 9.6 FL (ref 9.2–11.8)
POTASSIUM SERPL-SCNC: 3.4 MMOL/L (ref 3.5–5.5)
PROTHROMBIN TIME: 14.2 SEC (ref 11.5–15.2)
RBC # BLD AUTO: 4.48 M/UL (ref 4.2–5.3)
SODIUM SERPL-SCNC: 138 MMOL/L (ref 136–145)
WBC # BLD AUTO: 7.4 K/UL (ref 4.6–13.2)

## 2022-09-19 PROCEDURE — 74011250636 HC RX REV CODE- 250/636: Performed by: NURSE PRACTITIONER

## 2022-09-19 PROCEDURE — 99233 SBSQ HOSP IP/OBS HIGH 50: CPT | Performed by: INTERNAL MEDICINE

## 2022-09-19 PROCEDURE — 74011250636 HC RX REV CODE- 250/636: Performed by: INTERNAL MEDICINE

## 2022-09-19 PROCEDURE — 83735 ASSAY OF MAGNESIUM: CPT

## 2022-09-19 PROCEDURE — 99223 1ST HOSP IP/OBS HIGH 75: CPT | Performed by: INTERNAL MEDICINE

## 2022-09-19 PROCEDURE — 74011000250 HC RX REV CODE- 250: Performed by: EMERGENCY MEDICINE

## 2022-09-19 PROCEDURE — 74011250637 HC RX REV CODE- 250/637: Performed by: EMERGENCY MEDICINE

## 2022-09-19 PROCEDURE — 85610 PROTHROMBIN TIME: CPT

## 2022-09-19 PROCEDURE — 85025 COMPLETE CBC W/AUTO DIFF WBC: CPT

## 2022-09-19 PROCEDURE — 74011250636 HC RX REV CODE- 250/636: Performed by: ORTHOPAEDIC SURGERY

## 2022-09-19 PROCEDURE — 99221 1ST HOSP IP/OBS SF/LOW 40: CPT | Performed by: NURSE PRACTITIONER

## 2022-09-19 PROCEDURE — 80048 BASIC METABOLIC PNL TOTAL CA: CPT

## 2022-09-19 PROCEDURE — 65270000046 HC RM TELEMETRY

## 2022-09-19 RX ORDER — ENOXAPARIN SODIUM 100 MG/ML
30 INJECTION SUBCUTANEOUS EVERY 24 HOURS
Status: COMPLETED | OUTPATIENT
Start: 2022-09-19 | End: 2022-09-19

## 2022-09-19 RX ORDER — POTASSIUM CHLORIDE 7.45 MG/ML
10 INJECTION INTRAVENOUS
Status: DISPENSED | OUTPATIENT
Start: 2022-09-19 | End: 2022-09-19

## 2022-09-19 RX ORDER — ATORVASTATIN CALCIUM 40 MG/1
40 TABLET, FILM COATED ORAL DAILY
COMMUNITY

## 2022-09-19 RX ADMIN — ENOXAPARIN SODIUM 30 MG: 100 INJECTION SUBCUTANEOUS at 20:31

## 2022-09-19 RX ADMIN — LEVOTHYROXINE SODIUM 75 MCG: 0.05 TABLET ORAL at 10:16

## 2022-09-19 RX ADMIN — ISOSORBIDE MONONITRATE 60 MG: 60 TABLET, EXTENDED RELEASE ORAL at 08:48

## 2022-09-19 RX ADMIN — HYDROMORPHONE HYDROCHLORIDE 0.5 MG: 1 INJECTION, SOLUTION INTRAMUSCULAR; INTRAVENOUS; SUBCUTANEOUS at 05:25

## 2022-09-19 RX ADMIN — HYDROMORPHONE HYDROCHLORIDE 0.5 MG: 1 INJECTION, SOLUTION INTRAMUSCULAR; INTRAVENOUS; SUBCUTANEOUS at 08:49

## 2022-09-19 RX ADMIN — POTASSIUM CHLORIDE 10 MEQ: 7.45 INJECTION INTRAVENOUS at 12:17

## 2022-09-19 RX ADMIN — HYDROMORPHONE HYDROCHLORIDE 0.5 MG: 1 INJECTION, SOLUTION INTRAMUSCULAR; INTRAVENOUS; SUBCUTANEOUS at 16:44

## 2022-09-19 RX ADMIN — SODIUM CHLORIDE, PRESERVATIVE FREE 10 ML: 5 INJECTION INTRAVENOUS at 08:49

## 2022-09-19 RX ADMIN — SODIUM CHLORIDE, PRESERVATIVE FREE 10 ML: 5 INJECTION INTRAVENOUS at 16:45

## 2022-09-19 RX ADMIN — HYDROMORPHONE HYDROCHLORIDE 0.5 MG: 1 INJECTION, SOLUTION INTRAMUSCULAR; INTRAVENOUS; SUBCUTANEOUS at 12:40

## 2022-09-19 RX ADMIN — METOPROLOL SUCCINATE 25 MG: 25 TABLET, EXTENDED RELEASE ORAL at 08:48

## 2022-09-19 RX ADMIN — POTASSIUM CHLORIDE 10 MEQ: 7.45 INJECTION INTRAVENOUS at 10:55

## 2022-09-19 RX ADMIN — POTASSIUM CHLORIDE 10 MEQ: 7.45 INJECTION INTRAVENOUS at 10:16

## 2022-09-19 RX ADMIN — HYDROMORPHONE HYDROCHLORIDE 0.5 MG: 1 INJECTION, SOLUTION INTRAMUSCULAR; INTRAVENOUS; SUBCUTANEOUS at 23:02

## 2022-09-19 NOTE — PROGRESS NOTES
TRANSFER - IN REPORT:    Verbal report received from [facility] (name) on Raquel Mendenhall  being received from ED (unit) for routine progression of care      Report consisted of patients Situation, Background, Assessment and   Recommendations(SBAR). Information from the following report(s) SBAR, ED Summary, Intake/Output, MAR, and Recent Results was reviewed with the receiving nurse. Opportunity for questions and clarification was provided. Assessment completed upon patients arrival to unit and care assumed.         Pt scheduled to go to OR this PM  Pt has been NPO since 7am.

## 2022-09-19 NOTE — PROGRESS NOTES
completed the initial Spiritual Assessment of the patient in bed 11 of the emergency room and offered Pastoral Care support to the patient. . Patient reported that she has an advance directive at home and will try to get a copy brought here by her family member. Patient does not have any Zoroastrian/cultural needs that will affect patients preferences in health care. Chaplains will continue to follow and will provide pastoral care on an as needed/requested basis.     [facility] Care Department  [phone]

## 2022-09-19 NOTE — CONSULTS
Cardiology Initial Patient Referral Note    Cardiology referral request from Dr. Rufino Delacruz for pre-op evaluation    Date of  Admission: 9/18/2022  7:42 AM   Primary Care Physician:  Kalyani Bhandari NP    Attending Cardiologist: Dr. Radha Cullen:     -R hip fracture s/p fall  -R 5th metacarpal fracture s/p fall  -CAD, medically managed. S/p cardiac cath (2009) that revealed proximal LAD, proximal LCx/OM disease, proximal dominant RCA disease at which time options for percutaneous revascularization and surgical revascularization were given and pt decided to pursue medical management.  -Echo 9/18/2022 with hyperdynamic LVEF 70-75% with normal wall motion, mildly increased LV wall thickness, normal RV size and function, no significant valvular pathology  -HTN  -Hypothyroidism, on Synthroid  -DNR status    Primary cardiologist is Dr. Kyle Fearing:     Addendum: Independently seen and evaluated. Agree with below. As noted below, she has no evidence of decompensated CHF or recent history of unstable angina. Despite the fact that she does have known CAD, she really has no options but to proceed with hip repair or otherwise in your bedridden state. All questions were answered. The plan is to proceed with hip surgery. Obviously, she will be at moderate risk for perioperative coronary event.    -Echo yesterday with hyperdynamic LVEF.  -Continue ASA, Toprol, Imdur, Amlodipine.    -Would proceed with surgery this afternoon as planned. Pt with elevated risk for surgery given advanced age and known Hx CAD, but pt without symptoms to suggest decompensated heart failure or unstable angina. She expresses understanding and wishes to proceed with surgery. History of Present Illness: This is a 80 y.o. female admitted for Hip fracture ([facility] [address] [place]. Patient complains of: R hip pain      Murray Aranda is a 80 y.o. female who presented to the hospital due to R hip pain s/p fall.   Pt was ambulating in her kitchen yesterday AM when she fell; she thinks it was related to something with her cane. She reports she was unable to get up (as she couldn't move from the pain) and eventually able to get attention of someone upstairs. She denies CP, SOB, lightheadedness/dizziness or syncope. Cardiac risk factors: hypertension, post-menopausal, known CAD      Review of Symptoms:  Except as stated above include:  Constitutional:  negative  Respiratory:  negative  Cardiovascular:  negative  Gastrointestinal: negative  Genitourinary:  negative  Musculoskeletal:  As per HPI  Neurological:  Negative  Dermatological:  Negative  Endocrinological: Negative  Psychological:  Negative       Past Medical History:     Past Medical History:   Diagnosis Date    CAD (coronary artery disease) 2008    Fibromyalgia     Heart failure ([facility] [address]     Pt. states sge gas 5 heart blocks. Hypercholesterolemia     Hypertension     Menopause     Thyroid disease          Social History:     Social History     Socioeconomic History    Marital status:    Tobacco Use    Smoking status: Never    Smokeless tobacco: Never   Substance and Sexual Activity    Alcohol use: No    Drug use: No        Family History:     Family History   Problem Relation Age of Onset    Breast Cancer Mother [de-identified]        Medications:      Allergies   Allergen Reactions    Codeine Other (comments)     dizziness    Dapiprazole Vertigo    Elavil Vertigo    Voltaren [Diclofenac Sodium] Vertigo        Current Facility-Administered Medications   Medication Dose Route Frequency    [Held by provider] aspirin delayed-release tablet 162 mg  162 mg Oral DAILY    isosorbide mononitrate ER (IMDUR) tablet 60 mg  60 mg Oral DAILY    levothyroxine (SYNTHROID) tablet 75 mcg  75 mcg Oral ACB    metoprolol succinate (TOPROL-XL) XL tablet 25 mg  25 mg Oral DAILY    nitroglycerin (NITROSTAT) tablet 0.4 mg  0.4 mg SubLINGual Q5MIN PRN    hydrALAZINE (APRESOLINE) 20 mg/mL injection 10 mg  10 mg IntraVENous Q6H PRN    sodium chloride (NS) flush 5-40 mL  5-40 mL IntraVENous Q8H    sodium chloride (NS) flush 5-40 mL  5-40 mL IntraVENous PRN    acetaminophen (TYLENOL) tablet 650 mg  650 mg Oral Q6H PRN    Or    acetaminophen (TYLENOL) suppository 650 mg  650 mg Rectal Q6H PRN    polyethylene glycol (MIRALAX) packet 17 g  17 g Oral DAILY PRN    HYDROmorphone (DILAUDID) syringe 0.5 mg  0.5 mg IntraVENous Q4H PRN     Current Outpatient Medications   Medication Sig    metoprolol succinate (TOPROL-XL) 25 mg XL tablet TAKE 1 TABLET BY MOUTH EVERY DAY    amLODIPine (NORVASC) 10 mg tablet TAKE 1 TABLET BY MOUTH EVERY DAY    ascorbic acid, vitamin C, (VITAMIN C) 500 mg tablet Take  by mouth.    isosorbide mononitrate ER (IMDUR) 60 mg CR tablet TAKE 1 TABLET BY MOUTH  EVERY MORNING    aspirin delayed-release 81 mg tablet Take 162 mg by mouth daily. levothyroxine (SYNTHROID) 75 mcg tablet Take 75 mcg by mouth Daily (before breakfast). nitroglycerin (NITROSTAT) 0.4 mg SL tablet by SubLINGual route every five (5) minutes as needed for Chest Pain.  (Patient not taking: Reported on 9/18/2022)         Physical Exam:   Visit Vitals  BP (!) 185/64 (BP 1 Location: Left upper arm, BP Patient Position: Semi fowlers)   Pulse 86   Temp 99 °F (37.2 °C)   Resp 23   Ht 5' 2\" (1.575 m)   Wt 60.8 kg (134 lb)   SpO2 96%   BMI 24.51 kg/m²       BP Readings from Last 3 Encounters:   09/19/22 (!) 185/64   08/06/22 (!) 155/57   05/31/22 (!) 150/70     Pulse Readings from Last 3 Encounters:   09/19/22 86   08/06/22 61   05/31/22 61     Wt Readings from Last 3 Encounters:   09/18/22 60.8 kg (134 lb)   08/06/22 59 kg (130 lb)   05/31/22 59.4 kg (131 lb)       General:  alert, cooperative, no distress, appears stated age  Neck:  supple  Lungs:  clear to auscultation bilaterally  Heart:  regular rate and rhythm  Abdomen:  abdomen is soft without significant tenderness, masses, organomegaly or guarding  Extremities:  R ulnar splint with ecchymoses to R hand, no edema  Skin: Warm and dry. Neuro: alert, oriented x3, affect appropriate, no involuntary movements  Psych: non focal     Data Review:     Recent Labs     09/19/22  0500 09/18/22  0752   WBC 7.4 7.1   HGB 13.8 14.4   HCT 39.7 42.5    156     Recent Labs     09/19/22  0500 09/18/22  0752    140   K 3.4* 3.5    105   CO2 26 25   * 113*   BUN 14 17   CREA 0.50* 0.71   CA 8.6 8.7   MG 2.3  --    ALB  --  4.3   ALT  --  24   INR 1.1  --            Last Lipid:    Lab Results   Component Value Date/Time    Cholesterol, total 165 08/20/2019 09:43 AM    HDL Cholesterol 47 08/20/2019 09:43 AM    LDL, calculated 86.8 08/20/2019 09:43 AM    Triglyceride 156 (H) 08/20/2019 09:43 AM    CHOL/HDL Ratio 3.5 08/20/2019 09:43 AM       Cardiographics:     EKG Results       Procedure 720 Value Units Date/Time    EKG, 12 LEAD, INITIAL [[id number]] Collected: 09/18/22 0747    Order Status: Completed Updated: 09/18/22 1652     Ventricular Rate 91 BPM      Atrial Rate 91 BPM      P-R Interval 196 ms      QRS Duration 84 ms      Q-T Interval 360 ms      QTC Calculation (Bezet) 442 ms      Calculated P Axis 65 degrees      Calculated R Axis -39 degrees      Calculated T Axis 41 degrees      Diagnosis --     Normal sinus rhythm  Left axis deviation  Nonspecific ST abnormality  Abnormal ECG  When compared with ECG of 06-AUG-2022 09:44,  No significant change was found  Confirmed by Miya Rapp MD, ----- ([id number]) on 9/18/2022 4:51:56 PM                  XR Results (most recent):  Results from Hospital Encounter encounter on 09/18/22    XR HIP RT W OR WO PELV 2-3 VWS    Narrative  EXAM: Pelvis and right hip x-ray    INDICATION:  fall . Right hip pain    TECHNIQUE: AP view of the pelvis with cross-table lateral view of the right hip    COMPARISON: 11/3/2016    FINDINGS:  Generalized osteopenia is noted. No evidence of acute fracture of the pelvis. The sacroiliac joints are unremarkable.  The pubic symphysis is unremarkable. No lytic or blastic focus identified. No erosions or periostitis appreciated. There is a transcervical right hip fracture with minimal displacement or  angulation. Degenerative changes of the right hip are noted. Impression  1. Right transcervical femoral neck fracture. Minimal displacement or  angulation identified.         Signed By: Carlos Suarez PA-C     September 19, 2022

## 2022-09-19 NOTE — ACP (ADVANCE CARE PLANNING)
Palliative Medicine  Visited patient in ED along with Palliative team member ANALIA Mcduffie NP. Patient resting on stretcher, awake, alert and oriented X 4. Pleasant and very talkative with our team. Soft splint noted to right arm and hand, able to wiggle fingers. Patient denies any pain or discomfort at this time. Tells our team she is having surgery today, \"around 12 or 1 pm I think\". Patient presented the ED 9/18/22 after experiencing a fall at home with right hip and right arm pain. Patient is not sure how she fell, possibly tripping on cane. PMH: CAD, HTN, CHF. Pt does not have an Advance Directive on file in EMR. She has been  for 36 years, has one adult son, Reji Rosales. She relocated from [place] 6 years ago to live with her son. She is independent with all of her ADL's and IADL's. Her granddaughter takes her grocery shopping, stopping by [facility] when they are finished. States, Norma Jacobo is our treat together\". Goals of care discussion including the benefits and burdens of intubation and CPR in the event of respiratory decline or cardiopulmonary arrest. Patient is very clear that she does not want to be resuscitated or intubated,  states she has a \"paper\" at home, \"my son can bring it here for me\". Instructed patient that our team will follow up with her after her surgery. Primary Decision Maker [facility] Agent): Justine Thurston  Relationship to patient: son  Phone number: [phone]  [] Named in a scanned document   [x] Legal Next of Kin  [] Guardian    ACP documents you currently have include:  [] Advance Directive or Living Will  [] Durable Do Not Resuscitate  [] Physician Orders for Scope of Treatment (POST)  [] Medical Power of   [x] Other-DNR order. Thank you for the Palliative Medicine consult and allowing us to participate in the care of Ms. Idris Ford. Will continue to monitor and provide support.     CODE STATUS: DNR/DNI    Sheila Sauceda RN  Palliative Medicine Inpatient [facility]AL[facility]  [address] [facility]: [phone] ([phone])

## 2022-09-19 NOTE — PROGRESS NOTES
Cristina Aj  called for report from OR    Possible time 8pm  All questions answered   Preop checklist will be completed

## 2022-09-19 NOTE — PROGRESS NOTES
Due to excessive delays in the OR, we will move the case to tomorrow. Although this moves the patients surgery outside of the 48hr window, I do not believe it is in the patients best interest to have her surgery so late at night. Regular diet, npo after midnight & 1 dose lovenox ordered.

## 2022-09-19 NOTE — PROGRESS NOTES
Case management was consulted by Dr. Van Betancorut on 9- for discharge planning. The patient had a fall at home. Physical therapy and occupational therapy was consulted for the patient.  informed the patient that therapy may recommend rehab at a snf.   The patient indicated that she does not want to do rehab at a [facility]

## 2022-09-19 NOTE — PERIOP NOTES
TRANSFER - IN REPORT:    Verbal report received from [address] [place] [address],[facility], RN(name) on Maribell Big  being received from [address] [place] [address](unit) for ordered procedure      Report consisted of patients Situation, Background, Assessment and   Recommendations(SBAR). Information from the following report(s) SBAR and Pre Procedure Checklist was reviewed with the receiving nurse. Opportunity for questions and clarification was provided.       RECEIVED REPORT AND HANDED TO PACU RN, Radha Reynolds

## 2022-09-19 NOTE — PROGRESS NOTES
met with the patient's son, Talisha Elizondo, at the patient's bedside.  introduced herself and discussed case management roles.  informed the patient's son that therapy will be seeing the patient and they may recommend rehab or home health. The patient's son indicated that he is aware that the patient may need rehab.  voiced an understanding. The patient's son reported no questions or concerns at this time.       CATIA Perez

## 2022-09-19 NOTE — ACP (ADVANCE CARE PLANNING)
Advance Care Planning   Advance Care Planning Inpatient Note  [address] [place] [address]   Spiritual Care Department    Today's Date: 9/19/2022  Unit: [facility] EMERGENCY DEPT    Received request from . Upon review of chart and communication with care team, patient's decision making abilities are not in question. Patient was/were present in the room during visit. Goals of ACP Conversation:  Discuss Advance Care planning documents    Health Care Decision Makers:    No healthcare decision makers have been documented. Click here to complete [address] including selection of the Healthcare Decision Maker Relationship (ie \"Primary\")  Summary:  No Decision Maker named by patient at this time      Advance Care Planning Documents (Patient Wishes) on file:  None     Assessment:    Patient seen as a new admit to the hospital in room 11. Asked about her having an advance directive and she replied yes at home. She will try and get her family to bring in a copy of her document.     Interventions:  Requested patient/family submit existing document(s) for our records: None    Care Preferences Communicated:  No    Outcomes/Plan:      [facility] on 9/19/2022 at 12:01 PM

## 2022-09-19 NOTE — ED NOTES
Attempted to call patient's son, Salud Hardy at phone number listed on chart. Voicemail not self-identified and voicemail was not left.

## 2022-09-19 NOTE — PROGRESS NOTES
[facility] Hospitalist Group  Progress Note    Patient: Rock Lee Age: 80 y.o. : 1924 MR#: [id number] SSN: [id number]  Date/Time: 2022     Subjective:   Patient reports that she is doing well. Some anxiety about the upcoming procedure and when it will be done. States that her pain is well controlled although she has some minor right hip pain and right hand pain at this time. Review of systems  General: No fevers or chills. Cardiovascular: No chest pain or pressure. No palpitations. Pulmonary: No shortness of breath, cough or wheeze. Gastrointestinal: No abdominal pain, nausea, vomiting or diarrhea. Genitourinary: No urinary frequency, urgency, hesitancy or dysuria. Musculoskeletal: pain in right hip and right hand, no back pain, no recent trauma. Neurologic: No headache, numbness, tingling or weakness. Anxiety about surgery  Assessment/Plan: 1. Right hip fracture s/p fall at home  2. Oblqiue fracture of fifth metacarpal  3. HTN  4. CAD  5. Possible LGIB  6. Dysuria  7. History of hypothyroidism  Hospital Problems  Date Reviewed: 2022            Codes Class Noted POA    Encounter for palliative care ICD-10-CM: Z51.5  ICD-9-CM: V66.7  2022 Unknown        Advanced age ICD-10-CM: R54  ICD-9-CM: 170  2022 Unknown        Debility ICD-10-CM: R53.81  ICD-9-CM: 799.3  2022 Unknown        Hip fracture ([facility] [address] ICD-10-CM: T44.450D  ICD-9-CM: 820.8  2022 Unknown       1. Right hip fracture s/p fall at home  Patient presented with a right hip fracture status post fall. Right hip fracture confirmed on x-ray. Plan is for the orthopedic service to take the patient for a right hip hemiarthroplasty later this evening on 2022. -Appreciate orthopedic recommendations  -Cardiology cleared patient for operation  -Pain control with Dilaudid    2.  Oblique fracture of fifth metacarpal  Noted on imaging done in the ER. patient has a right hand splint currently applied and plan is to transition to brace when orthopedics deems it appropriate  -appreciate orthopedic recommendations    3. HTN  Patient currently has metoprolol and hydralazine as needed  -Holding Norvasc at this time    4. CAD  History of CAD. Neurology has cleared the patient at this point. Echo shows hyperdynamic left ventricular systolic function. She also has pulmonary hypertension  -Awaiting surgery    5. Possible LGIB  Received reports of pink-tinged stool however no bleeding has been visualized while in the hospital.  Consider hemorrhoids versus diverticular bleed versus less likely AVM versus other causes. -FOBT pending    6. Dysuria  UA at this time is negative for nitrites, leukocyte esterase. UA does not appear infectious  -Monitor    7. History of hypothyroidism  -Continue levothyroxine      I spent 60 minutes with the patient in face-to-face consultation, of which greater than 50% was spent in counseling and coordination of care as described above. Case discussed with:  []Patient  []Family  []Nursing  []Case Management  DVT Prophylaxis:  []Lovenox  []Hep SQ  []SCDs  []Coumadin   []Eliquis/Xarelto     Objective:   VS: Visit Vitals  BP (!) 145/81 (BP 1 Location: Left upper arm, BP Patient Position: Semi fowlers)   Pulse 75   Temp 98 °F (36.7 °C)   Resp 23   Ht 5' 2\" (1.575 m)   Wt 60.8 kg (134 lb)   SpO2 94%   Breastfeeding Unknown   BMI 24.51 kg/m²      Tmax/24hrs: Temp (24hrs), Av.4 °F (36.9 °C), Min:98 °F (36.7 °C), Max:99 °F (37.2 °C)  IOBRIEF  Intake/Output Summary (Last 24 hours) at 2022 8532  Last data filed at 2022 0853  Gross per 24 hour   Intake --   Output 900 ml   Net -900 ml       General:  Alert, cooperative, no acute distress    HEENT: PERRLA, anicteric sclerae. Pulmonary:  CTA Bilaterally. No Wheezing/Rales. Cardiovascular: Regular rate and Rhythm. GI:  Soft, Non distended, Non tender. + Bowel sounds. Extremities:  No edema. No calf tenderness. Shortened externally rotated lower extremity. Tenderness to right lateral hand   Psych: Good insight. Not anxious or agitated. Neurologic: Alert and oriented X 3. Moves all ext. Slightly anxious  Additional:    Medications:   Current Facility-Administered Medications   Medication Dose Route Frequency    [Held by provider] aspirin delayed-release tablet 162 mg  162 mg Oral DAILY    isosorbide mononitrate ER (IMDUR) tablet 60 mg  60 mg Oral DAILY    levothyroxine (SYNTHROID) tablet 75 mcg  75 mcg Oral ACB    metoprolol succinate (TOPROL-XL) XL tablet 25 mg  25 mg Oral DAILY    nitroglycerin (NITROSTAT) tablet 0.4 mg  0.4 mg SubLINGual Q5MIN PRN    hydrALAZINE (APRESOLINE) 20 mg/mL injection 10 mg  10 mg IntraVENous Q6H PRN    sodium chloride (NS) flush 5-40 mL  5-40 mL IntraVENous Q8H    sodium chloride (NS) flush 5-40 mL  5-40 mL IntraVENous PRN    acetaminophen (TYLENOL) tablet 650 mg  650 mg Oral Q6H PRN    Or    acetaminophen (TYLENOL) suppository 650 mg  650 mg Rectal Q6H PRN    polyethylene glycol (MIRALAX) packet 17 g  17 g Oral DAILY PRN    HYDROmorphone (DILAUDID) syringe 0.5 mg  0.5 mg IntraVENous Q4H PRN       Labs:    Recent Results (from the past 24 hour(s))   CBC WITH AUTOMATED DIFF    Collection Time: 09/19/22  5:00 AM   Result Value Ref Range    WBC 7.4 4.6 - 13.2 K/uL    RBC 4.48 4.20 - 5.30 M/uL    HGB 13.8 12.0 - 16.0 g/dL    HCT 39.7 35.0 - 45.0 %    MCV 88.6 78.0 - 100.0 FL    MCH 30.8 24.0 - 34.0 PG    MCHC 34.8 31.0 - 37.0 g/dL    RDW 13.0 11.6 - 14.5 %    PLATELET 830 139 - 112 K/uL    MPV 9.6 9.2 - 11.8 FL    NRBC 0.0 0  WBC    ABSOLUTE NRBC 0.00 0.00 - 0.01 K/uL    NEUTROPHILS 78 (H) 40 - 73 %    LYMPHOCYTES 13 (L) 21 - 52 %    MONOCYTES 8 3 - 10 %    EOSINOPHILS 1 0 - 5 %    BASOPHILS 0 0 - 2 %    IMMATURE GRANULOCYTES 1 (H) 0.0 - 0.5 %    ABS. NEUTROPHILS 5.7 1.8 - 8.0 K/UL    ABS. LYMPHOCYTES 0.9 0.9 - 3.6 K/UL    ABS. MONOCYTES 0.6 0.05 - 1.2 K/UL    ABS.  EOSINOPHILS 0.1 0.0 - 0.4 K/UL    ABS. BASOPHILS 0.0 0.0 - 0.1 K/UL    ABS. IMM. GRANS. 0.0 0.00 - 0.04 K/UL    DF AUTOMATED     METABOLIC PANEL, BASIC    Collection Time: 09/19/22  5:00 AM   Result Value Ref Range    Sodium 138 136 - 145 mmol/L    Potassium 3.4 (L) 3.5 - 5.5 mmol/L    Chloride 103 100 - 111 mmol/L    CO2 26 21 - 32 mmol/L    Anion gap 9 3.0 - 18 mmol/L    Glucose 105 (H) 74 - 99 mg/dL    BUN 14 7.0 - 18 MG/DL    Creatinine 0.50 (L) 0.6 - 1.3 MG/DL    BUN/Creatinine ratio 28 (H) 12 - 20      GFR est AA >60 >60 ml/min/1.73m2    GFR est non-AA >60 >60 ml/min/1.73m2    Calcium 8.6 8.5 - 10.1 MG/DL   MAGNESIUM    Collection Time: 09/19/22  5:00 AM   Result Value Ref Range    Magnesium 2.3 1.6 - 2.6 mg/dL   PROTHROMBIN TIME + INR    Collection Time: 09/19/22  5:00 AM   Result Value Ref Range    Prothrombin time 14.2 11.5 - 15.2 sec    INR 1.1 0.8 - 1.2         Signed By: Alissa Reynolds DO     September 19, 2022      Disclaimer: Sections of this note are dictated using utilizing voice recognition software. Minor typographical errors may be present. If questions arise, please do not hesitate to contact me or call our department.

## 2022-09-19 NOTE — CONSULTS
[facility]: [phone]  [facility]: [phone]     Patient Name: Dea Mclean  YOB: 1924    Date of Initial Consult : 9/19/2022  Reason for Consult: Care decisions  Requesting Provider: Dr. Rockney Meckel  Primary Care Physician: Esme Jenkins NP      SUMMARY:   Dea Mclean is a 80 y.o. female with a past history of hypertension, CAD, hypothyroidism and hypercholesterolemia, who was admitted on 9/18/2022 from home with a diagnosis of right hip fracture. Current medical issues leading to Palliative Medicine involvement include: 63-year-old female presented to the ER with concerns of right hip pain following a fall at home earlier today. Palliative medicine is consulted for care decisions. CHIEF COMPLAINT: Right hip pain    HPI/SUBJECTIVE:    Past history as above. Ms. Meron Roberts is a 63-year-old  female with multiple medical issues who presented to the ER at with concerns of right hip pain following a fall at home earlier the day of admission. Patient reports being in her usual state of health until she had a fall at home with subsequent right hip pain. Patient denies dizziness and thinks her father may have something to do with the cane she was using. Patient reports using a cane inside the house and a walker when she is outside. In the ER patient was found to have a right hip fracture and is admitted for further surgical and medical management. The patient is:   [x] Verbal and participatory  [] Non-participatory due to:     GOALS OF CARE:  DNR  Patient/Health Care Proxy Stated Goals: Prolong life      TREATMENT PREFERENCES:   Code Status: DNR         PALLIATIVE DIAGNOSES:   Encounter for palliative care/goals of care  R hip fracture  Advanced age   [de-identified]       PLAN:   Encounter for palliative care/goals of care -seen at bedside in ER 11 along with Ms. Padma Marie RN.   Ms. Meron Roberts is lying on a gurney, awake, alert and oriented x4. Patient is capable of participating in a goals of care conversation today. No family at bedside. Denies pain or shortness of breath. Denies pain in her right hip at time of visit. Right ulnar splint in place with bruising noted on her fingers Patient able to provide history of what brought her to the hospital.  Patient is pleasant and conversant and expressed gratitude for our visit. There is no AMD on file. Patient states she is  and has 1 living son, Fayetta Holstein.  According to the [address] [facility], in the absence of any paperwork legal medical decision making defaults to patient's adult son, Ezio Selby. Introduced and discussed goals of care including benefits and burdens of resuscitation, intubation and ventilation and patient is very clear that she does not wish to undergo resuscitation in the event of cardiopulmonary arrest.  Patient elects DNR/DNI and states that she has previously completed paperwork to this effect. CODE STATUS DNR/DNI as per previous conversation with hospitalist NP (appreciate assistance). Patient is scheduled to undergo surgery to repair her hip fracture later this afternoon. Palliative medicine will follow up with patient postoperatively to hopefully be able to complete a POST form prior to discharge. Goals of care are DNR/DNI. R hip fracture - primary team managing, orthopedic consult, X-ray Right hip on 9/18/22 reveals \"Right transcervical femoral neck fracture. Minimal displacement or angulation identified. \"  Surgery planned for later today. Advanced age - 80years of age, cardiology for pre-operative clearance for surgery  Debility - PPS 61, lives with her son, Fayetta Holstein and his wife for the last 6 years. Patient reports being independent with ADLs including preparing her own meals, personal care, and gets her own groceries. Granddaughter drives her to appointments and shopping.   Initial consult note routed to primary continuity provider  Communicated plan of care with: Palliative IDT      Advance Care Planning:  [] The [facility] Interdisciplinary Team has updated the ACP Navigator with [address] and Patient Capacity    Primary Decision [facility] ([address]): Afsaneh Ordonez (son)  [phone]  Patient reports only having 1 son, Ion Chen, who would be her default medical decision maker according to the [facility] [employer]. As far as possible, the palliative care team has discussed with patient / health care proxy about goals of care / treatment preferences for patient. HISTORY:     History obtained from: chart and patient    Active Problems:    Hip fracture ([facility] [address] (9/18/2022)    Past Medical History:   Diagnosis Date    CAD (coronary artery disease) 2008    Fibromyalgia     Heart failure ([facility] [address]     Pt. states sge gas 5 heart blocks. Hypercholesterolemia     Hypertension     Menopause     Thyroid disease       Past Surgical History:   Procedure Laterality Date    HX CHOLECYSTECTOMY      HX HYSTERECTOMY        Family History   Problem Relation Age of Onset    Breast Cancer Mother [de-identified]     History reviewed, no pertinent family history.   Social History     Tobacco Use    Smoking status: Never    Smokeless tobacco: Never   Substance Use Topics    Alcohol use: No     Allergies   Allergen Reactions    Codeine Other (comments)     dizziness    Dapiprazole Vertigo    Elavil Vertigo    Voltaren [Diclofenac Sodium] Vertigo      Current Facility-Administered Medications   Medication Dose Route Frequency    [Held by provider] aspirin delayed-release tablet 162 mg  162 mg Oral DAILY    isosorbide mononitrate ER (IMDUR) tablet 60 mg  60 mg Oral DAILY    levothyroxine (SYNTHROID) tablet 75 mcg  75 mcg Oral ACB    metoprolol succinate (TOPROL-XL) XL tablet 25 mg  25 mg Oral DAILY    nitroglycerin (NITROSTAT) tablet 0.4 mg  0.4 mg SubLINGual Q5MIN PRN    hydrALAZINE (APRESOLINE) 20 mg/mL injection 10 mg  10 mg IntraVENous Q6H PRN    sodium chloride (NS) flush 5-40 mL  5-40 mL IntraVENous Q8H    sodium chloride (NS) flush 5-40 mL  5-40 mL IntraVENous PRN    acetaminophen (TYLENOL) tablet 650 mg  650 mg Oral Q6H PRN    Or    acetaminophen (TYLENOL) suppository 650 mg  650 mg Rectal Q6H PRN    polyethylene glycol (MIRALAX) packet 17 g  17 g Oral DAILY PRN    HYDROmorphone (DILAUDID) syringe 0.5 mg  0.5 mg IntraVENous Q4H PRN     Current Outpatient Medications   Medication Sig    metoprolol succinate (TOPROL-XL) 25 mg XL tablet TAKE 1 TABLET BY MOUTH EVERY DAY    amLODIPine (NORVASC) 10 mg tablet TAKE 1 TABLET BY MOUTH EVERY DAY    ascorbic acid, vitamin C, (VITAMIN C) 500 mg tablet Take  by mouth.    isosorbide mononitrate ER (IMDUR) 60 mg CR tablet TAKE 1 TABLET BY MOUTH  EVERY MORNING    aspirin delayed-release 81 mg tablet Take 162 mg by mouth daily. levothyroxine (SYNTHROID) 75 mcg tablet Take 75 mcg by mouth Daily (before breakfast). nitroglycerin (NITROSTAT) 0.4 mg SL tablet by SubLINGual route every five (5) minutes as needed for Chest Pain.  (Patient not taking: Reported on 9/18/2022)          Clinical Pain Assessment (nonverbal scale for nonverbal patients): Clinical Pain Assessment  Severity: 0          Duration: for how long has pt been experiencing pain (e.g., 2 days, 1 month, years)  Frequency: how often pain is an issue (e.g., several times per day, once every few days, constant)     FUNCTIONAL ASSESSMENT:     Palliative Performance Scale (PPS):  PPS: 60    ECOG  ECOG Status : Limited self-care     PSYCHOSOCIAL/SPIRITUAL SCREENING:      Any spiritual / Faith concerns:  [] Yes /  [x] No    Caregiver Burnout:  [] Yes /  [] No /  [x] No Caregiver Present      Anticipatory grief assessment:   [x] Normal  / [] Maladaptive        REVIEW OF SYSTEMS:     Systems: constitutional, ears/nose/mouth/throat, respiratory, gastrointestinal, genitourinary, musculoskeletal, integumentary, neurologic, psychiatric, endocrine. Positive findings noted below. Modified ESAS Completed by: provider           Pain: 0           Dyspnea: 0               Positive and pertinent negative findings in ROS are noted above in HPI. The following systems were [x] reviewed / [] unable to be reviewed as noted in HPI  Other findings are noted below. PHYSICAL EXAM:     Constitutional: lying on gurney, awake, alert and oriented x4, currently NPO for surgery  Eyes: pupils equal, anicteric  ENMT: no nasal discharge, moist mucous membranes  Cardiovascular: regular rhythm, distal pulses intact  Respiratory: breathing not labored, symmetric  Gastrointestinal: soft non-tender, +bowel sounds  Last bowel movement: none recorded  Musculoskeletal: R leg slightly shorter than left  Skin: warm, dry  Neurologic: following commands, moving all extremities  Psychiatric: full affect, no hallucinations    Other: Wt Readings from Last 3 Encounters:   09/18/22 60.8 kg (134 lb)   08/06/22 59 kg (130 lb)   05/31/22 59.4 kg (131 lb)     Blood pressure (!) 169/71, pulse 97, temperature 98 °F (36.7 °C), resp. rate 23, height 5' 2\" (1.575 m), weight 60.8 kg (134 lb), SpO2 96 %. Pain:  Pain Scale 1: Numeric (0 - 10)  Pain Intensity 1: 6     Pain Location 1: Hip  Pain Orientation 1: Right             LAB AND IMAGING FINDINGS:     Lab Results   Component Value Date/Time    WBC 7.4 09/19/2022 05:00 AM    HGB 13.8 09/19/2022 05:00 AM    PLATELET 791 78/45/8548 05:00 AM     Lab Results   Component Value Date/Time    Sodium 138 09/19/2022 05:00 AM    Potassium 3.4 (L) 09/19/2022 05:00 AM    Chloride 103 09/19/2022 05:00 AM    CO2 26 09/19/2022 05:00 AM    BUN 14 09/19/2022 05:00 AM    Creatinine 0.50 (L) 09/19/2022 05:00 AM    Calcium 8.6 09/19/2022 05:00 AM    Magnesium 2.3 09/19/2022 05:00 AM      Lab Results   Component Value Date/Time    Alk.  phosphatase 93 09/18/2022 07:52 AM    Protein, total 7.6 09/18/2022 07:52 AM    Albumin 4.3 09/18/2022 07:52 AM Globulin 3.3 09/18/2022 07:52 AM     Lab Results   Component Value Date/Time    INR 1.1 09/19/2022 05:00 AM    Prothrombin time 14.2 09/19/2022 05:00 AM      No results found for: IRON, FE, TIBC, IBCT, PSAT, FERR   No results found for: PH, PCO2, PO2  No components found for: [place]   Lab Results   Component Value Date/Time     11/03/2016 10:20 AM    CK - MB 2.2 11/03/2016 10:20 AM              Total time: 30 minutes    > 50% counseling / coordination:  Time spent in direct consultation with the patient, medical team, and family     Prolonged service was provided for  []30 min   []75 min in face to face time in the presence of the patient, spent as noted above. Time Start:   Time End:     Disclaimer: Sections of this note are dictated using utilizing voice recognition software, which may have resulted in some phonetic based errors in grammar and contents. Even though attempts were made to correct all the mistakes, some may have been missed, and remained in the body of the document. If questions arise, please contact our department.

## 2022-09-19 NOTE — PROGRESS NOTES
Reason for Admission:  Hip fracture ([facility] [address] [S72.009A]                 RUR Score:    9            Plan for utilizing home health: Yes                      Likelihood of Readmission:   LOW                         Transition of Care Plan:              Initial assessment completed with patient. Cognitive status of patient: oriented to time, place, person and situation at assessment. Face sheet information confirmed:  yes. The patient designates son Flavia Swan to participate in her discharge plan and to receive any needed information. This patient lives in a single family home with son. The patient lives in a 2 level home. The patient's bedroom is on the 1st floor. Patient is able to navigate steps as needed. The patient has a ramp. Prior to hospitalization, patient was considered to be independent with ADLs/IADLS : yes . Patient has a current ACP document on file: no      Healthcare Decision Maker:     Click here to complete [address] including selection of the Healthcare Decision Maker Relationship (ie \"Primary\")    The son will be available to transport patient home upon discharge. The patient already has Rollator medical equipment available in the home. Patient is not currently active with home health. Patient has not stayed in a skilled nursing facility or rehab. Was  stay within last 60 days : no. This patient is on dialysis :no    Currently, the discharge plan is Home. The patient states that she can obtain her medications from the pharmacy, and take her medications as directed. Patient's current insurance is Medicare A and B/[employer]. The patient currently receives social security. The patient had her covid vaccination and had her covid booster shot. Care Management Interventions  PCP Verified by CM: Yes  Mode of Transport at Discharge:  Other (see comment) (family)  Transition of Care Consult (CM Consult): Discharge Planning  Discharge Durable Medical Equipment: No (patient has rollator)  Confirm Follow Up Transport: Family  Discharge Location  Patient Expects to be Discharged to[de-identified] Home        CATIA Montelongo

## 2022-09-19 NOTE — PROGRESS NOTES
Per Dr Sammy Avilez   Pt surgery is cancelled     NPO after midnight    Informed incoming nurse and called son(Wm) with above info

## 2022-09-20 ENCOUNTER — APPOINTMENT (OUTPATIENT)
Dept: GENERAL RADIOLOGY | Age: 87
DRG: 522 | End: 2022-09-20
Attending: ORTHOPAEDIC SURGERY
Payer: MEDICARE

## 2022-09-20 LAB
ANION GAP SERPL CALC-SCNC: 10 MMOL/L (ref 3–18)
BUN SERPL-MCNC: 21 MG/DL (ref 7–18)
BUN/CREAT SERPL: 39 (ref 12–20)
CALCIUM SERPL-MCNC: 8.7 MG/DL (ref 8.5–10.1)
CHLORIDE SERPL-SCNC: 103 MMOL/L (ref 100–111)
CO2 SERPL-SCNC: 23 MMOL/L (ref 21–32)
CREAT SERPL-MCNC: 0.54 MG/DL (ref 0.6–1.3)
ERYTHROCYTE [DISTWIDTH] IN BLOOD BY AUTOMATED COUNT: 12.9 % (ref 11.6–14.5)
GLUCOSE BLD STRIP.AUTO-MCNC: 108 MG/DL (ref 70–110)
GLUCOSE SERPL-MCNC: 105 MG/DL (ref 74–99)
HCT VFR BLD AUTO: 38.7 % (ref 35–45)
HGB BLD-MCNC: 13.2 G/DL (ref 12–16)
MCH RBC QN AUTO: 30.6 PG (ref 24–34)
MCHC RBC AUTO-ENTMCNC: 34.1 G/DL (ref 31–37)
MCV RBC AUTO: 89.6 FL (ref 78–100)
NRBC # BLD: 0 K/UL (ref 0–0.01)
NRBC BLD-RTO: 0 PER 100 WBC
PLATELET # BLD AUTO: 141 K/UL (ref 135–420)
PMV BLD AUTO: 10.2 FL (ref 9.2–11.8)
POTASSIUM SERPL-SCNC: 3.9 MMOL/L (ref 3.5–5.5)
RBC # BLD AUTO: 4.32 M/UL (ref 4.2–5.3)
SODIUM SERPL-SCNC: 136 MMOL/L (ref 136–145)
WBC # BLD AUTO: 7.9 K/UL (ref 4.6–13.2)

## 2022-09-20 PROCEDURE — 99100 ANES PT EXTEME AGE<1 YR&>70: CPT | Performed by: NURSE ANESTHETIST, CERTIFIED REGISTERED

## 2022-09-20 PROCEDURE — 77030040830 HC CATH URETH FOL MDII -A: Performed by: ORTHOPAEDIC SURGERY

## 2022-09-20 PROCEDURE — 82962 GLUCOSE BLOOD TEST: CPT

## 2022-09-20 PROCEDURE — 77030030614 HC KT FEM BN PREP S&N -G1: Performed by: ORTHOPAEDIC SURGERY

## 2022-09-20 PROCEDURE — 99232 SBSQ HOSP IP/OBS MODERATE 35: CPT | Performed by: INTERNAL MEDICINE

## 2022-09-20 PROCEDURE — 74011250637 HC RX REV CODE- 250/637: Performed by: ORTHOPAEDIC SURGERY

## 2022-09-20 PROCEDURE — 74011000250 HC RX REV CODE- 250: Performed by: NURSE ANESTHETIST, CERTIFIED REGISTERED

## 2022-09-20 PROCEDURE — 77030013079 HC BLNKT BAIR HGGR 3M -A: Performed by: ANESTHESIOLOGY

## 2022-09-20 PROCEDURE — 77030013708 HC HNDPC SUC IRR PULS STRY –B: Performed by: ORTHOPAEDIC SURGERY

## 2022-09-20 PROCEDURE — 99100 ANES PT EXTEME AGE<1 YR&>70: CPT | Performed by: ANESTHESIOLOGY

## 2022-09-20 PROCEDURE — 80048 BASIC METABOLIC PNL TOTAL CA: CPT

## 2022-09-20 PROCEDURE — 77030014368: Performed by: ORTHOPAEDIC SURGERY

## 2022-09-20 PROCEDURE — 76010000132 HC OR TIME 2.5 TO 3 HR: Performed by: ORTHOPAEDIC SURGERY

## 2022-09-20 PROCEDURE — 77030038269 HC DRN EXT URIN PURWCK BARD -A

## 2022-09-20 PROCEDURE — 85027 COMPLETE CBC AUTOMATED: CPT

## 2022-09-20 PROCEDURE — 74011250636 HC RX REV CODE- 250/636: Performed by: ORTHOPAEDIC SURGERY

## 2022-09-20 PROCEDURE — 74011250636 HC RX REV CODE- 250/636: Performed by: EMERGENCY MEDICINE

## 2022-09-20 PROCEDURE — C1776 JOINT DEVICE (IMPLANTABLE): HCPCS | Performed by: ORTHOPAEDIC SURGERY

## 2022-09-20 PROCEDURE — 77030031139 HC SUT VCRL2 J&J -A: Performed by: ORTHOPAEDIC SURGERY

## 2022-09-20 PROCEDURE — 74011000258 HC RX REV CODE- 258: Performed by: ORTHOPAEDIC SURGERY

## 2022-09-20 PROCEDURE — 73501 X-RAY EXAM HIP UNI 1 VIEW: CPT

## 2022-09-20 PROCEDURE — 74011250636 HC RX REV CODE- 250/636: Performed by: NURSE ANESTHETIST, CERTIFIED REGISTERED

## 2022-09-20 PROCEDURE — 74011000258 HC RX REV CODE- 258: Performed by: NURSE ANESTHETIST, CERTIFIED REGISTERED

## 2022-09-20 PROCEDURE — 74011000250 HC RX REV CODE- 250: Performed by: ORTHOPAEDIC SURGERY

## 2022-09-20 PROCEDURE — 2709999900 HC NON-CHARGEABLE SUPPLY

## 2022-09-20 PROCEDURE — 36415 COLL VENOUS BLD VENIPUNCTURE: CPT

## 2022-09-20 PROCEDURE — 27125 PARTIAL HIP REPLACEMENT: CPT | Performed by: ORTHOPAEDIC SURGERY

## 2022-09-20 PROCEDURE — 77030008462 HC STPLR SKN PROX J&J -A: Performed by: ORTHOPAEDIC SURGERY

## 2022-09-20 PROCEDURE — C1713 ANCHOR/SCREW BN/BN,TIS/BN: HCPCS | Performed by: ORTHOPAEDIC SURGERY

## 2022-09-20 PROCEDURE — 0SRR0J9 REPLACEMENT OF RIGHT HIP JOINT, FEMORAL SURFACE WITH SYNTHETIC SUBSTITUTE, CEMENTED, OPEN APPROACH: ICD-10-PCS | Performed by: ORTHOPAEDIC SURGERY

## 2022-09-20 PROCEDURE — 77030008723 HC TU FEED NG COVD -A: Performed by: ORTHOPAEDIC SURGERY

## 2022-09-20 PROCEDURE — 77030003029 HC SUT VCRL J&J -B: Performed by: ORTHOPAEDIC SURGERY

## 2022-09-20 PROCEDURE — 77030026438 HC STYL ET INTUB CARD -A: Performed by: ANESTHESIOLOGY

## 2022-09-20 PROCEDURE — 2709999900 HC NON-CHARGEABLE SUPPLY: Performed by: ORTHOPAEDIC SURGERY

## 2022-09-20 PROCEDURE — 77030010785: Performed by: ORTHOPAEDIC SURGERY

## 2022-09-20 PROCEDURE — 77030018673: Performed by: ORTHOPAEDIC SURGERY

## 2022-09-20 PROCEDURE — 74011000250 HC RX REV CODE- 250: Performed by: EMERGENCY MEDICINE

## 2022-09-20 PROCEDURE — 74011250636 HC RX REV CODE- 250/636: Performed by: NURSE PRACTITIONER

## 2022-09-20 PROCEDURE — 76060000036 HC ANESTHESIA 2.5 TO 3 HR: Performed by: ORTHOPAEDIC SURGERY

## 2022-09-20 PROCEDURE — 77030008683 HC TU ET CUF COVD -A: Performed by: ANESTHESIOLOGY

## 2022-09-20 PROCEDURE — 77030018547 HC SUT ETHBND1 J&J -B: Performed by: ORTHOPAEDIC SURGERY

## 2022-09-20 PROCEDURE — 77030006835 HC BLD SAW SAG STRY -B: Performed by: ORTHOPAEDIC SURGERY

## 2022-09-20 PROCEDURE — 01210 ANES OPEN PX HIP JOINT NOS: CPT | Performed by: NURSE ANESTHETIST, CERTIFIED REGISTERED

## 2022-09-20 PROCEDURE — 77030018836 HC SOL IRR NACL ICUM -A: Performed by: ORTHOPAEDIC SURGERY

## 2022-09-20 PROCEDURE — 65270000046 HC RM TELEMETRY

## 2022-09-20 PROCEDURE — 77030002933 HC SUT MCRYL J&J -A: Performed by: ORTHOPAEDIC SURGERY

## 2022-09-20 PROCEDURE — 01210 ANES OPEN PX HIP JOINT NOS: CPT | Performed by: ANESTHESIOLOGY

## 2022-09-20 PROCEDURE — 77030040241 HC ABD PLLW HIP MDII -B: Performed by: ORTHOPAEDIC SURGERY

## 2022-09-20 PROCEDURE — 76210000006 HC OR PH I REC 0.5 TO 1 HR: Performed by: ORTHOPAEDIC SURGERY

## 2022-09-20 DEVICE — STEM FEM CEM ANGLED 127DEG NEC --: Type: IMPLANTABLE DEVICE | Site: HIP | Status: FUNCTIONAL

## 2022-09-20 DEVICE — COMPONENT TOT HIP CAPPED BPLR UPLR CEM STEM H4STRYKER] STRYKER CORP]: Type: IMPLANTABLE DEVICE | Site: HIP | Status: FUNCTIONAL

## 2022-09-20 DEVICE — CENTRALIZER STEM DIA10MM UNIV DST FEM HIP CEM ACCOLADE: Type: IMPLANTABLE DEVICE | Site: HIP | Status: FUNCTIONAL

## 2022-09-20 DEVICE — HEAD FEM LFRIC V40 28X4MM COCR --: Type: IMPLANTABLE DEVICE | Site: HIP | Status: FUNCTIONAL

## 2022-09-20 DEVICE — HEAD FEM OD46MM ID28MM UNIV CO CHROM COMPHSVE SZ ARRY FOR: Type: IMPLANTABLE DEVICE | Site: HIP | Status: FUNCTIONAL

## 2022-09-20 DEVICE — CEMENT BNE 20ML 41GM FULL DOSE PMMA W/ TOBRA M VISC RADPQ: Type: IMPLANTABLE DEVICE | Site: HIP | Status: FUNCTIONAL

## 2022-09-20 RX ORDER — AMLODIPINE BESYLATE 5 MG/1
5 TABLET ORAL DAILY
Status: DISCONTINUED | OUTPATIENT
Start: 2022-09-20 | End: 2022-09-22 | Stop reason: HOSPADM

## 2022-09-20 RX ORDER — HYDROMORPHONE HYDROCHLORIDE 2 MG/ML
INJECTION, SOLUTION INTRAMUSCULAR; INTRAVENOUS; SUBCUTANEOUS AS NEEDED
Status: DISCONTINUED | OUTPATIENT
Start: 2022-09-20 | End: 2022-09-20 | Stop reason: HOSPADM

## 2022-09-20 RX ORDER — MAGNESIUM SULFATE HEPTAHYDRATE 40 MG/ML
INJECTION, SOLUTION INTRAVENOUS AS NEEDED
Status: DISCONTINUED | OUTPATIENT
Start: 2022-09-20 | End: 2022-09-20 | Stop reason: HOSPADM

## 2022-09-20 RX ORDER — INSULIN LISPRO 100 [IU]/ML
INJECTION, SOLUTION INTRAVENOUS; SUBCUTANEOUS ONCE
Status: DISCONTINUED | OUTPATIENT
Start: 2022-09-20 | End: 2022-09-20 | Stop reason: HOSPADM

## 2022-09-20 RX ORDER — ACETAMINOPHEN 500 MG
1000 TABLET ORAL EVERY 8 HOURS
Status: DISCONTINUED | OUTPATIENT
Start: 2022-09-20 | End: 2022-09-22 | Stop reason: HOSPADM

## 2022-09-20 RX ORDER — PROPOFOL 10 MG/ML
INJECTION, EMULSION INTRAVENOUS AS NEEDED
Status: DISCONTINUED | OUTPATIENT
Start: 2022-09-20 | End: 2022-09-20 | Stop reason: HOSPADM

## 2022-09-20 RX ORDER — VANCOMYCIN HYDROCHLORIDE 1 G/20ML
INJECTION, POWDER, LYOPHILIZED, FOR SOLUTION INTRAVENOUS AS NEEDED
Status: DISCONTINUED | OUTPATIENT
Start: 2022-09-20 | End: 2022-09-20 | Stop reason: HOSPADM

## 2022-09-20 RX ORDER — ROCURONIUM BROMIDE 10 MG/ML
INJECTION, SOLUTION INTRAVENOUS AS NEEDED
Status: DISCONTINUED | OUTPATIENT
Start: 2022-09-20 | End: 2022-09-20 | Stop reason: HOSPADM

## 2022-09-20 RX ORDER — GLYCOPYRROLATE 0.2 MG/ML
INJECTION INTRAMUSCULAR; INTRAVENOUS AS NEEDED
Status: DISCONTINUED | OUTPATIENT
Start: 2022-09-20 | End: 2022-09-20 | Stop reason: HOSPADM

## 2022-09-20 RX ORDER — ONDANSETRON 2 MG/ML
4 INJECTION INTRAMUSCULAR; INTRAVENOUS
Status: DISCONTINUED | OUTPATIENT
Start: 2022-09-20 | End: 2022-09-20 | Stop reason: HOSPADM

## 2022-09-20 RX ORDER — LIDOCAINE HYDROCHLORIDE 10 MG/ML
3 INJECTION, SOLUTION EPIDURAL; INFILTRATION; INTRACAUDAL; PERINEURAL ONCE
Status: DISCONTINUED | OUTPATIENT
Start: 2022-09-20 | End: 2022-09-20 | Stop reason: HOSPADM

## 2022-09-20 RX ORDER — DEXMEDETOMIDINE HYDROCHLORIDE 4 UG/ML
INJECTION, SOLUTION INTRAVENOUS AS NEEDED
Status: DISCONTINUED | OUTPATIENT
Start: 2022-09-20 | End: 2022-09-20 | Stop reason: HOSPADM

## 2022-09-20 RX ORDER — MAGNESIUM SULFATE 100 %
4 CRYSTALS MISCELLANEOUS AS NEEDED
Status: DISCONTINUED | OUTPATIENT
Start: 2022-09-20 | End: 2022-09-20 | Stop reason: HOSPADM

## 2022-09-20 RX ORDER — DEXTROSE MONOHYDRATE AND SODIUM CHLORIDE 5; .9 G/100ML; G/100ML
75 INJECTION, SOLUTION INTRAVENOUS CONTINUOUS
Status: DISCONTINUED | OUTPATIENT
Start: 2022-09-20 | End: 2022-09-21

## 2022-09-20 RX ORDER — SODIUM CHLORIDE, SODIUM LACTATE, POTASSIUM CHLORIDE, CALCIUM CHLORIDE 600; 310; 30; 20 MG/100ML; MG/100ML; MG/100ML; MG/100ML
100 INJECTION, SOLUTION INTRAVENOUS CONTINUOUS
Status: DISCONTINUED | OUTPATIENT
Start: 2022-09-20 | End: 2022-09-20 | Stop reason: HOSPADM

## 2022-09-20 RX ORDER — OXYCODONE AND ACETAMINOPHEN 5; 325 MG/1; MG/1
1 TABLET ORAL AS NEEDED
Status: DISCONTINUED | OUTPATIENT
Start: 2022-09-20 | End: 2022-09-20 | Stop reason: HOSPADM

## 2022-09-20 RX ORDER — HYDROMORPHONE HYDROCHLORIDE 1 MG/ML
0.5 INJECTION, SOLUTION INTRAMUSCULAR; INTRAVENOUS; SUBCUTANEOUS
Status: DISCONTINUED | OUTPATIENT
Start: 2022-09-20 | End: 2022-09-20 | Stop reason: HOSPADM

## 2022-09-20 RX ORDER — MIDAZOLAM HYDROCHLORIDE 1 MG/ML
2 INJECTION, SOLUTION INTRAMUSCULAR; INTRAVENOUS ONCE
Status: DISCONTINUED | OUTPATIENT
Start: 2022-09-20 | End: 2022-09-20 | Stop reason: HOSPADM

## 2022-09-20 RX ORDER — FENTANYL CITRATE 50 UG/ML
INJECTION, SOLUTION INTRAMUSCULAR; INTRAVENOUS AS NEEDED
Status: DISCONTINUED | OUTPATIENT
Start: 2022-09-20 | End: 2022-09-20 | Stop reason: HOSPADM

## 2022-09-20 RX ORDER — SUCCINYLCHOLINE CHLORIDE 20 MG/ML
INJECTION INTRAMUSCULAR; INTRAVENOUS AS NEEDED
Status: DISCONTINUED | OUTPATIENT
Start: 2022-09-20 | End: 2022-09-20 | Stop reason: HOSPADM

## 2022-09-20 RX ORDER — HYDROMORPHONE HYDROCHLORIDE 1 MG/ML
0.2 INJECTION, SOLUTION INTRAMUSCULAR; INTRAVENOUS; SUBCUTANEOUS AS NEEDED
Status: DISCONTINUED | OUTPATIENT
Start: 2022-09-20 | End: 2022-09-20 | Stop reason: HOSPADM

## 2022-09-20 RX ORDER — ROPIVACAINE HYDROCHLORIDE 2 MG/ML
30 INJECTION, SOLUTION EPIDURAL; INFILTRATION; PERINEURAL
Status: DISCONTINUED | OUTPATIENT
Start: 2022-09-20 | End: 2022-09-20 | Stop reason: HOSPADM

## 2022-09-20 RX ORDER — ENOXAPARIN SODIUM 100 MG/ML
40 INJECTION SUBCUTANEOUS EVERY 24 HOURS
Status: DISCONTINUED | OUTPATIENT
Start: 2022-09-21 | End: 2022-09-21

## 2022-09-20 RX ORDER — SODIUM CHLORIDE 0.9 % (FLUSH) 0.9 %
5-40 SYRINGE (ML) INJECTION EVERY 8 HOURS
Status: DISCONTINUED | OUTPATIENT
Start: 2022-09-20 | End: 2022-09-20 | Stop reason: HOSPADM

## 2022-09-20 RX ORDER — CELECOXIB 100 MG/1
200 CAPSULE ORAL 2 TIMES DAILY
Status: CANCELLED | OUTPATIENT
Start: 2022-09-20

## 2022-09-20 RX ORDER — SODIUM CHLORIDE 0.9 % (FLUSH) 0.9 %
5-40 SYRINGE (ML) INJECTION AS NEEDED
Status: DISCONTINUED | OUTPATIENT
Start: 2022-09-20 | End: 2022-09-20 | Stop reason: HOSPADM

## 2022-09-20 RX ORDER — SODIUM CHLORIDE, SODIUM LACTATE, POTASSIUM CHLORIDE, CALCIUM CHLORIDE 600; 310; 30; 20 MG/100ML; MG/100ML; MG/100ML; MG/100ML
75 INJECTION, SOLUTION INTRAVENOUS CONTINUOUS
Status: DISCONTINUED | OUTPATIENT
Start: 2022-09-20 | End: 2022-09-20 | Stop reason: HOSPADM

## 2022-09-20 RX ORDER — DEXTROSE MONOHYDRATE 100 MG/ML
0-250 INJECTION, SOLUTION INTRAVENOUS AS NEEDED
Status: DISCONTINUED | OUTPATIENT
Start: 2022-09-20 | End: 2022-09-20 | Stop reason: HOSPADM

## 2022-09-20 RX ORDER — LIDOCAINE HYDROCHLORIDE 20 MG/ML
INJECTION, SOLUTION EPIDURAL; INFILTRATION; INTRACAUDAL; PERINEURAL AS NEEDED
Status: DISCONTINUED | OUTPATIENT
Start: 2022-09-20 | End: 2022-09-20 | Stop reason: HOSPADM

## 2022-09-20 RX ORDER — NEOSTIGMINE METHYLSULFATE 1 MG/ML
INJECTION, SOLUTION INTRAVENOUS AS NEEDED
Status: DISCONTINUED | OUTPATIENT
Start: 2022-09-20 | End: 2022-09-20 | Stop reason: HOSPADM

## 2022-09-20 RX ORDER — FENTANYL CITRATE 50 UG/ML
100 INJECTION, SOLUTION INTRAMUSCULAR; INTRAVENOUS ONCE
Status: DISCONTINUED | OUTPATIENT
Start: 2022-09-20 | End: 2022-09-20 | Stop reason: HOSPADM

## 2022-09-20 RX ADMIN — FENTANYL CITRATE 50 MCG: 50 INJECTION, SOLUTION INTRAMUSCULAR; INTRAVENOUS at 18:03

## 2022-09-20 RX ADMIN — LIDOCAINE HYDROCHLORIDE 40 MG: 20 INJECTION, SOLUTION EPIDURAL; INFILTRATION; INTRACAUDAL; PERINEURAL at 18:03

## 2022-09-20 RX ADMIN — SODIUM CHLORIDE, PRESERVATIVE FREE 10 ML: 5 INJECTION INTRAVENOUS at 23:52

## 2022-09-20 RX ADMIN — MAGNESIUM SULFATE 2 G: 2 INJECTION INTRAVENOUS at 19:28

## 2022-09-20 RX ADMIN — HYDRALAZINE HYDROCHLORIDE 10 MG: 20 INJECTION, SOLUTION INTRAMUSCULAR; INTRAVENOUS at 03:28

## 2022-09-20 RX ADMIN — FENTANYL CITRATE 25 MCG: 50 INJECTION, SOLUTION INTRAMUSCULAR; INTRAVENOUS at 18:50

## 2022-09-20 RX ADMIN — NEOSTIGMINE METHYLSULFATE 3 MG: 1 INJECTION, SOLUTION INTRAVENOUS at 20:29

## 2022-09-20 RX ADMIN — CEFAZOLIN SODIUM 2 G: 1 INJECTION, POWDER, FOR SOLUTION INTRAMUSCULAR; INTRAVENOUS at 18:14

## 2022-09-20 RX ADMIN — SODIUM CHLORIDE, POTASSIUM CHLORIDE, SODIUM LACTATE AND CALCIUM CHLORIDE 75 ML/HR: 600; 310; 30; 20 INJECTION, SOLUTION INTRAVENOUS at 17:04

## 2022-09-20 RX ADMIN — ROCURONIUM BROMIDE 25 MG: 50 INJECTION INTRAVENOUS at 18:10

## 2022-09-20 RX ADMIN — HYDROMORPHONE HYDROCHLORIDE 0.5 MG: 1 INJECTION, SOLUTION INTRAMUSCULAR; INTRAVENOUS; SUBCUTANEOUS at 08:45

## 2022-09-20 RX ADMIN — FAMOTIDINE 20 MG: 10 INJECTION, SOLUTION INTRAVENOUS at 17:05

## 2022-09-20 RX ADMIN — ROCURONIUM BROMIDE 5 MG: 50 INJECTION INTRAVENOUS at 18:03

## 2022-09-20 RX ADMIN — HYDROMORPHONE HYDROCHLORIDE 0.5 MG: 1 INJECTION, SOLUTION INTRAMUSCULAR; INTRAVENOUS; SUBCUTANEOUS at 14:36

## 2022-09-20 RX ADMIN — SUCCINYLCHOLINE CHLORIDE 80 MG: 20 INJECTION, SOLUTION INTRAMUSCULAR; INTRAVENOUS at 18:04

## 2022-09-20 RX ADMIN — SODIUM CHLORIDE, PRESERVATIVE FREE 10 ML: 5 INJECTION INTRAVENOUS at 01:19

## 2022-09-20 RX ADMIN — PROPOFOL 60 MG: 10 INJECTION, EMULSION INTRAVENOUS at 18:03

## 2022-09-20 RX ADMIN — PHENYLEPHRINE HYDROCHLORIDE 100 MCG: 10 INJECTION INTRAVENOUS at 19:35

## 2022-09-20 RX ADMIN — ACETAMINOPHEN 1000 MG: 500 TABLET ORAL at 23:52

## 2022-09-20 RX ADMIN — FENTANYL CITRATE 25 MCG: 50 INJECTION, SOLUTION INTRAMUSCULAR; INTRAVENOUS at 18:40

## 2022-09-20 RX ADMIN — HYDROMORPHONE HYDROCHLORIDE 0.4 MG: 2 INJECTION, SOLUTION INTRAMUSCULAR; INTRAVENOUS; SUBCUTANEOUS at 19:06

## 2022-09-20 RX ADMIN — HYDROMORPHONE HYDROCHLORIDE 0.5 MG: 1 INJECTION, SOLUTION INTRAMUSCULAR; INTRAVENOUS; SUBCUTANEOUS at 03:24

## 2022-09-20 RX ADMIN — Medication 4 MCG: at 19:28

## 2022-09-20 RX ADMIN — GLYCOPYRROLATE 0.4 MG: 0.2 INJECTION INTRAMUSCULAR; INTRAVENOUS at 20:29

## 2022-09-20 NOTE — PROGRESS NOTES
[facility]  Progress Note    Patient: Saw Enriquez Age: 80 y.o. : 1924 MR#: [id number] SSN: [id number]  Date/Time: 2022     Subjective:     Patient was seen in presence of family members. She has some hearing issues. Denies any chest or abdominal pain. Continues to have right hip pain. No nausea or vomiting. Assessment/Plan: 1. Right hip fracture s/p fall at home  2. Oblqiue fracture of fifth metacarpal  3. HTN  4. CAD  5. History of hypothyroidism    Hospital Problems  Date Reviewed: 2022            Codes Class Noted POA    Encounter for palliative care ICD-10-CM: Z51.5  ICD-9-CM: V66.7  2022 Unknown        Advanced age ICD-10-CM: R54  ICD-9-CM: 511  2022 Unknown        Debility ICD-10-CM: R53.81  ICD-9-CM: 799.3  2022 Unknown        Hip fracture ([address] ICD-10-CM: S72.009A  ICD-9-CM: 820.8  2022 Unknown         PLAN:    Pending surgery. Discussed with OR and she is on schedule around 5 PM.  Informed the family about it  Continue current pain management with Dilaudid  Cardiology input noted  Will restart Norvasc. Hydralazine as needed for hypertension management  Echocardiogram report reviewed  Continue Synthroid  Patient's hemoglobin has been stable. No hematochezia has been noted here. We will put patient on Lovenox from tomorrow morning for DVT prophylaxis and monitor. I have discussed with family members that patient may develop postoperative anemia and may require blood transfusion. They verbalized understanding about it.   Start patient on IV fluid  PT and OT to follow this patient    Discharge barriers for today: Surgery    Anticipated date of discharge: 2022 if cleared by Ortho and no new clinical issues after surgery    Case discussed with:  [x]Patient  [x]Family  []Nursing  []Case Management  DVT Prophylaxis:  []Lovenox  []Hep SQ  []SCDs  []Coumadin   []Eliquis/Xarelto     Objective:   VS: Visit Vitals  BP (!) 159/74   Pulse 97   Temp 98 °F (36.7 °C)   Resp 20   Ht 5' 2\" (1.575 m)   Wt 60.8 kg (134 lb)   SpO2 96%   Breastfeeding Unknown   BMI 24.51 kg/m²      Tmax/24hrs: Temp (24hrs), Av °F (36.7 °C), Min:97.6 °F (36.4 °C), Max:98.3 °F (36.8 °C)  IOBRIEFNo intake or output data in the 24 hours ending 22 0950      General:  Alert, cooperative, no acute distress    Pulmonary:  CTA Bilaterally. No Wheezing/Rales. Cardiovascular: Regular rate and Rhythm. GI:  Soft, Non distended, Non tender. + Bowel sounds. Extremities:  No edema. No calf tenderness. Psych: Good insight. Not anxious or agitated. Neurologic: Alert and oriented X 3.   Moves both upper extremities and left lower extremity well      Medications:   Current Facility-Administered Medications   Medication Dose Route Frequency    [Held by provider] aspirin delayed-release tablet 162 mg  162 mg Oral DAILY    isosorbide mononitrate ER (IMDUR) tablet 60 mg  60 mg Oral DAILY    levothyroxine (SYNTHROID) tablet 75 mcg  75 mcg Oral ACB    metoprolol succinate (TOPROL-XL) XL tablet 25 mg  25 mg Oral DAILY    nitroglycerin (NITROSTAT) tablet 0.4 mg  0.4 mg SubLINGual Q5MIN PRN    hydrALAZINE (APRESOLINE) 20 mg/mL injection 10 mg  10 mg IntraVENous Q6H PRN    sodium chloride (NS) flush 5-40 mL  5-40 mL IntraVENous Q8H    sodium chloride (NS) flush 5-40 mL  5-40 mL IntraVENous PRN    acetaminophen (TYLENOL) tablet 650 mg  650 mg Oral Q6H PRN    Or    acetaminophen (TYLENOL) suppository 650 mg  650 mg Rectal Q6H PRN    polyethylene glycol (MIRALAX) packet 17 g  17 g Oral DAILY PRN    HYDROmorphone (DILAUDID) syringe 0.5 mg  0.5 mg IntraVENous Q4H PRN       Labs:    Recent Results (from the past 24 hour(s))   GLUCOSE, POC    Collection Time: 22  1:23 AM   Result Value Ref Range    Glucose (POC) 108 70 - 644 mg/dL   METABOLIC PANEL, BASIC    Collection Time: 22  2:32 AM   Result Value Ref Range    Sodium 136 136 - 145 mmol/L    Potassium 3.9 3.5 - 5.5 mmol/L    Chloride 103 100 - 111 mmol/L    CO2 23 21 - 32 mmol/L    Anion gap 10 3.0 - 18 mmol/L    Glucose 105 (H) 74 - 99 mg/dL    BUN 21 (H) 7.0 - 18 MG/DL    Creatinine 0.54 (L) 0.6 - 1.3 MG/DL    BUN/Creatinine ratio 39 (H) 12 - 20      GFR est AA >60 >60 ml/min/1.73m2    GFR est non-AA >60 >60 ml/min/1.73m2    Calcium 8.7 8.5 - 10.1 MG/DL   CBC W/O DIFF    Collection Time: 09/20/22  2:32 AM   Result Value Ref Range    WBC 7.9 4.6 - 13.2 K/uL    RBC 4.32 4.20 - 5.30 M/uL    HGB 13.2 12.0 - 16.0 g/dL    HCT 38.7 35.0 - 45.0 %    MCV 89.6 78.0 - 100.0 FL    MCH 30.6 24.0 - 34.0 PG    MCHC 34.1 31.0 - 37.0 g/dL    RDW 12.9 11.6 - 14.5 %    PLATELET 185 371 - 939 K/uL    MPV 10.2 9.2 - 11.8 FL    NRBC 0.0 0  WBC    ABSOLUTE NRBC 0.00 0.00 - 0.01 K/uL       Signed By: Georgia Smiley MD     September 20, 2022      Disclaimer: Sections of this note are dictated using utilizing voice recognition software. Minor typographical errors may be present. If questions arise, please do not hesitate to contact me or call our department.

## 2022-09-20 NOTE — PROGRESS NOTES
visited with the family of Tran Griggs, who is a 80 y.o.,female. The  provided the following Interventions:  Patient is comfortably resting after being given pain meds per patinet's granddaughter Rola Ramirez who was by the bedside upon this writer's visit. Initiated a relationship of care and support. Seymour that patient has only one child Jerry Neighbor) who has 2 boys and 2 girls. Rola Ramirez is one among the two other grandchildren who take turn to care of patient. Chart reviewed. Patient has AMD at home per note of another . Encouraged granddaughter to follow up with her dad Ratna Jurado) to bring in a copy of the ACP document for scanning and filing in patient's EMR. Offered assurance of continued prayers on patient's behalf. Plan:  Chaplains will continue to follow and will provide pastoral care on an as needed/requested basis.  recommends bedside caregivers page  on duty if patient shows signs of acute spiritual or emotional distress.     Melba Xavier5 [phone]

## 2022-09-20 NOTE — INTERVAL H&P NOTE
Update History & Physical    The Patient's History and Physical of September 18, 2022 was reviewed with the patient and I examined the patient. There was no change. The surgical site was confirmed by the patient and me. Plan:  The risk, benefits, expected outcome, and alternative to the recommended procedure have been discussed with the patient. Patient understands and wants to proceed with the procedure.     Electronically signed by Matt Salinas DO on 9/20/2022 at 4:35 PM

## 2022-09-20 NOTE — ROUTINE PROCESS
TRANSFER - IN REPORT:    Verbal report received from Eloisa RN on Lacy Camp  being received from [address] [place] [address] for ordered procedure      Report consisted of patients Situation, Background, Assessment and   Recommendations(SBAR). Information from the following report(s) SBAR was reviewed with the receiving nurse. Opportunity for questions and clarification was provided. Assessment completed upon patients arrival to unit and care assumed.

## 2022-09-20 NOTE — PROGRESS NOTES
Palliative Medicine    Palliative Medicine team Rukhsana Encarnacion NP, Khai Avery RN and Don Larkin RN met at the pt's bedside. Pt had her granddaughter present at the bedside. Pt complaining that she is having severe hip pain. States that her right arm is sore. Has been receiving medication for pain. Plan for pt to go to surgery today. Pt remains DNR/DNI. Thank you for the Palliative Medicine consult and allowing us to participate in the care of Ms. [address]. Will continue to monitor and provide support.     Don Larkin RN, BSN  Palliative Medicine Inpatient RN   [facility]  Palliative COPE Line: [phone] ([phone])

## 2022-09-20 NOTE — ANESTHESIA PREPROCEDURE EVALUATION
Relevant Problems   CARDIOVASCULAR   (+) Coronary artery disease involving native coronary artery   (+) Exertional angina (HCC)   (+) Hypertension       Anesthetic History   No history of anesthetic complications            Review of Systems / Medical History  Patient summary reviewed and pertinent labs reviewed    Pulmonary  Within defined limits                 Neuro/Psych   Within defined limits           Cardiovascular    Hypertension          CAD         GI/Hepatic/Renal  Within defined limits              Endo/Other      Hypothyroidism       Other Findings   Comments: On lovenox and last dose less than 24 hours ago. Discussed with surgeon GA. Physical Exam    Airway  Mallampati: II  TM Distance: 4 - 6 cm  Neck ROM: normal range of motion   Mouth opening: Normal     Cardiovascular    Rhythm: regular  Rate: normal         Dental    Dentition: Poor dentition     Pulmonary  Breath sounds clear to auscultation               Abdominal  GI exam deferred       Other Findings            Anesthetic Plan    ASA: 3  Anesthesia type: general          Induction: Intravenous  Anesthetic plan and risks discussed with: Patient      Suspended DNR with pt and son.

## 2022-09-20 NOTE — PROGRESS NOTES
Palliative Medicine    Visited patient along with Palliative team members ANALIA Peguero and ANALIA Mcduffie NP. Patient lying in bed, awake but drowsy. States she is having pain in her right hip area and soreness to her right arm. Acknowledges that she has received pain medications. Granddaughter at bedside, states surgery is supposed to be around noon today. Palliative Medicine remains available to provide support to Ms. Idris Ford and her family during this hospital stay.     CODE STATUS: DNR/DNI    Mary Maza RN  Palliative Medicine Inpatient RN  Palliative COPE Line: [phone]

## 2022-09-21 ENCOUNTER — APPOINTMENT (OUTPATIENT)
Dept: GENERAL RADIOLOGY | Age: 87
DRG: 522 | End: 2022-09-21
Attending: INTERNAL MEDICINE
Payer: MEDICARE

## 2022-09-21 LAB
ALBUMIN SERPL-MCNC: 2.7 G/DL (ref 3.4–5)
ALBUMIN/GLOB SERPL: 0.8 {RATIO} (ref 0.8–1.7)
ALP SERPL-CCNC: 69 U/L (ref 45–117)
ALT SERPL-CCNC: 17 U/L (ref 13–56)
ANION GAP SERPL CALC-SCNC: 9 MMOL/L (ref 3–18)
AST SERPL-CCNC: 29 U/L (ref 10–38)
BASOPHILS # BLD: 0 K/UL (ref 0–0.1)
BASOPHILS NFR BLD: 0 % (ref 0–2)
BILIRUB SERPL-MCNC: 1.9 MG/DL (ref 0.2–1)
BUN SERPL-MCNC: 29 MG/DL (ref 7–18)
BUN/CREAT SERPL: 33 (ref 12–20)
CALCIUM SERPL-MCNC: 7.9 MG/DL (ref 8.5–10.1)
CHLORIDE SERPL-SCNC: 103 MMOL/L (ref 100–111)
CO2 SERPL-SCNC: 23 MMOL/L (ref 21–32)
CREAT SERPL-MCNC: 0.88 MG/DL (ref 0.6–1.3)
DIFFERENTIAL METHOD BLD: ABNORMAL
EOSINOPHIL # BLD: 0 K/UL (ref 0–0.4)
EOSINOPHIL NFR BLD: 0 % (ref 0–5)
ERYTHROCYTE [DISTWIDTH] IN BLOOD BY AUTOMATED COUNT: 13 % (ref 11.6–14.5)
GLOBULIN SER CALC-MCNC: 3.3 G/DL (ref 2–4)
GLUCOSE SERPL-MCNC: 150 MG/DL (ref 74–99)
HCT VFR BLD AUTO: 33.7 % (ref 35–45)
HGB BLD-MCNC: 11.9 G/DL (ref 12–16)
IMM GRANULOCYTES # BLD AUTO: 0.1 K/UL (ref 0–0.04)
IMM GRANULOCYTES NFR BLD AUTO: 1 % (ref 0–0.5)
LYMPHOCYTES # BLD: 0.7 K/UL (ref 0.9–3.6)
LYMPHOCYTES NFR BLD: 6 % (ref 21–52)
MCH RBC QN AUTO: 31.7 PG (ref 24–34)
MCHC RBC AUTO-ENTMCNC: 35.3 G/DL (ref 31–37)
MCV RBC AUTO: 89.9 FL (ref 78–100)
MONOCYTES # BLD: 1.1 K/UL (ref 0.05–1.2)
MONOCYTES NFR BLD: 9 % (ref 3–10)
NEUTS SEG # BLD: 9.8 K/UL (ref 1.8–8)
NEUTS SEG NFR BLD: 84 % (ref 40–73)
NRBC # BLD: 0 K/UL (ref 0–0.01)
NRBC BLD-RTO: 0 PER 100 WBC
PLATELET # BLD AUTO: 223 K/UL (ref 135–420)
PMV BLD AUTO: 10.6 FL (ref 9.2–11.8)
POTASSIUM SERPL-SCNC: 4.2 MMOL/L (ref 3.5–5.5)
PROT SERPL-MCNC: 6 G/DL (ref 6.4–8.2)
RBC # BLD AUTO: 3.75 M/UL (ref 4.2–5.3)
SODIUM SERPL-SCNC: 135 MMOL/L (ref 136–145)
WBC # BLD AUTO: 11.7 K/UL (ref 4.6–13.2)

## 2022-09-21 PROCEDURE — 74011000258 HC RX REV CODE- 258: Performed by: INTERNAL MEDICINE

## 2022-09-21 PROCEDURE — 2709999900 HC NON-CHARGEABLE SUPPLY

## 2022-09-21 PROCEDURE — 80053 COMPREHEN METABOLIC PANEL: CPT

## 2022-09-21 PROCEDURE — 74011250637 HC RX REV CODE- 250/637: Performed by: INTERNAL MEDICINE

## 2022-09-21 PROCEDURE — 99232 SBSQ HOSP IP/OBS MODERATE 35: CPT | Performed by: NURSE PRACTITIONER

## 2022-09-21 PROCEDURE — 85025 COMPLETE CBC W/AUTO DIFF WBC: CPT

## 2022-09-21 PROCEDURE — 74011250637 HC RX REV CODE- 250/637: Performed by: EMERGENCY MEDICINE

## 2022-09-21 PROCEDURE — 97535 SELF CARE MNGMENT TRAINING: CPT

## 2022-09-21 PROCEDURE — 77030038269 HC DRN EXT URIN PURWCK BARD -A

## 2022-09-21 PROCEDURE — 99232 SBSQ HOSP IP/OBS MODERATE 35: CPT | Performed by: INTERNAL MEDICINE

## 2022-09-21 PROCEDURE — 74011000250 HC RX REV CODE- 250: Performed by: EMERGENCY MEDICINE

## 2022-09-21 PROCEDURE — 97162 PT EVAL MOD COMPLEX 30 MIN: CPT

## 2022-09-21 PROCEDURE — 74011250636 HC RX REV CODE- 250/636: Performed by: ORTHOPAEDIC SURGERY

## 2022-09-21 PROCEDURE — 97530 THERAPEUTIC ACTIVITIES: CPT

## 2022-09-21 PROCEDURE — 77030027138 HC INCENT SPIROMETER -A

## 2022-09-21 PROCEDURE — 74011000250 HC RX REV CODE- 250: Performed by: ORTHOPAEDIC SURGERY

## 2022-09-21 PROCEDURE — 73502 X-RAY EXAM HIP UNI 2-3 VIEWS: CPT

## 2022-09-21 PROCEDURE — 97166 OT EVAL MOD COMPLEX 45 MIN: CPT

## 2022-09-21 PROCEDURE — 36415 COLL VENOUS BLD VENIPUNCTURE: CPT

## 2022-09-21 PROCEDURE — 65270000046 HC RM TELEMETRY

## 2022-09-21 PROCEDURE — 74011250637 HC RX REV CODE- 250/637: Performed by: ORTHOPAEDIC SURGERY

## 2022-09-21 RX ORDER — NALOXONE HYDROCHLORIDE 0.4 MG/ML
0.4 INJECTION, SOLUTION INTRAMUSCULAR; INTRAVENOUS; SUBCUTANEOUS AS NEEDED
Status: DISCONTINUED | OUTPATIENT
Start: 2022-09-21 | End: 2022-09-22 | Stop reason: HOSPADM

## 2022-09-21 RX ORDER — SODIUM CHLORIDE 0.9 % (FLUSH) 0.9 %
5-40 SYRINGE (ML) INJECTION AS NEEDED
Status: DISCONTINUED | OUTPATIENT
Start: 2022-09-21 | End: 2022-09-22 | Stop reason: HOSPADM

## 2022-09-21 RX ORDER — ONDANSETRON 2 MG/ML
4 INJECTION INTRAMUSCULAR; INTRAVENOUS
Status: DISCONTINUED | OUTPATIENT
Start: 2022-09-21 | End: 2022-09-22 | Stop reason: HOSPADM

## 2022-09-21 RX ORDER — SODIUM CHLORIDE 0.9 % (FLUSH) 0.9 %
5-40 SYRINGE (ML) INJECTION EVERY 8 HOURS
Status: DISCONTINUED | OUTPATIENT
Start: 2022-09-21 | End: 2022-09-22 | Stop reason: HOSPADM

## 2022-09-21 RX ORDER — HYDROMORPHONE HYDROCHLORIDE 2 MG/1
1 TABLET ORAL
Status: DISCONTINUED | OUTPATIENT
Start: 2022-09-21 | End: 2022-09-22

## 2022-09-21 RX ORDER — TRAMADOL HYDROCHLORIDE 50 MG/1
50 TABLET ORAL
Status: DISCONTINUED | OUTPATIENT
Start: 2022-09-21 | End: 2022-09-22 | Stop reason: SDUPTHER

## 2022-09-21 RX ORDER — AMOXICILLIN 250 MG
1 CAPSULE ORAL 2 TIMES DAILY
Status: DISCONTINUED | OUTPATIENT
Start: 2022-09-21 | End: 2022-09-22 | Stop reason: HOSPADM

## 2022-09-21 RX ORDER — ASPIRIN 81 MG/1
81 TABLET ORAL 2 TIMES DAILY
Status: DISCONTINUED | OUTPATIENT
Start: 2022-09-21 | End: 2022-09-22 | Stop reason: HOSPADM

## 2022-09-21 RX ADMIN — TRAMADOL HYDROCHLORIDE 50 MG: 50 TABLET, COATED ORAL at 08:43

## 2022-09-21 RX ADMIN — SODIUM CHLORIDE, PRESERVATIVE FREE 10 ML: 5 INJECTION INTRAVENOUS at 22:41

## 2022-09-21 RX ADMIN — DEXTROSE AND SODIUM CHLORIDE 75 ML/HR: 5; 900 INJECTION, SOLUTION INTRAVENOUS at 00:02

## 2022-09-21 RX ADMIN — ACETAMINOPHEN 1000 MG: 500 TABLET ORAL at 05:49

## 2022-09-21 RX ADMIN — ASPIRIN 81 MG: 81 TABLET, COATED ORAL at 17:56

## 2022-09-21 RX ADMIN — ACETAMINOPHEN 1000 MG: 500 TABLET ORAL at 22:40

## 2022-09-21 RX ADMIN — AMLODIPINE BESYLATE 5 MG: 5 TABLET ORAL at 08:43

## 2022-09-21 RX ADMIN — SODIUM CHLORIDE, PRESERVATIVE FREE 10 ML: 5 INJECTION INTRAVENOUS at 01:00

## 2022-09-21 RX ADMIN — ASPIRIN 81 MG: 81 TABLET, COATED ORAL at 08:43

## 2022-09-21 RX ADMIN — CEFAZOLIN 2 G: 1 INJECTION, POWDER, FOR SOLUTION INTRAMUSCULAR; INTRAVENOUS at 08:50

## 2022-09-21 RX ADMIN — METOPROLOL SUCCINATE 25 MG: 25 TABLET, EXTENDED RELEASE ORAL at 08:43

## 2022-09-21 RX ADMIN — TRAMADOL HYDROCHLORIDE 50 MG: 50 TABLET, COATED ORAL at 17:56

## 2022-09-21 RX ADMIN — CEFAZOLIN 2 G: 1 INJECTION, POWDER, FOR SOLUTION INTRAMUSCULAR; INTRAVENOUS at 02:00

## 2022-09-21 RX ADMIN — SENNOSIDES AND DOCUSATE SODIUM 1 TABLET: 50; 8.6 TABLET ORAL at 08:44

## 2022-09-21 RX ADMIN — SODIUM CHLORIDE, PRESERVATIVE FREE 10 ML: 5 INJECTION INTRAVENOUS at 14:00

## 2022-09-21 RX ADMIN — SENNOSIDES AND DOCUSATE SODIUM 1 TABLET: 50; 8.6 TABLET ORAL at 17:56

## 2022-09-21 RX ADMIN — ACETAMINOPHEN 1000 MG: 500 TABLET ORAL at 14:11

## 2022-09-21 RX ADMIN — ISOSORBIDE MONONITRATE 60 MG: 60 TABLET, EXTENDED RELEASE ORAL at 08:44

## 2022-09-21 RX ADMIN — LEVOTHYROXINE SODIUM 75 MCG: 0.05 TABLET ORAL at 08:43

## 2022-09-21 NOTE — PROGRESS NOTES
Bedside shift change report given to RALF Solis (oncoming nurse) by Khushboo Santamaria RN (offgoing nurse). Report included the following information SBAR, Kardex, Intake/Output, MAR, and Recent Results.

## 2022-09-21 NOTE — PROGRESS NOTES
Wound Prevention Checklist    Patient: Fco Coleman (13 y.o. female)  Date: 9/21/2022  Diagnosis: Hip fracture ([facility] [address] Cathy Cortez <principal problem not specified>    Precautions: Fall, Total hip, WBAT (RLE; R cast placed on wrist -forearm d/t fifth metacarpal fx)       []  Heel prevention boots placed on patient    []  Patient turned q2h during shift    []  Lift team ordered    []  Patient on [facility] bed/Specialty bed    [x]  Each Wound is documented during shift (Stage, Color, drainage, odor, measurements, and dressings)    [x]  Dual skin check done with RALF Solis

## 2022-09-21 NOTE — PROGRESS NOTES
Physician Progress Note      Lisa Bradley  CSN #:                  [id number]  :                       1924  ADMIT DATE:       2022 7:42 AM  DISCH DATE:  RESPONDING  PROVIDER #:        Janel Orozco MD          QUERY TEXT:    Pt admitted with Right displaced femoral neck fracture s/p Right hip cemented hemiarthroplasty . Pt noted to have rt hip xray finding Osteopenia and superimposed bowel gas limits of variation of bony detail. If possible, please document in progress notes and discharge summary if you are evaluating and/or treating any of the following: The medical record reflects the following:  Risk Factors: 80 y.o.  female with PMH of Hypertension, hypercholesterolemia, CAD, hypothyroidism who presents with Right hip pain following fall . Clinical Indicators: 89yo who had a fall from standing , Right displaced femoral neck fracture s/p Right hip cemented hemiarthroplasty .  -Rt hip xray-No obvious acute hardware competition or acute osseous findings.  Osteopenia and superimposed bowel gas limits of variation of bony detail    Treatment: Right hip cemented hemiarthroplasty    Thank you  Xi Camarillo RN CRCR CDI , [facility]  [email or web address]  Options provided:  -- Pathological Right displaced femoral neck fracture  -- Pathological Right displaced femoral neck  fracture due to osteopenia  -- Pathological  fracture due to osteopenia following fall which would not usually break a normal, healthy bone  -- Osteoporotic Right displaced femoral neck Fracture  -- Osteoporotic Right displaced femoral neck fracture following fall which would not usually break a normal, healthy bone  -- Traumatic Right displaced femoral neck fracture  -- Other - I will add my own diagnosis  -- Disagree - Not applicable / Not valid  -- Disagree - Clinically unable to determine / Unknown  -- Refer to Clinical Documentation Reviewer    PROVIDER RESPONSE TEXT:    This patient has a traumatic Right displaced femoral neck fracture. Query created by:  Renata Stephen on 9/21/2022 2:14 PM      Electronically signed by:  Srini Darnell MD 9/21/2022 3:00 PM

## 2022-09-21 NOTE — PROGRESS NOTES
Cardiology Progress Note    Admit Date: 9/18/2022  Attending Cardiologist: Dr. Emile Santiago    Assessment:     -R hip fracture s/p fall. S/p R hip cemented hemiarthroplasty by Dr. Adarsh Johnson 9/20/2022.  -R 5th metacarpal fracture s/p fall  -CAD, medically managed. S/p cardiac cath (2009) that revealed proximal LAD, proximal LCx/OM disease, proximal dominant RCA disease at which time options for percutaneous revascularization and surgical revascularization were given and pt decided to pursue medical management.  -Echo 9/18/2022 with hyperdynamic LVEF 70-75% with normal wall motion, mildly increased LV wall thickness, normal RV size and function, no significant valvular pathology  -HTN  -Hypothyroidism, on Synthroid  -DNR status     Primary cardiologist is Dr. Jonah Cardoza:     -Pt is s/p R hip cemented hemiarthroplasty yesterday by ortho team, appreciate assistance.  -Continue routine post-operative care.  -Pt remains stable from cardiac standpoint. Will be available as needed if additional concerns arise. Dispo planning per primary team.    Subjective:     Reports soreness to her R hip. Denies CP/SOB.     Objective:      Patient Vitals for the past 8 hrs:   Temp Pulse Resp BP SpO2   09/21/22 1152 -- -- -- (!) 111/52 --   09/21/22 1112 98.1 °F (36.7 °C) 77 18 (!) 95/52 95 %   09/21/22 0749 97.7 °F (36.5 °C) 70 18 129/67 97 %         Patient Vitals for the past 96 hrs:   Weight   09/18/22 1329 60.8 kg (134 lb)   09/18/22 0742 61.1 kg (134 lb 11.2 oz)       TELE: normal sinus rhythm               Current Facility-Administered Medications   Medication Dose Route Frequency Last Admin    sodium chloride (NS) flush 5-40 mL  5-40 mL IntraVENous Q8H 10 mL at 09/21/22 0100    sodium chloride (NS) flush 5-40 mL  5-40 mL IntraVENous PRN      traMADoL (ULTRAM) tablet 50 mg  50 mg Oral Q6H PRN 50 mg at 09/21/22 0843    naloxone (NARCAN) injection 0.4 mg  0.4 mg IntraVENous PRN      aspirin delayed-release tablet 81 mg  81 mg Oral BID 81 mg at 09/21/22 0843    ondansetron (ZOFRAN) injection 4 mg  4 mg IntraVENous Q4H PRN      senna-docusate (PERICOLACE) 8.6-50 mg per tablet 1 Tablet  1 Tablet Oral BID 1 Tablet at 09/21/22 0844    sodium chloride (NS) flush 5-40 mL  5-40 mL IntraVENous Q8H 10 mL at 09/21/22 0100    sodium chloride (NS) flush 5-40 mL  5-40 mL IntraVENous PRN      HYDROmorphone (DILAUDID) tablet 1 mg  1 mg Oral Q4H PRN      amLODIPine (NORVASC) tablet 5 mg  5 mg Oral DAILY 5 mg at 09/21/22 0843    acetaminophen (TYLENOL) tablet 1,000 mg  1,000 mg Oral Q8H 1,000 mg at 09/21/22 0549    Or    acetaminophen (TYLENOL) suppository 650 mg  650 mg Rectal Q8H      isosorbide mononitrate ER (IMDUR) tablet 60 mg  60 mg Oral DAILY 60 mg at 09/21/22 0844    levothyroxine (SYNTHROID) tablet 75 mcg  75 mcg Oral ACB 75 mcg at 09/21/22 0843    metoprolol succinate (TOPROL-XL) XL tablet 25 mg  25 mg Oral DAILY 25 mg at 09/21/22 0843    nitroglycerin (NITROSTAT) tablet 0.4 mg  0.4 mg SubLINGual Q5MIN PRN      hydrALAZINE (APRESOLINE) 20 mg/mL injection 10 mg  10 mg IntraVENous Q6H PRN 10 mg at 09/20/22 0328    sodium chloride (NS) flush 5-40 mL  5-40 mL IntraVENous Q8H 10 mL at 09/20/22 2352    sodium chloride (NS) flush 5-40 mL  5-40 mL IntraVENous PRN      polyethylene glycol (MIRALAX) packet 17 g  17 g Oral DAILY PRN           Intake/Output Summary (Last 24 hours) at 9/21/2022 1358  Last data filed at 9/21/2022 1309  Gross per 24 hour   Intake 980 ml   Output 300 ml   Net 680 ml       Physical Exam:  General:  alert, cooperative, no distress, appears stated age  Neck:  supple  Lungs:  clear to auscultation bilaterally  Heart:  regular rate and rhythm  Abdomen:  abdomen is soft without significant tenderness, masses, organomegaly or guarding  Extremities:  atraumatic, no edema    Visit Vitals  BP (!) 111/52   Pulse 77   Temp 98.1 °F (36.7 °C)   Resp 18   Ht 5' 2\" (1.575 m)   Wt 60.8 kg (134 lb)   SpO2 95%   Breastfeeding Unknown   BMI 24.51 kg/m²       Data Review:     Labs: Results:       Chemistry Recent Labs     09/21/22  0231 09/20/22  0232 09/19/22  0500   * 105* 105*   * 136 138   K 4.2 3.9 3.4*    103 103   CO2 23 23 26   BUN 29* 21* 14   CREA 0.88 0.54* 0.50*   CA 7.9* 8.7 8.6   MG  --   --  2.3   AGAP 9 10 9   BUCR 33* 39* 28*   AP 69  --   --    TP 6.0*  --   --    ALB 2.7*  --   --    GLOB 3.3  --   --    AGRAT 0.8  --   --       CBC w/Diff Recent Labs     09/21/22  0535 09/20/22 0232 09/19/22  0500   WBC 11.7 7.9 7.4   RBC 3.75* 4.32 4.48   HGB 11.9* 13.2 13.8   HCT 33.7* 38.7 39.7    141 150   GRANS 84*  --  78*   LYMPH 6*  --  13*   EOS 0  --  1      Coagulation Recent Labs     09/19/22  0500   PTP 14.2   INR 1.1       Lipid Panel Lab Results   Component Value Date/Time    Cholesterol, total 165 08/20/2019 09:43 AM    HDL Cholesterol 47 08/20/2019 09:43 AM    LDL, calculated 86.8 08/20/2019 09:43 AM    VLDL, calculated 31.2 08/20/2019 09:43 AM    Triglyceride 156 (H) 08/20/2019 09:43 AM    CHOL/HDL Ratio 3.5 08/20/2019 09:43 AM      Liver Enzymes Recent Labs     09/21/22  0231   TP 6.0*   ALB 2.7*   AP 69      Thyroid Studies Lab Results   Component Value Date/Time    TSH 0.85 08/20/2019 09:43 AM          Signed By: Wili Roach, PAVanC     September 21, 2022

## 2022-09-21 NOTE — PROGRESS NOTES
Problem: Pain  Goal: *Control of Pain  Outcome: Progressing Towards Goal  Goal: *PALLIATIVE CARE:  Alleviation of Pain  Outcome: Progressing Towards Goal     Problem: Patient Education: Go to Patient Education Activity  Goal: Patient/Family Education  Outcome: Progressing Towards Goal     Problem: Patient Education: Go to Patient Education Activity  Goal: Patient/Family Education  Outcome: Progressing Towards Goal     Problem: Lower Extremity Fracture:Day of Admission  Goal: Off Pathway (Use only if patient is Off Pathway)  Outcome: Progressing Towards Goal  Goal: Activity/Safety  Outcome: Progressing Towards Goal  Goal: Consults, if ordered  Outcome: Progressing Towards Goal  Goal: Diagnostic Test/Procedures  Outcome: Progressing Towards Goal  Goal: Nutrition/Diet  Outcome: Progressing Towards Goal  Goal: Medications  Outcome: Progressing Towards Goal  Goal: Respiratory  Outcome: Progressing Towards Goal  Goal: Treatments/Interventions/Procedures  Outcome: Progressing Towards Goal  Goal: Psychosocial  Outcome: Progressing Towards Goal  Goal: *Optimal pain control at patient's stated goal  Outcome: Progressing Towards Goal  Goal: *Hemodynamically stable  Outcome: Progressing Towards Goal  Goal: *Adequate oxygenation  Outcome: Progressing Towards Goal     Problem: Lower Extremity Fracture:Day of Surgery (Intiate SCIP Measures for Post-op Care)  Goal: Off Pathway (Use only if patient is Off Pathway)  Outcome: Progressing Towards Goal  Goal: Activity/Safety  Outcome: Progressing Towards Goal  Goal: Consults, if ordered  Outcome: Progressing Towards Goal  Goal: Diagnostic Test/Procedures  Outcome: Progressing Towards Goal  Goal: Nutrition/Diet  Outcome: Progressing Towards Goal  Goal: Medications  Outcome: Progressing Towards Goal  Goal: Respiratory  Outcome: Progressing Towards Goal  Goal: Treatments/Interventions/Procedures  Outcome: Progressing Towards Goal  Goal: Psychosocial  Outcome: Progressing Towards Goal  Goal: *Optimal pain control at patient's stated goal  Outcome: Progressing Towards Goal  Goal: *Hemodynamically stable  Outcome: Progressing Towards Goal  Goal: *Adequate oxygenation  Outcome: Progressing Towards Goal     Problem: Lower Extremity Fracture:Post-op Day 1  Goal: Off Pathway (Use only if patient is Off Pathway)  Outcome: Progressing Towards Goal  Goal: Activity/Safety  Outcome: Progressing Towards Goal  Goal: Consults, if ordered  Outcome: Progressing Towards Goal  Goal: Diagnostic Test/Procedures  Outcome: Progressing Towards Goal  Goal: Nutrition/Diet  Outcome: Progressing Towards Goal  Goal: Discharge Planning  Outcome: Progressing Towards Goal  Goal: Medications  Outcome: Progressing Towards Goal  Goal: Respiratory  Outcome: Progressing Towards Goal  Goal: Treatments/Interventions/Procedures  Outcome: Progressing Towards Goal  Goal: Psychosocial  Outcome: Progressing Towards Goal  Goal: *Optimal pain control at patient's stated goal  Outcome: Progressing Towards Goal  Goal: *Hemodynamically stable  Outcome: Progressing Towards Goal  Goal: *Adequate oxygenation  Outcome: Progressing Towards Goal  Goal: *PT/INR within defined limits  Outcome: Progressing Towards Goal  Goal: *Demonstrates progressive activity  Outcome: Progressing Towards Goal     Problem: Lower Extremity Fracture:Post-op Day 2  Goal: Off Pathway (Use only if patient is Off Pathway)  Outcome: Progressing Towards Goal  Goal: Activity/Safety  Outcome: Progressing Towards Goal  Goal: Diagnostic Test/Procedures  Outcome: Progressing Towards Goal  Goal: Nutrition/Diet  Outcome: Progressing Towards Goal  Goal: Discharge Planning  Outcome: Progressing Towards Goal  Goal: Medications  Outcome: Progressing Towards Goal  Goal: Respiratory  Outcome: Progressing Towards Goal  Goal: Treatments/Interventions/Procedures  Outcome: Progressing Towards Goal  Goal: Psychosocial  Outcome: Progressing Towards Goal  Goal: *Optimal pain control at patient's stated goal  Outcome: Progressing Towards Goal  Goal: *Hemodynamically stable  Outcome: Progressing Towards Goal  Goal: *Adequate oxygenation  Outcome: Progressing Towards Goal  Goal: *PT/INR within defined limits  Outcome: Progressing Towards Goal  Goal: *Demonstrates progressive activity  Outcome: Progressing Towards Goal  Goal: *Voiding  Outcome: Progressing Towards Goal  Goal: *Bowel movement  Outcome: Progressing Towards Goal  Goal: *Tolerating diet  Outcome: Progressing Towards Goal     Problem: Lower Extremity Fracture:Post-op Day 3  Goal: Off Pathway (Use only if patient is Off Pathway)  Outcome: Progressing Towards Goal  Goal: Activity/Safety  Outcome: Progressing Towards Goal  Goal: Diagnostic Test/Procedures  Outcome: Progressing Towards Goal  Goal: Nutrition/Diet  Outcome: Progressing Towards Goal  Goal: Discharge Planning  Outcome: Progressing Towards Goal  Goal: Medications  Outcome: Progressing Towards Goal  Goal: Respiratory  Outcome: Progressing Towards Goal  Goal: Treatments/Interventions/Procedures  Outcome: Progressing Towards Goal  Goal: Psychosocial  Outcome: Progressing Towards Goal  Goal: *Optimal pain control at patient's stated goal  Outcome: Progressing Towards Goal  Goal: *Hemodynamically stable  Outcome: Progressing Towards Goal  Goal: *Adequate oxygenation  Outcome: Progressing Towards Goal  Goal: *PT/INR within defined limits  Outcome: Progressing Towards Goal  Goal: *Demonstrates progressive activity  Outcome: Progressing Towards Goal  Goal: *Voiding  Outcome: Progressing Towards Goal  Goal: *Bowel movement  Outcome: Progressing Towards Goal  Goal: *Tolerating diet  Outcome: Progressing Towards Goal

## 2022-09-21 NOTE — ANESTHESIA POSTPROCEDURE EVALUATION
Procedure(s):  RIGHT HIP HEMIARTHROPLASTY LATERAL/STYKER. general    Anesthesia Post Evaluation      Multimodal analgesia: multimodal analgesia used between 6 hours prior to anesthesia start to PACU discharge  Patient location during evaluation: PACU  Patient participation: complete - patient participated  Level of consciousness: awake and alert  Pain management: adequate  Airway patency: patent  Anesthetic complications: no  Cardiovascular status: acceptable  Respiratory status: acceptable  Hydration status: acceptable  Post anesthesia nausea and vomiting:  none  Final Post Anesthesia Temperature Assessment:  Normothermia (36.0-37.5 degrees C)      INITIAL Post-op Vital signs:   Vitals Value Taken Time   /60 09/20/22 2109   Temp 37.2 °C (99 °F) 09/20/22 2109   Pulse 83 09/20/22 2114   Resp 16 09/20/22 2114   SpO2 100 % 09/20/22 2114   Vitals shown include unvalidated device data.

## 2022-09-21 NOTE — PERIOP NOTES
TRANSFER - OUT REPORT:    Verbal report given to Will (name) on Laurita End  being transferred to (unit) for routine post - op       Report consisted of patients Situation, Background, Assessment and   Recommendations(SBAR). Information from the following report(s) Procedure Summary and MAR was reviewed with the receiving nurse. Lines:   Peripheral IV 09/18/22 Left Antecubital (Active)   Site Assessment Clean, dry, & intact 09/20/22 2043   Phlebitis Assessment 0 09/20/22 2043   Infiltration Assessment 0 09/20/22 2043   Dressing Status Clean, dry, & intact 09/20/22 2043   Dressing Type Transparent 09/20/22 2043   Hub Color/Line Status Pink;Flushed; Infusing 09/20/22 2043   Action Taken Open ports on tubing capped 09/20/22 0844   Alcohol Cap Used Yes 09/20/22 0844       Peripheral IV 09/19/22 Left Wrist (Active)   Site Assessment Clean, dry, & intact 09/20/22 2043   Phlebitis Assessment 0 09/20/22 2043   Infiltration Assessment 0 09/20/22 2043   Dressing Status Clean, dry, & intact 09/20/22 2043   Dressing Type Transparent 09/20/22 2043   Hub Color/Line Status Blue;Capped;Flushed 09/20/22 2043   Action Taken Open ports on tubing capped 09/20/22 0844   Alcohol Cap Used Yes 09/20/22 0844        Opportunity for questions and clarification was provided.       Patient transported with:   O2 @ 2 liters

## 2022-09-21 NOTE — ROUTINE PROCESS
Bedside and Verbal shift change report given to RALF Newby (oncoming nurse) by Rianna Whitfield RN  (offgoing nurse). Report included the following information SBAR, Kardex, MAR, and Recent Results.

## 2022-09-21 NOTE — PROGRESS NOTES
Palliative Medicine    Palliative Medicine team Tyler Guevara NP and Gustavo Marrufo RN met at the pt's bedside. Pt had hip surgery yesterday and states that the procedure went well. Pt does report having pain and nausea. She was able to participate with therapy today however stated that it was a rough go and wore her out. Pt's son and daughter-in-law were also present at the bedside. Reaffirmed that pt is having a lot of pain and nausea. BSRN notified and will medicate pt as appropriate. Pt remains DNR/DNI. Son to look for pt's directives at home and will bring to the hospital tomorrow. Thank you for the Palliative Medicine consult and allowing us to participate in the care of Ms. [address]. Will continue to monitor and provide support.     Gustavo Marrufo RN, BSN  Palliative Medicine Inpatient RN  [facility]  Palliative COPE Line: [phone])

## 2022-09-21 NOTE — PROGRESS NOTES
[facility] [place]: [phone] ([phone])  [facility]: [phone]     Patient Name: Blanca Oliveira  YOB: 1924    Date of Initial Consult : 9/19/2022  Date of follow-up visit:  9/21/2022  Reason for Consult: Care decisions  Requesting Provider: Dr. Cora Ferreira  Primary Care Physician: Vandana Pratt NP      SUMMARY:   Blanca Oliveira is a 80 y.o. female with a past history of hypertension, CAD, hypothyroidism and hypercholesterolemia, who was admitted on 9/18/2022 from home with a diagnosis of right hip fracture. Current medical issues leading to Palliative Medicine involvement include: 51-year-old female presented to the ER with concerns of right hip pain following a fall at home earlier today. Palliative medicine is consulted for care decisions. CHIEF COMPLAINT: Right hip pain    HPI/SUBJECTIVE:    Past history as above. Ms. Kapil Rock is a 51-year-old  female with multiple medical issues who presented to the ER at with concerns of right hip pain following a fall at home earlier the day of admission. Patient reports being in her usual state of health until she had a fall at home with subsequent right hip pain. Patient denies dizziness and thinks her father may have something to do with the cane she was using. Patient reports using a cane inside the house and a walker when she is outside. In the ER patient was found to have a right hip fracture and is admitted for further surgical and medical management. The patient is:   [x] Verbal and participatory  [] Non-participatory due to:     GOALS OF CARE:  DNR  Patient/Health Care Proxy Stated Goals: Prolong life      TREATMENT PREFERENCES:   Code Status: DNR         PALLIATIVE DIAGNOSES:   Encounter for palliative care/goals of care  R hip fracture  Advanced age   [de-identified]       PLAN:   9/21/2022:  Seen at bedside along with Ms. Halle Talbert RN.   Patient is lying in bed, awake and alert, reports significant pain in right hip as well as some nausea. Patient's son and daughter-in-law present at bedside. Patient reports working with therapy earlier today which was difficult and family shared patient did not eat her lunch due to \"feeling like she was going to throw up\". Discussed with bedside RN about pain and nausea medication for patient. Per son, patient will hopefully be able to go to rehab possibly tomorrow. Reviewed goals of care with son who stated that patient has a DNR. Asked son if he could bring a copy of this paperwork to the hospital for scanning and he said he would look for it at home. Discussed possibly updating DNR paperwork since her old paperwork was done ~ 20 years ago. Discussed completion of a POST form if patient and son are interested and son expressed trying to complete a POST form tomorrow when his mother is feeling a bit better. Palliative medicine will follow-up with patient and son tomorrow for possible POST form completion. At this time goals of care remain DNR. Please see below for previous conversations with the palliative medicine team:    Encounter for palliative care/goals of care -seen at bedside in ER 11 along with Ms. Sarika Mccartney RN. Ms. Kylie Hoover is lying on a gurney, awake, alert and oriented x4. Patient is capable of participating in a goals of care conversation today. No family at bedside. Denies pain or shortness of breath. Denies pain in her right hip at time of visit. Right ulnar splint in place with bruising noted on her fingers Patient able to provide history of what brought her to the hospital.  Patient is pleasant and conversant and expressed gratitude for our visit. There is no AMD on file. Patient states she is  and has 1 living son, Duncan England.  [facility] [id number] [facility], in the absence of any paperwork legal medical decision making defaults to patient's adult son, Cem Chaudhary.   Introduced and discussed goals of care including benefits and burdens of resuscitation, intubation and ventilation and patient is very clear that she does not wish to undergo resuscitation in the event of cardiopulmonary arrest.  Patient elects DNR/DNI and states that she has previously completed paperwork to this effect. CODE STATUS DNR/DNI as per previous conversation with hospitalist NP (appreciate assistance). Patient is scheduled to undergo surgery to repair her hip fracture later this afternoon. Palliative medicine will follow up with patient postoperatively to hopefully be able to complete a POST form prior to discharge. Goals of care are DNR/DNI. R hip fracture - primary team managing, orthopedic consult, X-ray Right hip on 9/18/22 reveals \"Right transcervical femoral neck fracture. Minimal displacement or angulation identified. \"  Surgery planned for later today. Advanced age - 80years of age, cardiology for pre-operative clearance for surgery  Debility - PPS 61, lives with her son, Janak Garay and his wife for the last 6 years. Patient reports being independent with ADLs including preparing her own meals, personal care, and gets her own groceries. Granddaughter drives her to appointments and shopping. Initial consult note routed to primary continuity provider  Communicated plan of care with: Palliative IDT      Advance Care Planning:  [] The [facility] Interdisciplinary Team has updated the ACP Navigator with [address] and Patient Capacity    Primary Decision [address] ([address]): Rafi Mars (son)  [phone]  Patient reports only having 1 son, Janak Garay, who would be her default medical decision maker according to the [facility] [employer]. As far as possible, the palliative care team has discussed with patient / health care proxy about goals of care / treatment preferences for patient.          HISTORY:     History obtained from: chart and patient    Active Problems: Hip fracture ([facility] [address] (9/18/2022)      Encounter for palliative care (9/19/2022)      Advanced age (9/19/2022)      Debility (9/19/2022)    Past Medical History:   Diagnosis Date    CAD (coronary artery disease) 2008    Fibromyalgia     Heart failure (HCC)     Pt. states sge gas 5 heart blocks. Hypercholesterolemia     Hypertension     Menopause     Thyroid disease       Past Surgical History:   Procedure Laterality Date    HX CHOLECYSTECTOMY      HX HYSTERECTOMY        Family History   Problem Relation Age of Onset    Breast Cancer Mother [de-identified]     History reviewed, no pertinent family history.   Social History     Tobacco Use    Smoking status: Never    Smokeless tobacco: Never   Substance Use Topics    Alcohol use: No     Allergies   Allergen Reactions    Codeine Other (comments)     dizziness    Dapiprazole Vertigo    Elavil Vertigo    Voltaren [Diclofenac Sodium] Vertigo      Current Facility-Administered Medications   Medication Dose Route Frequency    sodium chloride (NS) flush 5-40 mL  5-40 mL IntraVENous Q8H    sodium chloride (NS) flush 5-40 mL  5-40 mL IntraVENous PRN    traMADoL (ULTRAM) tablet 50 mg  50 mg Oral Q6H PRN    naloxone (NARCAN) injection 0.4 mg  0.4 mg IntraVENous PRN    aspirin delayed-release tablet 81 mg  81 mg Oral BID    ondansetron (ZOFRAN) injection 4 mg  4 mg IntraVENous Q4H PRN    senna-docusate (PERICOLACE) 8.6-50 mg per tablet 1 Tablet  1 Tablet Oral BID    sodium chloride (NS) flush 5-40 mL  5-40 mL IntraVENous Q8H    sodium chloride (NS) flush 5-40 mL  5-40 mL IntraVENous PRN    HYDROmorphone (DILAUDID) tablet 1 mg  1 mg Oral Q4H PRN    amLODIPine (NORVASC) tablet 5 mg  5 mg Oral DAILY    acetaminophen (TYLENOL) tablet 1,000 mg  1,000 mg Oral Q8H    Or    acetaminophen (TYLENOL) suppository 650 mg  650 mg Rectal Q8H    isosorbide mononitrate ER (IMDUR) tablet 60 mg  60 mg Oral DAILY    levothyroxine (SYNTHROID) tablet 75 mcg  75 mcg Oral ACB    metoprolol succinate (TOPROL-XL) XL tablet 25 mg  25 mg Oral DAILY    nitroglycerin (NITROSTAT) tablet 0.4 mg  0.4 mg SubLINGual Q5MIN PRN    hydrALAZINE (APRESOLINE) 20 mg/mL injection 10 mg  10 mg IntraVENous Q6H PRN    sodium chloride (NS) flush 5-40 mL  5-40 mL IntraVENous Q8H    sodium chloride (NS) flush 5-40 mL  5-40 mL IntraVENous PRN    polyethylene glycol (MIRALAX) packet 17 g  17 g Oral DAILY PRN          Clinical Pain Assessment (nonverbal scale for nonverbal patients): Clinical Pain Assessment  Severity: 3          Duration: for how long has pt been experiencing pain (e.g., 2 days, 1 month, years)  Frequency: how often pain is an issue (e.g., several times per day, once every few days, constant)     FUNCTIONAL ASSESSMENT:     Palliative Performance Scale (PPS):  PPS: 50    ECOG  ECOG Status : Limited self-care     PSYCHOSOCIAL/SPIRITUAL SCREENING:      Any spiritual / Protestant concerns:  [] Yes /  [x] No    Caregiver Burnout:  [] Yes /  [x] No /  [] No Caregiver Present      Anticipatory grief assessment:   [x] Normal  / [] Maladaptive        REVIEW OF SYSTEMS:     Systems: constitutional, ears/nose/mouth/throat, respiratory, gastrointestinal, genitourinary, musculoskeletal, integumentary, neurologic, psychiatric, endocrine. Positive findings noted below. Modified ESAS Completed by: provider           Pain: 3           Dyspnea: 0               Positive and pertinent negative findings in ROS are noted above in HPI. The following systems were [x] reviewed / [] unable to be reviewed as noted in HPI  Other findings are noted below.      PHYSICAL EXAM:     Constitutional: lying in bed, awake, alert and oriented x4, + pain right hip  Eyes: pupils equal, anicteric, wears glasses  ENMT: no nasal discharge, moist mucous membranes  Cardiovascular: regular rhythm  Respiratory: breathing not labored, symmetric, on room air  Gastrointestinal: soft non-tender, + nausea  Last bowel movement: none recorded  Skin: warm, dry  Neurologic: following commands, moving all extremities  Psychiatric: calm    Other: Wt Readings from Last 3 Encounters:   09/18/22 60.8 kg (134 lb)   08/06/22 59 kg (130 lb)   05/31/22 59.4 kg (131 lb)     Blood pressure (!) 111/52, pulse 77, temperature 98.1 °F (36.7 °C), resp. rate 18, height 5' 2\" (1.575 m), weight 60.8 kg (134 lb), SpO2 95 %, unknown if currently breastfeeding. Pain:  Pain Scale 1: Numeric (0 - 10)  Pain Intensity 1: 7     Pain Location 1: Hip  Pain Orientation 1: Right  Pain Description 1: Aching, Shooting  Pain Intervention(s) 1: Medication (see MAR)       LAB AND IMAGING FINDINGS:     Lab Results   Component Value Date/Time    WBC 11.7 09/21/2022 05:35 AM    HGB 11.9 (L) 09/21/2022 05:35 AM    PLATELET 511 63/46/9235 05:35 AM     Lab Results   Component Value Date/Time    Sodium 135 (L) 09/21/2022 02:31 AM    Potassium 4.2 09/21/2022 02:31 AM    Chloride 103 09/21/2022 02:31 AM    CO2 23 09/21/2022 02:31 AM    BUN 29 (H) 09/21/2022 02:31 AM    Creatinine 0.88 09/21/2022 02:31 AM    Calcium 7.9 (L) 09/21/2022 02:31 AM    Magnesium 2.3 09/19/2022 05:00 AM      Lab Results   Component Value Date/Time    Alk. phosphatase 69 09/21/2022 02:31 AM    Protein, total 6.0 (L) 09/21/2022 02:31 AM    Albumin 2.7 (L) 09/21/2022 02:31 AM    Globulin 3.3 09/21/2022 02:31 AM     Lab Results   Component Value Date/Time    INR 1.1 09/19/2022 05:00 AM    Prothrombin time 14.2 09/19/2022 05:00 AM      No results found for: IRON, FE, TIBC, IBCT, PSAT, FERR   No results found for: PH, PCO2, PO2  No components found for: [place]   Lab Results   Component Value Date/Time     11/03/2016 10:20 AM    CK - MB 2.2 11/03/2016 10:20 AM              Total time: 25 minutes    > 50% counseling / coordination:  Time spent in direct consultation with the patient, medical team, and family     Prolonged service was provided for  []30 min   []75 min in face to face time in the presence of the patient, spent as noted above.   Time Start:   Time End: Disclaimer: Sections of this note are dictated using utilizing voice recognition software, which may have resulted in some phonetic based errors in grammar and contents. Even though attempts were made to correct all the mistakes, some may have been missed, and remained in the body of the document. If questions arise, please contact our department.

## 2022-09-21 NOTE — OP NOTES
[facility]  OPERATIVE REPORT     Patient Name: Edwin Webster Medical Record Number:  [id number]  YOB: 1924  DATE OF SERVICE: 09/20/2022     PREOPERATIVE DIAGNOSES:  1. Right displaced femoral neck fracture     POSTOPERATIVE DIAGNOSES:  1. Same     PROCEDURES PERFORMED:  1. Right hip cemented hemiarthroplasty     SURGEON: Sudha Lyman DO     OR Staff:  Circ-1: Alex Hollins RN  Circ-Relief: Mary Rodríguez RN  Scrub Tech-1: Loc Witt  Scrub Tech-Relief: Elvira Molina  Surg Asst-1: Floydene Rather  Surg Asst-2: Artie Martínez  Surg Asst-Relief: Aj Miss      ANESTHESIA: Archie Boyer CRNA     COMPLICATIONS:  none     SPECIMENS REMOVED:  None. IMPLANTS:  [place]:  Accolade C 127degree size 4 cemented hip stem  UHR bipolar head size 46mm/28mm  LFIT V40 28mm, +4mm femoral head      ESTIMATED BLOOD LOSS: 200 mL     IV FLUIDS:  see anesthesia record     INDICATIONS FOR PROCEDURE:  89yo who had a fall from standing prior to admission and presented to the ED and was found to have a displaced femoral neck fracture. The patient was explained the risks of nonoperative care to include nonunion, pulmonary complications, bed sores, inability to ambulate, pain, and debility with the increased risk of mortality in the next year. The patient also was explained the risks of operative care with hemiarthroplasty to include pain, neurovascular injury, fracture, hardware failure, instability, loosening, need for further surgery, PE, DVT, loss of limb or loss of life. She understood and signed written consent. PROCEDURE:   After informed consent was confirmed in the preoperative holding area, the operative extremity was marked. The patient was brought to the operating room and anesthesia was initiated.  The patient was placed in the lateral decubitus position with the operative extremity up and all bony prominences were padded and mechanical dvt prophylaxis was placed on the nonoperative limb. The operative leg was prepped and draped in the usual sterile fashion. A time-out was done to ensure the right patient, side, and procedure to be performed. The patient received weight-based preoperative antibiotics and received txa. A standard posterolateral approach to the hip was performed. A large amount of scar tissue was encountered due to previous surgery. The hip was externally rotated and the neck was cut 1cm from the lesser trochanter. The head was exposed and extracted and then measured. Trial heads were tested for appropriate sizing. The femoral shoulder was cleared and the femoral canal was instrumented. Sequential reaming and broaching was performed until axial and rotational stability of the broach was apparent. A trial head and neck were placed. The hip was ranged to ensure there was no impingement and that it was stable in extension external rotation, position of sleep, deep flexion and flexion internal rotation to over 45 degrees. Leg lengths were assessed by how the limbs felt using a Galleazzi type assessment. Adjustments were made. The final femoral stem was cemented in place using meticulous fourth generation technique. The trunnion was cleaned and the bipolar head ball placed. Leg lengths felt appropriate. A betadine wash was performed. Local infiltrative analgesia was injected into the surrounding tissues. The capsule was repaired with a soft tissue repair consisting of a number 5 ethibond. The Iliotibial band was closed with #1 vicryl and a running # 1 quill suture. Casis layer was approximated with a running #1 vicryl suture. The dermis was closed with 2-0 monocryl suture followed by a running 2-0 monocryl Quill stitch in the subcuticular layer. Dermabond and Mepilex were applied. At the end of the procedure, all counts were correct times two. The patient was transferred to PACU in stable condition.  There were no immediate complications. Post operatively the patient will be weightbearing as tolerated. The patient will attempt to walk on Post operative day 0 or day 1 and will have chemoprophylaxis for dvt prophylaxis along with early mobilization and mechanical prophylaxis while in house. The patient will remain on our postoperative pain regimen to minimize heavy narcotics. I was present and scrubbed for all key portions of the procedure.      Electronically Signed Up   Chauncey George DO  9/20/2022  8:39 PM

## 2022-09-21 NOTE — PROGRESS NOTES
Problem: Mobility Impaired (Adult and Pediatric)  Goal: *Acute Goals and Plan of Care (Insert Text)  Description: Physical Therapy Goals  Initiated 9/21/2022 and to be accomplished within 7 day(s)  1. Patient will move from supine to sit and sit to supine  in bed with minimal assistance/contact guard assist.    2.  Patient will transfer from bed to chair and chair to bed with minimal assistance/contact guard assist using the least restrictive device. 3.  Patient will perform sit to stand with supervision/set-up. 4.  Patient will ambulate with minimal assistance/contact guard assist for 50 feet with the least restrictive device. PLOF: Pt lives with her son in a 2-story home, set up on the first with a ramp to enter. Chris with a RW, reports multiple falls. Outcome: Progressing Towards Goal     PHYSICAL THERAPY EVALUATION    Patient: Aruna Jimenez (40 y.o. female)  Date: 9/21/2022  Primary Diagnosis: Hip fracture ([facility] [address] [S72.009A]  Procedure(s) (LRB):  RIGHT HIP HEMIARTHROPLASTY LATERAL/STYKER (Right) 1 Day Post-Op   Precautions:  Fall, total hip, WBAT    ASSESSMENT :  Based on the objective data described below, the patient presents with generalized weakness, decreased functional mobility tolerance, and pain. Pt seen with both PT and OT to maximize patients safety, mobility, and participation. Pt found semi-reclined in bed, in NAD, willing to work with PT, She reports soreness. Edu pt on hip precautions and WBAT. Sup-sit with modAx1. Once sitting, fair balance. STS with minAx2 and RW, pt amb 2 short, shuffled to to recliner with modA. Decreased weight bearing observed on R LE. Poor eccentric control to sitting. Pt left with B LE's elevated, call bell nearby, all needs met. Rn notified. Recommend ARU. Patient will benefit from skilled intervention to address the above impairments.   Patient's rehabilitation potential is considered to be Fair  Factors which may influence rehabilitation potential include:   []         None noted  []         Mental ability/status  [x]         Medical condition  [x]         Home/family situation and support systems  [x]         Safety awareness  [x]         Pain tolerance/management  []         Other:      PLAN :  Recommendations and Planned Interventions:   [x]           Bed Mobility Training             [x]    Neuromuscular Re-Education  [x]           Transfer Training                   []    Orthotic/Prosthetic Training  [x]           Gait Training                          []    Modalities  [x]           Therapeutic Exercises           []    Edema Management/Control  [x]           Therapeutic Activities            [x]    Family Training/Education  [x]           Patient Education  []           Other (comment):    Frequency/Duration: Patient will be followed by physical therapy 1-2 times per day/4-7 days per week to address goals. Further Equipment Recommendations for Discharge: N/A    AMPAC: Based on an AM-PAC score of 11/24 (or **/20 if omitting stairs) and their current functional mobility deficits, it is recommended that the patient have 5-7 sessions per week of Physical Therapy at d/c to increase the patient's independence. Currently, this patient demonstrates the potential endurance, and/or tolerance for 3 hours of therapy each day at d/c. This AMPAC score should be considered in conjunction with interdisciplinary team recommendations to determine the most appropriate discharge setting. Patient's social support, diagnosis, medical stability, and prior level of function should also be taken into consideration. SUBJECTIVE:   Patient stated It's sore.     OBJECTIVE DATA SUMMARY:     Past Medical History:   Diagnosis Date    CAD (coronary artery disease) 2008    Fibromyalgia     Heart failure ([facility] [address]     Pt. states sge gas 5 heart blocks.     Hypercholesterolemia     Hypertension     Menopause     Thyroid disease      Past Surgical History:   Procedure Laterality Date    HX CHOLECYSTECTOMY      HX HYSTERECTOMY       Barriers to Learning/Limitations: None  Compensate with: N/A  Home Situation:  Home Situation  Home Environment: Private residence  # Steps to Enter: 6 (6 steps ramp installed 2021)  Wheelchair Ramp: Yes  One/Two Story Residence: Two story, live on 1st floor  # of Interior Steps: 12  Living Alone: No  Support Systems: Child(earle)  Patient Expects to be Discharged to[de-identified] Rehab hospital/unit acute  Current DME Used/Available at Home: Walker, rollator  Critical Behavior:  Neurologic State: Alert  Orientation Level: Appropriate for age  Cognition: Follows commands  Safety/Judgement: Fall prevention  Psychosocial  Patient Behaviors: Calm; Cooperative  Purposeful Interaction: Yes  Pt Identified Daily Priority: Clinical issues (comment); Communication issues (comment)  Caritas Process: Establish trust;Nurture spiritual self;Teaching/learning; Attend basic human needs  Caring Interventions: Therapeutic modalities; Reassure  Reassure: Informing; Therapeutic listening;Caring rounds  Therapeutic Modalities: Intentional therapeutic touch        Skin Integrity: Incision (comment) (right hip)  Skin Integumentary  Skin Color: Ecchymosis (comment)  Skin Integrity: Incision (comment) (right hip)     Strength:    Strength: Generally decreased, functional                    Tone & Sensation:   Tone: Normal              Sensation: Intact               Range Of Motion:  AROM: Generally decreased, functional                       Posture:        Functional Mobility:  Bed Mobility:     Supine to Sit: Moderate assistance;Assist x1;Additional time     Scooting: Moderate assistance  Transfers:  Sit to Stand: Minimum assistance;Assist x2  Stand to Sit: Moderate assistance;Assist x1;Additional time        Bed to Chair: Moderate assistance;Assist x1              Balance:   Sitting: Impaired; With support  Sitting - Static: Good (unsupported)  Sitting - Dynamic: Fair (occasional)  Standing: Impaired; With support  Standing - Static: Good;Fair  Standing - Dynamic : Fair;Occasional  Wheelchair Mobility:        Ambulation/Gait Training:  Distance (ft): 2 Feet (ft)  Assistive Device: Walker, rolling  Ambulation - Level of Assistance: Moderate assistance;Assist x1        Gait Abnormalities: Decreased step clearance; Antalgic                 Step Length: Right shortened;Left shortened      Pain:  Pain level pre-treatment: 3/10   Pain level post-treatment: 3/10   Pain Intervention(s) : Medication (see MAR); Rest, Ice, Repositioning  Response to intervention: Nurse notified, See doc flow    Activity Tolerance:   Pt tolerated mobility fair  Please refer to the flowsheet for vital signs taken during this treatment. After treatment:   [x]         Patient left in no apparent distress sitting up in chair  []         Patient left in no apparent distress in bed  [x]         Call bell left within reach  [x]         Nursing notified  []         Caregiver present  []         Bed alarm activated  []         SCDs applied    COMMUNICATION/EDUCATION:   [x]         Role of Physical Therapy in the acute care setting. [x]         Fall prevention education was provided and the patient/caregiver indicated understanding. [x]         Patient/family have participated as able in goal setting and plan of care. []         Patient/family agree to work toward stated goals and plan of care. []         Patient understands intent and goals of therapy, but is neutral about his/her participation. []         Patient is unable to participate in goal setting/plan of care: ongoing with therapy staff.  []         Other:     Thank you for this referral.  Ladi Fulling   Time Calculation: 26 mins      Eval Complexity: History: MEDIUM  Complexity : 1-2 comorbidities / personal factors will impact the outcome/ POC Exam:MEDIUM Complexity : 3 Standardized tests and measures addressing body structure, function, activity limitation and / or participation in recreation  Presentation: LOW Complexity : Stable, uncomplicated  Clinical Decision Making:Medium Complexity    Overall Complexity:MEDIUM    [address] [facility] [address] [address] AM-PAC® Basic Mobility Inpatient Short Form (6-Clicks) Version 2    How much HELP from another person does the patient currently need    (If the patient hasn't done an activity recently, how much help from another person do you think he/she would need if he/she tried?)   Total (Total A or Dep)   A Lot  (Mod to Max A)   A Little (Sup or Min A)   None (Mod I to I)   Turning from your back to your side while in a flat bed without using bedrails? [] 1 [x] 2 [] 3 [] 4   2. Moving from lying on your back to sitting on the side of a flat bed without using bedrails? [] 1 [x] 2 [] 3 [] 4   3. Moving to and from a bed to a chair (including a wheelchair)? [] 1 [x] 2 [] 3 [] 4   4. Standing up from a chair using your arms (e.g., wheelchair, or bedside chair)? [] 1 [x] 2 [] 3 [] 4   5. Walking in hospital room? [] 1 [x] 2 [] 3 [] 4   6. Climbing 3-5 steps with a railing?+   [x] 1 [] 2 [] 3 [] 4   +If stair climbing cannot be assessed, skip item #6. Sum responses from items 1-5. Based on an AM-PAC score of 11/24 (or **/20 if omitting stairs) and their current functional mobility deficits, it is recommended that the patient have 5-7 sessions per week of Physical Therapy at d/c to increase the patient's independence. Currently, this patient demonstrates the potential endurance, and/or tolerance for 3 hours of therapy each day at d/c.

## 2022-09-21 NOTE — ROUTINE PROCESS
Wound Prevention Checklist    Patient: Iris Rivas (16 y.o. female)  Date: 9/21/2022  Diagnosis: Hip fracture ([address] [facility] <principal problem not specified>    Precautions:         []  Heel prevention boots placed on patient    []  Patient turned q2h during shift    []  Lift team ordered    []  Patient on [facility] bed/Specialty bed    []  Each Wound is documented during shift (Stage, Color, drainage, odor, measurements, and dressings)    [x]  Dual skin checks done at bedside during shift report with Narendra Newby RN

## 2022-09-21 NOTE — PROGRESS NOTES
Orthopedic Joint Progress Note    Date:  2022  Admit Date: 2022  Admit Diagnosis: Hip fracture ([address] [facility]. POD:  1 Day Post-Op    Subjective:     Tara Forde [address] [facility] [place]     Patient seen and examined at bedside. She reports minimal pain, only \"soreness\". Objective:     Vital Signs:    Blood pressure 129/67, pulse 70, temperature 97.7 °F (36.5 °C), resp. rate 18, height 5' 2\" (1.575 m), weight 134 lb (60.8 kg), SpO2 97 %, unknown if currently breastfeeding. Temp (24hrs), Av.2 °F (36.8 °C), Min:97.7 °F (36.5 °C), Max:99.1 °F (37.3 °C)        LAB:      Labs: Results:       Chemistry Recent Labs     22  0231 22  0232 22  0500   * 105* 105*   * 136 138   K 4.2 3.9 3.4*    103 103   CO2 23 23 26   BUN 29* 21* 14   CREA 0.88 0.54* 0.50*   CA 7.9* 8.7 8.6   AGAP 9 10 9      CBC w/Diff Recent Labs     22  0535 22  0232 22  0500   WBC 11.7 7.9 7.4   RBC 3.75* 4.32 4.48   HGB 11.9* 13.2 13.8   HCT 33.7* 38.7 39.7    141 150      Coagulation Recent Labs     22  0500   PTP 14.2   INR 1.1                   This SmartLink retrieves the last documented value for LDA assessment data, retrieved by either LDA type or by LDA group ID. This SmartLink should be used in a SmartText or SmartPhrase. If one is not available, please contact your . Physical Exam:    Quadriceps function intact. NVID. No evidence of DVT.   No distal extremity swelling  Dry Dressing      Assessment:   Postoperative day #1 from right hip hemiarthroplasty  Active Problems:    Hip fracture ([address] (2022)      Encounter for palliative care (2022)      Advanced age (2022)      Debility (2022)         Plan:     Continue PT/OT/Rehab  Consult: Rehab team including PT, OT, recreational therapy, and  and Hospitalist  DVT prophylaxis: Baby aspirin twice daily  Weightbearing as tolerated to the right lower extremity  Discharge planning: Okay to discharge to rehab or possibly home with home health care from orthopedic perspective. Follow-up in my clinic in 2 weeks for wound check.     Signed By: Giacomo Walker DO

## 2022-09-21 NOTE — PROGRESS NOTES
[facility]  Progress Note    Patient: Graciela Daigle Age: 80 y.o. : 1924 MR#: [id number] SSN: [id number]  Date/Time: 2022     Subjective:     Patient was seen in presence of nursing. Patient was just seen by orthopedic surgeon and patient is currently in excruciating pain especially right hip. No nausea or vomiting. No shortness of breath or cough. Assessment/Plan: 1. Right hip fracture secondary to mechanical fall s/p repair  2. Oblqiue fracture of fifth metacarpal  3. HTN  4. CAD  5. History of hypothyroidism    Hospital Problems  Date Reviewed: 2022            Codes Class Noted POA    Encounter for palliative care ICD-10-CM: Z51.5  ICD-9-CM: V66.7  2022 Unknown        Advanced age ICD-10-CM: R54  ICD-9-CM: 373  2022 Unknown        Debility ICD-10-CM: R53.81  ICD-9-CM: 799.3  2022 Unknown        Hip fracture ([facility] [address] ICD-10-CM: S72.009A  ICD-9-CM: 820.8  2022 Unknown         PLAN:    Orthopedic input noted about continuing current pain management, PT OT and baby aspirin for DVT prophylaxis  Will change pain management to p.o.   Cardiology input noted  Discontinue IV fluid and continue Norvasc, Imdur and Toprol-XL for hypertension management  Hydralazine as needed for hypertension management  Continue Synthroid  PT and OT to follow this patient    Discharge barriers for today: Postoperative care    Anticipated date of discharge: 2022 if cleared by Ortho and no new clinical issues after surgery    Case discussed with:  [x]Patient  []Family  [x]Nursing  []Case Management  DVT Prophylaxis:  []Lovenox  []Hep SQ  []SCDs  []Coumadin   []Eliquis/Xarelto     Objective:   VS: Visit Vitals  /67 (BP 1 Location: Left upper arm, BP Patient Position: At rest)   Pulse 70   Temp 97.7 °F (36.5 °C)   Resp 18   Ht 5' 2\" (1.575 m)   Wt 60.8 kg (134 lb)   SpO2 97%   Breastfeeding Unknown   BMI 24.51 kg/m²      Tmax/24hrs: Temp (24hrs), Av.2 °F (36.8 °C), Min:97.7 °F (36.5 °C), Max:99.1 °F (37.3 °C)  IOBRIEF  Intake/Output Summary (Last 24 hours) at 2022 2904  Last data filed at 2022 6274  Gross per 24 hour   Intake 860 ml   Output 300 ml   Net 560 ml         General:  Alert, cooperative, no acute distress    Pulmonary:  CTA Bilaterally. No Wheezing/Rales. Cardiovascular: Regular rate and Rhythm. GI:  Soft, Non distended, Non tender. + Bowel sounds. Extremities:  No edema. No calf tenderness. Right hip dressing in situ. Psych: Good insight. Not anxious or agitated. Neurologic: Alert and oriented X 3.   Moves both upper extremities and left lower extremity well      Medications:   Current Facility-Administered Medications   Medication Dose Route Frequency    sodium chloride (NS) flush 5-40 mL  5-40 mL IntraVENous Q8H    sodium chloride (NS) flush 5-40 mL  5-40 mL IntraVENous PRN    traMADoL (ULTRAM) tablet 50 mg  50 mg Oral Q6H PRN    naloxone (NARCAN) injection 0.4 mg  0.4 mg IntraVENous PRN    aspirin delayed-release tablet 81 mg  81 mg Oral BID    ondansetron (ZOFRAN) injection 4 mg  4 mg IntraVENous Q4H PRN    senna-docusate (PERICOLACE) 8.6-50 mg per tablet 1 Tablet  1 Tablet Oral BID    sodium chloride (NS) flush 5-40 mL  5-40 mL IntraVENous Q8H    sodium chloride (NS) flush 5-40 mL  5-40 mL IntraVENous PRN    amLODIPine (NORVASC) tablet 5 mg  5 mg Oral DAILY    dextrose 5% and 0.9% NaCl infusion  75 mL/hr IntraVENous CONTINUOUS    acetaminophen (TYLENOL) tablet 1,000 mg  1,000 mg Oral Q8H    Or    acetaminophen (TYLENOL) suppository 650 mg  650 mg Rectal Q8H    isosorbide mononitrate ER (IMDUR) tablet 60 mg  60 mg Oral DAILY    levothyroxine (SYNTHROID) tablet 75 mcg  75 mcg Oral ACB    metoprolol succinate (TOPROL-XL) XL tablet 25 mg  25 mg Oral DAILY    nitroglycerin (NITROSTAT) tablet 0.4 mg  0.4 mg SubLINGual Q5MIN PRN    hydrALAZINE (APRESOLINE) 20 mg/mL injection 10 mg  10 mg IntraVENous Q6H PRN    sodium chloride (NS) flush 5-40 mL  5-40 mL IntraVENous Q8H    sodium chloride (NS) flush 5-40 mL  5-40 mL IntraVENous PRN    polyethylene glycol (MIRALAX) packet 17 g  17 g Oral DAILY PRN    HYDROmorphone (DILAUDID) syringe 0.5 mg  0.5 mg IntraVENous Q4H PRN       Labs:    Recent Results (from the past 24 hour(s))   METABOLIC PANEL, COMPREHENSIVE    Collection Time: 09/21/22  2:31 AM   Result Value Ref Range    Sodium 135 (L) 136 - 145 mmol/L    Potassium 4.2 3.5 - 5.5 mmol/L    Chloride 103 100 - 111 mmol/L    CO2 23 21 - 32 mmol/L    Anion gap 9 3.0 - 18 mmol/L    Glucose 150 (H) 74 - 99 mg/dL    BUN 29 (H) 7.0 - 18 MG/DL    Creatinine 0.88 0.6 - 1.3 MG/DL    BUN/Creatinine ratio 33 (H) 12 - 20      GFR est AA >60 >60 ml/min/1.73m2    GFR est non-AA 59 (L) >60 ml/min/1.73m2    Calcium 7.9 (L) 8.5 - 10.1 MG/DL    Bilirubin, total 1.9 (H) 0.2 - 1.0 MG/DL    ALT (SGPT) 17 13 - 56 U/L    AST (SGOT) 29 10 - 38 U/L    Alk. phosphatase 69 45 - 117 U/L    Protein, total 6.0 (L) 6.4 - 8.2 g/dL    Albumin 2.7 (L) 3.4 - 5.0 g/dL    Globulin 3.3 2.0 - 4.0 g/dL    A-G Ratio 0.8 0.8 - 1.7     CBC WITH AUTOMATED DIFF    Collection Time: 09/21/22  5:35 AM   Result Value Ref Range    WBC 11.7 4.6 - 13.2 K/uL    RBC 3.75 (L) 4.20 - 5.30 M/uL    HGB 11.9 (L) 12.0 - 16.0 g/dL    HCT 33.7 (L) 35.0 - 45.0 %    MCV 89.9 78.0 - 100.0 FL    MCH 31.7 24.0 - 34.0 PG    MCHC 35.3 31.0 - 37.0 g/dL    RDW 13.0 11.6 - 14.5 %    PLATELET 294 342 - 800 K/uL    MPV 10.6 9.2 - 11.8 FL    NRBC 0.0 0  WBC    ABSOLUTE NRBC 0.00 0.00 - 0.01 K/uL    NEUTROPHILS 84 (H) 40 - 73 %    LYMPHOCYTES 6 (L) 21 - 52 %    MONOCYTES 9 3 - 10 %    EOSINOPHILS 0 0 - 5 %    BASOPHILS 0 0 - 2 %    IMMATURE GRANULOCYTES 1 (H) 0.0 - 0.5 %    ABS. NEUTROPHILS 9.8 (H) 1.8 - 8.0 K/UL    ABS. LYMPHOCYTES 0.7 (L) 0.9 - 3.6 K/UL    ABS. MONOCYTES 1.1 0.05 - 1.2 K/UL    ABS. EOSINOPHILS 0.0 0.0 - 0.4 K/UL    ABS. BASOPHILS 0.0 0.0 - 0.1 K/UL    ABS. IMM.  GRANS. 0.1 (H) 0.00 - 0.04 K/UL    DF AUTOMATED         Signed By: Elsa Bright MD     September 21, 2022      Disclaimer: Sections of this note are dictated using utilizing voice recognition software. Minor typographical errors may be present. If questions arise, please do not hesitate to contact me or call our department.

## 2022-09-22 ENCOUNTER — HOSPITAL ENCOUNTER (INPATIENT)
Age: 87
LOS: 13 days | Discharge: HOME HEALTH | DRG: 561 | End: 2022-10-05
Attending: FAMILY MEDICINE | Admitting: FAMILY MEDICINE
Payer: MEDICARE

## 2022-09-22 VITALS
RESPIRATION RATE: 14 BRPM | HEART RATE: 69 BPM | DIASTOLIC BLOOD PRESSURE: 61 MMHG | WEIGHT: 134 LBS | SYSTOLIC BLOOD PRESSURE: 128 MMHG | HEIGHT: 62 IN | BODY MASS INDEX: 24.66 KG/M2 | OXYGEN SATURATION: 98 % | TEMPERATURE: 97.9 F

## 2022-09-22 DIAGNOSIS — S72.001A CLOSED FRACTURE OF RIGHT HIP, INITIAL ENCOUNTER (HCC): Primary | ICD-10-CM

## 2022-09-22 LAB
BASOPHILS # BLD: 0 K/UL (ref 0–0.1)
BASOPHILS NFR BLD: 0 % (ref 0–2)
DIFFERENTIAL METHOD BLD: ABNORMAL
EOSINOPHIL # BLD: 0.2 K/UL (ref 0–0.4)
EOSINOPHIL NFR BLD: 2 % (ref 0–5)
ERYTHROCYTE [DISTWIDTH] IN BLOOD BY AUTOMATED COUNT: 13.2 % (ref 11.6–14.5)
GLUCOSE BLD STRIP.AUTO-MCNC: 107 MG/DL (ref 70–110)
HCT VFR BLD AUTO: 27 % (ref 35–45)
HGB BLD-MCNC: 9.1 G/DL (ref 12–16)
IMM GRANULOCYTES # BLD AUTO: 0 K/UL (ref 0–0.04)
IMM GRANULOCYTES NFR BLD AUTO: 1 % (ref 0–0.5)
LYMPHOCYTES # BLD: 1.1 K/UL (ref 0.9–3.6)
LYMPHOCYTES NFR BLD: 13 % (ref 21–52)
MCH RBC QN AUTO: 31 PG (ref 24–34)
MCHC RBC AUTO-ENTMCNC: 33.7 G/DL (ref 31–37)
MCV RBC AUTO: 91.8 FL (ref 78–100)
MONOCYTES # BLD: 0.8 K/UL (ref 0.05–1.2)
MONOCYTES NFR BLD: 10 % (ref 3–10)
NEUTS SEG # BLD: 6.6 K/UL (ref 1.8–8)
NEUTS SEG NFR BLD: 75 % (ref 40–73)
NRBC # BLD: 0 K/UL (ref 0–0.01)
NRBC BLD-RTO: 0 PER 100 WBC
PLATELET # BLD AUTO: 140 K/UL (ref 135–420)
PMV BLD AUTO: 10.5 FL (ref 9.2–11.8)
RBC # BLD AUTO: 2.94 M/UL (ref 4.2–5.3)
WBC # BLD AUTO: 8.9 K/UL (ref 4.6–13.2)

## 2022-09-22 PROCEDURE — 65310000000 HC RM PRIVATE REHAB

## 2022-09-22 PROCEDURE — 97530 THERAPEUTIC ACTIVITIES: CPT

## 2022-09-22 PROCEDURE — 77030038269 HC DRN EXT URIN PURWCK BARD -A

## 2022-09-22 PROCEDURE — 97116 GAIT TRAINING THERAPY: CPT

## 2022-09-22 PROCEDURE — 2709999900 HC NON-CHARGEABLE SUPPLY

## 2022-09-22 PROCEDURE — 36415 COLL VENOUS BLD VENIPUNCTURE: CPT

## 2022-09-22 PROCEDURE — 97535 SELF CARE MNGMENT TRAINING: CPT

## 2022-09-22 PROCEDURE — 74011250637 HC RX REV CODE- 250/637: Performed by: INTERNAL MEDICINE

## 2022-09-22 PROCEDURE — 77030040392 HC DRSG OPTIFOAM MDII -A

## 2022-09-22 PROCEDURE — 74011250636 HC RX REV CODE- 250/636: Performed by: ORTHOPAEDIC SURGERY

## 2022-09-22 PROCEDURE — 74011250637 HC RX REV CODE- 250/637: Performed by: EMERGENCY MEDICINE

## 2022-09-22 PROCEDURE — 74011000250 HC RX REV CODE- 250: Performed by: EMERGENCY MEDICINE

## 2022-09-22 PROCEDURE — 99239 HOSP IP/OBS DSCHRG MGMT >30: CPT | Performed by: INTERNAL MEDICINE

## 2022-09-22 PROCEDURE — 74011250637 HC RX REV CODE- 250/637: Performed by: ORTHOPAEDIC SURGERY

## 2022-09-22 PROCEDURE — 85025 COMPLETE CBC W/AUTO DIFF WBC: CPT

## 2022-09-22 PROCEDURE — 74011250637 HC RX REV CODE- 250/637: Performed by: FAMILY MEDICINE

## 2022-09-22 PROCEDURE — 51798 US URINE CAPACITY MEASURE: CPT

## 2022-09-22 PROCEDURE — 74011000250 HC RX REV CODE- 250: Performed by: ORTHOPAEDIC SURGERY

## 2022-09-22 PROCEDURE — 82962 GLUCOSE BLOOD TEST: CPT

## 2022-09-22 RX ORDER — AMLODIPINE BESYLATE 10 MG/1
10 TABLET ORAL DAILY
Status: DISCONTINUED | OUTPATIENT
Start: 2022-09-23 | End: 2022-10-05 | Stop reason: HOSPADM

## 2022-09-22 RX ORDER — INSULIN LISPRO 100 [IU]/ML
INJECTION, SOLUTION INTRAVENOUS; SUBCUTANEOUS
Status: DISCONTINUED | OUTPATIENT
Start: 2022-09-22 | End: 2022-10-05 | Stop reason: HOSPADM

## 2022-09-22 RX ORDER — HYDROCODONE BITARTRATE AND ACETAMINOPHEN 5; 325 MG/1; MG/1
1 TABLET ORAL
Status: DISCONTINUED | OUTPATIENT
Start: 2022-09-22 | End: 2022-09-22 | Stop reason: HOSPADM

## 2022-09-22 RX ORDER — FACIAL-BODY WIPES
10 EACH TOPICAL
Status: DISCONTINUED | OUTPATIENT
Start: 2022-09-22 | End: 2022-10-05 | Stop reason: HOSPADM

## 2022-09-22 RX ORDER — ISOSORBIDE MONONITRATE 60 MG/1
60 TABLET, EXTENDED RELEASE ORAL DAILY
Status: DISCONTINUED | OUTPATIENT
Start: 2022-09-23 | End: 2022-10-05 | Stop reason: HOSPADM

## 2022-09-22 RX ORDER — HYDROCODONE BITARTRATE AND ACETAMINOPHEN 5; 325 MG/1; MG/1
1 TABLET ORAL
Status: DISCONTINUED | OUTPATIENT
Start: 2022-09-22 | End: 2022-09-23

## 2022-09-22 RX ORDER — HYDROCODONE BITARTRATE AND ACETAMINOPHEN 5; 325 MG/1; MG/1
1-2 TABLET ORAL
Status: DISCONTINUED | OUTPATIENT
Start: 2022-09-22 | End: 2022-09-22

## 2022-09-22 RX ORDER — MAGNESIUM SULFATE 100 %
16 CRYSTALS MISCELLANEOUS AS NEEDED
Status: DISCONTINUED | OUTPATIENT
Start: 2022-09-22 | End: 2022-10-05 | Stop reason: HOSPADM

## 2022-09-22 RX ORDER — NITROGLYCERIN 0.4 MG/1
0.4 TABLET SUBLINGUAL AS NEEDED
Status: DISCONTINUED | OUTPATIENT
Start: 2022-09-22 | End: 2022-10-05 | Stop reason: HOSPADM

## 2022-09-22 RX ORDER — AMOXICILLIN 250 MG
1 CAPSULE ORAL 2 TIMES DAILY
Qty: 15 TABLET | Refills: 0 | Status: SHIPPED
Start: 2022-09-22

## 2022-09-22 RX ORDER — BISACODYL 5 MG
10 TABLET, DELAYED RELEASE (ENTERIC COATED) ORAL
Status: DISCONTINUED | OUTPATIENT
Start: 2022-09-22 | End: 2022-10-05 | Stop reason: HOSPADM

## 2022-09-22 RX ORDER — METOPROLOL SUCCINATE 25 MG/1
25 TABLET, EXTENDED RELEASE ORAL DAILY
Status: DISCONTINUED | OUTPATIENT
Start: 2022-09-23 | End: 2022-10-05 | Stop reason: HOSPADM

## 2022-09-22 RX ORDER — ASPIRIN 81 MG/1
81 TABLET ORAL 2 TIMES DAILY
Qty: 60 TABLET | Refills: 0 | Status: SHIPPED
Start: 2022-09-22 | End: 2022-10-05

## 2022-09-22 RX ORDER — GUAIFENESIN 100 MG/5ML
81 LIQUID (ML) ORAL 2 TIMES DAILY
Status: DISCONTINUED | OUTPATIENT
Start: 2022-09-22 | End: 2022-10-05 | Stop reason: HOSPADM

## 2022-09-22 RX ORDER — HYDROCODONE BITARTRATE AND ACETAMINOPHEN 5; 325 MG/1; MG/1
1 TABLET ORAL
Qty: 10 TABLET | Refills: 0 | Status: SHIPPED
Start: 2022-09-22 | End: 2022-10-05

## 2022-09-22 RX ORDER — ADHESIVE BANDAGE
30 BANDAGE TOPICAL DAILY PRN
Status: DISCONTINUED | OUTPATIENT
Start: 2022-09-22 | End: 2022-10-05 | Stop reason: HOSPADM

## 2022-09-22 RX ORDER — DEXTROSE MONOHYDRATE 100 MG/ML
0-250 INJECTION, SOLUTION INTRAVENOUS AS NEEDED
Status: DISCONTINUED | OUTPATIENT
Start: 2022-09-22 | End: 2022-10-05 | Stop reason: HOSPADM

## 2022-09-22 RX ORDER — ATORVASTATIN CALCIUM 40 MG/1
40 TABLET, FILM COATED ORAL
Status: DISCONTINUED | OUTPATIENT
Start: 2022-09-22 | End: 2022-10-05 | Stop reason: HOSPADM

## 2022-09-22 RX ORDER — AMOXICILLIN 250 MG
1 CAPSULE ORAL 2 TIMES DAILY
Status: DISCONTINUED | OUTPATIENT
Start: 2022-09-22 | End: 2022-10-05 | Stop reason: HOSPADM

## 2022-09-22 RX ORDER — KETOROLAC TROMETHAMINE 15 MG/ML
15 INJECTION, SOLUTION INTRAMUSCULAR; INTRAVENOUS
Status: DISCONTINUED | OUTPATIENT
Start: 2022-09-22 | End: 2022-09-22

## 2022-09-22 RX ORDER — ACETAMINOPHEN 500 MG
TABLET ORAL
Qty: 30 TABLET | Refills: 0 | Status: SHIPPED
Start: 2022-09-22 | End: 2022-10-05

## 2022-09-22 RX ADMIN — HYDROCODONE BITARTRATE AND ACETAMINOPHEN 1 TABLET: 5; 325 TABLET ORAL at 19:26

## 2022-09-22 RX ADMIN — SODIUM CHLORIDE, PRESERVATIVE FREE 10 ML: 5 INJECTION INTRAVENOUS at 06:48

## 2022-09-22 RX ADMIN — METOPROLOL SUCCINATE 25 MG: 25 TABLET, EXTENDED RELEASE ORAL at 08:39

## 2022-09-22 RX ADMIN — AMLODIPINE BESYLATE 5 MG: 5 TABLET ORAL at 08:40

## 2022-09-22 RX ADMIN — SENNOSIDES AND DOCUSATE SODIUM 1 TABLET: 50; 8.6 TABLET ORAL at 17:16

## 2022-09-22 RX ADMIN — TRAMADOL HYDROCHLORIDE 50 MG: 50 TABLET, COATED ORAL at 05:37

## 2022-09-22 RX ADMIN — ATORVASTATIN CALCIUM 40 MG: 40 TABLET, FILM COATED ORAL at 21:38

## 2022-09-22 RX ADMIN — SENNOSIDES AND DOCUSATE SODIUM 1 TABLET: 50; 8.6 TABLET ORAL at 08:40

## 2022-09-22 RX ADMIN — MAGNESIUM HYDROXIDE 30 ML: 400 SUSPENSION ORAL at 21:39

## 2022-09-22 RX ADMIN — ASPIRIN 81 MG CHEWABLE TABLET 81 MG: 81 TABLET CHEWABLE at 17:16

## 2022-09-22 RX ADMIN — KETOROLAC TROMETHAMINE 15 MG: 15 INJECTION, SOLUTION INTRAMUSCULAR; INTRAVENOUS at 08:40

## 2022-09-22 RX ADMIN — ISOSORBIDE MONONITRATE 60 MG: 60 TABLET, EXTENDED RELEASE ORAL at 08:40

## 2022-09-22 RX ADMIN — ASPIRIN 81 MG: 81 TABLET, COATED ORAL at 08:40

## 2022-09-22 RX ADMIN — LEVOTHYROXINE SODIUM 75 MCG: 0.05 TABLET ORAL at 08:39

## 2022-09-22 NOTE — PROGRESS NOTES
Problem: Mobility Impaired (Adult and Pediatric)  Goal: *Acute Goals and Plan of Care (Insert Text)  Description: Physical Therapy Goals  Initiated 9/21/2022 and to be accomplished within 7 day(s)  1. Patient will move from supine to sit and sit to supine  in bed with minimal assistance/contact guard assist.    2.  Patient will transfer from bed to chair and chair to bed with minimal assistance/contact guard assist using the least restrictive device. 3.  Patient will perform sit to stand with supervision/set-up. 4.  Patient will ambulate with minimal assistance/contact guard assist for 50 feet with the least restrictive device. PLOF: Pt lives with her son in a 2-story home, set up on the first with a ramp to enter. Chris with a RW, reports multiple falls. Outcome: Progressing Towards Goal     PHYSICAL THERAPY TREATMENT    Patient: Darius Francisco (56 y.o. female)  Date: 9/22/2022  Diagnosis: Hip fracture ([address] [place] <principal problem not specified>  Procedure(s) (LRB):  RIGHT HIP HEMIARTHROPLASTY LATERAL/STYKER (Right) 2 Days Post-Op  Precautions: Fall, Total hip, WBAT      ASSESSMENT:  PAtient seen with OT to maximize safety with OOB mobility. Pt received semi reclined in bed and agreeable to therapy. Mod A supine to sit transfer. Min A sit to stand transfer with additional time. She ambulates 5 feet to the chair using RW; min A for walker management. Short steps with antalgic gait. Pt left resting in the recliner where call bell was left within reach. Progression toward goals:   []      Improving appropriately and progressing toward goals  [x]      Improving slowly and progressing toward goals  []      Not making progress toward goals and plan of care will be adjusted     PLAN:  Patient continues to benefit from skilled intervention to address the above impairments. Continue treatment per established plan of care.     Further Equipment Recommendations for Discharge:  rolling walker and N/A    AMPAC:   Based on an AM-PAC score of 10/20 if omitting stairs) and their current functional mobility deficits, it is recommended that the patient have 5-7 sessions per week of Physical Therapy at d/c to increase the patient's independence. Currently, this patient demonstrates the potential endurance, and/or tolerance for 3 hours of therapy each day at d/c. This AMPAC score should be considered in conjunction with interdisciplinary team recommendations to determine the most appropriate discharge setting. Patient's social support, diagnosis, medical stability, and prior level of function should also be taken into consideration. SUBJECTIVE:   Patient stated -.    OBJECTIVE DATA SUMMARY:   Critical Behavior:  Neurologic State: Alert  Orientation Level: Oriented X4  Cognition: Follows commands  Safety/Judgement: Fall prevention  Functional Mobility Training:  Bed Mobility:     Supine to Sit: Moderate assistance     Scooting: Moderate assistance         Transfers:  Sit to Stand: Minimum assistance  Stand to Sit: Minimum assistance                    Balance:  Sitting: Impaired; With support  Sitting - Static: Good (unsupported)  Sitting - Dynamic: Fair (occasional)  Standing: Impaired; With support  Standing - Static: Fair  Standing - Dynamic : Fair         Ambulation/Gait Training:  Distance (ft): 6 Feet (ft)  Assistive Device: Walker, rolling  Ambulation - Level of Assistance: Minimal assistance;Assist x2  Gait Abnormalities: Decreased step clearance; Antalgic                         Therapeutic Exercises:   10x LAQ      Pain:  Pain level pre-treatment: 6/10  Pain level post-treatment: 6/10   Pain Intervention(s): Medication (see MAR); Rest, Ice, Repositionin   Response to intervention: Nurse notified, See doc flow    Activity Tolerance:   Fair  Please refer to the flowsheet for vital signs taken during this treatment.   After treatment:   [x] Patient left in no apparent distress sitting up in chair  [] Patient left in no apparent distress in bed  [x] Call bell left within reach  [x] Nursing notified  [] Caregiver present  [x] Chair alarm activated  [] SCDs applied      COMMUNICATION/EDUCATION:   []         Role of Physical Therapy in the acute care setting. [x]         Fall prevention education was provided and the patient/caregiver indicated understanding. [x]         Patient/family have participated as able in working toward goals and plan of care. [x]         Patient/family agree to work toward stated goals and plan of care. []         Patient understands intent and goals of therapy, but is neutral about his/her participation. []         Patient is unable to participate in stated goals/plan of care: ongoing with therapy staff.  []         Other:        Arthurine Patient, PT   Time Calculation: 23 mins    Marion Scherer AM-PAC® Basic Mobility Inpatient Short Form (6-Clicks) Version 2    How much HELP from another person does the patient currently need    (If the patient hasn't done an activity recently, how much help from another person do you think he/she would need if he/she tried?)   Total (Total A or Dep)   A Lot  (Mod to Max A)   A Little (Sup or Min A)   None (Mod I to I)   Turning from your back to your side while in a flat bed without using bedrails? [] 1 [x] 2 [] 3 [] 4   2. Moving from lying on your back to sitting on the side of a flat bed without using bedrails? [] 1 [x] 2 [] 3 [] 4   3. Moving to and from a bed to a chair (including a wheelchair)? [] 1 [x] 2 [] 3 [] 4   4. Standing up from a chair using your arms (e.g., wheelchair, or bedside chair)? [] 1 [x] 2 [] 3 [] 4   5. Walking in hospital room? [] 1 [x] 2 [] 3 [] 4   6. Climbing 3-5 steps with a railing?+   [] 1 [] 2 [] 3 [] 4   +If stair climbing cannot be assessed, skip item #6. Sum responses from items 1-5.

## 2022-09-22 NOTE — PROGRESS NOTES
Discharge planning    Reviewed chart. PT is recommending ARU. Met with patient at bedside but hard of hearing and receiving pain medication. Spoke with Layman April, son, concerning discharge plan. Mr. Lukas Gorman wants ARU and agree with SNF if cannot tolerate ARU. Spoke with Tiffany Abernathy with ARU concerning accepting patient. She will speak with patient and son this am.     CM will continue to monitor and assist with transition of care.      ESAU Magdaleno, RN  Pager # [phone]  Care Manager

## 2022-09-22 NOTE — PROGRESS NOTES
Assumed care of patient awake and alert, oriented x3, periods of confusion noted. Patient complained of pain with repositioning for breakfast, patient medicated with PRN tylenol and also cold packs applied to surgical incision site. Patient currently eating breakfast, appears to be becoming more comfortable.

## 2022-09-22 NOTE — REHAB NOTE
ARU/IPR REFERRAL CONTACT NOTE  [id number] [facility]      Thank you for the opportunity to review this patient's case for admission to [id number] [facility]. Based on our pre-admission screening:    [ x] This patient meets criteria for admission to [facility]. Met with patient, son, and granddaughter at the bedside with MD present. Discussed ARU admission, density of program, and pain management during admission. Pt and family agreeable to rehab. Plan to admit patient today. Will follow up with time and room number. Addendum: Plan to admit patient to room 189 @1pm.  Please call report to [phone] prior to transfer. Please have completed discharge summary in the chart prior to transfer. Again, Thank you for this referral. Should you have any questions please do not hesitate to call.      Sincerely,  Vaughn Perez Liaison  [facility]  [phone]

## 2022-09-22 NOTE — ROUTINE PROCESS
Bedside and Verbal shift change report given to Wyatt Rothman RN (oncoming nurse) by Mamadou Coburn RN  (offgoing nurse). Report included the following information SBAR, Kardex, MAR, and Recent Results.

## 2022-09-22 NOTE — REHAB NOTE
SHIFT CHANGE NOTE FOR [facility]    Bedside and Verbal shift change report given to John Masters RN (oncoming nurse) by Kb Reynolds (offgoing nurse). Report included the following information SBAR, Kardex, MAR and Recent Results. Situation:   Code Status: DNR   Hospital Day: 0   Problem List:   Hospital Problems  Date Reviewed: 5/31/2022            Codes Class Noted POA    Hip fracture [facility]) ICD-10-CM: K82.105W  ICD-9-CM: 820.8  9/18/2022 Unknown           Background:   Past Medical History:   Past Medical History:   Diagnosis Date    CAD (coronary artery disease) 2008    Fibromyalgia     Heart failure ([facility] [address]     Pt. states sge gas 5 heart blocks. Hypercholesterolemia     Hypertension     Menopause     Thyroid disease         Assessment:  Changes in Assessment throughout shift:      Patient has a central line: no Reasons if yes: Insertion date: Last dressing date:  Patient has Elliott Cath: no Reasons if yes:     Insertion date:  Shift elliott care completed:     Last Vitals:     Vitals:    09/22/22 1554   BP: (!) 169/69   Pulse: 88   Resp: 18   Temp: 99.2 °F (37.3 °C)   SpO2: 95%   Weight: 62.4 kg (137 lb 9.6 oz)   Height: 5' 2\" (1.575 m)       PAIN    Pain Assessment    Pain Intensity 1: 3 (09/22/22 1445) Pain Intensity 1: 2 (12/29/14 1105)      Pain Location 1: Abdomen      Pain Intervention(s) 1: Medication (see MAR)  Patient Stated Pain Goal: 0 Patient Stated Pain Goal: 0  Intervention effective: yes  Other actions taken for pain:      Skin Assessment  Skin color Skin Color: Ecchymosis (comment)  Condition/Temperature    Integrity Skin Integrity: Abrasion, Incision (comment)  Turgor    Weekly Pressure Ulcer Documentation  Pressure  Injury Documentation: Pressure Injury Noted-See Wound LDA to Document  Wound Prevention & Protection Methods  Orientation of wound Orientation of Wound Prevention: Posterior  Location of Prevention Location of Wound Prevention: Sacrum/Coccyx  Dressing Present Dressing Present : Yes  Dressing Status Dressing Status: Intact  Wound Offloading      INTAKE/OUPUT  Date 09/21/22 0700 - 09/22/22 0659(Not Admitted) 09/22/22 0700 - 09/23/22 0659   Shift [id number] 1900-0659 24 Hour Total 1663-6700 6964-6520 24 Hour Total   INTAKE   Shift Total(mL/kg)         OUTPUT   Urine           Urine Occurrence(s)    1 x  1 x   Stool           Stool Occurrence(s)    0 x  0 x   Shift Total(mL/kg)         NET         Weight (kg)    62.4 62.4 62.4       Recommendations:  Patient needs and requests: transferring    Pending tests/procedures: Morning labs     Functional Level/Equipment: Complete care / Bed (comment)/ bedside commode    Fall Precautions:   Fall risk precautions were reinforced with the patient; she was instructed to call for help prior to getting up. The following fall risk precautions were continued: bed/ chair alarms, door signage, yellow bracelet and socks as well as update of the Gely Nap tool in the patient's room. Alma Rosa Score: 5    HEALS Safety Check    A safety check occurred in the patient's room between off going nurse and oncoming nurse listed above. The safety check included the below items  Area Items   H  High Alert Medications Verify all high alert medication drips (heparin, PCA, etc.)   E  Equipment Suction is set up for ALL patients (with geneva)  Red plugs utilized for all equipment (IV pumps, etc.)  WOWs wiped down at end of shift. Room stocked with oxygen, suction, and other unit-specific supplies   A  Alarms Bed alarm is set for fall risk patients  Ensure chair alarm is in place and activated if patient is up in a chair   L  Lines Check IV for any infiltration  Talley bag is empty if patient has a Talley   Tubing and IV bags are labeled   S  Safety   Room is clean, patient is clean, and equipment is clean. Hallways are clear from equipment besides carts.    Fall bracelet on for fall risk patients  Ensure room is clear and free of clutter  Suction is set up for ALL patients (with geneva)  Hallways are clear from equipment besides carts.    Isolation precautions followed, supplies available outside room, sign posted     Chelsea Alaniz   Wound Prevention Checklist    Patient: Reji Chavez (73 y.o. female)  Date: 9/22/2022  Diagnosis: Hip fracture ([facility] [address] Rain Azevedo <principal problem not specified>    Precautions:         []  Heel prevention boots placed on patient    []  Patient turned q2h during shift    []  Lift team ordered    []  Patient on [facility] bed/Specialty bed    [x]  Each Wound is documented during shift (Stage, Color, drainage, odor, measurements, and dressings)    [x]  Dual skin check done with Levorn Overall, RN    Chelsea Alaniz

## 2022-09-22 NOTE — PROGRESS NOTES
Report given to Nurse Layne over in ARU. PIV removed prior to patient discharge. Discharge instructions reviewed with patient at bedside, patient verbalizes understanding.

## 2022-09-22 NOTE — PROGRESS NOTES
[facility]  Inpatient Progress Note      2022  7:38 AM     Chart reviewed. Interval History/Events of Past 24 hours:   Pt S/e at bedside. Awakened from sleep. Reports pain controlled now but is very painful with movement or trying to ambulate. No other complaints at this time. Rilla Brinks to discharge. Objective:  Vital signs:   Visit Vitals  BP (!) 147/53 (BP 1 Location: Left upper arm, BP Patient Position: At rest)   Pulse 66   Temp 97.6 °F (36.4 °C)   Resp 18   Ht 5' 2\" (1.575 m)   Wt 134 lb (60.8 kg)   SpO2 98%   Breastfeeding Unknown   BMI 24.51 kg/m²     Patient Vitals for the past 24 hrs:   Temp Pulse Resp BP SpO2   22 0334 97.6 °F (36.4 °C) 66 18 (!) 147/53 98 %   22 0019 97.9 °F (36.6 °C) 68 18 (!) 151/64 97 %   22 1932 100.1 °F (37.8 °C) 66 20 (!) 137/50 98 %   22 1536 99.7 °F (37.6 °C) 69 20 (!) 123/59 96 %   22 1152 -- -- -- (!) 111/52 --   22 1112 98.1 °F (36.7 °C) 77 18 (!) 95/52 95 %   22 0749 97.7 °F (36.5 °C) 70 18 129/67 97 %     Temp (24hrs), Av.5 °F (36.9 °C), Min:97.6 °F (36.4 °C), Max:100.1 °F (37.8 °C)    Admission Weight: Last Weight   Weight: 134 lb 11.2 oz (61.1 kg) Weight: 134 lb (60.8 kg)     Physical Examination:     General:  Alert, oriented, NAD  MS Exam: RLE NVID, No signs of DVT. Dressing C/D/I, Quad function intact, painful log roll.            Labs:  Recent Results (from the past 24 hour(s))   CBC WITH AUTOMATED DIFF    Collection Time: 09/22/22  2:25 AM   Result Value Ref Range    WBC 8.9 4.6 - 13.2 K/uL    RBC 2.94 (L) 4.20 - 5.30 M/uL    HGB 9.1 (L) 12.0 - 16.0 g/dL    HCT 27.0 (L) 35.0 - 45.0 %    MCV 91.8 78.0 - 100.0 FL    MCH 31.0 24.0 - 34.0 PG    MCHC 33.7 31.0 - 37.0 g/dL    RDW 13.2 11.6 - 14.5 %    PLATELET 021 969 - 138 K/uL    MPV 10.5 9.2 - 11.8 FL    NRBC 0.0 0  WBC    ABSOLUTE NRBC 0.00 0.00 - 0.01 K/uL    NEUTROPHILS 75 (H) 40 - 73 %    LYMPHOCYTES 13 (L) 21 - 52 % MONOCYTES 10 3 - 10 %    EOSINOPHILS 2 0 - 5 %    BASOPHILS 0 0 - 2 %    IMMATURE GRANULOCYTES 1 (H) 0.0 - 0.5 %    ABS. NEUTROPHILS 6.6 1.8 - 8.0 K/UL    ABS. LYMPHOCYTES 1.1 0.9 - 3.6 K/UL    ABS. MONOCYTES 0.8 0.05 - 1.2 K/UL    ABS. EOSINOPHILS 0.2 0.0 - 0.4 K/UL    ABS. BASOPHILS 0.0 0.0 - 0.1 K/UL    ABS. IMM.  GRANS. 0.0 0.00 - 0.04 K/UL    DF AUTOMATED         New Studies:   none    Assessment/Plan:    1) POD#2 Right cemented Hip hemiarthroplasty    -pain control  -DVT ppx - ASA 81mg BID, SCDs, stockings  -ice to wound  -PT/OT  Acute blood loss anemia as expected  -Case management for DC planning to rehab  -OK for DC to ARI from ortho perspective when placement obtained.  -Follow up with Dr. Caitlyn Morrow in office at [facility] on Oct. 4, 10am    2) Right 5th metacarpal fracture  -Maintain splint-convert to TKO brace    Signed by Luis F Nicholas DO

## 2022-09-22 NOTE — PROGRESS NOTES
[facility]  Progress Note    Patient: Nelida Obregon Age: 80 y.o. : 1924 MR#: [id number] SSN: [id number]  Date/Time: 2022     Subjective:     Patient was seen in presence of his manager and family members. Patient feels much better currently. Pain is much better. She only uses 2 doses of pain medication in last 24 hours. No nausea or vomiting. No shortness of breath or cough. She is willing to go to rehab. Assessment/Plan: 1. Right hip fracture secondary to mechanical fall s/p repair  2. Oblqiue fracture of fifth metacarpal  3. HTN  4. CAD  5. History of hypothyroidism    Hospital Problems  Date Reviewed: 2022            Codes Class Noted POA    Encounter for palliative care ICD-10-CM: Z51.5  ICD-9-CM: V66.7  2022 Unknown        Advanced age ICD-10-CM: R54  ICD-9-CM: 344  2022 Unknown        Debility ICD-10-CM: R53.81  ICD-9-CM: 799.3  2022 Unknown        Hip fracture ([facility] [address] ICD-10-CM: Q09.077C  ICD-9-CM: 820.8  2022 Unknown         PLAN:    Will continue Norco for pain management with bowel protocol.   Continue aspirin for DVT prophylactics as recommended by orthopedic  Continue PT and OT  Resume home dose of Norvasc, Imdur and Toprol-XL for hypertension management  Continue Synthroid  PT and OT to follow this patient  ARU placement today    Discharge barriers for today: None    Anticipated date of discharge: 2022     Case discussed with:  [x]Patient  []Family  [x]Nursing  []Case Management  DVT Prophylaxis:  []Lovenox  []Hep SQ  []SCDs  []Coumadin   []Eliquis/Xarelto     Objective:   VS: Visit Vitals  BP (!) 166/64 (BP 1 Location: Left upper arm, BP Patient Position: At rest)   Pulse 70   Temp 98.2 °F (36.8 °C)   Resp 13   Ht 5' 2\" (1.575 m)   Wt 60.8 kg (134 lb)   SpO2 98%   Breastfeeding Unknown   BMI 24.51 kg/m²      Tmax/24hrs: Temp (24hrs), Av.6 °F (37 °C), Min:97.6 °F (36.4 °C), Max:100.1 °F (37.8 °C)  IOBRIEF  Intake/Output Summary (Last 24 hours) at 9/22/2022 0906  Last data filed at 9/22/2022 0845  Gross per 24 hour   Intake 440 ml   Output 100 ml   Net 340 ml         General:  Alert, cooperative, no acute distress    Pulmonary:  CTA Bilaterally. No Wheezing/Rales. Cardiovascular: Regular rate and Rhythm. GI:  Soft, Non distended, Non tender. + Bowel sounds. Extremities:  No edema. No calf tenderness. Right hip and upper extremity dressing in situ. Psych: Good insight. Not anxious or agitated. Neurologic: Alert and oriented X 3.   Moves both upper extremities and left lower extremity well      Medications:   Current Facility-Administered Medications   Medication Dose Route Frequency    ketorolac (TORADOL) injection 15 mg  15 mg IntraVENous Q6H PRN    HYDROcodone-acetaminophen (NORCO) 5-325 mg per tablet 1-2 Tablet  1-2 Tablet Oral Q6H PRN    sodium chloride (NS) flush 5-40 mL  5-40 mL IntraVENous Q8H    sodium chloride (NS) flush 5-40 mL  5-40 mL IntraVENous PRN    naloxone (NARCAN) injection 0.4 mg  0.4 mg IntraVENous PRN    aspirin delayed-release tablet 81 mg  81 mg Oral BID    ondansetron (ZOFRAN) injection 4 mg  4 mg IntraVENous Q4H PRN    senna-docusate (PERICOLACE) 8.6-50 mg per tablet 1 Tablet  1 Tablet Oral BID    sodium chloride (NS) flush 5-40 mL  5-40 mL IntraVENous Q8H    sodium chloride (NS) flush 5-40 mL  5-40 mL IntraVENous PRN    HYDROmorphone (DILAUDID) tablet 1 mg  1 mg Oral Q4H PRN    amLODIPine (NORVASC) tablet 5 mg  5 mg Oral DAILY    acetaminophen (TYLENOL) tablet 1,000 mg  1,000 mg Oral Q8H    Or    acetaminophen (TYLENOL) suppository 650 mg  650 mg Rectal Q8H    isosorbide mononitrate ER (IMDUR) tablet 60 mg  60 mg Oral DAILY    levothyroxine (SYNTHROID) tablet 75 mcg  75 mcg Oral ACB    metoprolol succinate (TOPROL-XL) XL tablet 25 mg  25 mg Oral DAILY    nitroglycerin (NITROSTAT) tablet 0.4 mg  0.4 mg SubLINGual Q5MIN PRN    hydrALAZINE (APRESOLINE) 20 mg/mL injection 10 mg 10 mg IntraVENous Q6H PRN    sodium chloride (NS) flush 5-40 mL  5-40 mL IntraVENous Q8H    sodium chloride (NS) flush 5-40 mL  5-40 mL IntraVENous PRN    polyethylene glycol (MIRALAX) packet 17 g  17 g Oral DAILY PRN       Labs:    Recent Results (from the past 24 hour(s))   CBC WITH AUTOMATED DIFF    Collection Time: 09/22/22  2:25 AM   Result Value Ref Range    WBC 8.9 4.6 - 13.2 K/uL    RBC 2.94 (L) 4.20 - 5.30 M/uL    HGB 9.1 (L) 12.0 - 16.0 g/dL    HCT 27.0 (L) 35.0 - 45.0 %    MCV 91.8 78.0 - 100.0 FL    MCH 31.0 24.0 - 34.0 PG    MCHC 33.7 31.0 - 37.0 g/dL    RDW 13.2 11.6 - 14.5 %    PLATELET 971 815 - 123 K/uL    MPV 10.5 9.2 - 11.8 FL    NRBC 0.0 0  WBC    ABSOLUTE NRBC 0.00 0.00 - 0.01 K/uL    NEUTROPHILS 75 (H) 40 - 73 %    LYMPHOCYTES 13 (L) 21 - 52 %    MONOCYTES 10 3 - 10 %    EOSINOPHILS 2 0 - 5 %    BASOPHILS 0 0 - 2 %    IMMATURE GRANULOCYTES 1 (H) 0.0 - 0.5 %    ABS. NEUTROPHILS 6.6 1.8 - 8.0 K/UL    ABS. LYMPHOCYTES 1.1 0.9 - 3.6 K/UL    ABS. MONOCYTES 0.8 0.05 - 1.2 K/UL    ABS. EOSINOPHILS 0.2 0.0 - 0.4 K/UL    ABS. BASOPHILS 0.0 0.0 - 0.1 K/UL    ABS. IMM. GRANS. 0.0 0.00 - 0.04 K/UL    DF AUTOMATED         Signed By: Aric Michaud MD     September 22, 2022      Disclaimer: Sections of this note are dictated using utilizing voice recognition software. Minor typographical errors may be present. If questions arise, please do not hesitate to contact me or call our department.

## 2022-09-22 NOTE — PROGRESS NOTES
Problem: Pain  Goal: *Control of Pain  Outcome: Resolved/Met  Goal: *PALLIATIVE CARE:  Alleviation of Pain  Outcome: Resolved/Met     Problem: Patient Education: Go to Patient Education Activity  Goal: Patient/Family Education  Outcome: Resolved/Met     Problem: Patient Education: Go to Patient Education Activity  Goal: Patient/Family Education  Outcome: Resolved/Met     Problem: Lower Extremity Fracture:Day of Admission  Goal: Off Pathway (Use only if patient is Off Pathway)  Outcome: Resolved/Met  Goal: Activity/Safety  Outcome: Resolved/Met  Goal: Consults, if ordered  Outcome: Resolved/Met  Goal: Diagnostic Test/Procedures  Outcome: Resolved/Met  Goal: Nutrition/Diet  Outcome: Resolved/Met  Goal: Medications  Outcome: Resolved/Met  Goal: Respiratory  Outcome: Resolved/Met  Goal: Treatments/Interventions/Procedures  Outcome: Resolved/Met  Goal: Psychosocial  Outcome: Resolved/Met  Goal: *Optimal pain control at patient's stated goal  Outcome: Resolved/Met  Goal: *Hemodynamically stable  Outcome: Resolved/Met  Goal: *Adequate oxygenation  Outcome: Resolved/Met     Problem: Lower Extremity Fracture:Day of Surgery ([facility] Measures for Post-op Care)  Goal: Off Pathway (Use only if patient is Off Pathway)  Outcome: Resolved/Met  Goal: Activity/Safety  Outcome: Resolved/Met  Goal: Consults, if ordered  Outcome: Resolved/Met  Goal: Diagnostic Test/Procedures  Outcome: Resolved/Met  Goal: Nutrition/Diet  Outcome: Resolved/Met  Goal: Medications  Outcome: Resolved/Met  Goal: Respiratory  Outcome: Resolved/Met  Goal: Treatments/Interventions/Procedures  Outcome: Resolved/Met  Goal: Psychosocial  Outcome: Resolved/Met  Goal: *Optimal pain control at patient's stated goal  Outcome: Resolved/Met  Goal: *Hemodynamically stable  Outcome: Resolved/Met  Goal: *Adequate oxygenation  Outcome: Resolved/Met     Problem: Lower Extremity Fracture:Post-op Day 1  Goal: Off Pathway (Use only if patient is Off Pathway)  Outcome: Resolved/Met  Goal: Activity/Safety  Outcome: Resolved/Met  Goal: Consults, if ordered  Outcome: Resolved/Met  Goal: Diagnostic Test/Procedures  Outcome: Resolved/Met  Goal: Nutrition/Diet  Outcome: Resolved/Met  Goal: Discharge Planning  Outcome: Resolved/Met  Goal: Medications  Outcome: Resolved/Met  Goal: Respiratory  Outcome: Resolved/Met  Goal: Treatments/Interventions/Procedures  Outcome: Resolved/Met  Goal: Psychosocial  Outcome: Resolved/Met  Goal: *Optimal pain control at patient's stated goal  Outcome: Resolved/Met  Goal: *Hemodynamically stable  Outcome: Resolved/Met  Goal: *Adequate oxygenation  Outcome: Resolved/Met  Goal: *PT/INR within defined limits  Outcome: Resolved/Met  Goal: *Demonstrates progressive activity  Outcome: Resolved/Met     Problem: Lower Extremity Fracture:Post-op Day 2  Goal: Off Pathway (Use only if patient is Off Pathway)  Outcome: Resolved/Met  Goal: Activity/Safety  Outcome: Resolved/Met  Goal: Diagnostic Test/Procedures  Outcome: Resolved/Met  Goal: Nutrition/Diet  Outcome: Resolved/Met  Goal: Discharge Planning  Outcome: Resolved/Met  Goal: Medications  Outcome: Resolved/Met  Goal: Respiratory  Outcome: Resolved/Met  Goal: Treatments/Interventions/Procedures  Outcome: Resolved/Met  Goal: Psychosocial  Outcome: Resolved/Met  Goal: *Optimal pain control at patient's stated goal  Outcome: Resolved/Met  Goal: *Hemodynamically stable  Outcome: Resolved/Met  Goal: *Adequate oxygenation  Outcome: Resolved/Met  Goal: *PT/INR within defined limits  Outcome: Resolved/Met  Goal: *Demonstrates progressive activity  Outcome: Resolved/Met  Goal: *Voiding  Outcome: Resolved/Met  Goal: *Bowel movement  Outcome: Resolved/Met  Goal: *Tolerating diet  Outcome: Resolved/Met     Problem: Lower Extremity Fracture:Post-op Day 3  Goal: Off Pathway (Use only if patient is Off Pathway)  Outcome: Resolved/Met  Goal: Activity/Safety  Outcome: Resolved/Met  Goal: Diagnostic Test/Procedures  Outcome: Resolved/Met  Goal: Nutrition/Diet  Outcome: Resolved/Met  Goal: Discharge Planning  Outcome: Resolved/Met  Goal: Medications  Outcome: Resolved/Met  Goal: Respiratory  Outcome: Resolved/Met  Goal: Treatments/Interventions/Procedures  Outcome: Resolved/Met  Goal: Psychosocial  Outcome: Resolved/Met  Goal: *Optimal pain control at patient's stated goal  Outcome: Resolved/Met  Goal: *Hemodynamically stable  Outcome: Resolved/Met  Goal: *Adequate oxygenation  Outcome: Resolved/Met  Goal: *PT/INR within defined limits  Outcome: Resolved/Met  Goal: *Demonstrates progressive activity  Outcome: Resolved/Met  Goal: *Voiding  Outcome: Resolved/Met  Goal: *Bowel movement  Outcome: Resolved/Met  Goal: *Tolerating diet  Outcome: Resolved/Met     Problem: Lower Extremity Fracture:Post-op Day 4  Goal: Off Pathway (Use only if patient is Off Pathway)  Outcome: Resolved/Met  Goal: Activity/Safety  Outcome: Resolved/Met  Goal: Diagnostic Test/Procedures  Outcome: Resolved/Met  Goal: Nutrition/Diet  Outcome: Resolved/Met  Goal: Discharge Planning  Outcome: Resolved/Met  Goal: Medications  Outcome: Resolved/Met  Goal: Respiratory  Outcome: Resolved/Met  Goal: Treatments/Interventions/Procedures  Outcome: Resolved/Met  Goal: Psychosocial  Outcome: Resolved/Met     Problem: Lower Extremity Fracture:Discharge Outcomes  Goal: *Hemodynamically stable  Outcome: Resolved/Met  Goal: *Modified independence with transfers, ambulation on levels with assistance devices, stair climbing, ADL's  Outcome: Resolved/Met  Goal: *Independent with active exercises  Outcome: Resolved/Met  Goal: *Describes follow-up/return visits to physicians  Outcome: Resolved/Met  Goal: *Tolerating diet  Outcome: Resolved/Met  Goal: *Optimal pain control at patient's stated goal  Outcome: Resolved/Met  Goal: *Adequate air exchange  Outcome: Resolved/Met  Goal: *Lungs clear or at baseline  Outcome: Resolved/Met  Goal: *Afebrile  Outcome: Resolved/Met  Goal: *Incision intact without signs of infection, redness, warmth  Outcome: Resolved/Met  Goal: *Absence of deep venous thrombosis signs and symptoms(Stroke Metric)  Outcome: Resolved/Met  Goal: *Active bowel function  Outcome: Resolved/Met  Goal: *Adequate urinary output  Outcome: Resolved/Met  Goal: *Discharge anxiety minimal  Outcome: Resolved/Met  Goal: *Describes available resources and support systems  Outcome: Resolved/Met     Problem: General Infection Care Plan (Adult and Pediatric)  Goal: Improvement in signs and symptoms of infection  Outcome: Resolved/Met  Goal: *Optimize nutritional status  Outcome: Resolved/Met     Problem: Patient Education: Go to Patient Education Activity  Goal: Patient/Family Education  Outcome: Resolved/Met     Problem: Falls - Risk of  Goal: *Absence of Falls  Description: Document Alma Rosa Fall Risk and appropriate interventions in the flowsheet. Outcome: Resolved/Met     Problem: Patient Education: Go to Patient Education Activity  Goal: Patient/Family Education  Outcome: Resolved/Met     Problem: Pressure Injury - Risk of  Goal: *Prevention of pressure injury  Description: Document Seamus Scale and appropriate interventions in the flowsheet.   Outcome: Resolved/Met     Problem: Patient Education: Go to Patient Education Activity  Goal: Patient/Family Education  Outcome: Resolved/Met     Problem: Patient Education: Go to Patient Education Activity  Goal: Patient/Family Education  Outcome: Resolved/Met     Problem: Patient Education: Go to Patient Education Activity  Goal: Patient/Family Education  Outcome: Resolved/Met

## 2022-09-22 NOTE — PROGRESS NOTES
Problem: Pain  Goal: *Control of Pain  Outcome: Resolved/Met  Goal: *PALLIATIVE CARE:  Alleviation of Pain  Outcome: Resolved/Met     Problem: Patient Education: Go to Patient Education Activity  Goal: Patient/Family Education  Outcome: Resolved/Met     Problem: Patient Education: Go to Patient Education Activity  Goal: Patient/Family Education  Outcome: Resolved/Met     Problem: Lower Extremity Fracture:Day of Admission  Goal: Off Pathway (Use only if patient is Off Pathway)  Outcome: Resolved/Met  Goal: Activity/Safety  Outcome: Resolved/Met  Goal: Consults, if ordered  Outcome: Resolved/Met  Goal: Diagnostic Test/Procedures  Outcome: Resolved/Met  Goal: Nutrition/Diet  Outcome: Resolved/Met  Goal: Medications  Outcome: Resolved/Met  Goal: Respiratory  Outcome: Resolved/Met  Goal: Treatments/Interventions/Procedures  Outcome: Resolved/Met  Goal: Psychosocial  Outcome: Resolved/Met  Goal: *Optimal pain control at patient's stated goal  Outcome: Resolved/Met  Goal: *Hemodynamically stable  Outcome: Resolved/Met  Goal: *Adequate oxygenation  Outcome: Resolved/Met     Problem: Lower Extremity Fracture:Day of Surgery ([facility] Measures for Post-op Care)  Goal: Off Pathway (Use only if patient is Off Pathway)  Outcome: Resolved/Met  Goal: Activity/Safety  Outcome: Resolved/Met  Goal: Consults, if ordered  Outcome: Resolved/Met  Goal: Diagnostic Test/Procedures  Outcome: Resolved/Met  Goal: Nutrition/Diet  Outcome: Resolved/Met  Goal: Medications  Outcome: Resolved/Met  Goal: Respiratory  Outcome: Resolved/Met  Goal: Treatments/Interventions/Procedures  Outcome: Resolved/Met  Goal: Psychosocial  Outcome: Resolved/Met  Goal: *Optimal pain control at patient's stated goal  Outcome: Resolved/Met  Goal: *Hemodynamically stable  Outcome: Resolved/Met  Goal: *Adequate oxygenation  Outcome: Resolved/Met     Problem: Lower Extremity Fracture:Post-op Day 1  Goal: Off Pathway (Use only if patient is Off Pathway)  Outcome: Resolved/Met  Goal: Activity/Safety  Outcome: Resolved/Met  Goal: Consults, if ordered  Outcome: Resolved/Met  Goal: Diagnostic Test/Procedures  Outcome: Resolved/Met  Goal: Nutrition/Diet  Outcome: Resolved/Met  Goal: Discharge Planning  Outcome: Resolved/Met  Goal: Medications  Outcome: Resolved/Met  Goal: Respiratory  Outcome: Resolved/Met  Goal: Treatments/Interventions/Procedures  Outcome: Resolved/Met  Goal: Psychosocial  Outcome: Resolved/Met  Goal: *Optimal pain control at patient's stated goal  Outcome: Resolved/Met  Goal: *Hemodynamically stable  Outcome: Resolved/Met  Goal: *Adequate oxygenation  Outcome: Resolved/Met  Goal: *PT/INR within defined limits  Outcome: Resolved/Met  Goal: *Demonstrates progressive activity  Outcome: Resolved/Met     Problem: Lower Extremity Fracture:Post-op Day 2  Goal: Off Pathway (Use only if patient is Off Pathway)  Outcome: Resolved/Met  Goal: Activity/Safety  Outcome: Resolved/Met  Goal: Diagnostic Test/Procedures  Outcome: Resolved/Met  Goal: Nutrition/Diet  Outcome: Resolved/Met  Goal: Discharge Planning  Outcome: Resolved/Met  Goal: Medications  Outcome: Resolved/Met  Goal: Respiratory  Outcome: Resolved/Met  Goal: Treatments/Interventions/Procedures  Outcome: Resolved/Met  Goal: Psychosocial  Outcome: Resolved/Met  Goal: *Optimal pain control at patient's stated goal  Outcome: Resolved/Met  Goal: *Hemodynamically stable  Outcome: Resolved/Met  Goal: *Adequate oxygenation  Outcome: Resolved/Met  Goal: *PT/INR within defined limits  Outcome: Resolved/Met  Goal: *Demonstrates progressive activity  Outcome: Resolved/Met  Goal: *Voiding  Outcome: Resolved/Met  Goal: *Bowel movement  Outcome: Resolved/Met  Goal: *Tolerating diet  Outcome: Resolved/Met     Problem: Lower Extremity Fracture:Post-op Day 3  Goal: Off Pathway (Use only if patient is Off Pathway)  Outcome: Resolved/Met  Goal: Activity/Safety  Outcome: Resolved/Met  Goal: Diagnostic Test/Procedures  Outcome: Resolved/Met  Goal: Nutrition/Diet  Outcome: Resolved/Met  Goal: Discharge Planning  Outcome: Resolved/Met  Goal: Medications  Outcome: Resolved/Met  Goal: Respiratory  Outcome: Resolved/Met  Goal: Treatments/Interventions/Procedures  Outcome: Resolved/Met  Goal: Psychosocial  Outcome: Resolved/Met  Goal: *Optimal pain control at patient's stated goal  Outcome: Resolved/Met  Goal: *Hemodynamically stable  Outcome: Resolved/Met  Goal: *Adequate oxygenation  Outcome: Resolved/Met  Goal: *PT/INR within defined limits  Outcome: Resolved/Met  Goal: *Demonstrates progressive activity  Outcome: Resolved/Met  Goal: *Voiding  Outcome: Resolved/Met  Goal: *Bowel movement  Outcome: Resolved/Met  Goal: *Tolerating diet  Outcome: Resolved/Met     Problem: Lower Extremity Fracture:Post-op Day 4  Goal: Off Pathway (Use only if patient is Off Pathway)  Outcome: Resolved/Met  Goal: Activity/Safety  Outcome: Resolved/Met  Goal: Diagnostic Test/Procedures  Outcome: Resolved/Met  Goal: Nutrition/Diet  Outcome: Resolved/Met  Goal: Discharge Planning  Outcome: Resolved/Met  Goal: Medications  Outcome: Resolved/Met  Goal: Respiratory  Outcome: Resolved/Met  Goal: Treatments/Interventions/Procedures  Outcome: Resolved/Met  Goal: Psychosocial  Outcome: Resolved/Met     Problem: Lower Extremity Fracture:Discharge Outcomes  Goal: *Hemodynamically stable  Outcome: Resolved/Met  Goal: *Modified independence with transfers, ambulation on levels with assistance devices, stair climbing, ADL's  Outcome: Resolved/Met  Goal: *Independent with active exercises  Outcome: Resolved/Met  Goal: *Describes follow-up/return visits to physicians  Outcome: Resolved/Met  Goal: *Tolerating diet  Outcome: Resolved/Met  Goal: *Optimal pain control at patient's stated goal  Outcome: Resolved/Met  Goal: *Adequate air exchange  Outcome: Resolved/Met  Goal: *Lungs clear or at baseline  Outcome: Resolved/Met  Goal: *Afebrile  Outcome: Resolved/Met  Goal: *Incision intact without signs of infection, redness, warmth  Outcome: Resolved/Met  Goal: *Absence of deep venous thrombosis signs and symptoms(Stroke Metric)  Outcome: Resolved/Met  Goal: *Active bowel function  Outcome: Resolved/Met  Goal: *Adequate urinary output  Outcome: Resolved/Met  Goal: *Discharge anxiety minimal  Outcome: Resolved/Met  Goal: *Describes available resources and support systems  Outcome: Resolved/Met     Problem: General Infection Care Plan (Adult and Pediatric)  Goal: Improvement in signs and symptoms of infection  Outcome: Resolved/Met  Goal: *Optimize nutritional status  Outcome: Resolved/Met     Problem: Patient Education: Go to Patient Education Activity  Goal: Patient/Family Education  Outcome: Resolved/Met     Problem: Falls - Risk of  Goal: *Absence of Falls  Description: Document Alma Rosa Fall Risk and appropriate interventions in the flowsheet. Outcome: Resolved/Met     Problem: Patient Education: Go to Patient Education Activity  Goal: Patient/Family Education  Outcome: Resolved/Met     Problem: Pressure Injury - Risk of  Goal: *Prevention of pressure injury  Description: Document Seamus Scale and appropriate interventions in the flowsheet.   Outcome: Resolved/Met     Problem: Patient Education: Go to Patient Education Activity  Goal: Patient/Family Education  Outcome: Resolved/Met     Problem: Patient Education: Go to Patient Education Activity  Goal: Patient/Family Education  Outcome: Resolved/Met     Problem: Patient Education: Go to Patient Education Activity  Goal: Patient/Family Education  Outcome: Resolved/Met

## 2022-09-22 NOTE — DISCHARGE INSTRUCTIONS
OUTPATIENT MEDICATIONS    Tylenol (Acetaminophen) 500 mg: Take 2 tabs by mouth every 8 hours for pain. Celebrex (Celecoxib) 200 mg: Take 1 cap by mouth once daily with food in the evening. Mobic (Meloxicam) 15 mg: Take 1 cap by mouth once daily with food in the evening. Ultram (Tramadol) 50 mg: Take 1 tab by mouth every 6 hours for pain not controlled by Tylenol. Oxycodone (Roxicodone) 5 mg: Take 1 tab by mouth every 4 hours as needed for pain not controlled by acetaminophen and Ultram (Tramadol)     Surfak (Docusate calcium) 240 mg: Take 1 cap by mouth twice daily while taking narcotics to avoid constipation. Miralax (Polyethylene glycol): Mix 17 Grams with 8 oz. juice or water and take by mouth up to twice daily as needed for constipation. Pantoprazole (Protonix) 20 mg: Take 1 tablet daily while taking aspirin to prevent stomach ulcers. Aspirin EC 81 mg: Take 1 tablet by mouth twice daily for 6 weeks to prevent blood clots. Eliquis (Apixaban) 2.5 mg: Take 1 tablet by mouth twice daily for 30 days to prevent blood clots. Naloxone 4mg Nasal Spray: 1 spray in each nostril every 2-3 minutes as needed for narcotic overdose. DISCHARGE ACTIVITY  ? Walking is the most important therapy after a hip replacement. Gradually increase your walking daily. Start by walking daily 5-10 minutes 3-4 times per day. The goal is to be walking 20-30 minutes 1-2 times per day by 6 weeks post-op. You may progress to a single crutch or cane as tolerated. ? Weight-bearing as tolerated with walker or crutches for a minimum of 2 weeks to prevent falls. (Discuss with your surgeon at 2 week post-op appointment) Slowly transition to a cane and then nothing when you feel comfortable and safe (everyone is different). This can take anywhere from a few weeks to a few months. ?  No running or high impact activities. ?  You may put full weight on your hip unless instructed otherwise.   ?  HIP REPLACEMENT PRECAUTIONS    Avoid extremes of motion. No pivoting on operative leg for 6 weeks. SLEEP    You may sleep on your back, stomach or either side with a pillow between your knees. You may resume sexual relations according to your comfort and must avoid extremes of motion. BLOOD CLOT PREVENTION    You will be on a medication to prevent blood clots for 4-6 weeks after surgery. Take as directed until all tablets are taken. ?   Walking regularly every day helps decrease the risk of blood clots! COMPRESSION STOCKINGS     Wear knee-high compression stockings during the day as needed until swelling resolves. Remove stockings at bedtime. You may use an ace wrap instead of stockings to help with swelling. Apply the ace wrap from middle of calf to middle of thigh. WOUND DRESSING INSTRUCTIONS    Please do not remove the bandage. Keep in place until follow-up appointment 10-14 days after surgery. Call the [facility]  and ask for the orthopedic on call physician if there is new drainage 5 days after surgery. Phone: [phone]    SHOWERING  ? It is OK to shower ONLY if incision remains dry. ?   You may shower with the outer dressing in place, immediately following surgery. Make sure the edges are sealed to avoid getting the wound wet. If becomes wet under bandage, remove and call the [address] [place] [address] office. After bandage is removed (at 2 week postoperative visit), you may shower. DO NOT scrub your incision, but allow soapy water to flow over it and pat dry. ?   Do not wash or scrub the incision for 6 weeks. ?   Do not apply topical antibiotics, creams, lotions, ointments, etc. to the incision for 6 weeks post-op. Do not submerge in water (pool/tub/bath/ocean/lake/etc.) until 4-6 weeks after surgery or until the incision has healed with no scabs remaining. ICE  ? Inflammation in the operative leg is normal for several months after surgery  ?    Use ice several times a day for 30 minutes tablet Take 500 mg by mouth 3 times daily       risperiDONE (RISPERDAL) 2 MG tablet Take 3 mg by mouth Take 2 tablets at bedtime      acetaminophen (TYLENOL) 500 MG tablet Take 1,000 mg by mouth every 4 hours as needed for Pain.  loperamide (IMODIUM) 2 MG capsule Take 2 mg by mouth 4 times daily as needed for Diarrhea. No current facility-administered medications for this visit. Allergies   Allergen Reactions    Citalopram     Lexapro [Escitalopram Oxalate]     Seasonal     Serotonin Reuptake Inhibitors (Ssris)        Review of Systems   Constitutional: Negative. HENT: Negative. Eyes: Negative. Respiratory: Negative. Cardiovascular: Negative. Gastrointestinal: Negative. Endocrine: Negative. Genitourinary: Negative. Musculoskeletal: Negative. Skin: Negative. Allergic/Immunologic: Negative. Neurological: Negative. Hematological: Negative. Psychiatric/Behavioral: Negative. OBJECTIVE:    Physical Exam  Vitals signs reviewed. Constitutional:       Appearance: Normal appearance. He is well-developed. He is obese. HENT:      Head: Normocephalic and atraumatic. Right Ear: Tympanic membrane, ear canal and external ear normal. There is no impacted cerumen. Left Ear: Tympanic membrane, ear canal and external ear normal. There is no impacted cerumen. Nose: Nose normal.      Mouth/Throat:      Lips: Pink. Mouth: Mucous membranes are moist.      Dentition: Normal dentition. Tongue: No lesions. Pharynx: Oropharynx is clear. Uvula midline. Tonsils: No tonsillar exudate or tonsillar abscesses. Eyes:      General: Lids are normal.         Right eye: No discharge. Left eye: No discharge. Extraocular Movements:      Right eye: Normal extraocular motion. Left eye: Normal extraocular motion. Conjunctiva/sclera: Conjunctivae normal.      Right eye: Right conjunctiva is not injected.       Left eye: Left at a time if ice pack or continuously if using the Ice circulating machine. Do not apply heat to the wound. PROCEDURES    Any elective dental cleaning/procedure or invasive procedures like a colonoscopy should not be performed within 3 months following a joint replacement. ?   You must take antibiotic prophylaxis before any dental cleaning or other invasive procedure. DRIVING  ? Necessary travel only for 6 weeks. ?   You should not drive until you are able to walk WITHOUT a walker (a cane is OK) and are not taking any narcotic pain medications. This is usually around 3-4 weeks. ? Most patients do not require a handicapped parking pass. If necessary, we will provide one for a maximum duration of 3 months. QUESTIONS/CONCERNS    Call the [facility]  at [phone] and request to speak to the Orthopedic Surgery on-call physician. Medication Requests: Call the [facility] main number at [phone], or the [facility]  at [phone] and request to speak to the Orthopedic Surgery on-call [address] [place] [address]. POSTOPERATIVE APPOINTMENT    We will monitor your progress with follow-up clinic visits at approximately 2 and 6 weeks following your surgery. ?   Follow-up at [facility] as scheduled on Oct 4 at [place] [address]. Phone: [phone]    DISCHARGE SUMMARY from Nurse    PATIENT INSTRUCTIONS:    After general anesthesia or intravenous sedation, for 24 hours or while taking prescription Narcotics:  Limit your activities  Do not drive and operate hazardous machinery  Do not make important personal or business decisions  Do  not drink alcoholic beverages  If you have not urinated within 8 hours after discharge, please contact your surgeon on call.     Report the following to your surgeon:  Excessive pain, swelling, redness or odor of or around the surgical area  Temperature over 100.5  Nausea and vomiting lasting longer than 4 hours or if unable to take medications  Any signs of conjunctiva is not injected. Pupils: Pupils are equal, round, and reactive to light. Neck:      Musculoskeletal: Normal range of motion and neck supple. Thyroid: No thyromegaly. Vascular: No carotid bruit or JVD. Cardiovascular:      Rate and Rhythm: Normal rate and regular rhythm. Pulses:           Carotid pulses are 2+ on the right side and 2+ on the left side. Radial pulses are 2+ on the right side and 2+ on the left side. Heart sounds: Normal heart sounds, S1 normal and S2 normal. No murmur. Pulmonary:      Effort: Pulmonary effort is normal. No accessory muscle usage. Breath sounds: Normal breath sounds. Abdominal:      General: Bowel sounds are normal. There is no distension or abdominal bruit. Palpations: Abdomen is soft. There is no mass. Tenderness: There is no abdominal tenderness. Hernia: No hernia is present. Musculoskeletal: Normal range of motion. Right lower leg: No edema. Left lower leg: No edema. Lymphadenopathy:      Cervical:      Right cervical: No superficial cervical adenopathy. Left cervical: No superficial cervical adenopathy. Skin:     General: Skin is warm and dry. Coloration: Skin is not jaundiced or pale. Findings: No lesion or rash. Nails: There is no clubbing. Neurological:      Mental Status: He is alert and oriented to person, place, and time. Cranial Nerves: No facial asymmetry. Motor: No weakness or tremor. Coordination: Coordination normal.      Gait: Gait normal.      Deep Tendon Reflexes: Reflexes are normal and symmetric. Psychiatric:         Attention and Perception: Attention normal.         Mood and Affect: Mood normal.         Speech: Speech normal.         Behavior: Behavior normal.         Thought Content:  Thought content normal.         Cognition and Memory: Memory normal.         Judgment: Judgment normal.        /74 (Site: Left Upper Arm, Position: Sitting, Cuff Size: Large Adult)   Pulse 110   Temp 97.4 °F (36.3 °C) (Temporal)   Wt 253 lb (114.8 kg)   SpO2 98%   BMI 42.10 kg/m²      ASSESSMENT:     Diagnosis Orders   1. Mild intellectual disability-ongoing    2. Other schizophrenia (HCC)-followed by psychiatry CBC    Comprehensive Metabolic Panel    TSH without Reflex    Prolactin   3. Morbid obesity with BMI of 40.0-44.9, adult (HCC)-worsening    4. Prolactin increased (HCC)-need to monitor CBC    Comprehensive Metabolic Panel    TSH without Reflex    Prolactin   5. Glucose intolerance (impaired glucose tolerance)-no control Hemoglobin A1C        PLAN:    1. Continue to live in a group home. 2.  Follow-up with psychiatry. 3.  Diet and exercise. 4.  Recheck blood work may need endocrinology consultation. 5.  Obtain A1c.   Follow-up 6 months decreased circulation or nerve impairment to extremity: change in color, persistent  numbness, tingling, coldness or increase pain  Any questions    What to do at Home:  Recommended activity: Activity as tolerated,     If you experience any of the following symptoms chest pain, shortness of breath, nausea, vomiting, or fever, please follow up with your primary care provider. *  Please give a list of your current medications to your Primary Care Provider. *  Please update this list whenever your medications are discontinued, doses are      changed, or new medications (including over-the-counter products) are added. *  Please carry medication information at all times in case of emergency situations. These are general instructions for a healthy lifestyle:    No smoking/ No tobacco products/ Avoid exposure to second hand smoke  Surgeon General's Warning:  Quitting smoking now greatly reduces serious risk to your health. Obesity, smoking, and sedentary lifestyle greatly increases your risk for illness    A healthy diet, regular physical exercise & weight monitoring are important for maintaining a healthy lifestyle    You may be retaining fluid if you have a history of heart failure or if you experience any of the following symptoms:  Weight gain of 3 pounds or more overnight or 5 pounds in a week, increased swelling in our hands or feet or shortness of breath while lying flat in bed. Please call your doctor as soon as you notice any of these symptoms; do not wait until your next office visit. Patient armband removed and shredded. The discharge information has been reviewed with the patient. The patient verbalized understanding. Discharge medications reviewed with the patient and appropriate educational materials and side effects teaching were provided.   ___________________________________________________________________________________________________________________________________

## 2022-09-22 NOTE — ROUTINE PROCESS
Wound Prevention Checklist    Patient: Sangeeta Kimball (43 y.o. female)  Date: 9/22/2022  Diagnosis: Hip fracture ([facility] [address] Lior Schaffer <principal problem not specified>    Precautions: Fall, Total hip, WBAT (RLE; R cast placed on wrist -forearm d/t fifth metacarpal fx)       []  Heel prevention boots placed on patient    []  Patient turned q2h during shift    []  Lift team ordered    []  Patient on [place] bed/Specialty bed    []  Each Wound is documented during shift (Stage, Color, drainage, odor, measurements, and dressings)    [x]  Dual skin check done with RALF Sargent RN

## 2022-09-22 NOTE — DISCHARGE SUMMARY
Physician Discharge Summary       Patient: hCalo Murrell MRN: [id number]  SSN: [id number]    YOB: 1924  Age: 80 y.o. Sex: female    PCP: Cheyanne Cesar NP    Allergies: Codeine, Dapiprazole, Elavil, and Voltaren [diclofenac sodium]    Admit date: 9/18/2022  Admitting Provider: J Carlos Youssef MD    Discharge date: 9/22/2022  Discharging Provider: Molly Lee MD    * Admission Diagnoses: Hip fracture [facility]) Patrick Alvarez    * Discharge Diagnoses:      Right hip fracture secondary to mechanical fall s/p repair  2. Oblqiue fracture of fifth metacarpal  3. HTN  4. CAD  5. History of hypothyroidism  6. Incidental right ureteric stone causing right ureteral hydronephrosis    Hospital Problems as of 9/22/2022 Date Reviewed: 5/31/2022            Codes Class Noted - Resolved POA    Encounter for palliative care ICD-10-CM: Z51.5  ICD-9-CM: V66.7  9/19/2022 - Present Unknown        Advanced age ICD-10-CM: R48  ICD-9-CM: 599  9/19/2022 - Present Unknown        Debility ICD-10-CM: R53.81  ICD-9-CM: 799.3  9/19/2022 - Present Unknown        Hip fracture ([facility] [address] ICD-10-CM: D12.161Z  ICD-9-CM: 820.8  9/18/2022 - Present Unknown           * Hospital Course: Patient presented to hospital on September 18, 2022 after having a fall at home. Please refer to hospital admission H&P for further detail. Patient was found out to have a right hip and right upper extremity pain. CT head was unremarkable. CT C-spine was reported no acute cervical spine fracture. CT abdomen pelvis showed right transcervical femoral neck fracture and right ureteral 7 mm stone causing proximal hydroureteronephrosis. X-ray right hand was showing oblique fracture of the fifth metacarpal without evidence of displacement. X-ray right hip showed right femoral neck fracture. Patient was seen by orthopedic surgeon and underwent surgery. Postoperatively x-ray showed s/p right hip hemiarthroplasty with expected for surgical changes. Postoperatively her hemoglobin was stable. Postoperatively her pain was managed initially with IV then p.o. medication in last 24 hours without any issues. She also had a small bowel movement. She has been tolerating diet and medications very well. She will be continued on aspirin for DVT prophylaxis. She was resumed on home medication for the comorbidities. With regards to incidental finding of right ureter stone causing hydronephrosis, patient does not have any sepsis. No flank pain. No leukocytosis. Had normal urine analysis. Remained afebrile. Creatinine is normal.  She is voiding urine without any issues. I spoke to urologist PA and they will follow this patient while patient is in rehab. They also did not recommend further intervention. Family was made aware of it. * Procedures:   Procedure(s):  RIGHT HIP HEMIARTHROPLASTY ARNOLDO/KYLE      Consults: Orthopedic Surgery and Urology    Significant Diagnostic Studies: Head, CT C-spine, CT abdomen and pelvis, x-ray right hand, x-ray right hip, postoperative x-ray right hip. Discharge Exam:  Please refer to my progress note from September 22, 2022 for further detail    * Discharge Condition: improved  * Disposition: ARU    Discharge Medications:  Current Discharge Medication List        START taking these medications    Details   acetaminophen (TYLENOL) 500 mg tablet 500 mg 3 times a day for next 3 days then every 4 hours as needed for fever and stress or pain  Indications: pain  Qty: 30 Tablet, Refills: 0  Start date: 9/22/2022      senna-docusate (PERICOLACE) 8.6-50 mg per tablet Take 1 Tablet by mouth two (2) times a day. Qty: 15 Tablet, Refills: 0  Start date: 9/22/2022      HYDROcodone-acetaminophen (NORCO) 5-325 mg per tablet Take 1 Tablet by mouth every four (4) hours as needed for Pain for up to 3 days. Max Daily Amount: 6 Tablets.  Indications: pain  Qty: 10 Tablet, Refills: 0  Start date: 9/22/2022, End date: 9/25/2022    Associated Diagnoses: Closed fracture of right hip, initial encounter ([facility] [address]           CONTINUE these medications which have CHANGED    Details   aspirin delayed-release 81 mg tablet Take 1 Tablet by mouth two (2) times a day. Qty: 60 Tablet, Refills: 0  Start date: 9/22/2022    Associated Diagnoses: POP (dyspnea on exertion); Coronary artery disease involving native coronary artery of native heart without angina pectoris           CONTINUE these medications which have NOT CHANGED    Details   atorvastatin (Lipitor) 40 mg tablet Take 40 mg by mouth daily. amLODIPine (NORVASC) 10 mg tablet TAKE 1 TABLET BY MOUTH EVERY DAY  Qty: 90 Tablet, Refills: 3      ascorbic acid, vitamin C, (VITAMIN C) 500 mg tablet Take  by mouth.      isosorbide mononitrate ER (IMDUR) 60 mg CR tablet TAKE 1 TABLET BY MOUTH  EVERY MORNING  Qty: 90 Tab, Refills: 3      levothyroxine (SYNTHROID) 75 mcg tablet Take 75 mcg by mouth Daily (before breakfast). metoprolol succinate (TOPROL-XL) 25 mg XL tablet TAKE 1 TABLET BY MOUTH EVERY DAY  Qty: 90 Tablet, Refills: 3      nitroglycerin (NITROSTAT) 0.4 mg SL tablet by SubLINGual route every five (5) minutes as needed for Chest Pain. Associated Diagnoses: Coronary artery disease involving native coronary artery of native heart without angina pectoris; POP (dyspnea on exertion)             * Follow-up Care/Patient Instructions:   Activity: PT/OT Eval and Treat  Diet: Cardiac Diet  Wound Care: As directed    Follow-up Information       Follow up With Specialties Details Why [address], NP Nurse Practitioner   [address]  [phone]            Follow-up Appointments   Procedures    FOLLOW UP VISIT Appointment in: Other (Specify) ARU doctor to follow this patient Follow-up with Dr. Vic Vasques in 10 days     ARU doctor to follow this patient  Follow-up with Dr. Vic Vasques in 10 days     Standing Status:   Standing     Number of Occurrences:   1     Order Specific Question:   Appointment in     Answer: Other (Specify)     Please call urologist, Dr. Dennis Sánchez, if any issues with urine retention or starts having ureteric stone pain. Total time of discharge greater than 35 minutes    Disclaimer: Sections of this note are dictated using utilizing voice recognition software, which may have resulted in some phonetic based errors in grammar and contents. Even though attempts were made to correct all the mistakes, some may have been missed, and remained in the body of the document. If questions arise, please contact our department.       Signed:  Kinjal Ybarra MD  9/22/2022  12:19 PM

## 2022-09-22 NOTE — PROGRESS NOTES
Problem: Pain  Goal: *Control of Pain  Outcome: Resolved/Met  Goal: *PALLIATIVE CARE:  Alleviation of Pain  Outcome: Resolved/Met     Problem: Patient Education: Go to Patient Education Activity  Goal: Patient/Family Education  Outcome: Resolved/Met     Problem: Patient Education: Go to Patient Education Activity  Goal: Patient/Family Education  Outcome: Resolved/Met     Problem: Lower Extremity Fracture:Day of Admission  Goal: Off Pathway (Use only if patient is Off Pathway)  Outcome: Resolved/Met  Goal: Activity/Safety  Outcome: Resolved/Met  Goal: Consults, if ordered  Outcome: Resolved/Met  Goal: Diagnostic Test/Procedures  Outcome: Resolved/Met  Goal: Nutrition/Diet  Outcome: Resolved/Met  Goal: Medications  Outcome: Resolved/Met  Goal: Respiratory  Outcome: Resolved/Met  Goal: Treatments/Interventions/Procedures  Outcome: Resolved/Met  Goal: Psychosocial  Outcome: Resolved/Met  Goal: *Optimal pain control at patient's stated goal  Outcome: Resolved/Met  Goal: *Hemodynamically stable  Outcome: Resolved/Met  Goal: *Adequate oxygenation  Outcome: Resolved/Met     Problem: Lower Extremity Fracture:Day of Surgery ([facility] Measures for Post-op Care)  Goal: Off Pathway (Use only if patient is Off Pathway)  Outcome: Resolved/Met  Goal: Activity/Safety  Outcome: Resolved/Met  Goal: Consults, if ordered  Outcome: Resolved/Met  Goal: Diagnostic Test/Procedures  Outcome: Resolved/Met  Goal: Nutrition/Diet  Outcome: Resolved/Met  Goal: Medications  Outcome: Resolved/Met  Goal: Respiratory  Outcome: Resolved/Met  Goal: Treatments/Interventions/Procedures  Outcome: Resolved/Met  Goal: Psychosocial  Outcome: Resolved/Met  Goal: *Optimal pain control at patient's stated goal  Outcome: Resolved/Met  Goal: *Hemodynamically stable  Outcome: Resolved/Met  Goal: *Adequate oxygenation  Outcome: Resolved/Met     Problem: Lower Extremity Fracture:Post-op Day 1  Goal: Off Pathway (Use only if patient is Off Pathway)  Outcome: Resolved/Met  Goal: Activity/Safety  Outcome: Resolved/Met  Goal: Consults, if ordered  Outcome: Resolved/Met  Goal: Diagnostic Test/Procedures  Outcome: Resolved/Met  Goal: Nutrition/Diet  Outcome: Resolved/Met  Goal: Discharge Planning  Outcome: Resolved/Met  Goal: Medications  Outcome: Resolved/Met  Goal: Respiratory  Outcome: Resolved/Met  Goal: Treatments/Interventions/Procedures  Outcome: Resolved/Met  Goal: Psychosocial  Outcome: Resolved/Met  Goal: *Optimal pain control at patient's stated goal  Outcome: Resolved/Met  Goal: *Hemodynamically stable  Outcome: Resolved/Met  Goal: *Adequate oxygenation  Outcome: Resolved/Met  Goal: *PT/INR within defined limits  Outcome: Resolved/Met  Goal: *Demonstrates progressive activity  Outcome: Resolved/Met     Problem: Lower Extremity Fracture:Post-op Day 2  Goal: Off Pathway (Use only if patient is Off Pathway)  Outcome: Resolved/Met  Goal: Activity/Safety  Outcome: Resolved/Met  Goal: Diagnostic Test/Procedures  Outcome: Resolved/Met  Goal: Nutrition/Diet  Outcome: Resolved/Met  Goal: Discharge Planning  Outcome: Resolved/Met  Goal: Medications  Outcome: Resolved/Met  Goal: Respiratory  Outcome: Resolved/Met  Goal: Treatments/Interventions/Procedures  Outcome: Resolved/Met  Goal: Psychosocial  Outcome: Resolved/Met  Goal: *Optimal pain control at patient's stated goal  Outcome: Resolved/Met  Goal: *Hemodynamically stable  Outcome: Resolved/Met  Goal: *Adequate oxygenation  Outcome: Resolved/Met  Goal: *PT/INR within defined limits  Outcome: Resolved/Met  Goal: *Demonstrates progressive activity  Outcome: Resolved/Met  Goal: *Voiding  Outcome: Resolved/Met  Goal: *Bowel movement  Outcome: Resolved/Met  Goal: *Tolerating diet  Outcome: Resolved/Met     Problem: Lower Extremity Fracture:Post-op Day 3  Goal: Off Pathway (Use only if patient is Off Pathway)  Outcome: Resolved/Met  Goal: Activity/Safety  Outcome: Resolved/Met  Goal: Diagnostic Test/Procedures  Outcome: Resolved/Met  Goal: Nutrition/Diet  Outcome: Resolved/Met  Goal: Discharge Planning  Outcome: Resolved/Met  Goal: Medications  Outcome: Resolved/Met  Goal: Respiratory  Outcome: Resolved/Met  Goal: Treatments/Interventions/Procedures  Outcome: Resolved/Met  Goal: Psychosocial  Outcome: Resolved/Met  Goal: *Optimal pain control at patient's stated goal  Outcome: Resolved/Met  Goal: *Hemodynamically stable  Outcome: Resolved/Met  Goal: *Adequate oxygenation  Outcome: Resolved/Met  Goal: *PT/INR within defined limits  Outcome: Resolved/Met  Goal: *Demonstrates progressive activity  Outcome: Resolved/Met  Goal: *Voiding  Outcome: Resolved/Met  Goal: *Bowel movement  Outcome: Resolved/Met  Goal: *Tolerating diet  Outcome: Resolved/Met

## 2022-09-22 NOTE — PALLIATIVE CARE
Palliative Medicine: Attempted to see patient earlier on rounds, however, patient unavailable for visit due to therapy working with patient and hospitalist and ARU rep visiting with patient and family in room. Returned to visit patient ~1215 and patient is lying in bed, awake and alert but drowsy and confused at times. Patient reports being tired after sitting up in chair for some time this morning. Granddaughter at bedside visiting with patient. Inquired if granddaughter was aware of whether her father had been able to locate patient's DNR paperwork and she was not. Patient's son, Lillie Figueroa, had already left the hospital and is leaving town on vacation for the next week. Patient is drowsy with periods of confusion. POST form not completed at this time. Plan is for patient to transition to ARU this afternoon for rehab. Goals of care remain DNR. Thank you for the opportunity to participate in the care of Ms. [address].     CLARICE Chan-BC  Palliative Medicine

## 2022-09-22 NOTE — CONSULTS
ASSESSMENT:   Right Hydronephrosis 2/2 R Proximal Ureteral Stone - 7mm  Bilateral Intrarenal Stone   WBC WNL   UA 9/18/22: negative    Creat 0.88   Tmax 100.1    Admitted R Hip Fx s/p Fall  FX of Fifth Metacarpal    H/o HTN  CAD      PLAN:    CT reviewed, right proximal ureteral stone. UA appears negative but patient with Tmax 100.1 within past 24 hours. WBC WNL, no CVAT, and UA negative on 9/18/22. Case discussed with attending, will defer stent placement at this time for stone unless patient becomes septic. Please notify if this occurs. Obtain PVR  If no intervention needed, will require f/up outpatient for definitive stone tx. Will see tomorrow. Follow up arranged? No    Roslyn Betts PA-C  [facility]  Available M-Fri, Kim Vazquez- 5PM  Pager: [phone]     September 22, 2022 12:51 PM    Attestation by Brody Freeman. Lily Negron MD    I agree with the findings as documented, and participated in the development of the care plan, any changes were made to the note as needed and any additional comments are below:        Brody Freeman. Lily Negron MD  [facility], [facility] (pager)               Chief Complaint   Patient presents with    Fall       HISTORY OF PRESENT ILLNESS:  Dea Mclean is a 80 y.o. female who is seen in consultation as referred by Dr. Michael Rivas  for hydronephrosis, kidney stone. No past urological hx noted. Patient with PMHX significant for below, presented to ED on 9/18/22 for right hip pain after mechanical fall today. CT obtained on 9/18/22, shows right ureteral stone causing hydronephrosis. Patient admitted for surgery for right femoral neck fracture. Urology consulted regarding kidney stone on 9/22/22. Patient noted to complain of dysuria on admission. No complaints today. Patient appears confused. Past Medical History:   Diagnosis Date    CAD (coronary artery disease) 2008    Fibromyalgia     Heart failure ([facility] [address]     Pt. states sge gas 5 heart blocks. Hypercholesterolemia     Hypertension     Menopause     Thyroid disease        Past Surgical History:   Procedure Laterality Date    HX CHOLECYSTECTOMY      HX HYSTERECTOMY         Social History     Tobacco Use    Smoking status: Never    Smokeless tobacco: Never   Substance Use Topics    Alcohol use: No    Drug use: No       Allergies   Allergen Reactions    Codeine Other (comments)     dizziness    Dapiprazole Vertigo    Elavil Vertigo    Voltaren [Diclofenac Sodium] Vertigo       Family History   Problem Relation Age of Onset    Breast Cancer Mother [de-identified]       Current Facility-Administered Medications   Medication Dose Route Frequency Provider Last Rate Last Admin    HYDROcodone-acetaminophen (NORCO) 5-325 mg per tablet 1 Tablet  1 Tablet Oral Q4H PRN Favian Gorman MD        sodium chloride (NS) flush 5-40 mL  5-40 mL IntraVENous Q8H Janet Guan, DO   10 mL at 09/22/22 0648    sodium chloride (NS) flush 5-40 mL  5-40 mL IntraVENous PRN Jennifer Moss DO        naloxone [facility]) injection 0.4 mg  0.4 mg IntraVENous PRN Carmen HOLBROOK, DO        aspirin delayed-release tablet 81 mg  81 mg Oral BID Carmen HOLBROOK, DO   81 mg at 09/22/22 0840    ondansetron (ZOFRAN) injection 4 mg  4 mg IntraVENous Q4H PRN Carmen HOLBROOK DO        senna-docusate (PERICOLACE) 8.6-50 mg per tablet 1 Tablet  1 Tablet Oral BID Jennifer Moss DO   1 Tablet at 09/22/22 0840    sodium chloride (NS) flush 5-40 mL  5-40 mL IntraVENous Q8H Justin Guan, DO   10 mL at 09/22/22 0648    sodium chloride (NS) flush 5-40 mL  5-40 mL IntraVENous PRN Carmen HOLBROOK DO        amLODIPine (NORVASC) tablet 5 mg  5 mg Oral DAILY Gil Ramsey MD   5 mg at 09/22/22 0840    acetaminophen (TYLENOL) tablet 1,000 mg  1,000 mg Oral Q8H Justin Guan, DO   1,000 mg at 09/21/22 2240    Or    acetaminophen (TYLENOL) suppository 650 mg  650 mg Rectal Q8H Jennifer Moss DO isosorbide mononitrate ER (IMDUR) tablet 60 mg  60 mg Oral DAILY Carlos Palacio MD   60 mg at 09/22/22 0840    levothyroxine (SYNTHROID) tablet 75 mcg  75 mcg Oral ACB Carlos Palacio MD   75 mcg at 09/22/22 0839    metoprolol succinate (TOPROL-XL) XL tablet 25 mg  25 mg Oral DAILY Carlos Palacio MD   25 mg at 09/22/22 2643    nitroglycerin (NITROSTAT) tablet 0.4 mg  0.4 mg SubLINGual Q5MIN PRN Carlos Palacio MD        hydrALAZINE (APRESOLINE) 20 mg/mL injection 10 mg  10 mg IntraVENous Q6H PRN Carlos Palacio MD   10 mg at 09/20/22 0328    sodium chloride (NS) flush 5-40 mL  5-40 mL IntraVENous Q8H Carlos Palacio MD   10 mL at 09/22/22 0648    sodium chloride (NS) flush 5-40 mL  5-40 mL IntraVENous PRN Carlos Palacio MD        polyethylene glycol (MIRALAX) packet 17 g  17 g Oral DAILY PRN Carlos Palacio MD             Review of Systems:  ROS is:     Not obtainable due to patient factors, AMS      PHYSICAL EXAMINATION:     Visit Vitals  /61 (BP 1 Location: Left upper arm, BP Patient Position: At rest)   Pulse 69   Temp 97.9 °F (36.6 °C)   Resp 14   Ht 5' 2\" (1.575 m)   Wt 60.8 kg (134 lb)   SpO2 98%   Breastfeeding Unknown   BMI 24.51 kg/m²     Constitutional: Well developed, well-nourished female in no acute distress. HEENT: Normocephalic, Atraumatic   CV:   no edema   Respiratory: No respiratory distress or difficulties breathing   Abdomen:  Soft and nontender.  Female:  CVA tenderness: none                    Pure wick with clear, yellow urine  Skin:  Normal color. No evidence of jaundice. Neuro/Psych:  Confused      REVIEW OF LABS AND IMAGING:       CT 9/18/22: IMPRESSION   Right transcervical femoral neck fracture with posterior angulation and  significant gap in the fracture fragments anteriorly. Right ureteral 7 mm stone causing proximal hydroureteronephrosis. This is at the  L3 level. Multiple nonobstructing stones in the right kidney.      Diverticulosis without evidence of diverticulitis. Degenerative changes at L3-L4 with moderate to severe canal narrowing. Findings discussed with Dr. Oscar Fitzgerald. Labs: Results:   Chemistry    Recent Labs     09/21/22  0231 09/20/22  0232   * 105*   * 136   K 4.2 3.9    103   CO2 23 23   BUN 29* 21*   CREA 0.88 0.54*   CA 7.9* 8.7   AGAP 9 10   BUCR 33* 39*   AP 69  --    TP 6.0*  --    ALB 2.7*  --    GLOB 3.3  --    AGRAT 0.8  --       CBC w/Diff Recent Labs     09/22/22  0225 09/21/22  0535 09/20/22 0232   WBC 8.9 11.7 7.9   RBC 2.94* 3.75* 4.32   HGB 9.1* 11.9* 13.2   HCT 27.0* 33.7* 38.7    223 141   GRANS 75* 84*  --    LYMPH 13* 6*  --    EOS 2 0  --       Cultures No results for input(s): CULT in the last 72 hours. All Micro Results       Procedure Component Value Units Date/Time    COVID-19 RAPID TEST [[id number]] Collected: 09/18/22 1320    Order Status: Completed Specimen: Nasopharyngeal Updated: 09/18/22 1348     Specimen source Nasopharyngeal        COVID-19 rapid test Not detected        Comment: Rapid Abbott ID Now       Rapid NAAT:  The specimen is NEGATIVE for SARS-CoV-2, the novel coronavirus associated with COVID-19. Negative results should be treated as presumptive and, if inconsistent with clinical signs and symptoms or necessary for patient management, should be tested with an alternative molecular assay. Negative results do not preclude SARS-CoV-2 infection and should not be used as the sole basis for patient management decisions. This test has been authorized by the FDA under an Emergency Use Authorization (EUA) for use by authorized laboratories.    Fact sheet for Healthcare Providers: ConventionUpdate.co.nz  Fact sheet for Patients: ConventionUpdate.co.nz       Methodology: Isothermal Nucleic Acid Amplification                   Urinalysis Color   Date Value Ref Range Status   09/18/2022 YELLOW   Final     Appearance   Date Value Ref Range Status   09/18/2022 CLEAR   Final     Specific gravity   Date Value Ref Range Status   09/18/2022 1.012 1.005 - 1.030   Final     pH (UA)   Date Value Ref Range Status   09/18/2022 6.5 5.0 - 8.0   Final     Protein   Date Value Ref Range Status   09/18/2022 Negative NEG mg/dL Final     Ketone   Date Value Ref Range Status   09/18/2022 15 (A) NEG mg/dL Final     Bilirubin   Date Value Ref Range Status   09/18/2022 Negative NEG   Final     Blood   Date Value Ref Range Status   09/18/2022 Negative NEG   Final     Urobilinogen   Date Value Ref Range Status   09/18/2022 0.2 0.2 - 1.0 EU/dL Final     Nitrites   Date Value Ref Range Status   09/18/2022 Negative NEG   Final     Leukocyte Esterase   Date Value Ref Range Status   09/18/2022 Negative NEG   Final     Potassium   Date Value Ref Range Status   09/21/2022 4.2 3.5 - 5.5 mmol/L Final     Creatinine   Date Value Ref Range Status   09/21/2022 0.88 0.6 - 1.3 MG/DL Final     BUN   Date Value Ref Range Status   09/21/2022 29 (H) 7.0 - 18 MG/DL Final      Coagulation Lab Results   Component Value Date/Time    Prothrombin time 14.2 09/19/2022 05:00 AM    INR 1.1 09/19/2022 05:00 AM

## 2022-09-22 NOTE — REHAB NOTE
Zulema Davison is a 80 y.o.  female admitted on 9/22 from 4N. Report received from [place], [facility]. Transportation was provided and the patient was transferred to her room via Wheelchair. Patient was assisted to bed with assist of 2. The patient was oriented to her room. Fall risk precautions were discussed with the patient; she was instructed to call for help prior to getting up. The following fall risk precautions were initiated: bed/ chair alarms, door signage, yellow bracelet and socks as well as completion of the Melanie Raven tool in the patient's room. Four eyes nurse skin assessment was performed by Shane Taylor RN and Silvia Castaneda RN. Skin problems noted: YES. Pt noted to have stage 1 nonblanchable on sacrum, bruises on bilateral arms, right hand in splint and ace bandage, and honey comb dressing to R hip. Son notified of patients arrival to unit.      Nila Christiansen

## 2022-09-22 NOTE — PROGRESS NOTES
Palliative Medicine    Visited patient along with Palliative team member ANALIA Mcduffie NP. Patient lying in bed, awake, alert to self and surroundings, some confusion noted. Is aware that she is going,\"downstairs\" later today. Granddaughter at bedside. Son has already left and going out of town until 10/03/22. Granddaughter is unaware if her father had found patient's do not resuscitate paperwork. Patient declined doing POST at this time. Palliative Medicine remains available to provide support to Ms. Idris Ford and her family during this hospital stay.     CODE STATUS: DNR/DNI    Pierre Perkins RN  Palliative Medicine Inpatient RN  Palliative [facility] Line: [phone]

## 2022-09-23 LAB
ANION GAP SERPL CALC-SCNC: 8 MMOL/L (ref 3–18)
BASOPHILS # BLD: 0 K/UL (ref 0–0.1)
BASOPHILS NFR BLD: 0 % (ref 0–2)
BUN SERPL-MCNC: 20 MG/DL (ref 7–18)
BUN/CREAT SERPL: 41 (ref 12–20)
CALCIUM SERPL-MCNC: 8.3 MG/DL (ref 8.5–10.1)
CHLORIDE SERPL-SCNC: 101 MMOL/L (ref 100–111)
CO2 SERPL-SCNC: 24 MMOL/L (ref 21–32)
CREAT SERPL-MCNC: 0.49 MG/DL (ref 0.6–1.3)
DIFFERENTIAL METHOD BLD: ABNORMAL
EOSINOPHIL # BLD: 0.1 K/UL (ref 0–0.4)
EOSINOPHIL NFR BLD: 1 % (ref 0–5)
ERYTHROCYTE [DISTWIDTH] IN BLOOD BY AUTOMATED COUNT: 12.9 % (ref 11.6–14.5)
FERRITIN SERPL-MCNC: 324 NG/ML (ref 8–388)
FOLATE SERPL-MCNC: 17.1 NG/ML (ref 3.1–17.5)
GLUCOSE BLD STRIP.AUTO-MCNC: 113 MG/DL (ref 70–110)
GLUCOSE BLD STRIP.AUTO-MCNC: 113 MG/DL (ref 70–110)
GLUCOSE BLD STRIP.AUTO-MCNC: 124 MG/DL (ref 70–110)
GLUCOSE BLD STRIP.AUTO-MCNC: 172 MG/DL (ref 70–110)
GLUCOSE SERPL-MCNC: 99 MG/DL (ref 74–99)
HCT VFR BLD AUTO: 30.3 % (ref 35–45)
HGB BLD-MCNC: 10.1 G/DL (ref 12–16)
IMM GRANULOCYTES # BLD AUTO: 0.1 K/UL (ref 0–0.04)
IMM GRANULOCYTES NFR BLD AUTO: 1 % (ref 0–0.5)
IRON SATN MFR SERPL: 10 % (ref 20–50)
IRON SERPL-MCNC: 18 UG/DL (ref 50–175)
LYMPHOCYTES # BLD: 1 K/UL (ref 0.9–3.6)
LYMPHOCYTES NFR BLD: 13 % (ref 21–52)
MCH RBC QN AUTO: 30.3 PG (ref 24–34)
MCHC RBC AUTO-ENTMCNC: 33.3 G/DL (ref 31–37)
MCV RBC AUTO: 91 FL (ref 78–100)
MONOCYTES # BLD: 0.8 K/UL (ref 0.05–1.2)
MONOCYTES NFR BLD: 11 % (ref 3–10)
NEUTS SEG # BLD: 5.5 K/UL (ref 1.8–8)
NEUTS SEG NFR BLD: 73 % (ref 40–73)
NRBC # BLD: 0 K/UL (ref 0–0.01)
NRBC BLD-RTO: 0 PER 100 WBC
PLATELET # BLD AUTO: 156 K/UL (ref 135–420)
PMV BLD AUTO: 10.4 FL (ref 9.2–11.8)
POTASSIUM SERPL-SCNC: 3.9 MMOL/L (ref 3.5–5.5)
RBC # BLD AUTO: 3.33 M/UL (ref 4.2–5.3)
SODIUM SERPL-SCNC: 133 MMOL/L (ref 136–145)
TIBC SERPL-MCNC: 181 UG/DL (ref 250–450)
VIT B12 SERPL-MCNC: 167 PG/ML (ref 211–911)
WBC # BLD AUTO: 7.5 K/UL (ref 4.6–13.2)

## 2022-09-23 PROCEDURE — 65310000000 HC RM PRIVATE REHAB

## 2022-09-23 PROCEDURE — 74011636637 HC RX REV CODE- 636/637: Performed by: FAMILY MEDICINE

## 2022-09-23 PROCEDURE — 74011250636 HC RX REV CODE- 250/636: Performed by: HOSPITALIST

## 2022-09-23 PROCEDURE — 97535 SELF CARE MNGMENT TRAINING: CPT

## 2022-09-23 PROCEDURE — 97116 GAIT TRAINING THERAPY: CPT

## 2022-09-23 PROCEDURE — 51798 US URINE CAPACITY MEASURE: CPT

## 2022-09-23 PROCEDURE — 85025 COMPLETE CBC W/AUTO DIFF WBC: CPT

## 2022-09-23 PROCEDURE — 36415 COLL VENOUS BLD VENIPUNCTURE: CPT

## 2022-09-23 PROCEDURE — 82728 ASSAY OF FERRITIN: CPT

## 2022-09-23 PROCEDURE — 97166 OT EVAL MOD COMPLEX 45 MIN: CPT

## 2022-09-23 PROCEDURE — 2709999900 HC NON-CHARGEABLE SUPPLY

## 2022-09-23 PROCEDURE — 97530 THERAPEUTIC ACTIVITIES: CPT

## 2022-09-23 PROCEDURE — 99223 1ST HOSP IP/OBS HIGH 75: CPT | Performed by: HOSPITALIST

## 2022-09-23 PROCEDURE — 74011250637 HC RX REV CODE- 250/637: Performed by: HOSPITALIST

## 2022-09-23 PROCEDURE — 80048 BASIC METABOLIC PNL TOTAL CA: CPT

## 2022-09-23 PROCEDURE — 97542 WHEELCHAIR MNGMENT TRAINING: CPT

## 2022-09-23 PROCEDURE — 74011250637 HC RX REV CODE- 250/637: Performed by: FAMILY MEDICINE

## 2022-09-23 PROCEDURE — 97162 PT EVAL MOD COMPLEX 30 MIN: CPT

## 2022-09-23 PROCEDURE — 82962 GLUCOSE BLOOD TEST: CPT

## 2022-09-23 PROCEDURE — 82607 VITAMIN B-12: CPT

## 2022-09-23 PROCEDURE — 83540 ASSAY OF IRON: CPT

## 2022-09-23 RX ORDER — CYANOCOBALAMIN 1000 UG/ML
1000 INJECTION, SOLUTION INTRAMUSCULAR; SUBCUTANEOUS DAILY
Status: COMPLETED | OUTPATIENT
Start: 2022-09-23 | End: 2022-09-27

## 2022-09-23 RX ORDER — POLYETHYLENE GLYCOL 3350 17 G/17G
17 POWDER, FOR SOLUTION ORAL DAILY
Status: DISCONTINUED | OUTPATIENT
Start: 2022-09-24 | End: 2022-10-05 | Stop reason: HOSPADM

## 2022-09-23 RX ORDER — LANOLIN ALCOHOL/MO/W.PET/CERES
1 CREAM (GRAM) TOPICAL
Status: DISCONTINUED | OUTPATIENT
Start: 2022-09-24 | End: 2022-10-05 | Stop reason: HOSPADM

## 2022-09-23 RX ORDER — HYDROCODONE BITARTRATE AND ACETAMINOPHEN 5; 325 MG/1; MG/1
1-2 TABLET ORAL
Status: DISCONTINUED | OUTPATIENT
Start: 2022-09-23 | End: 2022-10-05 | Stop reason: HOSPADM

## 2022-09-23 RX ADMIN — CYANOCOBALAMIN 1000 MCG: 1000 INJECTION, SOLUTION INTRAMUSCULAR; SUBCUTANEOUS at 17:21

## 2022-09-23 RX ADMIN — SENNOSIDES AND DOCUSATE SODIUM 1 TABLET: 50; 8.6 TABLET ORAL at 09:11

## 2022-09-23 RX ADMIN — ATORVASTATIN CALCIUM 40 MG: 40 TABLET, FILM COATED ORAL at 21:43

## 2022-09-23 RX ADMIN — Medication 2 UNITS: at 12:46

## 2022-09-23 RX ADMIN — AMLODIPINE BESYLATE 10 MG: 10 TABLET ORAL at 09:11

## 2022-09-23 RX ADMIN — ASPIRIN 81 MG CHEWABLE TABLET 81 MG: 81 TABLET CHEWABLE at 09:11

## 2022-09-23 RX ADMIN — HYDROCODONE BITARTRATE AND ACETAMINOPHEN 1 TABLET: 5; 325 TABLET ORAL at 22:06

## 2022-09-23 RX ADMIN — METOPROLOL SUCCINATE 25 MG: 25 TABLET, FILM COATED, EXTENDED RELEASE ORAL at 09:11

## 2022-09-23 RX ADMIN — SENNOSIDES AND DOCUSATE SODIUM 1 TABLET: 50; 8.6 TABLET ORAL at 17:21

## 2022-09-23 RX ADMIN — HYDROCODONE BITARTRATE AND ACETAMINOPHEN 1 TABLET: 5; 325 TABLET ORAL at 10:28

## 2022-09-23 RX ADMIN — ASPIRIN 81 MG CHEWABLE TABLET 81 MG: 81 TABLET CHEWABLE at 17:21

## 2022-09-23 RX ADMIN — HYDROCODONE BITARTRATE AND ACETAMINOPHEN 1 TABLET: 5; 325 TABLET ORAL at 06:28

## 2022-09-23 RX ADMIN — ISOSORBIDE MONONITRATE 60 MG: 60 TABLET, EXTENDED RELEASE ORAL at 09:11

## 2022-09-23 RX ADMIN — LEVOTHYROXINE SODIUM 75 MCG: 0.05 TABLET ORAL at 05:56

## 2022-09-23 NOTE — ACP (ADVANCE CARE PLANNING)
Advance Care Planning   Advance Care Planning Inpatient Note  [facility]  Spiritual Care Department    Today's Date: 9/23/2022  Unit: [facility]  REHAB UNIT    Received request from . Upon review of chart and communication with care team, patient's decision making abilities are not in question. Patient was/were present in the room during visit. Goals of ACP Conversation:  Discuss Advance Care planning documents    Health Care Decision Makers:    No healthcare decision makers have been documented. Click here to complete [address] including selection of the Healthcare Decision Maker Relationship (ie \"Primary\")  Summary:  No Decision Maker named by patient at this time    Advance Care Planning Documents (Patient Wishes) on file:  None     Assessment:    Patient seen as a corrine admit to the rehab unit. Found patient setting in a wheelchair with a video monitor on for her safety. Patient was asked about her having an advance directive and she said yes but it was at home. She also said that she was trying to get her family member to bring it in.     Interventions:  Deferred conversation as patient not interested in completing an advance directive at this time    Care Preferences Communicated:  No    Outcomes/Plan:      [facility] on 9/23/2022 at 10:56 AM

## 2022-09-23 NOTE — H&P
[facility] PHYSICAL REHABILITATION  [address] [place] [address], [address]     INPATIENT REHABILITATION  HISTORY AND PHYSICAL  ([facility] Admission Physician Evaluation)    Name: Pool Arce CSN: [id number]   Age: 80 y.o. MRN: [id number]   Sex: female Admit Date: 9/22/2022       Primary Rehab Impairment Category (IRWIN): Fracture of lower extremity    Impairment Group Label: Status post unilateral hip fracture    Etiologic Diagnosis: Fracture of unspecified part of neck of femur, initial encounter for closed fracture      Subjective:     Patient seen and examined. History of the Present Illness: This is a 57-year-old  female presented to hospital on September 18, 2022 after having a fall at home. Please refer to hospital admission H&P for further detail. Patient was found out to have a right hip and right upper extremity pain. CT head was unremarkable. CT C-spine was reported no acute cervical spine fracture. CT abdomen pelvis showed right transcervical femoral neck fracture and right ureteral 7 mm stone causing proximal hydroureteronephrosis. X-ray right hand was showing oblique fracture of the fifth metacarpal without evidence of displacement. X-ray right hip showed right femoral neck fracture. Patient was seen by orthopedic surgeon and underwent surgery. Postoperatively x-ray showed s/p right hip hemiarthroplasty with expected for surgical changes. Postoperatively her hemoglobin was stable. Postoperatively her pain was managed initially with IV then p.o. medication in last 24 hours without any issues. She also had a small bowel movement. She has been tolerating diet and medications very well. She will be continued on aspirin for DVT prophylaxis. She was resumed on home medication for the comorbidities. Patient was also on noted to have incidental finding of right ureter stone causing hydronephrosis, patient does not have any sepsis. No flank pain. No leukocytosis.   Had normal urine analysis. Remained afebrile. Creatinine is normal.  She is voiding urine without any issues. Discharging physician from the hospital spoke to urologist PA and they will follow this patient while patient is in rehab. They also did not recommend further intervention. Family was made aware of it. The patient had remained hemodynamically stable but due to the above events, the patient was noted to have impaired mobility and ADLs. Patient was felt to be a good candidate for acute inpatient rehabilitation. Upon evaluation by Physical Therapy and Occupational Therapy, the patient was recommended for acute inpatient rehabilitation. The patient was discharged and was subsequently admitted to the [facility] for intensive rehabilitation to help recover strength, function and mobility. Patient complains of mild pain at the surgical site otherwise she is doing much better. She denies any new complaints. Patient very enthusiastic about participating in the physical therapy and Occupational Therapy. Past Medical History:  Past Medical History:   Diagnosis Date    CAD (coronary artery disease) 2008    Fibromyalgia     Heart failure ([address]     Pt. states sge gas 5 heart blocks. Hypercholesterolemia     Hypertension     Menopause     Thyroid disease        Past Surgical History:  Past Surgical History:   Procedure Laterality Date    HX CHOLECYSTECTOMY      HX HYSTERECTOMY         Allergies: Allergies   Allergen Reactions    Codeine Other (comments)     dizziness    Dapiprazole Vertigo    Elavil Vertigo    Voltaren [Diclofenac Sodium] Vertigo         Social history: Denies any smoking or alcohol use        Family History:  Family History   Problem Relation Age of Onset    Breast Cancer Mother [de-identified]           Transfer Medications (from the transfer summary :    Prior to Admission Medications   Prescriptions Last Dose Informant Patient Reported? Taking? HYDROcodone-acetaminophen (NORCO) 5-325 mg per tablet Unknown  No No   Sig: Take 1 Tablet by mouth every four (4) hours as needed for Pain for up to 3 days. Max Daily Amount: 6 Tablets. Indications: pain   acetaminophen (TYLENOL) 500 mg tablet 2022  No Yes   Si mg 3 times a day for next 3 days then every 4 hours as needed for fever and stress or pain  Indications: pain   amLODIPine (NORVASC) 10 mg tablet 2022  No Yes   Sig: TAKE 1 TABLET BY MOUTH EVERY DAY   ascorbic acid, vitamin C, (VITAMIN C) 500 mg tablet Unknown  Yes No   Sig: Take  by mouth. aspirin delayed-release 81 mg tablet 2022  No Yes   Sig: Take 1 Tablet by mouth two (2) times a day. atorvastatin (LIPITOR) 40 mg tablet 2022  Yes Yes   Sig: Take 40 mg by mouth daily. isosorbide mononitrate ER (IMDUR) 60 mg CR tablet 2022  No Yes   Sig: TAKE 1 TABLET BY MOUTH  EVERY MORNING   levothyroxine (SYNTHROID) 75 mcg tablet 2022  Yes Yes   Sig: Take 75 mcg by mouth Daily (before breakfast). metoprolol succinate (TOPROL-XL) 25 mg XL tablet 2022  No Yes   Sig: TAKE 1 TABLET BY MOUTH EVERY DAY   nitroglycerin (NITROSTAT) 0.4 mg SL tablet Not Taking  Yes No   Sig: by SubLINGual route every five (5) minutes as needed for Chest Pain. Patient not taking: Reported on 2022   senna-docusate (Robert ) 8.6-50 mg per tablet 2022  No Yes   Sig: Take 1 Tablet by mouth two (2) times a day. Facility-Administered Medications: None       Review Of Systems:   CONSTITUTIONAL: No weight loss. EYES: No blurred vision and no eye discharge. ENT: No nasal discharge. No ear pain. CARDIOVASCULAR: No chest pain and no diaphoresis. RESPIRATORY: No cough, no hemoptysis. GI: No vomiting, no diarrhea   : No urinary frequency and no dysuria. MUSCULOSKELETAL: No muscle pains. SKIN: No rashes. NEURO: No dizziness, no numbness. ENDOCRINE: No polyphagia and no polydipsia.    HEMATOLOGY: No bleeding tendencies. Objective:     Vital Signs:  Patient Vitals for the past 24 hrs:   BP Temp Pulse Resp SpO2 Height Weight   09/23/22 0705 (!) 172/52 98.4 °F (36.9 °C) 77 18 96 % -- --   09/22/22 2100 (!) 152/60 98.6 °F (37 °C) 73 18 95 % -- --   09/22/22 1554 (!) 169/69 99.2 °F (37.3 °C) 88 18 95 % 5' 2\" (1.575 m) 62.4 kg (137 lb 9.6 oz)        Body mass index is 25.17 kg/m². Physical Examination:  General:  Alert, cooperative, no distress, appears stated age. Head: Normocephalic, without obvious abnormality, atraumatic. Eyes:  Conjunctivae/corneas clear. PERRL, EOMs intact. Nose: Nares normal. No drainage or sinus tenderness. Neck: Supple, symmetrical, trachea midline, no adenopathy, thyroid: no enlargement, no carotid bruit and no JVD. Lungs:   Clear to auscultation bilaterally. Heart:  Regular rate and rhythm, S1, S2 normal.     Abdomen: Soft, non-tender. Bowel sounds normal.    Extremities: Right hip mild swelling, dressing clean, no cyanosis or edema. Pulses: 2+ and symmetric all extremities.    Skin:  No rashes or lesions   Neurologic: AAOx3, moves all extremities        Current Medications:  Current Facility-Administered Medications   Medication Dose Route Frequency    magnesium hydroxide (MILK OF MAGNESIA) 400 mg/5 mL oral suspension 30 mL  30 mL Oral DAILY PRN    bisacodyL (DULCOLAX) tablet 10 mg  10 mg Oral Q48H PRN    bisacodyL (DULCOLAX) suppository 10 mg  10 mg Rectal Q48H PRN    aspirin chewable tablet 81 mg  81 mg Oral BID    senna-docusate (PERICOLACE) 8.6-50 mg per tablet 1 Tablet  1 Tablet Oral BID    HYDROcodone-acetaminophen (NORCO) 5-325 mg per tablet 1 Tablet  1 Tablet Oral Q4H PRN    atorvastatin (LIPITOR) tablet 40 mg  40 mg Oral QHS    metoprolol succinate (TOPROL-XL) XL tablet 25 mg  25 mg Oral DAILY    amLODIPine (NORVASC) tablet 10 mg  10 mg Oral DAILY    isosorbide mononitrate ER (IMDUR) tablet 60 mg  60 mg Oral DAILY    levothyroxine (SYNTHROID) tablet 75 mcg 75 mcg Oral 6am    nitroglycerin (NITROSTAT) tablet 0.4 mg  0.4 mg SubLINGual PRN    insulin lispro (HUMALOG) injection   SubCUTAneous AC&HS    glucose chewable tablet 16 g  16 g Oral PRN    glucagon (GLUCAGEN) injection 1 mg  1 mg IntraMUSCular PRN    dextrose 10% infusion 0-250 mL  0-250 mL IntraVENous PRN       Functional Assessment:     Occupational Therapy   Prior Level of Function  Pre-Admission Screen  Post-Admission Evaluation   Eating   Independent Eating   Supervision Eating  Functional Level: 5 (supv/ set-up)   Grooming   Independent Grooming   Supervision Grooming  Functional Level: 5 (SBA)   Upper Body Dressing   Independent Upper Body Dressing   Minimal Assist Upper Body Dressing  Functional Level: 4 (Min A)   Lower Body Dressing   Independent Lower Body Dressing   Moderate Assist Lower Body Dressing  Functional Level: 2 (Max A)   Bladder Management   Independent Bladder Management   Modified Independent Toileting  Functional Level: 2 (Max A (change of brief bed level; therapist removed PureWick on arrival to room))   Bowel Management   Independent Bowel Management   Modified Independent      Physical Therapy   Prior Level of Function  Pre-Admission Screen  Post-Admission Evaluation   Ambulation   Modified Independent Ambulation   Maximal Assist     Bed Mobility   Independent Bed Mobility   Moderate Assist     Supine to Sit   Independent Supine to Sit   Moderate Assist     Sit to Stand   Modified Independent Sit to Stand   Moderate Assist     Bed/Chair Transfers   Modified Independent Bed/Chair Transfers   Moderate Assist     Toilet Transfers   Modified Independent Toilet Transfers   Moderate Assist Toilet Transfers  Toilet Transfer Score: 0 (Not assessed at this time 2/2 pain and tolerance (right hip; 8/10))     Speech and Language Pathology  Post-Admission Evaluation     Comprehension (Native Language)  Primary Mode of Comprehension: Auditory  Score: 4 (Hard of hearing; may need words repeated) Expression (Native Language)  Primary Mode of Expression: Verbal  Score: 5     Social Interaction/Pragmatics  Score: 5     Problem Solving  Score: 3     Memory  Score: 3       Legend:   7 - Independent   6 - Modified Independent   5 - Standby Assistance / Supervision / Set-up   4 - Minimum Assistance / Contact Guard Assistance   3 - Moderate Assistance   2 - Maximum Assistance   1 - Total Assistance / Dependent       Labs on Admission:  Recent Results (from the past 24 hour(s))   GLUCOSE, POC    Collection Time: 09/22/22  9:10 PM   Result Value Ref Range    Glucose (POC) 107 70 - 110 mg/dL   IRON PROFILE    Collection Time: 09/23/22  5:31 AM   Result Value Ref Range    Iron 18 (L) 50 - 175 ug/dL    TIBC 181 (L) 250 - 450 ug/dL    Iron % saturation 10 (L) 20 - 50 %   METABOLIC PANEL, BASIC    Collection Time: 09/23/22  5:31 AM   Result Value Ref Range    Sodium 133 (L) 136 - 145 mmol/L    Potassium 3.9 3.5 - 5.5 mmol/L    Chloride 101 100 - 111 mmol/L    CO2 24 21 - 32 mmol/L    Anion gap 8 3.0 - 18 mmol/L    Glucose 99 74 - 99 mg/dL    BUN 20 (H) 7.0 - 18 MG/DL    Creatinine 0.49 (L) 0.6 - 1.3 MG/DL    BUN/Creatinine ratio 41 (H) 12 - 20      GFR est AA >60 >60 ml/min/1.73m2    GFR est non-AA >60 >60 ml/min/1.73m2    Calcium 8.3 (L) 8.5 - 10.1 MG/DL   CBC WITH AUTOMATED DIFF    Collection Time: 09/23/22  5:31 AM   Result Value Ref Range    WBC 7.5 4.6 - 13.2 K/uL    RBC 3.33 (L) 4.20 - 5.30 M/uL    HGB 10.1 (L) 12.0 - 16.0 g/dL    HCT 30.3 (L) 35.0 - 45.0 %    MCV 91.0 78.0 - 100.0 FL    MCH 30.3 24.0 - 34.0 PG    MCHC 33.3 31.0 - 37.0 g/dL    RDW 12.9 11.6 - 14.5 %    PLATELET 374 285 - 560 K/uL    MPV 10.4 9.2 - 11.8 FL    NRBC 0.0 0  WBC    ABSOLUTE NRBC 0.00 0.00 - 0.01 K/uL    NEUTROPHILS 73 40 - 73 %    LYMPHOCYTES 13 (L) 21 - 52 %    MONOCYTES 11 (H) 3 - 10 %    EOSINOPHILS 1 0 - 5 %    BASOPHILS 0 0 - 2 %    IMMATURE GRANULOCYTES 1 (H) 0.0 - 0.5 %    ABS. NEUTROPHILS 5.5 1.8 - 8.0 K/UL    ABS. LYMPHOCYTES 1.0 0.9 - 3.6 K/UL    ABS. MONOCYTES 0.8 0.05 - 1.2 K/UL    ABS. EOSINOPHILS 0.1 0.0 - 0.4 K/UL    ABS. BASOPHILS 0.0 0.0 - 0.1 K/UL    ABS. IMM. GRANS. 0.1 (H) 0.00 - 0.04 K/UL    DF AUTOMATED     GLUCOSE, POC    Collection Time: 09/23/22  7:44 AM   Result Value Ref Range    Glucose (POC) 113 (H) 70 - 110 mg/dL             Assessment:     Primary Rehabilitation Diagnosis  1. Impaired Mobility and ADLs  2. Right hip fracture secondary to mechanical fall s/p repair    Other comorbidities managed in the rehab  Anemia, multifactorial  Vitamin B12 deficiency  Iron deficiency  Oblqiue fracture of fifth metacarpal  HTN  CAD  History of hypothyroidism  Incidental right ureteric stone causing right ureteral hydronephrosis    Comorbidities  Patient Active Problem List   Diagnosis Code    Hypertension I10    Hypercholesterolemia E78.00    Diaphoresis R61    Exertional angina (HCC) I20.8    Bradycardia R00.1    Coronary artery disease involving native coronary artery I25.10    Hip fracture ([facility] [address] S72.009A    Encounter for palliative care Z51.5    Advanced age R48    Debility R50.80       Willingness to participate in the program: Good      Rehabilitation Potential: Good      Plan:     1. Medical Issues being followed closely:    [x]  Fall and safety precautions     [x]  Wound Care     [x]  Bowel and Bladder Function     [x]  Fluid Electrolyte and Nutrition Balance     [x]  Pain Control      2. Issues that 24 hour rehabilitation nursing is following:    [x]  Fall and safety precautions     [x]  Wound Care     [x]  Bowel and Bladder Function     [x]  Fluid Electrolyte and Nutrition Balance     [x]  Pain Control      [x]  Assistance with and education on in-room safety with transfers to and from the bed, wheelchair, toilet and shower.       3. Acute rehabilitation plan of care:    [x]  Patient to be evaluated and treated by:           [x]  Physical Therapy           [x]  Occupational Therapy           []  Speech Therapy []  Hold Rehab until further notice     5. Medications:    [x]  MAR Reviewed     [x]  Continue Present Medications     6. DVT Prophylaxis:      []  Lovenox     []  Unfractionated Heparin     []  Coumadin     []  NOAC     [x]  KIRSTIE Stockings     [x]  Sequential Compression Device     []  None     7. Rehabilitation program and expectations from patient, as well as medical issues discussed with the patient. 1. The patient is being admitted to a comprehensive acute inpatient rehabilitation program consisting of at least 180 minutes a day, 5 out of 7 days a week of  combined physical, occupational and speech therapy, and close supervision by a physician. 2. The patient's prognosis for significant practical improvement within a reasonable period of time appears to be good. 3. The estimated length of stay is 17 days. 4. The patient/family has a good understanding of our discharge process. The patient has potential to make improvement and is in need of at least two of the following multidisciplinary therapies including but not limited to physical, occupational, speech, nutritional services, wound care, prosthetics and orthotics. The patient is expected to be able to return to home with home health therapy and family support. 5. Given the patient's multiple co-morbidities and risk for further medical complications, rehabilitation services could not be safely provided at a lower level of care such as a skilled nursing facility. 6. Physical therapy for therapeutic exercise, progressive mobility, gait training, transfer training, bed mobility training, patient and family education, and wheelchair mobility training. Physical therapy goals to address - extremity function, range of motion, balance, safety awareness, independence in transfers, activity tolerance, independence in bed mobility, and independence in ambulation.   7. Occupational therapy for self-care home management, transfer training, therapeutic exercise, activity, wheelchair mobility training. Occupational therapy goals to address - extremity function, cognition, balance, activity tolerance, independence in functional transfers, range of motion, safety awareness, independence in ADL  and independence in home management skills. 9. Specialized 24 hour rehabilitation nursing care for bowel and bladder retraining, disease management, pain management, pressure ulcer prevention and management per policy, education on pressure relief techniques, embolism prevention, nutrition management, hydration management, transfer training and   medication distribution. 10. Nutrition and Dietary services will be obtained for assessment of adequate calorie needs, hydration and calorie counts as appropriate. 11. Therapeutic recreation for leisure skills. 15. Rehab psychology for coping skills. 15. Social work services for patient and family counseling and safe discharge planning. MEDICAL PLAN:  We will start patient on vitamin B12 injections and iron supplements  We will continue amlodipine, metoprolol and monitor blood pressure closely  Will continue aspirin and statin  Continue SSI and monitor blood sugar  Continue Synthroid  Continue bowel regimen with MiraLAX and Senokot  PT/OT eval and treatment  Wound care as needed    PRECAUTIONS:   1. Safety/fall precautions. 2. Deep venous thrombosis precautions. 3. Aspiration precautions. POTENTIAL BARRIERS TO DISCHARGE:   1. Risk for falls. 2. The patient lives alone. 3. Current home layout. 4. Family limited in ability to provide constant care. 5. Limited community resources. RELEVANT CHANGES SINCE PREADMISSION SCREENING: I have compared the patients medical and functional status at the time of the pre-admission screening and on this post-admission evaluation.  The preadmission screen and findings from therapy evaluations both support my post admission physician evaluation, deeming this patient to be an appropriate candidate for the IRF. The patient requires multidisciplinary treatment, physician oversight and intensive therapy not provided at a lower level of care. By signing this document, I acknowledge that I have personally performed a full physical examination on this patient within 24 hours of admission to this inpatient rehabilitation facility and have determined the patient to be able to tolerate the above course of treatment at an intensive level for a reasonable period of time. The Medical Director (or covering physician) will be completing a detailed individualized plan of care for this patient by day #4 of the patients stay based upon the Pre-Admission Screen, the Post-Admission Evaluation, and the therapy evaluations.       Signed:    Karyna Butler MD    September 23, 2022

## 2022-09-23 NOTE — REHAB NOTE
SHIFT CHANGE NOTE FOR [facility]    Bedside and Verbal shift change report given to Mila ARAMBULA (oncoming nurse) by Abena Churchill RN  (offgoing nurse). Report included the following information SBAR, Kardex, MAR and Recent Results. Situation:   Code Status: DNR   Reason for Admission: right hip fracture  Hospital Day: 1   Problem List:   Hospital Problems  Date Reviewed: 5/31/2022            Codes Class Noted POA    Hip fracture [facility]) ICD-10-CM: E55.265A  ICD-9-CM: 820.8  9/18/2022 Unknown         Background:   Past Medical History:   Past Medical History:   Diagnosis Date    CAD (coronary artery disease) 2008    Fibromyalgia     Heart failure ([facility] [address]     Pt. states sge gas 5 heart blocks. Hypercholesterolemia     Hypertension     Menopause     Thyroid disease       Patient taking anticoagulants ASA    Patient has a defibrillator: no     Assessment:  Changes in Assessment throughout shift: none    Patient has central line: no Reasons if yes: Insertion date: Last dressing date:  Patient has Talley Cath: no Reasons if yes:     Insertion date:    Last Vitals:     Vitals:    09/22/22 1554 09/22/22 2100 09/23/22 0705 09/23/22 1510   BP: (!) 169/69 (!) 152/60 (!) 172/52 (!) 144/66   Pulse: 88 73 77 68   Resp: 18 18 18 17   Temp: 99.2 °F (37.3 °C) 98.6 °F (37 °C) 98.4 °F (36.9 °C) 98.1 °F (36.7 °C)   SpO2: 95% 95% 96% 95%   Weight: 62.4 kg (137 lb 9.6 oz)      Height: 5' 2\" (1.575 m)          PAIN    Pain Assessment    Pain Intensity 1: 0 (09/23/22 1603) Pain Intensity 1: 2 (12/29/14 1105)    Pain Location 1: Hip Pain Location 1: Abdomen    Pain Intervention(s) 1: Medication (see MAR) Pain Intervention(s) 1: Medication (see MAR)  Patient Stated Pain Goal: 0 Patient Stated Pain Goal: 0  Intervention effective: yes    Other actions taken for pain: none    Skin Assessment  Skin color Skin Color: Appropriate for ethnicity  Condition/Temperature    Integrity Skin Integrity: Incision (comment)  Turgor    Weekly Pressure Ulcer Documentation  Pressure  Injury Documentation: No Pressure Injury Noted-Pressure Ulcer Prevention Initiated  Wound Prevention & Protection Methods  Orientation of wound Orientation of Wound Prevention: Posterior  Location of Prevention Location of Wound Prevention: Sacrum/Coccyx  Dressing Present Dressing Present : Yes  Dressing Status Dressing Status: Intact  Wound Offloading Wound Offloading (Prevention Methods): Bed, pressure reduction mattress    INTAKE/OUPUT  Date 09/22/22 1900 - 09/23/22 0659 09/23/22 0700 - 09/24/22 0659   Shift 1437-6098 24 Hour Total 4684-5427 4406-2810 24 Hour Total   INTAKE   P.O.  0 600  600     P. O.  0 600  600   Shift Total(mL/kg)  0(0) 600(9.6)  600(9.6)   OUTPUT   Urine(mL/kg/hr)          Urine Occurrence(s) 0 x 1 x 7 x  7 x   Other 200 200        Other Output 200 200      Stool 0 0        Stool Occurrence(s) 0 x 0 x 0 x  0 x     Stool 0 0      Shift Total(mL/kg) 200(3.2) 200(3.2)      NET -200 -200 600  600   Weight (kg) 62.4 62.4 62.4 62.4 62.4         Recommendations:  Patient needs and requests: pain management,reposition    Diet: Regular    Pending tests/procedures: labs     Functional Level/Equipment: assist x 2/wheelchair    Estimated Discharge Date: TBD Posted on Whiteboard in Patients Room: yes     HEALS Safety Check    A safety check occurred in the patient's room between off going nurse and oncoming nurse listed above. The safety check included the below items  Area Items   H  High Alert Medications Verify all high alert medication drips (heparin, PCA, etc.)   E  Equipment Suction is set up for ALL patients (with yanker)  Red plugs utilized for all equipment (IV pumps, etc.)  WOWs wiped down at end of shift.   Room stocked with oxygen, suction, and other unit-specific supplies   A  Alarms Bed alarm is set for fall risk patients  Ensure chair alarm is in place and activated if patient is up in a chair   L  Lines Check IV for any infiltration  Talley bag is empty if patient has a Talley   Tubing and IV bags are labeled   S  Safety   Room is clean, patient is clean, and equipment is clean. Hallways are clear from equipment besides carts. Fall bracelet on for fall risk patients  Ensure room is clear and free of clutter  Suction is set up for ALL patients (with geneva)  Hallways are clear from equipment besides carts.    Isolation precautions followed, supplies available outside room, sign posted

## 2022-09-23 NOTE — CONSULTS
[facility] PSYCHOLOGICAL SCREENING    Assessment Initiated: September 23, 2022    Rehab Diagnosis: Right Hip Fx    Pertinent Physical/Psychiatric History:     Patient Active Problem List   Diagnosis Code    Hypertension I10    Hypercholesterolemia E78.00    Diaphoresis R61    Exertional angina ([facility] [address] I20.8    Bradycardia R00.1    Coronary artery disease involving native coronary artery I25.10    Hip fracture ([facility] [address] S72.009A    Encounter for palliative care Z51.5    Advanced age R49    Debility R50.80       Patient denies history of mental health services and is not currently Rx psychotropic medication for stability. Patient denies history of any substance use nor dependence.       OBJECTIVE  GENERAL OBSERVATIONS  Willingness to participate in program: [] good   [x] fair [] indifferent [] poor    General Appearance: Patient observed casually dressed and sitting upright in wheelchair, variably at ease and then in acute distress with perceived pain    Sensory Impairments: Patient wearing eyeglasses for correction; she is observed to be H of H and needs compensatory effort to be heard consistently; she appears to be experiencing significant discomfort while seated and sometimes restless while trying to shift her position    Yazdanism Affiliation: [facility]    Admission Assessment  Discharge Status   [x] alert  [] lethargic  [] difficult to arouse  [] fluctuating  [] other: Level of Consciousness [] alert  [] lethargic  [] difficult to arouse  [] fluctuating  [] other:   [x] person  [] place  [] time  [x] situation Oriented [] person  [] place  [] time  [] situation   [x] within normal limits  [x] impaired       [x] mild        [] moderate        [] severe Attention [] within normal limits  [] impaired       [] mild        [] moderate        [] severe   [x] within normal limits  [x] impaired       [x] mild        [] moderate        [] severe Memory [] within normal limits  [] impaired       [] mild        [] moderate        [] severe   [] appropriate to situation  [x] depressed  [x] anxious  [] angry   [x] fearful  [] emotionally labile  [x] other: Patient acknowledges feeling stressed and affirms each of the above, but possibly on account of acute discomfort at time of interview Mood [] appropriate to situation  [] depressed  [] anxious  [] angry   [] fearful  [] emotionally labile  [] other:   [x] appropriate  [] flat  [] inappropriate to content of speech Affect [] appropriate  [] flat  [] inappropriate to content of speech   [] appropriate  [] aggressive/agitated  [] withdrawn  [] inappropriate  [x] other: Patient is very, mildly agitated with discomfort while in seated position Behavior [] appropriate  [] aggressive/agitated  [] withdrawn  [] inappropriate  [] other:   [] good  [x] limited  [] denial  [] none Insight Into Illness [] good  [] limited  [] denial  [] none   [] intact  [x] impaired       [x] mild        [] moderate        [] severe       Describe: Patient will require cues, prompts, repetition, redirection and reinforcement for maximum problem solving and recall, seemingly exacerbated by the stress of acute injury and recovery Cognition [] intact  [] impaired       [] mild        [] moderate        [] severe       Describe:    [] coping  [x] demonstrates poor adjustment  [] undetermined       As evidenced by: Patient appears to be having a difficult morning, as suggested at time of this contact Patient Adjustment to Disability [] coping  [] demonstrates poor adjustment  [] undetermined       As evidenced by:    [] coping  [] demonstrates poor adjustment  [x] undetermined      As evidenced by: Not available on evaluation but patient feels well supported since her relocation to [place] Family Adjustment to Disability [] coping  [] demonstrates poor adjustment  [] undetermined      As evidenced by:      ASSESSMENT  Clinical Impression: Patient is a 80year old, , long ago retired (),  female.   She reportedly move from possible life long residence to live with her only child, a son and daughter in law, within the past year. Fortunately, she feels well supported by them and feels the adjustment has been satisfactory. However, she also insists that she is typically capable of self care and is supposedly independent in the residence. She insists that this hip fracture is the first injury she has had from a fall. She has limited insight about aspects of recovery and will certainly benefit from further education to better understand the parameters of her recovery and in order to identify realistic goals for herself. She is focused on returning to home as soon as possible and needs to better accept the efficacy of acute rehabilitation before she returns to home. Emotionally, she is observed in obvious distress while seated for interview and emphasizes pain as a barrier. In turn, she admits to feeling anxious, depressed and fearful, as seemingly related to post injury recovery. In fact, she denies history of mental health services and is not currently Rx psychotropic medication for stability. It is noted that at time of this contact, patient was apparently due for scheduled pain medication shortly and provided to her only after interview ended. I did inform NP of possible issues for patient with pain medication in regard to recovery, and this will be monitored for her. Patient will be supported in her recovery effort, both educated about various aspects of recovery as well as encouraged discuss any emergent or ongoing concerns for herself while hospitalized. Cognitively, patient's participation is certainly impacted by her hearing loss and compensatory effort is required. She had difficulty with immediate recall of new information on account of her hearing, as well as having difficulty with recall, and she was not entirely oriented.   Patient will benefit from various compensatory efforts to maximize her treatment participation, including understanding what to do and follow through/carry over. Patient Strengths: Patient suggests that she has managed particularly well up until the time of injury, and even relatively, functionally independent in residence since moving in with son and daughter in law    Patient Preferences: Patient expects to return to home with immediate family members    Rehab Potential: Guarded, due to age and pain tolerance    Educational Needs: Under each heading list the specific items in which the patient or family will need education/training.  Example: hip precautions, use of walker, ADL equipment, neglect, judgment, adjustment, etc.     Special considerations or accommodations for teaching:  [x] Yes     [] No     [] NA  If Yes, explain: Pain management and stress with injury and forced dependency Discharge Status    Completed Demonstrated/ Verbalized Understanding    Yes No Yes No   Info regarding disability:  [] [] [] []   Adjustment:  [] [] [] []   Cognition:  [] [] [] []   Other: [] [] [] []   Other: [] [] [] []   If education not completed, explain: [] [] [] []     PLAN  Problem: Limited insight about all recovery  Long Term Goal: Increase insight  Intervention: Patient education  At Discharge - LTG Achieved: [] Yes [] No If not achieved, explain:    Problem: Pain management  Long Term Goal: Effective management of pain in recovery  Intervention: Rx and behavioral redirection  At Discharge - LTG Achieved: [] Yes [] No If not achieved, explain:    Problem: Stress with injury and recovery  Long Term Goal: Minimize subjective stress/distress and maintain mood stability  Intervention: Support , as needed; R/O Rx as needed  At Discharge - LTG Achieved: [] Yes [] No If not achieved, explain:    Problem: Safety  Long Term Goal: Maximize safety awareness  Intervention: Patient education and reinforcement  At Discharge - LTG Achieved: [] Yes [] No If not achieved, explain:    Problem: Patient has significant hearing loss  Long Term Goal: Maximize auditory reception in all treatment activity on ARU  Intervention: Compensatory strategies as helpful and needed  At Discharge - LTG Achieved: [] Yes [] No If not achieved, explain:    Kiley Flores, THE [facility]  9/23/2022 11:04 AM    DISCHARGE STATUS    Clinical Impressions: Patient only seen for Initial Evaluation on admit to ARU as I was not informed of need for follow up on account of significant behavior nor emotional problems occurring during rehabilitation effort on ARU. Patient was able to discharge to home with continued therapy services ordered for her.     Follow-up Services Recommended Purpose                 Kiley Flores, PHD  Discharge Date/Time:

## 2022-09-23 NOTE — PROGRESS NOTES
Problem: Mobility Impaired (Adult and Pediatric)  Goal: *Acute Goals and Plan of Care (Insert Text)  Description: Physical Therapy Short Term Goals  Initiated 9/23/2022 and to be accomplished within 7 day(s) (9/30/2022)  1. Patient will move from supine to sit and sit to supine , scoot up and down, and roll side to side in bed with supervision/set-up. 2.  Patient will transfer from bed to chair and chair to bed with minimal assistance/contact guard assist using the least restrictive device. 3.  Patient will perform sit to stand with minimal assistance/contact guard assist.  4.  Patient will ambulate with minimal assistance/contact guard assist for 50 feet with the least restrictive device. 5.  Patient will ascend/descend 5 stairs with bilateral handrail(s) with minimal assistance/contact guard assist.    Physical Therapy Long Term Goals  Initiated 9/23/2022 and to be accomplished within 14-21 day(s) (10/07/2022-10/14/2022)  1. Patient will move from supine to sit and sit to supine , scoot up and down, and roll side to side in bed with supervision/set-up. 2.  Patient will transfer from bed to chair and chair to bed with supervision/set-up using the least restrictive device. 3.  Patient will perform sit to stand with supervision/set-up. 4.  Patient will ambulate with supervision/set-up for 150 feet with the least restrictive device. 5.  Patient will ascend/descend 5 stairs with bilateral handrail(s) with supervision/set-up. Outcome: Progressing Towards Goal    PHYSICAL THERAPY EVALUATION    Patient: Leonila Correa (26 y.o. female)  Date: 9/23/2022  Diagnosis: Hip fracture ([facility] [S72.009A]   Precautions: Total hip, FallFall Risk Precautions  Chart, physical therapy assessment, plan of care and goals were reviewed. Time in:1035  Time out:1202    Patient seen for: Gait training;Patient education;Transfer training; Wheelchair mobility    Pain:  Pt was perseverative re: right hip/LE pain throughout treatment session. Intervention: Per nursing staff, pt received pain medication just prior to evaluation. Pt offered rest breaks throughout evaluation. Patient identified with name and : yes    SUBJECTIVE:     Patient asked  didn't we just do that?   Pt appears preoccupied with pain at start of treatment session and then more lethargic towards end of treatment session requiring maximal encouragement for participation. Pt's nursing staff notified and aware of c/o pain. Initially, pt attempts to decline participation in gait assessment but after maximal encouragement did participate. Pt is hard of hearing and requires repetition of cues and education in spite of wearing hearing aids. Patient's Goal for Physical Therapy: Pt does not articulate goal due to preoccupation with pain    OBJECTIVE DATA SUMMARY:     Past Medical History:   Diagnosis Date    CAD (coronary artery disease)     Fibromyalgia     Heart failure ([facility]     Pt. states sge gas 5 heart blocks. Hypercholesterolemia     Hypertension     Menopause     Thyroid disease      Past Surgical History:   Procedure Laterality Date    HX CHOLECYSTECTOMY      HX HYSTERECTOMY         Problem List:    Decreased strength B LE  [x]     Decreased strength trunk/core  [x]     Decreased AROM   []     Decreased PROM  []    Decreased endurance  [x]     Decreased balance sitting  []     Decreased balance standing  [x]     Pain   [x]     Slow ambulation velocity  [x]    Decreased coordination  []    Decreased safety awareness  [x]      Functional Limitations:   Decreased independence with bed mobility  [x]     Decreased independence with functional transfers  [x]     Decreased independence with ambulation  [x]     Decreased independence with stair negotiation  [x]       Previous Functional Level: Per pt, she utilized a rollator at home for ambulation.     Home Environment: Home Environment: Private residence  # Steps to Enter: 6 (w/c ramp installed 2021) - pt reports having handrails to hold onto.   Wheelchair Ramp: Yes  One/Two Story Residence: Two story, live on 1st floor  # of Interior Steps: 12  Living Alone: No  Support Systems: Child(earle)  Patient Expects to be Discharged to[de-identified] Home  Current DME Used/Available at Home: Druscilla Covert, rollator  Tub or Shower Type: Tub/Shower combination (Pt reports performing sink bath at home)    Barriers to Learning/Limitations: yes;  emotional and physical  Compensate with: visual, verbal, tactile, kinesthetic cues/model         Outcome Measures: functional mobility assessment     MMT Initial Assessment: Not formally assessed on evaluation   Right Lower Extremity Left Lower Extremity   Hip Flexion       Knee Extension       Knee Flexion       Ankle Dorsiflexion       0/5 No palpable muscle contraction  1/5 Palpable muscle contraction, no joint movement  2-/5 Less than full range of motion in gravity eliminated position  2/5 Able to complete full range of motion in gravity eliminated position  2+/5 Able to initiate movement against gravity  3-/5 More than half but not full range of motion against gravity  3/5 Able to complete full range of motion against gravity  3+/5 Completes full range of motion against gravity with minimal resistance  4-/5 Completes full range of motion against gravity with minimal-moderate resistance  4/5 Completes full range of motion against gravity with moderate resistance  4+/5 Completes full range of motion against gravity with moderate-maximum resistance  5/5 Completes full range of motion against gravity with maximum resistance        GROSS ASSESSMENT Initial Assessment 9/23/2022   AROM Generally decreased, functional   Strength Generally decreased, functional   Coordination Within functional limits   Tone Normal   Sensation Intact   PROM Within functional limits       POSTURE Initial Assessment 9/23/2022   Posture (WDL) Exceptions to WDL   Posture Assessment Forward head;Kyphosis;Rounded shoulders BALANCE Initial Assessment 9/23/2022    Sitting - Static: Good (unsupported)  Sitting - Dynamic: Good (unsupported)  Standing - Static: Poor  Standing - Dynamic : Impaired       BED/CHAIR/WHEELCHAIR TRANSFERS Initial Assessment 9/23/2022   Rolling Right 0 (Not tested)   Rolling Left 4 (Minimal assistance) at right LE to maintain precautions   Supine to Sit 3 (Moderate assistance) for maintaining precautions managing right LE and trunk from sidelying. Sit to Stand Moderate assistance - minimal to moderate assistance for lift off with sit to stand and moderate assistance for slow controlled descent with stand to sit transfers with maximal cuing throughout for safe hand placement with transition from RW. Sit to Supine 3 (Moderate assistance) for right LE and trunk management. Transfer Assist Score 3 (Moderate assistance )   Transfer Type Other   Comments Pt requires maximal reminders and education re: total hip precautions with PT assisting pt to utilize pillow between B LEs with rolling at bed level and with supine to sit transfer. Pt requires minimal assistance for balance with moderate assistance for safe RW management with stand step transfers with RW. Car Transfer Moderate assistance (stand step transfer to/from transfer . Unable to fully complete transfer managing right LE into car  2/2 inability to maintain hip precautions in .)   Car Type car transfer        [facility] MOBILITY/MANAGEMENT Initial Assessment 9/23/2022   Able to Propel 100 feet   Assist Level 2   Curbs/ramps assistance required 0 (Not tested)   Wheelchair set up assistance required 1 (Dependent/total assistance)   Wheelchair management  Pt demonstrates minimal attempt to manage brakes with B UEs. Comments Pt requires maximal assistance to propel w/c with B UEs. GAIT Initial Assessment 9/23/2022   Gait Description (WDL) Exceptions to WDL   Gait Abnormalities Decreased step clearance; Step to gait       WALKING INDEPENDENCE Initial Assessment 9/23/2022   Assistive device Walker, rolling, Gait belt   Ambulation assistance - level surface 4 (Minimal assistance)   Distance 14 Feet (ft)   Comments Pt ambulates with forward flexed, rounded shoulder posture with decreased right stance time and weight shift. Ambulation assistance - unlevel surface NT        STEPS/STAIRS Initial Assessment 9/23/2022   Steps/Stairs ambulated 0   Rail Use None   Assistance Level 0 (Not tested) (pt requires increased time and rest breaks throughout and maximal encouragement to participate in gait assessment)   Comments NT    Curbs/Ramps  NT       ASSESSMENT :  Based on the objective data described above, the patient presents with impaired strength and balance with poor activity tolerance and increased pain s/p hip fracture and replacement. .    Patient will benefit from skilled intervention to address the above impairments. Patient's rehabilitation potential is considered to be Fair  Factors which may influence rehabilitation potential include:   []         None noted  []         Mental ability/status  []         Medical condition  []         Home/family situation and support systems  []         Safety awareness  []         Pain tolerance/management  []         Other:      PLAN :  Please refer to POC for details re: long term goals    Order received from MD for physical therapy services and chart reviewed. Pt to be seen 5 times per week for 3 hours of total therapy per day for 2 weeks. Thank you for the referral.    Pt would benefit from skilled physical therapy in order to improve independent functional mobility within the home.  Interventions may include range of motion (AROM, PROM B LE/trunk), motor function (B LE/trunk strengthening/coordination), activity tolerance (vitals, oxygen saturation levels), bed mobility training, balance activities, gait training (progressive ambulation program), wheelchair management and functional transfer training. Patient would also benefit from concurrent and group therapy sessions to promote increased participation in skilled therapy interventions and to provide opportunities for increased social interaction. Discharge Recommendations: Home Physical Therapy  Further Equipment Recommendations for Discharge: rolling walker and wheelchair 18\"       Activity Tolerance:   Poor  Please refer to the flowsheet for vital signs taken during this treatment. After treatment:   [] Patient left in no apparent distress in bed  [x] Patient left in no apparent distress sitting up in chair  [] Patient left in no apparent distress in w/c mobilizing under own power  [] Patient left in no apparent distress dining area  [] Patient left in no apparent distress mobilizing under own power  [x] Call bell left within reach  [x] Nursing notified  [] Caregiver present  [] Bed alarm activated   [x] Chair alarm activated. COMMUNICATION/EDUCATION:   [x]         Fall prevention education was provided and the patient/caregiver indicated understanding. [x]         Patient/family have participated as able in goal setting and plan of care. [x]         Patient/family agree to work toward stated goals and plan of care. []         Patient understands intent and goals of therapy, but is neutral about his/her participation. []         Patient is unable to participate in goal setting and plan of care.     Thank you for this referral.  Africa Arreguin, PT, DPT  9/23/2022

## 2022-09-23 NOTE — PROGRESS NOTES
Pt is a 80year old female admitted to ARU for a hip fracture. Pt is alert and oriented, alone in the room. Pt expressed pain and sw relayed to pt's nurse who was aware. Pt able to receive medications in 30 mins from time of interview. Sw verbally shared with pt and documented on the whiteboard. Pt consents to interview. Pt states that she lives with her son and daughter in law in a 2 level home with a first floor set up and ramp to enter. Pt states that she does not use a shower or tub but bathes at a sink level. Pt states that prior to admission she was able to self care with the use if a rolling walker for mobility and assistance with driving for errands. Pt states that she has no history with home health, outpatient therapy or SNF. Pt states that her son, Petros Hyde ([phone]), is her [facility] contact and states that he will be her helper at [place]. Pt consents to calling son to schedule caregiver education. Pt states that Dr Anika Vuong is her PCP and she fills her medications at [facility]. Pt states that she has not received the COVID vaccines and is not interested. Pt confirms her insurance as medicare and BCBS. Sw reviewed dc planning and team conference. Pt states understanding and denies questions. Sw called pt's son to schedule caregiver education and son advised that he is out of town and deferred conversations with his daughter, Carl Prieto ([phone]). Hiram called pt's granddaughter and reviewed dc planning. Granddaughter states that she is the pt's helper during the week and agreed to caregiver education on 9/27 at 930. Sw will follow.

## 2022-09-23 NOTE — PROGRESS NOTES
conducted an initial consultation and Spiritual Assessment for Lorri Bains, who is a 80 y.o.,female. Patients Primary Language is: [place]. According to the patients EMR Religion Affiliation is: Holiness. The reason the Patient came to the hospital is:   Patient Active Problem List    Diagnosis Date Noted    Encounter for palliative care 09/19/2022    Advanced age 09/19/2022    Debility 09/19/2022    Hip fracture ([facility] 09/18/2022    Coronary artery disease involving native coronary artery 03/21/2018    Bradycardia 08/09/2017    Exertional angina ([address] 05/03/2017    Diaphoresis 02/01/2017    Hypertension     Hypercholesterolemia         The  provided the following Interventions:  Initiated a relationship of care and support with patient in room 189 of the rehab unit. Found patient setting in a wheelchair with a video monitor on her for safety. Asked her about having an advance directive and she said yes but it was at home and she would try and get family to bring in the document. Explored issues of anastacia, belief, spirituality and Orthodox/ritual needs while hospitalized. .  Provided information about Spiritual Care Services. Offered prayer and assurance of continued prayers on patients behalf. The following outcomes were achieved:  Patient shared limited information about her medical narrative and spiritual journey/beliefs. Patient processed feeling about current hospitalization. Patient expressed gratitude for pastoral care visit. Assessment:  Patient does not have any Orthodox/cultural needs that will affect patients preferences in health care. There are no further spiritual or Orthodox issues which require Spiritual Care Services interventions at this time. Plan:  Chaplains will continue to follow and will provide pastoral care on an as needed/requested basis    . Joaquim Stephens   Spiritual Care   [phone]

## 2022-09-23 NOTE — PROGRESS NOTES
Urology Progress Note        Assessment/Plan:     Patient Active Problem List   Diagnosis Code    Hypertension I10    Hypercholesterolemia E78.00    Diaphoresis R61    Exertional angina (HCC) I20.8    Bradycardia R00.1    Coronary artery disease involving native coronary artery I25.10    Hip fracture ([address] S72.009A    Encounter for palliative care Z51.5    Advanced age R48    Debility R53.81       ASSESSMENT:   Right Hydronephrosis 2/2 R Proximal Ureteral Stone - 7mm  Bilateral Intrarenal Stone              WBC WNL              UA 22: negative                Creat WNL     Admitted R Hip Fx s/p Fall  FX of Fifth Metacarpal     H/o HTN  CAD        PLAN:    CT reviewed, right proximal ureteral stone. Tmax 99.2  WBC WNL, no CVAT, and UA negative on 22. Case discussed with attending, no recommended tx for stone at this time. Obtain PVR  Will require f/up outpatient for definitive stone tx. Will sign off     Follow up arranged? Yes     Kameron Thurston PA-C  [facility]  Available  [facility]  Pager: [phone]        Subjective:     Daily Progress Note: 2022 11:31 AM    Marco Lowry is doing ok. No complaints. Objective:     Visit Vitals  BP (!) 172/52 (BP 1 Location: Right upper arm, BP Patient Position: Supine)   Pulse 77   Temp 98.4 °F (36.9 °C)   Resp 18   Ht 5' 2\" (1.575 m)   Wt 62.4 kg (137 lb 9.6 oz)   SpO2 96%   Breastfeeding Unknown   BMI 25.17 kg/m²        Temp (24hrs), Av.5 °F (36.9 °C), Min:97.9 °F (36.6 °C), Max:99.2 °F (37.3 °C)      Intake and Output:   1901 -  0700  In: 0   Out: 200   No intake/output data recorded. PHYSICAL EXAMINATION:   Visit Vitals  BP (!) 172/52 (BP 1 Location: Right upper arm, BP Patient Position: Supine)   Pulse 77   Temp 98.4 °F (36.9 °C)   Resp 18   Ht 5' 2\" (1.575 m)   Wt 62.4 kg (137 lb 9.6 oz)   SpO2 96%   Breastfeeding Unknown   BMI 25.17 kg/m²     Constitutional: Well developed, well-nourished female in no acute distress. HEENT: Normocephalic, Atraumatic   CV:   no edema   Respiratory: No respiratory distress or difficulties breathing   Abdomen:  Soft and nontender.  Female:  CVA tenderness: none             Skin:  Normal color. No evidence of jaundice. Neuro/Psych:  Confused       Lab/Data Review: All lab results for the last 24 hours reviewed. Labs:     Labs: Results:   Chemistry    Recent Labs     09/23/22  0531 09/21/22  0231   GLU 99 150*   * 135*   K 3.9 4.2    103   CO2 24 23   BUN 20* 29*   CREA 0.49* 0.88   CA 8.3* 7.9*   AGAP 8 9   BUCR 41* 33*   AP  --  69   TP  --  6.0*   ALB  --  2.7*   GLOB  --  3.3   AGRAT  --  0.8      CBC w/Diff Recent Labs     09/23/22  0531 09/22/22  0225 09/21/22  0535   WBC 7.5 8.9 11.7   RBC 3.33* 2.94* 3.75*   HGB 10.1* 9.1* 11.9*   HCT 30.3* 27.0* 33.7*    140 223   GRANS 73 75* 84*   LYMPH 13* 13* 6*   EOS 1 2 0      Cultures No results for input(s): CULT in the last 72 hours.   All Micro Results       None              Urinalysis Color   Date Value Ref Range Status   09/18/2022 YELLOW   Final     Appearance   Date Value Ref Range Status   09/18/2022 CLEAR   Final     Specific gravity   Date Value Ref Range Status   09/18/2022 1.012 1.005 - 1.030   Final     pH (UA)   Date Value Ref Range Status   09/18/2022 6.5 5.0 - 8.0   Final     Protein   Date Value Ref Range Status   09/18/2022 Negative NEG mg/dL Final     Ketone   Date Value Ref Range Status   09/18/2022 15 (A) NEG mg/dL Final     Bilirubin   Date Value Ref Range Status   09/18/2022 Negative NEG   Final     Blood   Date Value Ref Range Status   09/18/2022 Negative NEG   Final     Urobilinogen   Date Value Ref Range Status   09/18/2022 0.2 0.2 - 1.0 EU/dL Final     Nitrites   Date Value Ref Range Status   09/18/2022 Negative NEG   Final     Leukocyte Esterase   Date Value Ref Range Status   09/18/2022 Negative NEG   Final     Potassium   Date Value Ref Range Status   09/23/2022 3.9 3.5 - 5.5 mmol/L Final Creatinine   Date Value Ref Range Status   09/23/2022 0.49 (L) 0.6 - 1.3 MG/DL Final     BUN   Date Value Ref Range Status   09/23/2022 20 (H) 7.0 - 18 MG/DL Final      PSA No results for input(s): PSA in the last 72 hours.    Coagulation Lab Results   Component Value Date/Time    Prothrombin time 14.2 09/19/2022 05:00 AM    INR 1.1 09/19/2022 05:00 AM

## 2022-09-23 NOTE — REHAB NOTE
Wound Prevention Checklist    Patient: Jennifer Owens (88 y.o. female)  Date: 9/23/2022  Diagnosis: Hip fracture ([address] Obdulio Flank <principal problem not specified>    Precautions:  Total hip, Fall       []  Heel prevention boots placed on patient    [x]  Patient turned q2h during shift    []  Lift team ordered    [x]  Patient on [place] bed/Specialty bed    [x]  Each Wound is documented during shift (Stage, Color, drainage, odor, measurements, and dressings)    [x]  Dual skin check done with Kalli Simon, RN

## 2022-09-23 NOTE — PROGRESS NOTES
Problem: Self Care Deficits Care Plan (Adult)  Goal: *Acute Goals and Plan of Care (Insert Text)  Description: Occupational Therapy Goals   Long Term Goals  Initiated (2022) and to be accomplished within 3 week(s), by (10/14/2022)  1. Pt will perform self-feeding with Mod I.  2. Pt will perform grooming with Mod I.  3. Pt will perform UB bathing with set-up. 4. Pt will perform LB bathing with supv using AE and/ or compensatory strategies while maintaining hip precautions. 5. Pt will perform tub/shower transfer with SBA using LRAD. 6. Pt will perform UB dressing with Mod I.  7. Pt will perform LB dressing with SBA using AE and/ or compensatory strategies while maintaining hip precautions. 8. Pt will perform toileting task with supv/ SBA. 9. Pt will perform toilet transfer with supv using LRAD. Short Term Goals   Initiated (2022) and to be accomplished within 7 day(s), by (2022)  1. Pt will perform self-feeding with set-up/ supv. 2. Pt will perform grooming with supv. 3. Pt will perform UB bathing with supv. 4. Pt will perform LB bathing with Min A using AE and/ or compensatory strategies while maintaining hip precautions. 5. Pt will perform tub/shower transfer with Min A using LRAD. 6. Pt will perform UB dressing with SBA. 7. Pt will perform LB dressing with Mod A using AE and/ or compensatory strategies while maintaining hip precautions. 8. Pt will perform toileting task with Mod A.  9. Pt will perform toilet transfer with Min A using LRAD. Outcome: Progressing Towards Goal  Goal: Interventions  Outcome: Progressing Towards Goal   OCCUPATIONAL THERAPY EVALUATION    Patient:  Beat 80 y.o.   Date: 2022  Primary Diagnosis: Hip fracture ([facility] [address] [S72.009A]    Patient identified with name and : yes    Past Medical History:   Past Medical History:   Diagnosis Date    CAD (coronary artery disease)     Fibromyalgia     Heart failure ([facility] [address]     Pt. states sge gas 5 heart blocks. Hypercholesterolemia     Hypertension     Menopause     Thyroid disease      Precautions: Fall precautions, Aspiration, Safety, right hip precautions, RLE WBAT, RUE (wrist/ forearm cast; fifth metacarpal fx)    Time In: 4162  Time Out: 0835    Pain:  Pt reports 6/10 pain or discomfort prior to treatment; right hip (aching; throbbing)  Pt reports 8/10 pain or discomfort post treatment; right hip (aching; throbbing)  Interventions: repositioning; intermittent rest breaks. Care coordinated with Meagan Hidalgo RN for pain medication; staff nurse to follow-up. SUBJECTIVE:   Patient stated I live with my son.     OBJECTIVE DATA SUMMARY:     [x]  Right hand dominant   []  Left hand dominant    Therapy Diagnosis:   Difficulty with ADLs  [x]     Difficulty with functional transfers  [x]     Difficulty with ambulation  [x]     Difficulty with IADLs  [x]       Problem List:    Decreased strength B UE  [x]     Decreased strength trunk/core  [x]     Decreased AROM   [x]     Decreased endurance  [x]     Decreased balance sitting  [x]     Decreased balance standing  [x]     Pain   [x]     Decreased PROM  []       Functional Limitations:   Decreased independence with ADL  [x]     Decreased independence with functional transfers  [x]     Decreased independence with ambulation  [x]     Decreased independence with IADL  [x]       Previous Functional Level: Pre-Morbid Level of Function: Per patient and chart review, pt was (I) with self-care/ ADL's. Pt resides with son in 2 story home (lives on first floor). Functional mobility performed RW level within the home. Pt reports cleaning up at sink for bathing. Mod I for cooking (frozen meals) and medication management. Patient's son does the laundry. Pt's son drives her to grocery store and to appointments.     Home Environment: Home Situation  Home Environment: Private residence  # Steps to Enter: 6 (w/c ramp installed 2021)  Wheelchair Ramp: Yes  One/Two Story Residence: Two story, live on 1st floor  # of Interior Steps: 12  Living Alone: No  Support Systems: Child(earle)  Patient Expects to be Discharged to[de-identified] Home  Current DME Used/Available at Home: [employer], rollator  Tub or Shower Type: Tub/Shower combination (Pt reports performing sink bath at home)    Barriers to Learning/Limitations: yes;  cognitive, sensory deficits-vision/hearing/speech, and physical  Compensate with: visual, verbal, tactile, kinesthetic cues/model    Outcome Measures:      MMT Initial Assessment   Right Upper Extremity  Left Upper Extremity    UE AROM RUE AROM (generally decreased); right wrist/ forearm soft cast 2/2 fifth metacarpal fx. RUE MMT not assessed 2/2 fracture. Care coordinated with unit NP for clarification regarding use of RUE.     LUE AROM WFL   Shoulder flexion  3+/5   Shoulder extension  3+/5   Shoulder ABDuction     Shoulder ADDUction     Elbow Flexion  3+/5   Elbow Extension  3+/5   Wrist Extension/Flexion  3+/5     3+/5   0/5 No palpable muscle contraction  1/5 Palpable muscle contraction, no joint movement  2-/5 Less than full range of motion in gravity eliminated position  2/5 Able to complete full range of motion in gravity eliminated position  2+/5 Able to initiate movement against gravity  3-/5 More than half but not full range of motion against gravity  3/5 Able to complete full range of motion against gravity  3+/5 Completes full range of motion against gravity with minimal resistance  4-/5 Completes full range of motion against gravity with minimal resistance  4/5 Completes full range of motion against gravity with moderate resistance  5/5 Completes full range of motion against gravity with maximum resistance    Sensation: RUE soft cast (Right fifth metacarpal fx); pt reports slight numbness right hand pinky side  Coordination: RUE FMC impaired; LUE FMC appears intact    FIM SCORES Initial Assessment   Bladder - level of assist     Bladder - accident frequency score    Bowel - level of assist    Bowel - accident frequency score    Please see [facility] Interdisciplinary Eval: Coordination/Balance Section for details regarding FIM score description. COGNITION/PERCEPTION Initial Assessment   Reading Status     Writing Status     Arousal/Alertness     Orientation Level Oriented x4   Visual Fields     Praxis     Body Scheme       COMPREHENSION MODE Initial Assessment   Primary Mode of Comprehension Auditory   Hearing Aide Bilateral, With patient   Corrective Lenses Glasses   Score 4     EXPRESSION Initial Assessment   Primary Mode of Expression Verbal   Score 5   Comments       SOCIAL INTERACTION/PRAGMATICS Initial Assessment   Score 5   Comments       PROBLEM SOLVING Initial Assessment   Score 3   Comments       MEMORY Initial Assessment   Score 3   Comments       EATING Initial Assessment   Functional Level 5 (supv/ set-up)   Comments Self-feeding (supv/ set-up); assistance with opening containers, lids, and small packets due to RUE (wrist/ forearm cast; fifth metacarpal fx)     GROOMING Initial Assessment   Functional Level 5 (SBA)    Oral Hygiene FIM: 5 SBA   Tasks completed by patient Brushed teeth, Washed face   Comments Self-care grooming performed (SBA) for brushing teeth; washing face using washcloth with basin set-up on tray table. Verbal cues for task initiation. BATHING Initial Assessment   Functional Level 3 (UB bathing: SBA; LB bathing: Mod A)   Body parts patient bathed Abdomen, Arm, left, Arm, right, Buttocks, Chest, Lower leg and foot, left, Lower leg and foot, right, Delroy area, Thigh, left, Thigh, right   Comments UB bathing performed (SBA) basin set-up on tray table; therapist assisting with back. LB bathing performed (Mod A) bed level 2/2 pain/ discomfort. Pt requiring assistance with washing RLE, delroy-area, and sacrum/ buttocks. TUB/SHOWER TRANSFER INDEPENDENCE Initial Assessment   Score 0 (Not assessed due to safety concerns and pain level (right hip). )   Comments Not assessed due to safety concerns and pain level (right hip). UPPER BODY DRESSING/UNDRESSING Initial Assessment   Functional Level 4 (Min A)   Items applied/Steps completed Pullover (4 steps)   Comments UB dressing performed Min A for doffing hospital gown; SBA/ Min A for donning pullover scrub top. LOWER BODY DRESSING/UNDRESSING Initial Assessment   Functional Level 2 (Max A)    Sock and/or Shoe Management FIM: 2   Items applied/Steps completed Sock, left (1 step), Sock, right (1 step) (Scrub pants (3 steps))   Comments LB dressing performed (Max A) for donning scrub pants and bilateral slipper socks. Pt requiring assistance with threading clothing over distal BLE's and for pulling up over hips to waist; edu/ instruction for maintaining hip precautions. Max A for donning left sock; total assist for donning right slipper sock. Hip kit issued to patient during today's treatment session. TOILETING Initial Assessment   Functional Level 2 (Max A (change of brief bed level; therapist removed PureWick on arrival to room); pt voided using BSC at bedside (SBA for hygiene; Max A CM))   Comments Max A (change of brief bed level; therapist removed PureWick on arrival to room); pt voided using BSC at bedside (SBA hygiene; Max A CM)     TOILET TRANSFER INDEPENDENCE Initial Assessment   Transfer score 3 (Stand step xfer (Mod A) RW; EOB > BSC at bedside. Limited due to hip pain.)   Comments Stand step xfer (Mod A) RW; EOB > BSC at bedside. Limited due to hip pain.      INSTRUMENTAL ADL Initial Assessment (PLOF)   Meal preparation Modified independent (frozen dinners/ meals (per pt report))   Homemaking  (Son assists with homemaking tasks)   Medicine Management Modified independent   Financial Management Assist x2      ASSESSMENT :  Based on the objective data described above, the patient presents with decreased (I) with ADL's, decreased functional strength/ endurance, decreased activity tolerance, impaired balance/ coordination, decreased AROM, right wrist/ forearm soft cast 2/2 fifth metacarpal fx, right total hip/ WBAT, hip precautions, impaired functional transfers/ mobility, pain, impaired cognition, and safety awareness impeding pt's functional (I) with ADL's and mobility for return to PLOF. Patient presented resting quietly; semi-reclined bed level. Patient was awakened at this time. Therapist introduced self and role of occupational therapy on inpatient rehab unit. Patient alert and oriented x4. Patient agreeable to participate in occupational therapy evaluation and ADL session at this time. UB ADL's performed (SBA/ Min A); LB ADL's (Mod to Max A). Supine to edge of bed transitioning performed (Mod A); therapist assisting at trunk and RLE. Mod A to scoot forward to edge of bed. Stand step xfer performed (Mod A) using RW; gait belt. Mod verbal cues for hand placement and safety cues for managing AD (RW). Edu/ instructed regarding hip precautions within context of self-care performance. Hip kit (AE) issued to patient. Care coordinated with Shelia Landeros RN; regarding pain medication. Patient reported 8/10 right hip pain; described as aching/ throbbing. Staff nurse to follow-up. Right wrist/ forearm soft cast 2/2 fifth metacarpal fx. RUE MMT not assessed 2/2 fracture. Care coordinated with unit NP for clarification regarding use of RUE. Pt would benefit from skilled occupational therapy in order to improve independent functional mobility/ADLs,/IADLs within the home. Interventions may include range of motion (AROM, PROM B UE), motor function (B UE/ strengthening/coordination), activity tolerance (vitals, oxygen saturation levels), balance training, ADL/IADL training and functional transfer training. Please see [facility]; Interdisciplinary Eval, Care Plan, and Patient Education for further information regarding occupational therapy examination and plan of care.       PLAN :  Recommendations and Planned Interventions:  [x] Self Care Training                   [x]        Therapeutic Activities  [x]               Functional Mobility Training    [x]        Cognitive Retraining  [x]               Therapeutic Exercises            [x]        Endurance Activities  [x]               Balance Training                     [x]        Neuromuscular Re-Education  []               Visual/Perceptual Training      [x]   Home Safety Training  [x]               Patient Education                    [x]        Family Training/Education  []               Other (comment):    Frequency/Duration: Patient will be followed by occupational therapy 1-2 times per day/4-7 days per week to address goals. Discharge Recommendations: Home Health  Further Equipment Recommendations for Discharge: bedside commode, gait belt, and transfer bench     Please refer to the flow sheet for vital signs taken during this treatment. After treatment:   [x] Patient left in no apparent distress sitting up in wheelchair with needs met  [] Patient left in no apparent distress in bed  [x] Call bell left within reach  [x] Nursing notified  [] Caregiver present  [x] Wheelchair alarm activated    COMMUNICATION/EDUCATION:   [] Home safety education was provided and the patient/caregiver indicated understanding. [x] Patient/family have participated as able in goal setting and plan of care. [x] Patient/family agree to work toward stated goals and plan of care. [] Patient understands intent and goals of therapy, but is neutral about his/her participation. [] Patient is unable to participate in goal setting and plan of care. Order received from MD for occupational therapy services and chart reviewed. Pt to be seen 5 times per week for 3 hours of total therapy per day for 3 weeks.   Thank you for the referral.    Thank you for this referral.  Darshana Lazo, OT

## 2022-09-23 NOTE — PROGRESS NOTES
[x] Psychology  [] Social Work [] Recreational Therapy    INTERVENTION  UNITS/TIME OF SERVICE   Assessment  September 23, 2022   Supportive Counseling    Orientation    Discharge Planning    Resource Linkage              Progress/Current Status    Patient seen for Psychological Evaluation as requested on admission to ARU by MD.  Patient found sitting upright in wheelchair in bedroom, variably calm and composed and then, sometimes, grimacing with pain and discomfort. Obviously, she is experiencing difficulty with pain management and concerns were conveyed to both RN and NP. Parenthetically, she was actually due for a next dose of pain medicine shortly. Otherwise, patient presents with significant hearing loss but is able to respond to inquiry, not always correctly, however, and needing to have questions repeated. She only move from life long residence in [place] to be with son and daughter in law in [place] within the past year, and insists that she has had a good adjustment with their support. She describes that she has been relatively independent with self care until this injury, and hopes to return to same, as able. She denies history of mental health services and is not currently requiring psychotropic medication for stability. At this time, she is very obviously stressed by discomfort and various aspects of \"forced\" recovery, even stating \"I want to get back home, as soon as possible. Patient will be monitored for emotional and/or behavioral difficulties while on ARU and be encouraged to persevere in her therapy effort, to the best of her ability.     Osorio Adame, THE [facility] 9/23/2022 10:59 AM

## 2022-09-23 NOTE — REHAB NOTE
[facility]    Bedside and Verbal shift change report given to Shaniqua Jordan (oncoming nurse) by Caden Rush (offgoing nurse). Report included the following information SBAR, Kardex, MAR and Recent Results. Situation:   Code Status: DNR   Reason for Admission: right hip fracture  Hospital Day: 1   Problem List:   Hospital Problems  Date Reviewed: 5/31/2022            Codes Class Noted POA    Hip fracture [facility]) ICD-10-CM: H17.828Z  ICD-9-CM: 820.8  9/18/2022 Unknown           Background:   Past Medical History:   Past Medical History:   Diagnosis Date    CAD (coronary artery disease) 2008    Fibromyalgia     Heart failure ([facility] [address]     Pt. states sge gas 5 heart blocks. Hypercholesterolemia     Hypertension     Menopause     Thyroid disease       Patient taking anticoagulants ASA    Patient has a defibrillator: no     Assessment:  Changes in Assessment throughout shift: none    Patient has central line: no Reasons if yes: Insertion date: Last dressing date:  Patient has Talley Cath: no Reasons if yes:     Insertion date:    Last Vitals:     Vitals:    09/22/22 1554 09/22/22 2100   BP: (!) 169/69 (!) 152/60   Pulse: 88 73   Resp: 18 18   Temp: 99.2 °F (37.3 °C) 98.6 °F (37 °C)   SpO2: 95% 95%   Weight: 62.4 kg (137 lb 9.6 oz)    Height: 5' 2\" (1.575 m)        PAIN    Pain Assessment    Pain Intensity 1: 0 (09/23/22 0000) Pain Intensity 1: 2 (12/29/14 1105)    Pain Location 1: Arm, Hip Pain Location 1: Abdomen    Pain Intervention(s) 1: Medication (see MAR) Pain Intervention(s) 1: Medication (see MAR)  Patient Stated Pain Goal: 0 Patient Stated Pain Goal: 0  Intervention effective: yes    Other actions taken for pain: none    Skin Assessment  Skin color Skin Color: Appropriate for ethnicity  Condition/Temperature    Integrity Skin Integrity: Incision (comment)  Turgor    Weekly Pressure Ulcer Documentation  Pressure  Injury Documentation: No Pressure Injury Noted-Pressure Ulcer Prevention Initiated  Wound Prevention & Protection Methods  Orientation of wound Orientation of Wound Prevention: Posterior  Location of Prevention Location of Wound Prevention: Buttocks, Sacrum/Coccyx  Dressing Present Dressing Present : Yes  Dressing Status Dressing Status: Intact  Wound Offloading Wound Offloading (Prevention Methods): Bed, pressure redistribution/air    INTAKE/OUPUT  Date 09/22/22 0700 - 09/23/22 0659 09/23/22 0700 - 09/24/22 0659   Shift 0700-1859 1900-0659 24 Hour Total 0700-1859 1900-0659 24 Hour Total   INTAKE   P.O. 0  0        P. O. 0  0      Shift Total(mL/kg) 0(0)  0(0)      OUTPUT   Urine(mL/kg/hr)           Urine Occurrence(s) 1 x  1 x      Stool  0 0        Stool Occurrence(s) 0 x  0 x        Stool  0 0      Shift Total(mL/kg)  0(0) 0(0)      NET 0 0 0      Weight (kg) 62.4 62.4 62.4 62.4 62.4 62.4       Recommendations:  Patient needs and requests: pain management,reposition    Diet: Regular    Pending tests/procedures: labs     Functional Level/Equipment: assist x 2/wheelchair    Estimated Discharge Date: TBD Posted on Whiteboard in Patients Room: yes     HEALS Safety Check    A safety check occurred in the patient's room between off going nurse and oncoming nurse listed above. The safety check included the below items  Area Items   H  High Alert Medications Verify all high alert medication drips (heparin, PCA, etc.)   E  Equipment Suction is set up for ALL patients (with yanker)  Red plugs utilized for all equipment (IV pumps, etc.)  WOWs wiped down at end of shift. Room stocked with oxygen, suction, and other unit-specific supplies   A  Alarms Bed alarm is set for fall risk patients  Ensure chair alarm is in place and activated if patient is up in a chair   L  Lines Check IV for any infiltration  Talley bag is empty if patient has a Talley   Tubing and IV bags are labeled   S  Safety   Room is clean, patient is clean, and equipment is clean.   Hallways are clear from equipment besides carts. Fall bracelet on for fall risk patients  Ensure room is clear and free of clutter  Suction is set up for ALL patients (with geneva)  Hallways are clear from equipment besides carts.    Isolation precautions followed, supplies available outside room, sign posted

## 2022-09-24 LAB
GLUCOSE BLD STRIP.AUTO-MCNC: 101 MG/DL (ref 70–110)
GLUCOSE BLD STRIP.AUTO-MCNC: 104 MG/DL (ref 70–110)
GLUCOSE BLD STRIP.AUTO-MCNC: 128 MG/DL (ref 70–110)
GLUCOSE BLD STRIP.AUTO-MCNC: 93 MG/DL (ref 70–110)

## 2022-09-24 PROCEDURE — 74011250637 HC RX REV CODE- 250/637: Performed by: HOSPITALIST

## 2022-09-24 PROCEDURE — 97530 THERAPEUTIC ACTIVITIES: CPT

## 2022-09-24 PROCEDURE — 77030038269 HC DRN EXT URIN PURWCK BARD -A

## 2022-09-24 PROCEDURE — 74011250636 HC RX REV CODE- 250/636: Performed by: HOSPITALIST

## 2022-09-24 PROCEDURE — 74011250637 HC RX REV CODE- 250/637: Performed by: FAMILY MEDICINE

## 2022-09-24 PROCEDURE — 82962 GLUCOSE BLOOD TEST: CPT

## 2022-09-24 PROCEDURE — 97110 THERAPEUTIC EXERCISES: CPT

## 2022-09-24 PROCEDURE — 97116 GAIT TRAINING THERAPY: CPT

## 2022-09-24 PROCEDURE — 65310000000 HC RM PRIVATE REHAB

## 2022-09-24 RX ADMIN — ISOSORBIDE MONONITRATE 60 MG: 60 TABLET, EXTENDED RELEASE ORAL at 08:15

## 2022-09-24 RX ADMIN — SENNOSIDES AND DOCUSATE SODIUM 1 TABLET: 50; 8.6 TABLET ORAL at 17:03

## 2022-09-24 RX ADMIN — AMLODIPINE BESYLATE 10 MG: 10 TABLET ORAL at 08:15

## 2022-09-24 RX ADMIN — LEVOTHYROXINE SODIUM 75 MCG: 0.05 TABLET ORAL at 05:55

## 2022-09-24 RX ADMIN — POLYETHYLENE GLYCOL 3350 17 G: 17 POWDER, FOR SOLUTION ORAL at 08:15

## 2022-09-24 RX ADMIN — ASPIRIN 81 MG CHEWABLE TABLET 81 MG: 81 TABLET CHEWABLE at 08:15

## 2022-09-24 RX ADMIN — SENNOSIDES AND DOCUSATE SODIUM 1 TABLET: 50; 8.6 TABLET ORAL at 08:15

## 2022-09-24 RX ADMIN — METOPROLOL SUCCINATE 25 MG: 25 TABLET, FILM COATED, EXTENDED RELEASE ORAL at 08:15

## 2022-09-24 RX ADMIN — ASPIRIN 81 MG CHEWABLE TABLET 81 MG: 81 TABLET CHEWABLE at 17:03

## 2022-09-24 RX ADMIN — FERROUS SULFATE TAB 325 MG (65 MG ELEMENTAL FE) 325 MG: 325 (65 FE) TAB at 08:15

## 2022-09-24 RX ADMIN — ATORVASTATIN CALCIUM 40 MG: 40 TABLET, FILM COATED ORAL at 21:03

## 2022-09-24 RX ADMIN — HYDROCODONE BITARTRATE AND ACETAMINOPHEN 1 TABLET: 5; 325 TABLET ORAL at 21:04

## 2022-09-24 RX ADMIN — HYDROCODONE BITARTRATE AND ACETAMINOPHEN 1 TABLET: 5; 325 TABLET ORAL at 11:25

## 2022-09-24 RX ADMIN — BISACODYL 10 MG: 5 TABLET, COATED ORAL at 05:55

## 2022-09-24 RX ADMIN — CYANOCOBALAMIN 1000 MCG: 1000 INJECTION, SOLUTION INTRAMUSCULAR; SUBCUTANEOUS at 08:16

## 2022-09-24 NOTE — REHAB NOTE
SHIFT CHANGE NOTE FOR [facility]    Bedside and Verbal shift change report given to Andrzej Hawkins RN (oncoming nurse) by Moses Panchal RN  (offgoing nurse). Report included the following information SBAR, Kardex, MAR and Recent Results. Situation:   Code Status: DNR   Reason for Admission: right hip fracture  Hospital Day: 2   Problem List:   Hospital Problems  Date Reviewed: 5/31/2022            Codes Class Noted POA    Hip fracture [facility]) ICD-10-CM: W87.952O  ICD-9-CM: 820.8  9/18/2022 Unknown       Background:   Past Medical History:   Past Medical History:   Diagnosis Date    CAD (coronary artery disease) 2008    Fibromyalgia     Heart failure ([facility] [address]     Pt. states sge gas 5 heart blocks. Hypercholesterolemia     Hypertension     Menopause     Thyroid disease       Patient taking anticoagulants ASA    Patient has a defibrillator: no     Assessment:  Changes in Assessment throughout shift: none    Patient has central line: no Reasons if yes: Insertion date: Last dressing date:  Patient has Talley Cath: no Reasons if yes:     Insertion date:    Last Vitals:     Vitals:    09/23/22 2025 09/24/22 0815 09/24/22 0930 09/24/22 1706   BP: (!) 160/64 (!) 165/63 (!) 142/61 (!) 159/61   Pulse: 76 67 65 72   Resp: 18 16  17   Temp: 98.6 °F (37 °C) 99.1 °F (37.3 °C)  97.6 °F (36.4 °C)   SpO2: 97%   97%   Weight:       Height:           PAIN    Pain Assessment    Pain Intensity 1: 0 (09/24/22 1600) Pain Intensity 1: 2 (12/29/14 1105)    Pain Location 1: Hip Pain Location 1: Abdomen    Pain Intervention(s) 1: Medication (see MAR) Pain Intervention(s) 1: Medication (see MAR)  Patient Stated Pain Goal: 0 Patient Stated Pain Goal: 0  Intervention effective: yes    Other actions taken for pain: none    Skin Assessment  Skin color Skin Color: Appropriate for ethnicity  Condition/Temperature    Integrity Skin Integrity: Incision (comment)  Turgor    Weekly Pressure Ulcer Documentation  Pressure  Injury Documentation: No Pressure Injury Noted-Pressure Ulcer Prevention Initiated  Wound Prevention & Protection Methods  Orientation of wound Orientation of Wound Prevention: Posterior  Location of Prevention Location of Wound Prevention: Sacrum/Coccyx  Dressing Present Dressing Present : No, Yes  Dressing Status Dressing Status: Intact  Wound Offloading Wound Offloading (Prevention Methods): Bed, pressure reduction mattress    INTAKE/OUPUT  Date 09/23/22 1900 - 09/24/22 0659 09/24/22 0700 - 09/25/22 0659   Shift 0696-6136 24 Hour Total 1442-3794 5547-1359 24 Hour Total   INTAKE   P.O.  600        P. O.  600      Shift Total(mL/kg)  600(9.6)      OUTPUT   Urine(mL/kg/hr)          Urine Occurrence(s) 0 x 7 x      Emesis/NG output          Emesis Occurrence(s) 0 x 0 x      Stool          Stool Occurrence(s) 0 x 0 x      Shift Total(mL/kg)        NET  600      Weight (kg) 62.4 62.4 62.4 62.4 62.4         Recommendations:  Patient needs and requests: pain management,reposition    Diet: Regular    Pending tests/procedures: labs     Functional Level/Equipment: assist x 2/wheelchair    Estimated Discharge Date: TBD Posted on Whiteboard in Patients Room: yes     HEALS Safety Check    A safety check occurred in the patient's room between off going nurse and oncoming nurse listed above. The safety check included the below items  Area Items   H  High Alert Medications Verify all high alert medication drips (heparin, PCA, etc.)   E  Equipment Suction is set up for ALL patients (with yanker)  Red plugs utilized for all equipment (IV pumps, etc.)  WOWs wiped down at end of shift.   Room stocked with oxygen, suction, and other unit-specific supplies   A  Alarms Bed alarm is set for fall risk patients  Ensure chair alarm is in place and activated if patient is up in a chair   L  Lines Check IV for any infiltration  Talley bag is empty if patient has a Talley   Tubing and IV bags are labeled   S  Safety   Room is clean, patient is clean, and equipment is clean.  Hallways are clear from equipment besides carts. Fall bracelet on for fall risk patients  Ensure room is clear and free of clutter  Suction is set up for ALL patients (with geneva)  Hallways are clear from equipment besides carts.    Isolation precautions followed, supplies available outside room, sign posted

## 2022-09-24 NOTE — PROGRESS NOTES
conducted a Follow up consultation and Spiritual Assessment for Phyllis Blanco, who is a 80 y.o.,female. The  provided the following Interventions:  Continued the relationship of care and support. Listened empathically. Patient shares that she longed to receive holy communion for a very long time. She used to going to her son's [employer] but so much was going on in that [employer] and so she quit going. Offered prayer, holy communion, and assurance of continued prayer on patient's behalf. Chart reviewed. The following outcomes were achieved:  Patient expressed gratitude for 's visit and wish she could receive holy communio\n more often. Assessment:  There are no further spiritual or Hoahaoism issues which require Spiritual Care Services interventions at this time. Plan:  Chaplains will continue to follow and will provide pastoral care on an as needed/requested basis.  recommends bedside caregivers page  on duty if patient shows signs of acute spiritual or emotional distress.      Melba Alexis5 [phone]

## 2022-09-24 NOTE — REHAB NOTE
SHIFT CHANGE NOTE FOR [facility]    Bedside and Verbal shift change report given to 380Britta Thee Pryor (oncoming nurse) by Pawan Deras RN  (offgoing nurse). Report included the following information SBAR, Kardex, MAR and Recent Results. Situation:   Code Status: DNR   Reason for Admission: right hip fracture  Hospital Day: 2   Problem List:   Hospital Problems  Date Reviewed: 5/31/2022            Codes Class Noted POA    Hip fracture [facility]) ICD-10-CM: Q14.529L  ICD-9-CM: 820.8  9/18/2022 Unknown       Background:   Past Medical History:   Past Medical History:   Diagnosis Date    CAD (coronary artery disease) 2008    Fibromyalgia     Heart failure ([facility]     Pt. states sge gas 5 heart blocks. Hypercholesterolemia     Hypertension     Menopause     Thyroid disease       Patient taking anticoagulants ASA    Patient has a defibrillator: no     Assessment:  Changes in Assessment throughout shift: none    Patient has central line: no Reasons if yes: Insertion date: Last dressing date:  Patient has Talley Cath: no Reasons if yes:     Insertion date:    Last Vitals:     Vitals:    09/22/22 2100 09/23/22 0705 09/23/22 1510 09/23/22 2025   BP: (!) 152/60 (!) 172/52 (!) 144/66 (!) 160/64   Pulse: 73 77 68 76   Resp: 18 18 17 18   Temp: 98.6 °F (37 °C) 98.4 °F (36.9 °C) 98.1 °F (36.7 °C) 98.6 °F (37 °C)   SpO2: 95% 96% 95% 97%   Weight:       Height:           PAIN    Pain Assessment    Pain Intensity 1: 0 (09/24/22 0407) Pain Intensity 1: 2 (12/29/14 1105)    Pain Location 1: Hip Pain Location 1: Abdomen    Pain Intervention(s) 1: Position, Medication (see MAR) Pain Intervention(s) 1: Medication (see MAR)  Patient Stated Pain Goal: 0 Patient Stated Pain Goal: 0  Intervention effective: yes    Other actions taken for pain: none    Skin Assessment  Skin color Skin Color: Appropriate for ethnicity  Condition/Temperature    Integrity Skin Integrity: Incision (comment)  Turgor    Weekly Pressure Ulcer Documentation  Pressure  Injury Documentation: No Pressure Injury Noted-Pressure Ulcer Prevention Initiated  Wound Prevention & Protection Methods  Orientation of wound Orientation of Wound Prevention: Posterior  Location of Prevention Location of Wound Prevention: Sacrum/Coccyx  Dressing Present Dressing Present : Yes  Dressing Status Dressing Status: Intact  Wound Offloading Wound Offloading (Prevention Methods): Bed, pressure reduction mattress, Elevate heels, Pillows, Repositioning, Foam silicone, Walker, Wheelchair    INTAKE/OUPUT  Date 09/23/22 0700 - 09/24/22 0659 09/24/22 0700 - 09/25/22 0659   Shift 0700-1859 1900-0659 24 Hour Total 0700-1859 1900-0659 24 Hour Total   INTAKE   P.O. 600  600        P. O. 600  600      Shift Total(mL/kg) 600(9.6)  600(9.6)      OUTPUT   Urine(mL/kg/hr)           Urine Occurrence(s) 7 x 0 x 7 x      Emesis/NG output           Emesis Occurrence(s)  0 x 0 x      Stool           Stool Occurrence(s) 0 x 0 x 0 x      Shift Total(mL/kg)           600      Weight (kg) 62.4 62.4 62.4 62.4 62.4 62.4         Recommendations:  Patient needs and requests: pain management,reposition    Diet: Regular    Pending tests/procedures: labs     Functional Level/Equipment: assist x 2/wheelchair    Estimated Discharge Date: TBD Posted on Whiteboard in Patients Room: yes     HEALS Safety Check    A safety check occurred in the patient's room between off going nurse and oncoming nurse listed above. The safety check included the below items  Area Items   H  High Alert Medications Verify all high alert medication drips (heparin, PCA, etc.)   E  Equipment Suction is set up for ALL patients (with yanker)  Red plugs utilized for all equipment (IV pumps, etc.)  WOWs wiped down at end of shift.   Room stocked with oxygen, suction, and other unit-specific supplies   A  Alarms Bed alarm is set for fall risk patients  Ensure chair alarm is in place and activated if patient is up in a chair   L  Lines Check IV for any infiltration  Talley bag is empty if patient has a Talley   Tubing and IV bags are labeled   S  Safety   Room is clean, patient is clean, and equipment is clean. Hallways are clear from equipment besides carts. Fall bracelet on for fall risk patients  Ensure room is clear and free of clutter  Suction is set up for ALL patients (with geneva)  Hallways are clear from equipment besides carts.    Isolation precautions followed, supplies available outside room, sign posted

## 2022-09-24 NOTE — PROGRESS NOTES
Problem: Mobility Impaired (Adult and Pediatric)  Goal: *Acute Goals and Plan of Care (Insert Text)  Description: Physical Therapy Short Term Goals  Initiated 9/23/2022 and to be accomplished within 7 day(s) (9/30/2022)  1. Patient will move from supine to sit and sit to supine , scoot up and down, and roll side to side in bed with supervision/set-up. 2.  Patient will transfer from bed to chair and chair to bed with minimal assistance/contact guard assist using the least restrictive device. 3.  Patient will perform sit to stand with minimal assistance/contact guard assist.  4.  Patient will ambulate with minimal assistance/contact guard assist for 50 feet with the least restrictive device. 5.  Patient will ascend/descend 5 stairs with bilateral handrail(s) with minimal assistance/contact guard assist.    Physical Therapy Long Term Goals  Initiated 9/23/2022 and to be accomplished within 14-21 day(s) (10/07/2022-10/14/2022)  1. Patient will move from supine to sit and sit to supine , scoot up and down, and roll side to side in bed with supervision/set-up. 2.  Patient will transfer from bed to chair and chair to bed with supervision/set-up using the least restrictive device. 3.  Patient will perform sit to stand with supervision/set-up. 4.  Patient will ambulate with supervision/set-up for 150 feet with the least restrictive device. 5.  Patient will ascend/descend 5 stairs with bilateral handrail(s) with supervision/set-up. Outcome: Progressing Towards Goal   PHYSICAL THERAPY TREATMENT    Patient: Kristel Wiley (10 y.o. female)  Date: 9/24/2022  Diagnosis: Hip fracture ([facility] [S72.009A]   Precautions: Total hip, Fall  Chart, physical therapy assessment, plan of care and goals were reviewed. Time In:1110  Time Out:1240    Patient seen for: Transfer training;Gait training; Therapeutic exercise (bed mobility)    Pain:  Pt pain was reported as fair pre-treatment.   Pt pain was reported as 8-9/10 post-treatment. Intervention: reported to nurse. Pt received pain meds before PT, 1 of 2 pill dose. Patient identified with name and : yes     SUBJECTIVE:      Pt reports \"not too bad\" when asked if right LE was having pain at start of session. After gait and exercises pt reports 8-9/10 right hip. Pt appears forgetful during session and states \"do we have more testing to do? \" due to not remembering being in therapy. OBJECTIVE DATA SUMMARY:    Objective:     BED/MAT MOBILITY Daily Assessment     Supine to Sit : 3 (Moderate assistance)  Sit to Supine : 3 (Moderate assistance)  Pt presents supine in bed and required min assist with right LE and at trunk for supine to sit on right side of bed. Pt performed bed mobility on right side of mat table with mod assist with right LE and min assist at trunk for sit to supine. Pt required mod assist with right LE and at trunk for supine to sit on right side of mat table. TRANSFERS Daily Assessment     Transfer Type: Other  Other: stand step txfr with RW  Transfer Assistance : 4 (Minimal assistance)  Sit to Stand Assistance: Minimal assistance  Pt performed sit to stand from w/c with min assist and v/c to push up from B arm rests and performed stand step txfr bed to w/c and w/c<->mat table with RW and min assist for balance and safety due to right LE pain and decreased stance on right LE. GAIT Daily Assessment    Gait Description (WDL) Exceptions to WDL    Gait Abnormalities Antalgic;Decreased step clearance; Step to gait    Assistive device Walker, rolling;Gait belt    Ambulation assistance - level surface 4 (Minimal assistance)    Distance 14 Feet (ft) (12ft and 11ft)    Ambulation assistance- uneven surface      Comments Pt ambulated 12ft, 11ft and 14ft with RW and CGA/min assist. Pt demo'd decrease stance on right LE, short B step lengths and rounded posture with step to gait pattern.          BALANCE Daily Assessment     Sitting - Static: Fair (occasional)  Sitting - Dynamic: Fair (occasional)  Standing - Static: Fair (with RW)  Standing - Dynamic : Impaired        THERAPEUTIC EXERCISES Daily Assessment       Supine  BLE SAQ, bridging, hip abd 2x10        ASSESSMENT:  Pt demo'd decreased tolerance due to right hip pain and required increased rest breaks between tasks. Pt's c/o pain increased as session continued. Progression toward goals:  []      Improving appropriately and progressing toward goals  [x]      Improving slowly and progressing toward goals  []      Not making progress toward goals and plan of care will be adjusted      PLAN:  Patient continues to benefit from skilled intervention to address the above impairments. Continue treatment per established plan of care. Discharge Recommendations:  Home Health  Further Equipment Recommendations for Discharge:  rolling walker and wheelchair 18 inch      Estimated Discharge Date: TBD    Activity Tolerance:   Fair-  Please refer to the flowsheet for vital signs taken during this treatment.     After treatment:   [] Patient left in no apparent distress in bed  [x] Patient left in no apparent distress sitting up in chair  [] Patient left in no apparent distress in w/c mobilizing under own power  [] Patient left in no apparent distress dining area  [] Patient left in no apparent distress mobilizing under own power  [x] Call bell left within reach  [] Nursing notified  [] Caregiver present  [] Bed alarm activated   [x] Chair alarm activated      Francisco Stephenson PTA  9/24/2022

## 2022-09-25 LAB
GLUCOSE BLD STRIP.AUTO-MCNC: 102 MG/DL (ref 70–110)
GLUCOSE BLD STRIP.AUTO-MCNC: 115 MG/DL (ref 70–110)
GLUCOSE BLD STRIP.AUTO-MCNC: 126 MG/DL (ref 70–110)
GLUCOSE BLD STRIP.AUTO-MCNC: 169 MG/DL (ref 70–110)

## 2022-09-25 PROCEDURE — 74011636637 HC RX REV CODE- 636/637: Performed by: FAMILY MEDICINE

## 2022-09-25 PROCEDURE — 97535 SELF CARE MNGMENT TRAINING: CPT

## 2022-09-25 PROCEDURE — 74011250637 HC RX REV CODE- 250/637: Performed by: FAMILY MEDICINE

## 2022-09-25 PROCEDURE — 74011250637 HC RX REV CODE- 250/637: Performed by: HOSPITALIST

## 2022-09-25 PROCEDURE — 65310000000 HC RM PRIVATE REHAB

## 2022-09-25 PROCEDURE — 77030040392 HC DRSG OPTIFOAM MDII -A

## 2022-09-25 PROCEDURE — 82962 GLUCOSE BLOOD TEST: CPT

## 2022-09-25 PROCEDURE — 74011250636 HC RX REV CODE- 250/636: Performed by: HOSPITALIST

## 2022-09-25 PROCEDURE — 97530 THERAPEUTIC ACTIVITIES: CPT

## 2022-09-25 RX ADMIN — ISOSORBIDE MONONITRATE 60 MG: 60 TABLET, EXTENDED RELEASE ORAL at 08:45

## 2022-09-25 RX ADMIN — BISACODYL 10 MG: 10 SUPPOSITORY RECTAL at 13:28

## 2022-09-25 RX ADMIN — ASPIRIN 81 MG CHEWABLE TABLET 81 MG: 81 TABLET CHEWABLE at 08:45

## 2022-09-25 RX ADMIN — METOPROLOL SUCCINATE 25 MG: 25 TABLET, FILM COATED, EXTENDED RELEASE ORAL at 08:45

## 2022-09-25 RX ADMIN — Medication 2 UNITS: at 12:18

## 2022-09-25 RX ADMIN — POLYETHYLENE GLYCOL 3350 17 G: 17 POWDER, FOR SOLUTION ORAL at 08:45

## 2022-09-25 RX ADMIN — SENNOSIDES AND DOCUSATE SODIUM 1 TABLET: 50; 8.6 TABLET ORAL at 17:00

## 2022-09-25 RX ADMIN — ASPIRIN 81 MG CHEWABLE TABLET 81 MG: 81 TABLET CHEWABLE at 17:00

## 2022-09-25 RX ADMIN — ATORVASTATIN CALCIUM 40 MG: 40 TABLET, FILM COATED ORAL at 21:09

## 2022-09-25 RX ADMIN — SENNOSIDES AND DOCUSATE SODIUM 1 TABLET: 50; 8.6 TABLET ORAL at 08:45

## 2022-09-25 RX ADMIN — LEVOTHYROXINE SODIUM 75 MCG: 0.05 TABLET ORAL at 06:20

## 2022-09-25 RX ADMIN — FERROUS SULFATE TAB 325 MG (65 MG ELEMENTAL FE) 325 MG: 325 (65 FE) TAB at 08:45

## 2022-09-25 RX ADMIN — CYANOCOBALAMIN 1000 MCG: 1000 INJECTION, SOLUTION INTRAMUSCULAR; SUBCUTANEOUS at 08:45

## 2022-09-25 RX ADMIN — AMLODIPINE BESYLATE 10 MG: 10 TABLET ORAL at 08:45

## 2022-09-25 NOTE — PROGRESS NOTES
Per her nurse's referral,  provided a follow up visit and Spiritual Assessment for Leonarda Mccord, who is a 80 y.o.,female. The  provided the following Interventions:  Continued the relationship of care and support though supportive dialogue  Affirmed her anastacia and her daily anastacia practices. Discussed the availability of holy communion by our [facility] [address] which she expressed a desire to receive. Chart reviewed. The following outcomes were achieved:  Patient expressed gratitude for 's visit. Assessment:  There are no further spiritual or Denominational issues which require Spiritual Care Services interventions at this time. Plan:  Chaplains will continue to follow and will provide pastoral care on an as needed/requested basis.  recommends bedside caregivers page  on duty if patient shows signs of acute spiritual or emotional distress.        [address] [place]   [phone]

## 2022-09-25 NOTE — PROGRESS NOTES
Problem: Self Care Deficits Care Plan (Adult)  Goal: *Acute Goals and Plan of Care (Insert Text)  Description: Occupational Therapy Goals   Long Term Goals  Initiated (2022) and to be accomplished within 3 week(s), by (10/14/2022)  1. Pt will perform self-feeding with Mod I.  2. Pt will perform grooming with Mod I.  3. Pt will perform UB bathing with set-up. 4. Pt will perform LB bathing with supv using AE and/ or compensatory strategies while maintaining hip precautions. 5. Pt will perform tub/shower transfer with SBA using LRAD. 6. Pt will perform UB dressing with Mod I.  7. Pt will perform LB dressing with SBA using AE and/ or compensatory strategies while maintaining hip precautions. 8. Pt will perform toileting task with supv/ SBA. 9. Pt will perform toilet transfer with supv using LRAD. Short Term Goals   Initiated (2022) and to be accomplished within 7 day(s), by (2022)  1. Pt will perform self-feeding with set-up/ supv. 2. Pt will perform grooming with supv. 3. Pt will perform UB bathing with supv. 4. Pt will perform LB bathing with Min A using AE and/ or compensatory strategies while maintaining hip precautions. 5. Pt will perform tub/shower transfer with Min A using LRAD. 6. Pt will perform UB dressing with SBA. 7. Pt will perform LB dressing with Mod A using AE and/ or compensatory strategies while maintaining hip precautions. 8. Pt will perform toileting task with Mod A.  9. Pt will perform toilet transfer with Min A using LRAD. Outcome: Progressing Towards Goal   Occupational Therapy TREATMENT    Patient: Roque Bamberger   80 y.o. Patient identified with name and : yes    Date: 2022    First Tx Session  Time In: 1100  Time Out[de-identified] 1230      Diagnosis: Hip fracture ([address] [S72.009A]   Precautions: Total hip, Fall  Chart, occupational therapy assessment, plan of care, and goals were reviewed. Pain:  Pt reports 5/10 pain or discomfort prior to treatment. Pt reports 7/10 pain or discomfort post treatment. Intervention Provided: Nursing aware of pt's pain level. SUBJECTIVE:   Patient stated I moved here from [place], [place].    OBJECTIVE DATA SUMMARY:     THERAPEUTIC ACTIVITY Daily Assessment    Pt participated in reaching activity to challenge sitting balance and to increase core strength. Pt used elevated peg board to transfer x2 patterns from card onto board using large pegs. Pt was required to reach across/away from midline to retreive pegs and then forward, outside KAMILA, to place onto elevated peg board. Pt required extra time to complete 2/2 increased fatigue. Pt using BUE to perform activity. Pt participated in FM/dexterity activity for carryover to self-care tasks. Pt used the game Perfection for activity. Pt was required to use pincer grasp to  small pieces, manipulate to orient correctly and place into game board. Pt requiring extra time to complete 2/2 difficulty locating correct spot to place pieces. Pt performed activity with right UE and then with left UE. For increased challenge, 1# weights were placed on B upper arms. GROOMING Daily Assessment    Functional Level: 5 (SBA)  Tasks Completed by Patient: Washed hands  Comments: Performed w/c level at sink. TOILETING Daily Assessment    Functional Level: 2  Comments: Pt SBA for hygiene and requires Max A for CM in stance. MOBILITY/TRANSFERS Daily Assessment    Toilet Transfer Score: 3  Comments: Stand-step transfer w/c to elevated toilet seat. Stand step transfer EOB to w/c using FWW and gait belt for safety Mod A.       ASSESSMENT:  Pt appears to be limited with full participation due to increased pain levels. Pt also in discomfort from lack of BM. Nursing aware of pt's condition. Education provided to pt that movement/fluid intake would help improve condition.    Progression toward goals:  []          Improving appropriately and progressing toward goals  [x] Improving slowly and progressing toward goals  []          Not making progress toward goals and plan of care will be adjusted     PLAN:  Patient continues to benefit from skilled intervention to address the above impairments. Continue treatment per established plan of care. Discharge Recommendations:  Home Health  Further Equipment Recommendations for Discharge:  bedside commode and transfer bench     Activity Tolerance:  Fair-      Estimated LOS:TBD    Please refer to the flowsheet for vital signs taken during this treatment. After treatment:   [x]  Patient left in no apparent distress sitting up in chair   []  Patient left in no apparent distress in bed  [x]  Call bell left within reach  [x]  Nursing notified  [x]  Caregiver present (friend of grand daughter)  []  Bed alarm activated    COMMUNICATION/EDUCATION:   [] Home safety education was provided and the patient/caregiver indicated understanding. [] Patient/family have participated as able in goal setting and plan of care. [x] Patient/family agree to work toward stated goals and plan of care. [] Patient understands intent and goals of therapy, but is neutral about his/her participation. [] Patient is unable to participate in goal setting and plan of care.       SHANA King/GALDINO

## 2022-09-25 NOTE — PROGRESS NOTES
Problem: Patient Education: Go to Patient Education Activity  Goal: Patient/Family Education  Outcome: Progressing Towards Goal     Problem: Patient Education: Go to Patient Education Activity  Goal: Patient/Family Education  Outcome: Progressing Towards Goal     Problem: Pain  Goal: *Control of Pain  Outcome: Progressing Towards Goal  Goal: *PALLIATIVE CARE:  Alleviation of Pain  Outcome: Progressing Towards Goal     Problem: Patient Education: Go to Patient Education Activity  Goal: Patient/Family Education  Outcome: Progressing Towards Goal     Problem: Falls - Risk of  Goal: *Absence of Falls  Description: Document Alma Rosa Fall Risk and appropriate interventions in the flowsheet. Outcome: Progressing Towards Goal  Note: Fall Risk Interventions:  Mobility Interventions: Bed/chair exit alarm, Patient to call before getting OOB    Mentation Interventions: Adequate sleep, hydration, pain control, Bed/chair exit alarm    Medication Interventions: Bed/chair exit alarm, Patient to call before getting OOB    Elimination Interventions: Bed/chair exit alarm, Call light in reach, Patient to call for help with toileting needs    History of Falls Interventions: Bed/chair exit alarm         Problem: Patient Education: Go to Patient Education Activity  Goal: Patient/Family Education  Outcome: Progressing Towards Goal     Problem: Pressure Injury - Risk of  Goal: *Prevention of pressure injury  Description: Document Seamus Scale and appropriate interventions in the flowsheet.   Outcome: Progressing Towards Goal  Note: Pressure Injury Interventions:  Sensory Interventions: Assess changes in LOC    Moisture Interventions: Absorbent underpads    Activity Interventions: Chair cushion, Increase time out of bed, Pressure redistribution bed/mattress(bed type)    Mobility Interventions: Chair cushion, HOB 30 degrees or less, Pressure redistribution bed/mattress (bed type)    Nutrition Interventions: Document food/fluid/supplement intake    Friction and Shear Interventions: HOB 30 degrees or less                Problem: Patient Education: Go to Patient Education Activity  Goal: Patient/Family Education  Outcome: Progressing Towards Goal     Problem: Inpatient Rehab (Adult)  Goal: *LTG: Avoids injury/falls 100% of time related to deficits  Outcome: Progressing Towards Goal  Goal: *LTG: Avoids infection 100% of time related to deficits  Outcome: Progressing Towards Goal  Goal: *LTG: Verbalize understanding of diagnosis and risk factors for recurring stroke  Outcome: Progressing Towards Goal  Goal: *LTG: Absence of DVT during hospitalization  Outcome: Progressing Towards Goal  Goal: *LTG: Maintains Skin Integrity With No Evidence of Pressure Injury 100% of Time  Outcome: Progressing Towards Goal  Goal: Interventions  Outcome: Progressing Towards Goal     Problem: Patient Education: Go to Patient Education Activity  Goal: Patient/Family Education  Outcome: Progressing Towards Goal     Problem: Patient Education: Go to Patient Education Activity  Goal: Patient/Family Education  Outcome: Progressing Towards Goal

## 2022-09-25 NOTE — REHAB NOTE
SHIFT CHANGE NOTE FOR [facility]    Bedside and Verbal shift change report given to Anna Kwok RN (oncoming nurse) by Millicent Norris RN  (offgoing nurse). Report included the following information SBAR, Kardex, MAR and Recent Results. Situation:   Code Status: DNR   Reason for Admission: right hip fracture  Hospital Day: 3   Problem List:   Hospital Problems  Date Reviewed: 5/31/2022            Codes Class Noted POA    Hip fracture [facility]) ICD-10-CM: O29.172T  ICD-9-CM: 820.8  9/18/2022 Unknown       Background:   Past Medical History:   Past Medical History:   Diagnosis Date    CAD (coronary artery disease) 2008    Fibromyalgia     Heart failure ([facility] [address]     Pt. states sge gas 5 heart blocks. Hypercholesterolemia     Hypertension     Menopause     Thyroid disease       Patient taking anticoagulants ASA    Patient has a defibrillator: no     Assessment:  Changes in Assessment throughout shift: none    Patient has central line: no Reasons if yes: Insertion date: Last dressing date:  Patient has Talley Cath: no Reasons if yes:     Insertion date:    Last Vitals:     Vitals:    09/24/22 0930 09/24/22 1706 09/24/22 2104 09/25/22 0832   BP: (!) 142/61 (!) 159/61 (!) 162/63 (!) 186/69   Pulse: 65 72 71 70   Resp:  17 20 18   Temp:  97.6 °F (36.4 °C) 98.5 °F (36.9 °C) 97.9 °F (36.6 °C)   SpO2:  97% 95% 96%   Weight:       Height:           PAIN    Pain Assessment    Pain Intensity 1: 0 (09/25/22 1629) Pain Intensity 1: 2 (12/29/14 1105)    Pain Location 1: Arm Pain Location 1: Abdomen    Pain Intervention(s) 1: Medication (see MAR) Pain Intervention(s) 1: Medication (see MAR)  Patient Stated Pain Goal: 0 Patient Stated Pain Goal: 0  Intervention effective: yes    Other actions taken for pain: none    Skin Assessment  Skin color Skin Color: Appropriate for ethnicity  Condition/Temperature    Integrity Skin Integrity: Incision (comment)  Turgor    Weekly Pressure Ulcer Documentation  Pressure  Injury Documentation: No Pressure Injury Noted-Pressure Ulcer Prevention Initiated  Wound Prevention & Protection Methods  Orientation of wound Orientation of Wound Prevention: Posterior  Location of Prevention Location of Wound Prevention: Sacrum/Coccyx  Dressing Present Dressing Present : No  Dressing Status Dressing Status: Intact  Wound Offloading Wound Offloading (Prevention Methods): Bed, pressure redistribution/air    INTAKE/OUPUT  Date 09/24/22 1900 - 09/25/22 0659 09/25/22 0700 - 09/26/22 0659   Shift 9932-8772 24 Hour Total 0700-1859 1900-0659 24 Hour Total   INTAKE   P.O.   720  720     P. O.   720  720   Shift Total(mL/kg)   720(11.5)  720(11.5)   OUTPUT   Urine(mL/kg/hr) 800 800        Urine Voided 800 800        Urine Occurrence(s)   1 x  1 x   Stool          Stool Occurrence(s) 0 x 0 x 1 x  1 x   Shift Total(mL/kg) 800(12.8) 800(12.8)      NET -800 -800 720  720   Weight (kg) 62.4 62.4 62.4 62.4 62.4         Recommendations:  Patient needs and requests: pain management,reposition    Diet: Regular    Pending tests/procedures: labs     Functional Level/Equipment: assist x 2/wheelchair    Estimated Discharge Date: TBD Posted on Whiteboard in Patients Room: yes     HEALS Safety Check    A safety check occurred in the patient's room between off going nurse and oncoming nurse listed above. The safety check included the below items  Area Items   H  High Alert Medications Verify all high alert medication drips (heparin, PCA, etc.)   E  Equipment Suction is set up for ALL patients (with yanker)  Red plugs utilized for all equipment (IV pumps, etc.)  WOWs wiped down at end of shift.   Room stocked with oxygen, suction, and other unit-specific supplies   A  Alarms Bed alarm is set for fall risk patients  Ensure chair alarm is in place and activated if patient is up in a chair   L  Lines Check IV for any infiltration  Talley bag is empty if patient has a Talley   Tubing and IV bags are labeled   S  Safety   Room is clean, patient is clean, and equipment is clean. Hallways are clear from equipment besides carts. Fall bracelet on for fall risk patients  Ensure room is clear and free of clutter  Suction is set up for ALL patients (with geneva)  Hallways are clear from equipment besides carts.    Isolation precautions followed, supplies available outside room, sign posted

## 2022-09-25 NOTE — REHAB NOTE
[facility]    Bedside and Verbal shift change report given to Shirin Olson (oncoming nurse) by Silvia Wolfe (offgoing nurse). Report included the following information SBAR, Kardex, MAR and Recent Results. Situation:   Code Status: DNR   Reason for Admission: Right hip fracture  Hospital Day: 3   Problem List:   Hospital Problems  Date Reviewed: 5/31/2022            Codes Class Noted POA    Hip fracture [facility]) ICD-10-CM: E12.072F  ICD-9-CM: 820.8  9/18/2022 Unknown           Background:   Past Medical History:   Past Medical History:   Diagnosis Date    CAD (coronary artery disease) 2008    Fibromyalgia     Heart failure ([facility] [address]     Pt. states sge gas 5 heart blocks. Hypercholesterolemia     Hypertension     Menopause     Thyroid disease       Patient taking anticoagulants ASA    Patient has a defibrillator: no     Assessment:  Changes in Assessment throughout shift: none    Patient has central line: no Reasons if yes: Insertion date: Last dressing date:  Patient has Talley Cath: no Reasons if yes:     Insertion date:    Last Vitals:     Vitals:    09/24/22 0815 09/24/22 0930 09/24/22 1706 09/24/22 2104   BP: (!) 165/63 (!) 142/61 (!) 159/61 (!) 162/63   Pulse: 67 65 72 71   Resp: 16  17 20   Temp: 99.1 °F (37.3 °C)  97.6 °F (36.4 °C) 98.5 °F (36.9 °C)   SpO2:   97% 95%   Weight:       Height:           PAIN    Pain Assessment    Pain Intensity 1: 0 (09/25/22 0000) Pain Intensity 1: 2 (12/29/14 1105)    Pain Location 1: Arm Pain Location 1: Abdomen    Pain Intervention(s) 1: Medication (see MAR) Pain Intervention(s) 1: Medication (see MAR)  Patient Stated Pain Goal: 0 Patient Stated Pain Goal: 0  Intervention effective: na    Other actions taken for pain: na    Skin Assessment  Skin color Skin Color: Appropriate for ethnicity  Condition/Temperature    Integrity Skin Integrity: Incision (comment)  Turgor    Weekly Pressure Ulcer Documentation  Pressure  Injury Documentation: No Pressure Injury Noted-Pressure Ulcer Prevention Initiated  Wound Prevention & Protection Methods  Orientation of wound Orientation of Wound Prevention: Posterior  Location of Prevention Location of Wound Prevention: Buttocks, Sacrum/Coccyx  Dressing Present Dressing Present : Yes  Dressing Status Dressing Status: Intact  Wound Offloading Wound Offloading (Prevention Methods): Bed, pressure redistribution/air    INTAKE/OUPUT  Date 09/24/22 0700 - 09/25/22 0659 09/25/22 0700 - 09/26/22 0659   Shift 0700-1859 1900-0659 24 Hour Total 0700-1859 1900-0659 24 Hour Total   INTAKE   Shift Total(mL/kg)         OUTPUT   Stool           Stool Occurrence(s)  0 x 0 x      Shift Total(mL/kg)         NET         Weight (kg) 62.4 62.4 62.4 62.4 62.4 62.4       Recommendations:  Patient needs and requests: reposition    Diet: Regular    Pending tests/procedures: none     Functional Level/Equipment: assist x 2/wheelchair    Estimated Discharge Date: TBD Posted on Whiteboard in Patients Room: yes     HEALS Safety Check    A safety check occurred in the patient's room between off going nurse and oncoming nurse listed above. The safety check included the below items  Area Items   H  High Alert Medications Verify all high alert medication drips (heparin, PCA, etc.)   E  Equipment Suction is set up for ALL patients (with geneva)  Red plugs utilized for all equipment (IV pumps, etc.)  WOWs wiped down at end of shift. Room stocked with oxygen, suction, and other unit-specific supplies   A  Alarms Bed alarm is set for fall risk patients  Ensure chair alarm is in place and activated if patient is up in a chair   L  Lines Check IV for any infiltration  Talley bag is empty if patient has a Talley   Tubing and IV bags are labeled   S  Safety   Room is clean, patient is clean, and equipment is clean. Hallways are clear from equipment besides carts.    Fall bracelet on for fall risk patients  Ensure room is clear and free of clutter  Suction is set up for ALL patients (with yanker)  Hallways are clear from equipment besides carts.    Isolation precautions followed, supplies available outside room, sign posted

## 2022-09-26 LAB
ANION GAP SERPL CALC-SCNC: 7 MMOL/L (ref 3–18)
BASOPHILS # BLD: 0 K/UL (ref 0–0.1)
BASOPHILS NFR BLD: 1 % (ref 0–2)
BUN SERPL-MCNC: 14 MG/DL (ref 7–18)
BUN/CREAT SERPL: 30 (ref 12–20)
CALCIUM SERPL-MCNC: 8.2 MG/DL (ref 8.5–10.1)
CHLORIDE SERPL-SCNC: 103 MMOL/L (ref 100–111)
CO2 SERPL-SCNC: 26 MMOL/L (ref 21–32)
CREAT SERPL-MCNC: 0.46 MG/DL (ref 0.6–1.3)
DIFFERENTIAL METHOD BLD: ABNORMAL
EOSINOPHIL # BLD: 0.1 K/UL (ref 0–0.4)
EOSINOPHIL NFR BLD: 2 % (ref 0–5)
ERYTHROCYTE [DISTWIDTH] IN BLOOD BY AUTOMATED COUNT: 12.7 % (ref 11.6–14.5)
GLUCOSE BLD STRIP.AUTO-MCNC: 111 MG/DL (ref 70–110)
GLUCOSE BLD STRIP.AUTO-MCNC: 116 MG/DL (ref 70–110)
GLUCOSE BLD STRIP.AUTO-MCNC: 120 MG/DL (ref 70–110)
GLUCOSE BLD STRIP.AUTO-MCNC: 120 MG/DL (ref 70–110)
GLUCOSE SERPL-MCNC: 109 MG/DL (ref 74–99)
HCT VFR BLD AUTO: 28.3 % (ref 35–45)
HGB BLD-MCNC: 9.5 G/DL (ref 12–16)
IMM GRANULOCYTES # BLD AUTO: 0.2 K/UL (ref 0–0.04)
IMM GRANULOCYTES NFR BLD AUTO: 3 % (ref 0–0.5)
LYMPHOCYTES # BLD: 1.1 K/UL (ref 0.9–3.6)
LYMPHOCYTES NFR BLD: 19 % (ref 21–52)
MCH RBC QN AUTO: 30.2 PG (ref 24–34)
MCHC RBC AUTO-ENTMCNC: 33.6 G/DL (ref 31–37)
MCV RBC AUTO: 89.8 FL (ref 78–100)
MONOCYTES # BLD: 0.7 K/UL (ref 0.05–1.2)
MONOCYTES NFR BLD: 11 % (ref 3–10)
NEUTS SEG # BLD: 4 K/UL (ref 1.8–8)
NEUTS SEG NFR BLD: 66 % (ref 40–73)
NRBC # BLD: 0 K/UL (ref 0–0.01)
NRBC BLD-RTO: 0 PER 100 WBC
PLATELET # BLD AUTO: 256 K/UL (ref 135–420)
PMV BLD AUTO: 9.4 FL (ref 9.2–11.8)
POTASSIUM SERPL-SCNC: 4.2 MMOL/L (ref 3.5–5.5)
RBC # BLD AUTO: 3.15 M/UL (ref 4.2–5.3)
SODIUM SERPL-SCNC: 136 MMOL/L (ref 136–145)
WBC # BLD AUTO: 6.1 K/UL (ref 4.6–13.2)

## 2022-09-26 PROCEDURE — 85025 COMPLETE CBC W/AUTO DIFF WBC: CPT

## 2022-09-26 PROCEDURE — 74011250637 HC RX REV CODE- 250/637: Performed by: FAMILY MEDICINE

## 2022-09-26 PROCEDURE — 74011250637 HC RX REV CODE- 250/637: Performed by: HOSPITALIST

## 2022-09-26 PROCEDURE — 99232 SBSQ HOSP IP/OBS MODERATE 35: CPT | Performed by: HOSPITALIST

## 2022-09-26 PROCEDURE — 51798 US URINE CAPACITY MEASURE: CPT

## 2022-09-26 PROCEDURE — 97110 THERAPEUTIC EXERCISES: CPT

## 2022-09-26 PROCEDURE — 97530 THERAPEUTIC ACTIVITIES: CPT

## 2022-09-26 PROCEDURE — 36415 COLL VENOUS BLD VENIPUNCTURE: CPT

## 2022-09-26 PROCEDURE — 97116 GAIT TRAINING THERAPY: CPT

## 2022-09-26 PROCEDURE — 80048 BASIC METABOLIC PNL TOTAL CA: CPT

## 2022-09-26 PROCEDURE — 74011250636 HC RX REV CODE- 250/636: Performed by: HOSPITALIST

## 2022-09-26 PROCEDURE — 65310000000 HC RM PRIVATE REHAB

## 2022-09-26 PROCEDURE — 97535 SELF CARE MNGMENT TRAINING: CPT

## 2022-09-26 PROCEDURE — 82962 GLUCOSE BLOOD TEST: CPT

## 2022-09-26 PROCEDURE — 97542 WHEELCHAIR MNGMENT TRAINING: CPT

## 2022-09-26 PROCEDURE — 2709999900 HC NON-CHARGEABLE SUPPLY

## 2022-09-26 RX ADMIN — METOPROLOL SUCCINATE 25 MG: 25 TABLET, FILM COATED, EXTENDED RELEASE ORAL at 09:22

## 2022-09-26 RX ADMIN — HYDROCODONE BITARTRATE AND ACETAMINOPHEN 1 TABLET: 5; 325 TABLET ORAL at 05:30

## 2022-09-26 RX ADMIN — ASPIRIN 81 MG CHEWABLE TABLET 81 MG: 81 TABLET CHEWABLE at 18:35

## 2022-09-26 RX ADMIN — FERROUS SULFATE TAB 325 MG (65 MG ELEMENTAL FE) 325 MG: 325 (65 FE) TAB at 09:22

## 2022-09-26 RX ADMIN — CYANOCOBALAMIN 1000 MCG: 1000 INJECTION, SOLUTION INTRAMUSCULAR; SUBCUTANEOUS at 09:22

## 2022-09-26 RX ADMIN — HYDROCODONE BITARTRATE AND ACETAMINOPHEN 1 TABLET: 5; 325 TABLET ORAL at 10:39

## 2022-09-26 RX ADMIN — ISOSORBIDE MONONITRATE 60 MG: 60 TABLET, EXTENDED RELEASE ORAL at 09:22

## 2022-09-26 RX ADMIN — ATORVASTATIN CALCIUM 40 MG: 40 TABLET, FILM COATED ORAL at 22:11

## 2022-09-26 RX ADMIN — LEVOTHYROXINE SODIUM 75 MCG: 0.05 TABLET ORAL at 05:30

## 2022-09-26 RX ADMIN — HYDROCODONE BITARTRATE AND ACETAMINOPHEN 2 TABLET: 5; 325 TABLET ORAL at 14:40

## 2022-09-26 RX ADMIN — POLYETHYLENE GLYCOL 3350 17 G: 17 POWDER, FOR SOLUTION ORAL at 09:22

## 2022-09-26 RX ADMIN — SENNOSIDES AND DOCUSATE SODIUM 1 TABLET: 50; 8.6 TABLET ORAL at 09:22

## 2022-09-26 RX ADMIN — SENNOSIDES AND DOCUSATE SODIUM 1 TABLET: 50; 8.6 TABLET ORAL at 18:35

## 2022-09-26 RX ADMIN — AMLODIPINE BESYLATE 10 MG: 10 TABLET ORAL at 09:22

## 2022-09-26 RX ADMIN — ASPIRIN 81 MG CHEWABLE TABLET 81 MG: 81 TABLET CHEWABLE at 09:22

## 2022-09-26 NOTE — REHAB NOTE
Wound Prevention Checklist    Patient: Jennifer Owens (61 y.o. female)  Date: 9/26/2022  Diagnosis: Hip fracture ([address] Obdulio Flank <principal problem not specified>    Precautions:  Total hip, Fall       []  Heel prevention boots placed on patient    []  Patient turned q2h during shift    []  Lift team ordered    []  Patient on [facility] bed/Specialty bed    []  Each Wound is documented during shift (Stage, Color, drainage, odor, measurements, and dressings)    []  Dual skin check done with Jennifer Feldman RN

## 2022-09-26 NOTE — PROGRESS NOTES
[facility]  Progress Note    Patient: Say Franks Age: 80 y.o. : 1924 MR#: [id number] SSN: [id number]  Date/Time: 2022     Subjective: Patient feels fine, pain much better controlled. No new complaints. Tolerating therapy well. Assessment/Plan:   Impaired Mobility and ADLs  Right hip fracture secondary to mechanical fall s/p repair  Anemia, multifactorial  Vitamin B12 deficiency  Iron deficiency  Oblqiue fracture of fifth metacarpal  HTN  CAD  History of hypothyroidism  Incidental right ureteric stone causing right ureteral hydronephrosis    MEDICAL PLAN:  Continue vitamin B12 injections and iron supplements  We will continue amlodipine, metoprolol and monitor blood pressure closely  Will continue aspirin and statin  Continue SSI and monitor blood sugar  Continue Synthroid  Continue bowel regimen with MiraLAX and Senokot  PT/OT eval and treatment  Wound care as needed    Case discussed with:  [x]Patient  []Family  [x]Nursing  []Case Management  DVT Prophylaxis:  []Lovenox  []Hep SQ  [x]SCDs  []Coumadin   []Eliquis/Xarelto     Objective:   VS: Visit Vitals  /64 (BP 1 Location: Left upper arm, BP Patient Position: At rest)   Pulse 69   Temp 97.3 °F (36.3 °C)   Resp 19   Ht 5' 2\" (1.575 m)   Wt 62.4 kg (137 lb 9.6 oz)   SpO2 97%   Breastfeeding Unknown   BMI 25.17 kg/m²      Tmax/24hrs: Temp (24hrs), Av °F (36.7 °C), Min:97.3 °F (36.3 °C), Max:98.5 °F (36.9 °C)  IOBRIEF  Intake/Output Summary (Last 24 hours) at 2022 1344  Last data filed at 2022 1318  Gross per 24 hour   Intake 480 ml   Output --   Net 480 ml       General:  Alert, cooperative, no acute distress    Pulmonary:  CTA Bilaterally. No Wheezing/Rales. Cardiovascular: Regular rate and Rhythm. GI:  Soft, Non distended, Non tender. + Bowel sounds. Extremities:  No edema. No calf tenderness. Psych: Good insight. Not anxious or agitated.   Neurologic: Alert and oriented X 3. Moves all ext. Additional: Right hip surgical site dressing clean, no swelling, right hand in crêpe bandage.     Medications:   Current Facility-Administered Medications   Medication Dose Route Frequency    polyethylene glycol (MIRALAX) packet 17 g  17 g Oral DAILY    HYDROcodone-acetaminophen (NORCO) 5-325 mg per tablet 1-2 Tablet  1-2 Tablet Oral Q4H PRN    cyanocobalamin (VITAMIN B12) injection 1,000 mcg  1,000 mcg IntraMUSCular DAILY    ferrous sulfate tablet 325 mg  1 Tablet Oral DAILY WITH BREAKFAST    magnesium hydroxide (MILK OF MAGNESIA) 400 mg/5 mL oral suspension 30 mL  30 mL Oral DAILY PRN    bisacodyL (DULCOLAX) tablet 10 mg  10 mg Oral Q48H PRN    bisacodyL (DULCOLAX) suppository 10 mg  10 mg Rectal Q48H PRN    aspirin chewable tablet 81 mg  81 mg Oral BID    senna-docusate (PERICOLACE) 8.6-50 mg per tablet 1 Tablet  1 Tablet Oral BID    atorvastatin (LIPITOR) tablet 40 mg  40 mg Oral QHS    metoprolol succinate (TOPROL-XL) XL tablet 25 mg  25 mg Oral DAILY    amLODIPine (NORVASC) tablet 10 mg  10 mg Oral DAILY    isosorbide mononitrate ER (IMDUR) tablet 60 mg  60 mg Oral DAILY    levothyroxine (SYNTHROID) tablet 75 mcg  75 mcg Oral 6am    nitroglycerin (NITROSTAT) tablet 0.4 mg  0.4 mg SubLINGual PRN    insulin lispro (HUMALOG) injection   SubCUTAneous AC&HS    glucose chewable tablet 16 g  16 g Oral PRN    glucagon (GLUCAGEN) injection 1 mg  1 mg IntraMUSCular PRN    dextrose 10% infusion 0-250 mL  0-250 mL IntraVENous PRN       Labs:    Recent Results (from the past 24 hour(s))   GLUCOSE, POC    Collection Time: 09/25/22  4:19 PM   Result Value Ref Range    Glucose (POC) 102 70 - 110 mg/dL   GLUCOSE, POC    Collection Time: 09/25/22  8:47 PM   Result Value Ref Range    Glucose (POC) 115 (H) 70 - 454 mg/dL   METABOLIC PANEL, BASIC    Collection Time: 09/26/22  5:31 AM   Result Value Ref Range    Sodium 136 136 - 145 mmol/L    Potassium 4.2 3.5 - 5.5 mmol/L    Chloride 103 100 - 111 mmol/L    CO2 26 21 - 32 mmol/L    Anion gap 7 3.0 - 18 mmol/L    Glucose 109 (H) 74 - 99 mg/dL    BUN 14 7.0 - 18 MG/DL    Creatinine 0.46 (L) 0.6 - 1.3 MG/DL    BUN/Creatinine ratio 30 (H) 12 - 20      GFR est AA >60 >60 ml/min/1.73m2    GFR est non-AA >60 >60 ml/min/1.73m2    Calcium 8.2 (L) 8.5 - 10.1 MG/DL   CBC WITH AUTOMATED DIFF    Collection Time: 09/26/22  5:31 AM   Result Value Ref Range    WBC 6.1 4.6 - 13.2 K/uL    RBC 3.15 (L) 4.20 - 5.30 M/uL    HGB 9.5 (L) 12.0 - 16.0 g/dL    HCT 28.3 (L) 35.0 - 45.0 %    MCV 89.8 78.0 - 100.0 FL    MCH 30.2 24.0 - 34.0 PG    MCHC 33.6 31.0 - 37.0 g/dL    RDW 12.7 11.6 - 14.5 %    PLATELET 900 962 - 109 K/uL    MPV 9.4 9.2 - 11.8 FL    NRBC 0.0 0  WBC    ABSOLUTE NRBC 0.00 0.00 - 0.01 K/uL    NEUTROPHILS 66 40 - 73 %    LYMPHOCYTES 19 (L) 21 - 52 %    MONOCYTES 11 (H) 3 - 10 %    EOSINOPHILS 2 0 - 5 %    BASOPHILS 1 0 - 2 %    IMMATURE GRANULOCYTES 3 (H) 0.0 - 0.5 %    ABS. NEUTROPHILS 4.0 1.8 - 8.0 K/UL    ABS. LYMPHOCYTES 1.1 0.9 - 3.6 K/UL    ABS. MONOCYTES 0.7 0.05 - 1.2 K/UL    ABS. EOSINOPHILS 0.1 0.0 - 0.4 K/UL    ABS. BASOPHILS 0.0 0.0 - 0.1 K/UL    ABS. IMM. GRANS. 0.2 (H) 0.00 - 0.04 K/UL    DF AUTOMATED     GLUCOSE, POC    Collection Time: 09/26/22  8:11 AM   Result Value Ref Range    Glucose (POC) 111 (H) 70 - 110 mg/dL   GLUCOSE, POC    Collection Time: 09/26/22 11:31 AM   Result Value Ref Range    Glucose (POC) 120 (H) 70 - 110 mg/dL       Signed By: Janell Brumfield MD     September 26, 2022      Disclaimer: Sections of this note are dictated using utilizing voice recognition software. Minor typographical errors may be present. If questions arise, please do not hesitate to contact me or call our department.

## 2022-09-26 NOTE — ROUTINE PROCESS
Writer reached out to donte Alvarez for wound care orders. Order placed. \"R. hip incision: dressing and staples are to stay in place. Reinforce dressing as needed until follow up appointment 2 weeks after surgery. \"     Vilma THOMAS, RN, [address] [place] [address]  [phone]

## 2022-09-26 NOTE — INTERDISCIPLINARY ROUNDS
[facility] PHYSICAL REHABILITATION  [address] [place] [address], [address]    INPATIENT REHABILITATION  PRE-TEAM CONFERENCE SUMMARY     Date of Conference: 9/27/2022    Patient Information:        Name: Mahesh Lee Age / Sex: 80 y.o. / female   CSN: [id number] MRN: [id number]   [facility] Date: 9/22/2022 Length of Stay: 4 days     Primary Rehabilitation Diagnosis  1. Impaired Mobility and ADLs  2. Right hip fracture secondary to mechanical fall s/p repair     Other comorbidities managed in the rehab  Anemia, multifactorial  Vitamin B12 deficiency  Iron deficiency  Oblqiue fracture of fifth metacarpal  HTN  CAD  History of hypothyroidism  Incidental right ureteric stone causing right ureteral hydronephrosis    Comorbidities  Patient Active Problem List   Diagnosis Code    Hypertension I10    Hypercholesterolemia E78.00    Diaphoresis R61    Exertional angina (HCC) I20.8    Bradycardia R00.1    Coronary artery disease involving native coronary artery I25.10    Hip fracture ([address] S72.009A    Encounter for palliative care Z51.5    Advanced age R48    Debility R50.80         Therapy:     FIM SCORES Initial Assessment Weekly Progress Assessment 9/26/2022   Eating Functional Level: 5 (supv/ set-up)  Comments: Self-feeding (supv/ set-up); assistance with opening containers, lids, and small packets due to RUE (wrist/ forearm cast; fifth metacarpal fx)  Functional Level: 5 set-up   Swallowing     Grooming 5 (SBA) 5 (supv/ SBA)   Bathing 3 (UB bathing: SBA; LB bathing: Mod A) 3 (UB bathing: set-up/ supv; LB bathing: Mod A)   Upper Body Dressing Functional Level: 4 (Min A)  Items Applied/Steps Completed: Pullover (4 steps)  Comments: UB dressing performed Min A for doffing hospital gown; SBA/ Min A for donning pullover scrub top. Functional Level: 5 (SBA)  Items Applied/Steps Completed: Pullover (4 steps)  Comments: UB dresssing performed (SBA) for doffing/ donning pullover scrub top.    Lower Body Dressing Functional Level: 2 (Max A)  Items Applied/Steps Completed: Sock, left (1 step), Sock, right (1 step) (Scrub pants (3 steps))  Comments: LB dressing performed (Max A) for donning scrub pants and bilateral slipper socks. Pt requiring assistance with threading clothing over distal BLE's and for pulling up over hips to waist; edu/ instruction for maintaining hip precautions. Max A for donning left sock; total assist for donning right slipper sock. Hip kit issued to patient during today's treatment session. Functional Level: 3 (Mod A)  Items Applied/Steps Completed: Sock, left (1 step); Sock, right (1 step) (Scrub pants (3 steps))  Comments: LB dressing performed (Mod A) for donning scrub pants. Edu/ instructed on use of reacher to thread pant legs over distal BLE's. Edu/ instructed on use of sock aid (Mod A) for donning slipper socks seated edge of bed. Toileting Functional Level: 2 (Max A (change of brief bed level; therapist removed PureWick on arrival to room); pt voided using BSC at bedside (SBA for hygiene; Max A CM))  Comments: Max A (change of brief bed level; therapist removed PureWick on arrival to room); pt voided using BSC at bedside (SBA hygiene; Max A CM)  Functional Level: 2 (Max A) SBA for hygiene; Max A CM   Bladder 0 0   Bowel  0 0   Toilet Transfer [place] Toilet Transfer Score: 3 (Stand step xfer (Mod A) RW; EOB > BSC at bedside. Limited due to hip pain.)  Comments: Stand step xfer (Mod A) RW; EOB > BSC at bedside. Limited due to hip pain. Toilet Transfer Score: 3 (Stand step xfer (Min/ Mod A) using RW; gait belt (RLE WBAT); simulated transfer)  Comments: Stand step xfer (Min/ Mod A) using RW; gait belt (RLE WBAT); simulated transfer. Patient did not need to void at this time.    Tub/Shower Transfer [place] Tub or Shower Type: Tub/Shower combination (Pt reports performing sink bath at home)  Tub/Shower Transfer Score: 0 (Not assessed due to safety concerns and pain level (right hip).)  Comments: Not assessed due to safety concerns and pain level (right hip). Not formally assessed at this time 2/2 safety     Comprehension Primary Mode of Comprehension: Auditory  Score: 4 Primary Mode of Comprehension: Auditory  Score: 4     Expression Primary Mode of Expression: Verbal  Score: 5 Primary Mode of Expression: Verbal  Score: 5   Social Interaction Score: 5 Score: 5   Problem Solving Score: 3 Score: 3   Memory Score: 3 Score: 4     FIM SCORES Initial Assessment Weekly Progress Assessment 9/26/2022   Bed/Chair/Wheelchair Transfers Transfer Type: Other  Other: stand step with RW  Transfer Assistance : 3 (Moderate assistance )  Sit to Stand Assistance: Moderate assistance  Car Transfers: Moderate assistance (stand step transfer to/from transfer . Unable to fully complete transfer managing right LE into car  2/2 inability to maintain hip precautions in .)  Car Type: car transfer  Transfer Type:  Other (stand step w/RW)  Other: w/c <>toilet transfer (mod A with grab bar)  Transfer Assistance : 4 (Minimal assistance) (slow pace, small steps, antalgic)  Sit to Stand Assistance: Minimal assistance (using UE's to push up from w/c)   Bed Mobility Rolling Right 0 (Not tested)   Rolling Left 4 (Minimal assistance)   Supine to Sit 3 (Moderate assistance)   Sit to Stand Moderate assistance   Sit to Supine 3 (Moderate assistance)    Rolling Right    3(moderate assistance & painful)   Rolling Left    4 (minimal assistance)   Supine to Sit   3 (Moderate assistance)   Sit to Stand   Minimal assistance (using UE's to push up from w/c)   Sit to Supine   3 (Moderate assistance) (on mat table w/out side rails; and on bed with bed rail)      Locomotion (W/C) Able to Propel (ft): 100 feet  Functional Level: 2  Curbs/Ramps Assist Required (FIM Score): 0 (Not tested)  Wheelchair Setup Assist Required : 1 (Dependent/total assistance)  Wheelchair Management: Manages left brake, Manages right brake (with supervision & cues) Function  (mod A for steering, turning, & backing up; using a altagracia technique with L UE and L LE)  Setup Assistance  2 (Maximal assistance)      Locomotion (W/C distance)   100 feet   Locomotion (Walk) 4 (Minimal assistance) 4 (Minimal assistance)  Walker, rolling;Gait belt   Locomotion (Walk dist.) 14 Feet (ft) 25 Feet (ft) (x2 trials; seated rest break between)   Steps/Stairs Steps/Stairs Ambulated (#): 0  Level of Assist : 0 (Not tested) (pt requires increased time and rest breaks throughout and maximal encouragement to participate in gait assessment)  Rail Use: None  Steps/stairs ambulated (#): 0  Level of assist: 0 (not tested) (pt has increased pain levels and difficulty walking due to pain and weakness in R LE and R UE)         Nursing:     Neuro:   AAA&O x  4          Respiratory:   [x] WNL   [] O2 LPM:   Other:  Peripheral Vascular:   [] TEDS present   [] Edema present ____ Grade   Cardiac:   [x] WNL   [] Other  Genitourinary:   [x] continent   [] incontinent   [] elliott  Abdominal _9/26______ LBM  GI: _reg_____ Diet ___thin___ Liquids _____ tube feeds  Musculoskeletal: ____ ROM Transfers _____ Assistive Device Used  __mod__ Level of Assistance  Skin Integumentary:   [] Intact   [] Not Intact   __________Preventative Measures  Details______________________________________________________________  Pain: [x] Controlled   [] Not Controlled   Pain Meds:   [] Scheduled   [] PRN        Interdisciplinary Team Goals:     1. Discipline  Physical Therapy    Goal  Pt will perform stand step transfers bed <> w/c with CGA and RW with safe technique, WBAT on R LE. Barrier  Increased pain, limited AROM, decreased strength, impaired standing balance, and difficulty with ambulation/ wt bearing on R LE. Intervention  Therex, therapeutic activities, balance training, pt education    Goal written by:   Ok Si, MPT     2.  Discipline  Occupational Therapy    Goal  Pt will perform LB dressing (Min A) using AE and/ or compensatory strategies while adhering to hip precautions    Barrier  Decreased (I) with ADL's, impaired functional strength/ endurance, impaired mobility/ transfers, impaired standing balance, pain level    Intervention  ADL training, there ex, there act, pt education    Goal written by:  Lucille Rm OTR/L      3. Discipline  Speech Therapy    Goal      Barrier      Intervention      Goal written by:       4. Discipline  Nursing    Goal  Pt. Incision/wound will be free from infection and heal properly by discharge from rehab. Barrier  Post. - op Right Hip fracture    Intervention  Inspect incision/wound routinely, proper hand hygiene, keep clean , dry and due proper  incision /wound care as ordered. Goal written by:  Clive Lamar     5. Discipline  Clinical Psychology    Goal  Minimize stress and maintain mood stability in recovery    Barrier  Stress with injury and pain in recovery    Intervention  Support , pain management and behavioral redirection, as needed    Goal written by:  Gabriel Mazariegos, PhD         Disposition / Discharge Planning:      Follow-up services:  [x] Physical Therapy             [x] Occupational Therapy       [] Speech Therapy           [] Skilled Nursing      [] Medical Social Worker   [] Aide        [] Outpatient      [] vs   [x] Home Health  [] vs       [] to progress to outpatient       [] with 24-hour supervision       [] with 24-hour assistance   [] [place]   [facility] recommendations:  RW and 18 inch w/c   Estimated discharge date:  tbd   Discharge Location:  [] Home  [] versus    [] [facility]    [] [facility]   [] Other:           Electronic Signatures:      Signature Date Signed   Physical Therapist    KELTON Chacon  09/26/2022   Occupational Therapist    Lucille Rm OTR/L  9/27/2022   Speech Therapist         Edgar Obrien  09/26/2022   Clinical Psychologist    Gabriel Mazariegos, PhD 9/27/2022    Physician    Joie Benites MD      09/26/22  2:38 PM         Laverta Brittle, MSW  9/26/2022

## 2022-09-26 NOTE — ROUTINE PROCESS
SHIFT CHANGE NOTE FOR [facility]    Bedside and Verbal shift change report given to Katerin Mcdaniels RN (oncoming nurse) by Mallory Holloway RN (offgoing nurse). Report included the following information SBAR, Kardex, MAR and Recent Results. Situation:   Code Status: DNR   Hospital Day: 4   Problem List:   Hospital Problems  Date Reviewed: 5/31/2022            Codes Class Noted POA    Hip fracture [facility]) ICD-10-CM: J07.994S  ICD-9-CM: 820.8  9/18/2022 Unknown           Background:   Past Medical History:   Past Medical History:   Diagnosis Date    CAD (coronary artery disease) 2008    Fibromyalgia     Heart failure ([facility] [address]     Pt. states sge gas 5 heart blocks.     Hypercholesterolemia     Hypertension     Menopause     Thyroid disease         Assessment:  Changes in Assessment throughout shift: No change to previous assessment    Patient has a central line: no Reasons if yes: na  Insertion date:na Last dressing date:na  Patient has Elliott Cath: no Reasons if yes: na   Insertion date:na  Shift elliott care completed: NO    Last Vitals:     Vitals:    09/25/22 1911 09/25/22 2117 09/26/22 0733 09/26/22 1618   BP: (!) 115/47 (!) 131/57 139/64 (!) 125/57   Pulse: 67 67 69 60   Resp: 16 18 19 18   Temp: 98.5 °F (36.9 °C) 98.2 °F (36.8 °C) 97.3 °F (36.3 °C) 97.9 °F (36.6 °C)   SpO2:  95% 97% 95%   Weight:       Height:           PAIN    Pain Assessment    Pain Intensity 1: 0 (09/26/22 1618) Pain Intensity 1: 2 (12/29/14 1105)    Pain Location 1: Back Pain Location 1: Abdomen    Pain Intervention(s) 1: Medication (see MAR) Pain Intervention(s) 1: Medication (see MAR)  Patient Stated Pain Goal: 0 Patient Stated Pain Goal: 0  Intervention effective: yes  Other actions taken for pain: Medication (see MAR)    Skin Assessment  Skin color Skin Color: Appropriate for ethnicity  Condition/Temperature    Integrity Skin Integrity: Incision (comment)  Turgor    Weekly Pressure Ulcer Documentation  Pressure  Injury Documentation: No Pressure Injury Noted-Pressure Ulcer Prevention Initiated  Wound Prevention & Protection Methods  Orientation of wound Orientation of Wound Prevention: Posterior  Location of Prevention Location of Wound Prevention: Buttocks, Sacrum/Coccyx, Heel  Dressing Present Dressing Present : Yes  Dressing Status Dressing Status: Intact  Wound Offloading Wound Offloading (Prevention Methods): Bed, pressure redistribution/air    INTAKE/OUPUT  Date 09/25/22 0700 - 09/26/22 0659 09/26/22 0700 - 09/27/22 0659   Shift 0700-1859 1900-0659 24 Hour Total 0700-1859 1900-0659 24 Hour Total   INTAKE   P.O. 720  720 840  840     P. O. 720  720 840  840   Shift Total(mL/kg) 720(11.5)  720(11.5) 840(13.5)  840(13.5)   OUTPUT   Urine(mL/kg/hr)           Urine Occurrence(s) 1 x 5 x 6 x 3 x  3 x   Stool           Stool Occurrence(s) 1 x 0 x 1 x 0 x  0 x   Shift Total(mL/kg)           720 840  840   Weight (kg) 62.4 62.4 62.4 62.4 62.4 62.4       Recommendations:  Patient needs and requests: na    Pending tests/procedures: na     Functional Level/Equipment: Partial (one person) /      Fall Precautions:   Fall risk precautions were reinforced with the patient; she was instructed to call for help prior to getting up. The following fall risk precautions were continued: bed/ chair alarms, door signage, yellow bracelet and socks as well as update of the Kale Blight tool in the patient's room. Alma Rosa Score: 5    HEALS Safety Check    A safety check occurred in the patient's room between off going nurse and oncoming nurse listed above. The safety check included the below items  Area Items   H  High Alert Medications Verify all high alert medication drips (heparin, PCA, etc.)   E  Equipment Suction is set up for ALL patients (with yanker)  Red plugs utilized for all equipment (IV pumps, etc.)  WOWs wiped down at end of shift.   Room stocked with oxygen, suction, and other unit-specific supplies   A  Alarms Bed alarm is set for fall risk patients  Ensure chair alarm is in place and activated if patient is up in a chair   L  Lines Check IV for any infiltration  Talley bag is empty if patient has a Talley   Tubing and IV bags are labeled   S  Safety   Room is clean, patient is clean, and equipment is clean. Hallways are clear from equipment besides carts. Fall bracelet on for fall risk patients  Ensure room is clear and free of clutter  Suction is set up for ALL patients (with geneva)  Hallways are clear from equipment besides carts.    Isolation precautions followed, supplies available outside room, sign posted     Oral RALF Wood

## 2022-09-26 NOTE — PROGRESS NOTES
Problem: Self Care Deficits Care Plan (Adult)  Goal: *Acute Goals and Plan of Care (Insert Text)  Description: Occupational Therapy Goals   Long Term Goals  Initiated (2022) and to be accomplished within 3 week(s), by (10/14/2022)  1. Pt will perform self-feeding with Mod I.  2. Pt will perform grooming with Mod I.  3. Pt will perform UB bathing with set-up. 4. Pt will perform LB bathing with supv using AE and/ or compensatory strategies while maintaining hip precautions. 5. Pt will perform tub/shower transfer with SBA using LRAD. 6. Pt will perform UB dressing with Mod I.  7. Pt will perform LB dressing with SBA using AE and/ or compensatory strategies while maintaining hip precautions. 8. Pt will perform toileting task with supv/ SBA. 9. Pt will perform toilet transfer with supv using LRAD. Short Term Goals   Initiated (2022) and to be accomplished within 7 day(s), by (2022)  1. Pt will perform self-feeding with set-up/ supv. 2. Pt will perform grooming with supv. 3. Pt will perform UB bathing with supv. 4. Pt will perform LB bathing with Min A using AE and/ or compensatory strategies while maintaining hip precautions. 5. Pt will perform tub/shower transfer with Min A using LRAD. 6. Pt will perform UB dressing with SBA. 7. Pt will perform LB dressing with Mod A using AE and/ or compensatory strategies while maintaining hip precautions. 8. Pt will perform toileting task with Mod A.  9. Pt will perform toilet transfer with Min A using LRAD. Outcome: Progressing Towards Goal  Goal: Interventions  Outcome: Progressing Towards Goal   Occupational Therapy TREATMENT    Patient: Roque Bamberger   80 y.o. Patient identified with name and : yes    Date: 2022    First Tx Session  Time In: 0930  Time Out: 1100    Diagnosis: Hip fracture ([facility] [address] [S72.009A]   Precautions:  Total hip, Fall precautions, Aspiration, Safety, right hip precautions, RLE WBAT, RUE (wrist/ forearm cast; fifth metacarpal fx)  Chart, occupational therapy assessment, plan of care, and goals were reviewed. Pain:  Pt reports 5/10 pain or discomfort prior to treatment; right hip/ soreness   Pt reports 7/10 pain or discomfort post treatment; right hip/ soreness  Intervention Provided: repositioning, intermittent rest breaks. Tasks modified based on pt's tolerance. Care coordinated with Corrinne Echevaria, RN; staff nurse administered pain medication at this time. SUBJECTIVE:   Patient stated My hip feels really sore.     OBJECTIVE DATA SUMMARY:     THERAPEUTIC ACTIVITY Daily Assessment    Patient presented awake and alert; semi-reclined bed level. Pt agreeable to participate in ADL session at this time. Bed mobility performed (Min A) with Max verbal cues for maintaining total hip precautions and use of pillow between BLE's when turning. Supine to sit transitioning (Mod A) with therapist assisting with R LE and trunk. Pt scooted towards EOB with (Min A). Sit to stand transitions performed (Min A); verbal cues for pushing up from seated surface. Stand step transfer performed (Min A) using RW and gait belt; EOB > w/c. Verbal cues for hip precautions; R LE WBAT.     Edu/ instructed in use of energy conservation strategies within context of self-care performance. Edu/ instructed in use of AE for LB ADL's and for adherence to hip precautions (e.g. reacher, dressing stick, sock aid, and LH sponge). Pt participated in functional activity while seated edge of bed demonstrating good static/ dynamic sitting balance while (unsupported). [facility] tabletop there act performed using small colored pegs to replicate visual pattern onto square pegboard. Performed for FM skills (tip pinch; in-hand manipulation) and functional reach out-front while completing a 48 peg pattern.       GROOMING Daily Assessment    Functional Level: 5 (supv/ SBA)  Tasks Completed by Patient: Washed face;Brushed teeth;Brushed hair  Comments: Self-care grooming tasks performed (supv/ SBA) w/c at sink. Verbal cues for task initiation. Oral hygiene: 5 supv/ SBA     BATHING Daily Assessment    Functional Level: 3 (UB bathing: set-up/ supv; LB bathing: Mod A)  Body Parts Patient Bathed: Abdomen;Arm, left;Arm, right;Buttocks; Chest;Lower leg and foot, left; Lower leg and foot, right;Radha area; Thigh, left; Thigh, right  Comments: UB bathing performed (set-up/ supv) basin set-up on tray table. LB bathing performed (Mod A); edu/ instructed on use of LH sponge to wash distal BLE's. Verbal cues for maintaining hip precautions. UPPER BODY DRESSING Daily Assessment    Functional Level: 5 (SBA)  Items Applied/Steps Completed: Pullover (4 steps)  Comments: UB dresssing performed (SBA) for doffing/ donning pullover scrub top. LOWER BODY DRESSING Daily Assessment    Functional Level: 3 (Mod A)  Items Applied/Steps Completed: Sock, left (1 step); Sock, right (1 step) (Scrub pants (3 steps))  Comments: LB dressing performed (Mod A) for donning scrub pants. Edu/ instructed on use of reacher to thread pant legs over distal BLE's. Edu/ instructed on use of sock aid (Mod A) for donning slipper socks seated edge of bed. Sock and/or Shoe management: 3 Mod A      MOBILITY/TRANSFERS Daily Assessment    Toilet Transfer Score: 3 (Stand step xfer (Min/ Mod A) using RW; gait belt (RLE WBAT); simulated transfer)  Comments: Stand step xfer (Min/ Mod A) using RW; gait belt (RLE WBAT); simulated transfer. Patient did not need to void at this time.        ASSESSMENT:  Patient will benefit from continued skilled occupational therapy interventions to address decreased (I) with ADL's, decreased functional strength/ endurance, decreased activity tolerance, impaired balance/ coordination, decreased AROM, right wrist/ forearm soft cast 2/2 fifth metacarpal fx, right total hip/ WBAT, hip precautions, impaired functional transfers/ mobility, pain, impaired cognition, and safety awareness impeding pt's functional (I) with ADL's and mobility for return to PLOF. Progression toward goals:  []          Improving appropriately and progressing toward goals  [x]          Improving slowly and progressing toward goals  []          Not making progress toward goals and plan of care will be adjusted     PLAN:  Patient continues to benefit from skilled intervention to address the above impairments. Continue treatment per established plan of care. Discharge Recommendations:  Home Health occupational therapy with family assist  Further Equipment Recommendations for Discharge:  bedside commode, gait belt, and transfer bench     Activity Tolerance:  Fair(-); intermittent rest breaks due to fatigue  Estimated LOS: TBD pending progress    Please refer to the flowsheet for vital signs taken during this treatment. After treatment:   [x]  Patient left in no apparent distress sitting up in wheelchair with needs met  []  Patient left in no apparent distress in bed  [x]  Call bell left within reach  [x]  Nursing notified  []  Caregiver present  [x]  Wheelchair alarm activated    COMMUNICATION/EDUCATION:   [] Home safety education was provided and the patient/caregiver indicated understanding. [x] Patient/family have participated as able in goal setting and plan of care. [x] Patient/family agree to work toward stated goals and plan of care. [] Patient understands intent and goals of therapy, but is neutral about his/her participation. [] Patient is unable to participate in goal setting and plan of care.     [address] [facility], OT

## 2022-09-26 NOTE — PROGRESS NOTES
Problem: Mobility Impaired (Adult and Pediatric)  Goal: *Acute Goals and Plan of Care (Insert Text)  Description: Physical Therapy Short Term Goals  Initiated 9/23/2022 and to be accomplished within 7 day(s) (9/30/2022)  1. Patient will move from supine to sit and sit to supine , scoot up and down, and roll side to side in bed with supervision/set-up. 2.  Patient will transfer from bed to chair and chair to bed with minimal assistance/contact guard assist using the least restrictive device. 3.  Patient will perform sit to stand with minimal assistance/contact guard assist.  4.  Patient will ambulate with minimal assistance/contact guard assist for 50 feet with the least restrictive device. 5.  Patient will ascend/descend 5 stairs with bilateral handrail(s) with minimal assistance/contact guard assist.    Physical Therapy Long Term Goals  Initiated 9/23/2022 and to be accomplished within 14-21 day(s) (10/07/2022-10/14/2022)  1. Patient will move from supine to sit and sit to supine , scoot up and down, and roll side to side in bed with supervision/set-up. 2.  Patient will transfer from bed to chair and chair to bed with supervision/set-up using the least restrictive device. 3.  Patient will perform sit to stand with supervision/set-up. 4.  Patient will ambulate with supervision/set-up for 150 feet with the least restrictive device. 5.  Patient will ascend/descend 5 stairs with bilateral handrail(s) with supervision/set-up. Outcome: Progressing Towards Goal   PHYSICAL THERAPY TREATMENT    Patient: Shonna Tucker (55 y.o. female)  Date: 9/26/2022  Diagnosis: [facility] [S72.009A]   Precautions: Total hip, Fall  Chart, physical therapy assessment, plan of care and goals were reviewed. Time In: 1105  Time Out: 1173  Patient seen for: gait training, transfers training, pt education    Time In:1330  Time Out:1430  Patient seen for: Gait training;Patient education; Therapeutic exercise;Transfer training; Wheelchair mobility    Pain:  Am session:  Pt pain was reported as 5/10 in hip pre-treatment. Pt pain was reported as 6/10 in hip post-treatment. Intervention: pt had received pain meds prior to session; repositioning; mobility    Pm session:  Pt pain was reported as 5/10 in hip pre-treatment. Pt pain was reported as 9/10 in back and hip post-treatment  Intervention: pt received pain meds from nursing at end of session at time frame permitted; returned to bed to rest    Patient identified with name and : yes    SUBJECTIVE:      Am session: pt stated \"I hurt\" and rated pain as 5/10. Pt requested to use the toilet. Pm session: pt stated \"I'm feeling okay\" and agreeable to participate. At end of session, pt rated pain as 9/10. Pt thanked PT for working with her at end of session, after settled back in bed. Had requested pain meds from nursing. OBJECTIVE DATA SUMMARY:    Objective: Reviewed total hip precautions with patient. Pt verbalized understanding, but needs continued review. BED/MAT MOBILITY Daily Assessment     Supine to Sit : 3 (Moderate assistance)  Sit to Supine : 3 (Moderate assistance) (on mat table w/out side rails; and on bed with bed rail)      TRANSFERS Daily Assessment     Transfer Type: Other (stand step w/RW)  Other: w/c <>toilet transfer (mod A with grab bar)  Transfer Assistance : 4 (Minimal assistance) (slow pace, small steps, antalgic)  Sit to Stand Assistance: Minimal assistance (using UE's to push up from w/c)        GAIT Daily Assessment    Gait Description (WDL) Exceptions to WDL    Gait Abnormalities Antalgic;Decreased step clearance; Step to gait (flexed trunk posture)    Assistive device Walker, rolling;Gait belt    Ambulation assistance - level surface 4 (Minimal assistance)    Distance 25 Feet (ft) (x2 trials; seated rest break between)    Ambulation assistance- uneven surface  NT    Comments Slow pace, COG shifted to L; pain in R wrist with wt bearing on RW. STEPS/STAIRS Daily Assessment     Steps/Stairs ambulated      Assistance Required 0 (Not tested)    Rail Use      Comments  Not safe at this time due to pain and difficulty walking    Curbs/Ramps          BALANCE Daily Assessment     Sitting - Static: Good (unsupported)  Sitting - Dynamic: Fair (occasional)  Standing - Static: Fair (with RW support)  Standing - Dynamic : Impaired        WHEELCHAIR MOBILITY Daily Assessment     Able to Propel (ft): 100 feet  Functional Level:  (mod A for steering, turning, & backing up; using a altagracia technique with L UE and L LE)  Curbs/Ramps Assist Required (FIM Score): 0 (Not tested)  Wheelchair Setup Assist Required : 2 (Maximal assistance)  Wheelchair Management: Manages left brake;Manages right brake (with supervision & cues)        THERAPEUTIC EXERCISES Daily Assessment     Extremity: Both (AROM L LE/ AAROM R LE)  Exercise Type #1: Supine lower extremity strengthening (heel-slides, hip abd, SAQ's on bolster, AP's)  Sets Performed: 2  Reps Performed: 10  Level of Assist: Moderate assistance (for R LE; slow movements and guarded due to pain in R LE)     Exercises with R LE in limited range due to pain in hip. ASSESSMENT:  Pt willing to try to participate despite increased pain with mobility over time. Pt demonstrating slow and steady progress toward goals. Limited by ongoing pain issues and decreased activity tolerance. Pt able to progress with gait training today but still has antalgic, slow gait pattern. Pt cooperative within her ability to tolerate therapy. Progression toward goals:  []      Improving appropriately and progressing toward goals  [x]      Improving slowly and progressing toward goals  []      Not making progress toward goals and plan of care will be adjusted      PLAN:  Patient continues to benefit from skilled intervention to address the above impairments. Continue treatment per established plan of care.   Discharge Recommendations:  Home health PT  Further Equipment Recommendations for Discharge:  rolling walker and 18 inch wheelchair      Estimated Discharge Date:TBD (2-3 weeks)    Activity Tolerance:   Fair; pt limited by increased pain levels with mobility  Please refer to the flowsheet for vital signs taken during this treatment.     After treatment:   [x] Patient left in no apparent distress in bed (after am session and pm session, due to pain levels)  [] Patient left in no apparent distress sitting up in chair  [] Patient left in no apparent distress in w/c mobilizing under own power  [] Patient left in no apparent distress dining area  [] Patient left in no apparent distress mobilizing under own power  [x] Call bell left within reach  [x] Nursing notified  [] Caregiver present  [] Bed alarm activated   [] Chair alarm activated      Susan Bailey PT  9/26/2022

## 2022-09-26 NOTE — REHAB NOTE
SHIFT CHANGE NOTE FOR [facility]    Bedside and Verbal shift change report given to Alexi Feliciano (oncoming nurse) by Racheal Castro (offgoing nurse). Report included the following information SBAR, Kardex, MAR and Recent Results. Situation:   Code Status: DNR   Reason for Admission: Right hip fracture  Hospital Day: 4   Problem List:   Hospital Problems  Date Reviewed: 5/31/2022            Codes Class Noted POA    Hip fracture [facility]) ICD-10-CM: G69.189M  ICD-9-CM: 820.8  9/18/2022 Unknown           Background:   Past Medical History:   Past Medical History:   Diagnosis Date    CAD (coronary artery disease) 2008    Fibromyalgia     Heart failure ([facility]     Pt. states sge gas 5 heart blocks. Hypercholesterolemia     Hypertension     Menopause     Thyroid disease       Patient taking anticoagulants  ASA   Patient has a defibrillator: no    Assessment:  Changes in Assessment throughout shift: none    Patient has central line: na Reasons if yes: Insertion date: Last dressing date:  Patient has Talley Cath: na Reasons if yes:     Insertion date:    Last Vitals:     Vitals:    09/24/22 2104 09/25/22 0832 09/25/22 1911 09/25/22 2117   BP:  (!) 186/69 (!) 115/47 (!) 131/57   Pulse: 71 70 67 67   Resp: 20 18 16 18   Temp: 98.5 °F (36.9 °C) 97.9 °F (36.6 °C) 98.5 °F (36.9 °C) 98.2 °F (36.8 °C)   SpO2: 95% 96%  95%   Weight:       Height:           PAIN    Pain Assessment    Pain Intensity 1: 0 (09/26/22 0000) Pain Intensity 1: 2 (12/29/14 1105)    Pain Location 1: Arm Pain Location 1: Abdomen    Pain Intervention(s) 1: Medication (see MAR) Pain Intervention(s) 1: Medication (see MAR)  Patient Stated Pain Goal: 0 Patient Stated Pain Goal: 0  Intervention effective: na    Other actions taken for pain: na    Skin Assessment  Skin color Skin Color: Appropriate for ethnicity  Condition/Temperature    Integrity Skin Integrity: Incision (comment)  Turgor    Weekly Pressure Ulcer Documentation  Pressure  Injury Documentation: No Pressure Injury Noted-Pressure Ulcer Prevention Initiated  Wound Prevention & Protection Methods  Orientation of wound Orientation of Wound Prevention: Posterior  Location of Prevention Location of Wound Prevention: Buttocks, Sacrum/Coccyx, Heel  Dressing Present Dressing Present : Yes  Dressing Status Dressing Status: Intact  Wound Offloading Wound Offloading (Prevention Methods): Bed, pressure redistribution/air    INTAKE/OUPUT  Date 09/25/22 0700 - 09/26/22 0659 09/26/22 0700 - 09/27/22 0659   Shift 0700-1859 1900-0659 24 Hour Total 0700-1859 1900-0659 24 Hour Total   INTAKE   P.O. 720  720        P. O. 720  720      Shift Total(mL/kg) 720(11.5)  720(11.5)      OUTPUT   Urine(mL/kg/hr)           Urine Occurrence(s) 1 x 3 x 4 x      Stool           Stool Occurrence(s) 1 x 0 x 1 x      Shift Total(mL/kg)           720      Weight (kg) 62.4 62.4 62.4 62.4 62.4 62.4       Recommendations:  Patient needs and requests: toileting,reposition    Diet: Regular    Pending tests/procedures: labs    Functional Level/Equipment::assist x 1/wheelchair  Estimated Discharge Date: TBD Posted on Whiteboard in Patients Room: yes     HEALS Safety Check    A safety check occurred in the patient's room between off going nurse and oncoming nurse listed above. The safety check included the below items  Area Items   H  High Alert Medications Verify all high alert medication drips (heparin, PCA, etc.)   E  Equipment Suction is set up for ALL patients (with yanker)  Red plugs utilized for all equipment (IV pumps, etc.)  WOWs wiped down at end of shift.   Room stocked with oxygen, suction, and other unit-specific supplies   A  Alarms Bed alarm is set for fall risk patients  Ensure chair alarm is in place and activated if patient is up in a chair   L  Lines Check IV for any infiltration  Talley bag is empty if patient has a Talley   Tubing and IV bags are labeled   S  Safety   Room is clean, patient is clean, and equipment is clean.  Hallways are clear from equipment besides carts. Fall bracelet on for fall risk patients  Ensure room is clear and free of clutter  Suction is set up for ALL patients (with geneva)  Hallways are clear from equipment besides carts.    Isolation precautions followed, supplies available outside room, sign posted

## 2022-09-26 NOTE — REHAB NOTE
[facility] PHYSICAL REHABILITATION  [address] [place] [address], [address]     INPATIENT REHABILITATION  OVERALL PLAN OF CARE    Name: Reji Chavez CSN: [id number]   Age: 80 y.o. MRN: [id number]   Sex: female Admit Date: 9/22/2022     Primary Rehabilitation Diagnosis  1. Impaired Mobility and ADLs  2. Right hip fracture secondary to mechanical fall s/p repair     Other comorbidities managed in the rehab  Anemia, multifactorial  Vitamin B12 deficiency  Iron deficiency  Oblqiue fracture of fifth metacarpal  HTN  CAD  History of hypothyroidism  Incidental right ureteric stone causing right ureteral hydronephrosis      ANTICIPATED INTERVENTIONS THAT SUPPORT THE MEDICAL NECESSITY OF THIS ADMISSION:    I. Physical Therapy              A. Intensity: 3 hour per day              B. Frequency: 5 times per week              C. Duration: 2 weeks              D. Long Term Goals:    Initiated 9/23/2022 and to be accomplished within 7 day(s) (9/30/2022)  1. Patient will move from supine to sit and sit to supine , scoot up and down, and roll side to side in bed with supervision/set-up. 2.  Patient will transfer from bed to chair and chair to bed with minimal assistance/contact guard assist using the least restrictive device. 3.  Patient will perform sit to stand with minimal assistance/contact guard assist.  4.  Patient will ambulate with minimal assistance/contact guard assist for 50 feet with the least restrictive device. 5.  Patient will ascend/descend 5 stairs with bilateral handrail(s) with minimal assistance/contact guard assist.     E. Interventions:   Pt would benefit from skilled physical therapy in order to improve independent functional mobility within the home.  Interventions may include range of motion (AROM, PROM B LE/trunk), motor function (B LE/trunk strengthening/coordination), activity tolerance (vitals, oxygen saturation levels), bed mobility training, balance activities, gait training (progressive ambulation program), wheelchair management and functional transfer training. Patient would also benefit from concurrent and group therapy sessions to promote increased participation in skilled therapy interventions and to provide opportunities for increased social interaction. II. Occupational Therapy              A. Intensity: 3 hour per day              B. Frequency: 5 times per week              C. Duration: 2 weeks              D. Long Term Goals:      Initiated (9/23/2022) and to be accomplished within 3 week(s), by (10/14/2022)  1. Pt will perform self-feeding with Mod I.  2. Pt will perform grooming with Mod I.  3. Pt will perform UB bathing with set-up. 4. Pt will perform LB bathing with supv using AE and/ or compensatory strategies while maintaining hip precautions. 5. Pt will perform tub/shower transfer with SBA using LRAD. 6. Pt will perform UB dressing with Mod I.  7. Pt will perform LB dressing with SBA using AE and/ or compensatory strategies while maintaining hip precautions. 8. Pt will perform toileting task with supv/ SBA. 9. Pt will perform toilet transfer with supv using LRAD. E. Interventions:   Pt would benefit from skilled occupational therapy in order to improve independent functional mobility/ADLs,/IADLs within the home. Interventions may include range of motion (AROM, PROM B UE), motor function (B UE/ strengthening/coordination), activity tolerance (vitals, oxygen saturation levels), balance training, ADL/IADL training and functional transfer training. PHYSICIAN'S ASSESSMENT OF FINDINGS:    Are the established goals sufficient for achieving the optimal level of function? [x]  Yes      []  No    What changes would you recommend to the goals as written? None      Are the interventions noted sufficient for achieving the optimal level of function? [x]  Yes      []  No    What changes would you recommend to the interventions noted?  If therapy staff is unable to provide 3 hr of total therapy per day in 5 days due to medical issues or decreased patient tolerance, may modify treatment schedule to 15 hr/week.       Estimated length of stay: 2 weeks      Medical rehabilitation prognosis:    []  Excellent     [x]  Good     []  Fair     []  Guarded       Discharge Destination:     [x]  Home     []  [address]     []  [place]     []  [address]     []  [place]     []  Other:       Signed:    Karyna Butler MD    September 26, 2022

## 2022-09-26 NOTE — ROUTINE PROCESS
0800 PT. Awake sitting up in bed no change in assessment pt. Reported to be feeling fine. 0930 PT. Sitting up in chair eating breakfast.   1200 with therapy. 1330 able to transfer from bed to chair with assist.   1500 no change in assessment. 1800 PT. Sitting up in bed eating dinner.

## 2022-09-27 LAB
GLUCOSE BLD STRIP.AUTO-MCNC: 104 MG/DL (ref 70–110)
GLUCOSE BLD STRIP.AUTO-MCNC: 107 MG/DL (ref 70–110)
GLUCOSE BLD STRIP.AUTO-MCNC: 126 MG/DL (ref 70–110)
GLUCOSE BLD STRIP.AUTO-MCNC: 98 MG/DL (ref 70–110)

## 2022-09-27 PROCEDURE — 97535 SELF CARE MNGMENT TRAINING: CPT

## 2022-09-27 PROCEDURE — 51798 US URINE CAPACITY MEASURE: CPT

## 2022-09-27 PROCEDURE — 74011250636 HC RX REV CODE- 250/636: Performed by: HOSPITALIST

## 2022-09-27 PROCEDURE — 77030038269 HC DRN EXT URIN PURWCK BARD -A

## 2022-09-27 PROCEDURE — 99232 SBSQ HOSP IP/OBS MODERATE 35: CPT | Performed by: HOSPITALIST

## 2022-09-27 PROCEDURE — 97530 THERAPEUTIC ACTIVITIES: CPT

## 2022-09-27 PROCEDURE — 97110 THERAPEUTIC EXERCISES: CPT

## 2022-09-27 PROCEDURE — 77030040392 HC DRSG OPTIFOAM MDII -A

## 2022-09-27 PROCEDURE — 82962 GLUCOSE BLOOD TEST: CPT

## 2022-09-27 PROCEDURE — 97150 GROUP THERAPEUTIC PROCEDURES: CPT

## 2022-09-27 PROCEDURE — 2709999900 HC NON-CHARGEABLE SUPPLY

## 2022-09-27 PROCEDURE — 65310000000 HC RM PRIVATE REHAB

## 2022-09-27 PROCEDURE — 97116 GAIT TRAINING THERAPY: CPT

## 2022-09-27 PROCEDURE — 77030040393 HC DRSG OPTIFOAM GENT MDII -B

## 2022-09-27 PROCEDURE — 74011250637 HC RX REV CODE- 250/637: Performed by: HOSPITALIST

## 2022-09-27 PROCEDURE — 74011250637 HC RX REV CODE- 250/637: Performed by: FAMILY MEDICINE

## 2022-09-27 RX ADMIN — HYDROCODONE BITARTRATE AND ACETAMINOPHEN 1 TABLET: 5; 325 TABLET ORAL at 09:35

## 2022-09-27 RX ADMIN — AMLODIPINE BESYLATE 10 MG: 10 TABLET ORAL at 09:34

## 2022-09-27 RX ADMIN — CYANOCOBALAMIN 1000 MCG: 1000 INJECTION, SOLUTION INTRAMUSCULAR; SUBCUTANEOUS at 09:34

## 2022-09-27 RX ADMIN — ASPIRIN 81 MG CHEWABLE TABLET 81 MG: 81 TABLET CHEWABLE at 09:34

## 2022-09-27 RX ADMIN — ATORVASTATIN CALCIUM 40 MG: 40 TABLET, FILM COATED ORAL at 21:54

## 2022-09-27 RX ADMIN — POLYETHYLENE GLYCOL 3350 17 G: 17 POWDER, FOR SOLUTION ORAL at 09:34

## 2022-09-27 RX ADMIN — SENNOSIDES AND DOCUSATE SODIUM 1 TABLET: 50; 8.6 TABLET ORAL at 18:22

## 2022-09-27 RX ADMIN — ISOSORBIDE MONONITRATE 60 MG: 60 TABLET, EXTENDED RELEASE ORAL at 09:35

## 2022-09-27 RX ADMIN — FERROUS SULFATE TAB 325 MG (65 MG ELEMENTAL FE) 325 MG: 325 (65 FE) TAB at 09:35

## 2022-09-27 RX ADMIN — LEVOTHYROXINE SODIUM 75 MCG: 0.05 TABLET ORAL at 06:38

## 2022-09-27 RX ADMIN — METOPROLOL SUCCINATE 25 MG: 25 TABLET, FILM COATED, EXTENDED RELEASE ORAL at 09:35

## 2022-09-27 RX ADMIN — ASPIRIN 81 MG CHEWABLE TABLET 81 MG: 81 TABLET CHEWABLE at 18:22

## 2022-09-27 RX ADMIN — SENNOSIDES AND DOCUSATE SODIUM 1 TABLET: 50; 8.6 TABLET ORAL at 09:35

## 2022-09-27 RX ADMIN — HYDROCODONE BITARTRATE AND ACETAMINOPHEN 1 TABLET: 5; 325 TABLET ORAL at 14:18

## 2022-09-27 NOTE — ROUTINE PROCESS
0800 Pt. Awake sitting up in bed no change in assessment pt. Reported to be feeling fine. 0930 Pt. Sitting up in bed eating breakfast.  1200 with therapy. 1330 able to transfer from bed to chair with assist.   1500 no change in assessment. 1800 Pt. Sitting up in bed eating dinner.

## 2022-09-27 NOTE — PROGRESS NOTES
Problem: Self Care Deficits Care Plan (Adult)  Goal: *Acute Goals and Plan of Care (Insert Text)  Description: Occupational Therapy Goals   Long Term Goals  Initiated (2022) and to be accomplished within 3 week(s), by (10/14/2022)  1. Pt will perform self-feeding with Mod I.  2. Pt will perform grooming with Mod I.  3. Pt will perform UB bathing with set-up. 4. Pt will perform LB bathing with supv using AE and/ or compensatory strategies while maintaining hip precautions. 5. Pt will perform tub/shower transfer with SBA using LRAD. 6. Pt will perform UB dressing with Mod I.  7. Pt will perform LB dressing with SBA using AE and/ or compensatory strategies while maintaining hip precautions. 8. Pt will perform toileting task with supv/ SBA. 9. Pt will perform toilet transfer with supv using LRAD. Short Term Goals   Initiated (2022) and to be accomplished within 7 day(s), by (2022)  1. Pt will perform self-feeding with set-up/ supv. 2. Pt will perform grooming with supv. 3. Pt will perform UB bathing with supv. 4. Pt will perform LB bathing with Min A using AE and/ or compensatory strategies while maintaining hip precautions. 5. Pt will perform tub/shower transfer with Min A using LRAD. 6. Pt will perform UB dressing with SBA. 7. Pt will perform LB dressing with Mod A using AE and/ or compensatory strategies while maintaining hip precautions. 8. Pt will perform toileting task with Mod A.  9. Pt will perform toilet transfer with Min A using LRAD. Outcome: Progressing Towards Goal  Goal: Interventions  Outcome: Progressing Towards Goal   Occupational Therapy TREATMENT    Patient: Telly Marshall   80 y.o.     Patient identified with name and : yes    Date: 2022    First Tx Session  Time In: 0930 (Caregiver edu session with pt's grand-daughter in attendance)  Time Out: 1000    Second Tx Session  Time In: 80 (group occupational therapy session from [phone] to 1200; 28 min group)  Time Out: 1200     Third Tx Session  Time In: 1400  Time Out: 1430    Diagnosis: Hip fracture ([facility] [address] [S72.009A]   Precautions: Total hip, Fall precautions, Aspiration, Safety, right hip precautions, RLE WBAT, RUE (wrist/ forearm cast; fifth metacarpal fx)  Chart, occupational therapy assessment, plan of care, and goals were reviewed. Pain:  Pt reports 5/10 pain or discomfort prior to treatment; right hip/ aching  Pt reports 5/10 pain or discomfort post treatment; right hip/ aching  Intervention Provided: repositioning; intermittent rest breaks as pt fatigues easily. Care coordinated with Rahul Keith RN; staff nurse to follow-up regarding pain medication. SUBJECTIVE:   Patient stated I'm really tired.     OBJECTIVE DATA SUMMARY:     THERAPEUTIC ACTIVITY Daily Assessment    First tx session: Functional stand step transfer training performed (Min A) using RW; gait belt (w/c <-> elevated seat over toilet; to address toileting needs). Max verbal cues for hand placement when pushing up from seated surface and for reaching back to arm rests for slow controlled transitioning (sit <-> stand). Mod verbal cues for safe management of AD (RW). Second tx session: RUE/ LUE [address] there act performed for reaching out-front to insert large colored pegs onto vertical foam pegboard surface. Performed for ROM and [address] (pincer grasp; in-hand manipulation); x40 trials each hand. Performed for improved functional ability with self-cares. Third tx session: Supine <-> sit (Min A) with therapist managing R LE. Pt participated in RUE/ LUE there act while seated EOB during item retrieval and transfer task using reacher (bean bag pickup at floor level). Pt performed 15 trials (15 bean bags) x2 each hand. Performed for improved functional use of AE (reacher) for carryover to LB ADL's.       THERAPEUTIC EXERCISE Daily Assessment    RUE/ LUE there ex performed reaching out-front and overhead to secure graded clothespins onto vertical/ horizontal tabletop stand. Verbal cues with demonstration for alternating pinch patterns e.g. tip pinch, three jaw coy, and key pinch. To secure 48 clips; followed by removal. Increased time. FEEDING/EATING Daily Assessment    Functional Level: 5 (set-up)  Comments: Self-feeding performed (set-up) for snack, cookies and water. GROOMING Daily Assessment    Functional Level: 5 (set-up/ supv)  Tasks Completed by Patient: Brushed hair; Washed hands  Comments: Self-care grooming performed (set-up/ supv) w/c at sink. TOILETING Daily Assessment    Functional Level: 3 (Min/ Mod A)  Comments: SBA for hygiene; Min/ Mod A for CM (pants and brief); elevated seat over toilet     LOWER BODY DRESSING Daily Assessment    Functional Level: 3 (Mod A)  Items Applied/Steps Completed: Sock, left (1 step); Sock, right (1 step) (Scrub pants (3 steps))  Comments: Practice and repetition in use of AE for improved functional ability with LB dressing tasks e.g reacher, dressing stick, and sock aid  Sock and/or Shoe management: 4 (sock aid). Pt participated in repetition and practice in use of AE during LB dressing tasks for increased functional (I) and adherence to total hip precautions. MOBILITY/TRANSFERS Daily Assessment    Toilet Transfer Score: 4 (Stand step xfer (Min A) RW; gait belt (elevated seat over toilet))  Comments: Stand step transfer (Min A) RW; gait belt. Elevated seat over toilet. ASSESSMENT:  Patient will benefit from continued skilled occupational therapy interventions to address decreased (I) with ADL's, decreased functional strength/ endurance, decreased activity tolerance, impaired balance/ coordination, decreased AROM, right wrist/ forearm soft cast 2/2 fifth metacarpal fx, right total hip/ WBAT, hip precautions, impaired functional transfers/ mobility, pain, impaired cognition, and safety awareness impeding pt's functional (I) with ADL's and mobility for return to PLOF.     First tx session: Caregiver edu session with pt's granddaughter in attendance. Therapist reviewed use of AE for LB ADL's and for adherence to hip precautions (e.g. reacher, dressing stick, LH sponge, and sock aid). Functional transfer training performed (Min A) using RW; gait belt. Max verbal cues for hand placement and Mod verbal safety cues for managing AD (RW). Discussed equipment recommendations for dc planning (e.g. TTB, BSC, and gait belt). Patient's granddaughter verbalizing understanding of information discussed. Progression toward goals:  []          Improving appropriately and progressing toward goals  [x]          Improving slowly and progressing toward goals  []          Not making progress toward goals and plan of care will be adjusted     PLAN:  Patient continues to benefit from skilled intervention to address the above impairments. Continue treatment per established plan of care. Discharge Recommendations:  Home Health with family assist  Further Equipment Recommendations for Discharge:  bedside commode, gait belt, and tub transfer bench     Activity Tolerance:  Fair; intermittent rest breaks due to fatigue and low endurance  Estimated LOS: TBD    Please refer to the flowsheet for vital signs taken during this treatment. After treatment:   []  Patient left in no apparent distress sitting up in chair   [x]  Patient left in no apparent distress in bed at conclusion of second tx session  [x]  Call bell left within reach  [x]  Nursing notified  [x]  Caregiver present (caregiver edu session with pt's granddaughter in attendance)   [x]  Bed alarm activated    COMMUNICATION/EDUCATION:   [] Home safety education was provided and the patient/caregiver indicated understanding. [x] Patient/family have participated as able in goal setting and plan of care. [x] Patient/family agree to work toward stated goals and plan of care.   [] Patient understands intent and goals of therapy, but is neutral about his/her participation. [] Patient is unable to participate in goal setting and plan of care. OCCUPATIONAL THERAPY GROUP THERAPY TREATMENT: from 1132 to 1200; 28 min group  Therex   Sets Reps Comments   Shoulder flexion/extension 2 10 Hand weights 1 lb right hand; 2 lb left hand   Bicep curls         2        10 Hand weights 1 lb right hand; 2 lb left hand   Chest Press         2        10 Hand weights 1 lb right hand; 2 lb left hand   ER/IR         1         10 Hand weights 1 lb right hand; 2 lb left hand   Shoulder shrugs         1         10    Shoulder rolls (backward)         1          8        Therac   Sets   Reps   Comments   Beach ball toss/catch 3 10    Balloon tapping        4       10 Use of oversized racket   Forward/multi-directional RUE/ LUE AROM reaching out-front, across midline, and overhead          1        8 AROM/ functional reaching performed during warm-up; verbal cues for technique and positioning. ASSESSMENT:  Progression toward goals:  []          Patient participated fully in group therapy session and able to tolerate all activities  [x]          Patient able to participate in group therapy session with only minor modifications and/or extended rest breaks needed; pt fatigues easily. []          Patient not able to tolerate group therapy session. PLAN:  Patient continues to benefit from skilled intervention to address functional impairments. Patient is appropriate to continue group therapy sessions to promote increased participation in skilled therapy interventions and to provide opportunities for increased social interaction.          [id number] [facility], OT

## 2022-09-27 NOTE — REHAB NOTE
Wound Prevention Checklist    Patient: Betito Melendez (45 y.o. female)  Date: 9/27/2022  Diagnosis: Hip fracture ([facility] [address] Robin Stewart <principal problem not specified>    Precautions:  Total hip, Fall       []  Heel prevention boots placed on patient    []  Patient turned q2h during shift    []  Lift team ordered    []  Patient on [place] bed/Specialty bed    []  Each Wound is documented during shift (Stage, Color, drainage, odor, measurements, and dressings)    []  Dual skin check done with Edda Mejias RN

## 2022-09-27 NOTE — PROGRESS NOTES
[facility] Hospitalist Group  Progress Note    Patient: Janak Osborn Age: 80 y.o. : 1924 MR#: [id number] SSN: [id number]  Date/Time: 2022     Subjective: Patient feels fine, pain well controlled, feels tired after physical therapy today     Assessment/Plan:   Impaired Mobility and ADLs  Right hip fracture secondary to mechanical fall s/p repair  Anemia, multifactorial  Vitamin B12 deficiency  Iron deficiency  Oblqiue fracture of fifth metacarpal  HTN  CAD  History of hypothyroidism  Incidental right ureteric stone causing right ureteral hydronephrosis    MEDICAL PLAN:  Continue vitamin B12 injections and iron supplements  We will continue amlodipine, metoprolol and monitor blood pressure closely  BP slightly elevated today, will monitor and adjust medications  Will continue aspirin and statin  Continue SSI and monitor blood sugar  Continue Synthroid  Continue bowel regimen with MiraLAX and Senokot  PT/OT eval and treatment  Wound care as needed    Case discussed with:  [x]Patient  []Family  [x]Nursing  []Case Management  DVT Prophylaxis:  []Lovenox  []Hep SQ  [x]SCDs  []Coumadin   []Eliquis/Xarelto     Objective:   VS: Visit Vitals  BP (!) 159/62 (BP 1 Location: Left upper arm, BP Patient Position: At rest)   Pulse 60   Temp 98.7 °F (37.1 °C)   Resp 19   Ht 5' 2\" (1.575 m)   Wt 62.4 kg (137 lb 9.6 oz)   SpO2 96%   Breastfeeding Unknown   BMI 25.17 kg/m²      Tmax/24hrs: Temp (24hrs), Av.5 °F (36.9 °C), Min:97.9 °F (36.6 °C), Max:99 °F (37.2 °C)  IOBRIEF  Intake/Output Summary (Last 24 hours) at 2022 1401  Last data filed at 2022 1206  Gross per 24 hour   Intake 840 ml   Output 0 ml   Net 840 ml       General:  Alert, cooperative, no acute distress    Pulmonary:  CTA Bilaterally. No Wheezing/Rales. Cardiovascular: Regular rate and Rhythm. GI:  Soft, Non distended, Non tender. + Bowel sounds. Extremities:  No edema. No calf tenderness. Psych: Good insight. Not anxious or agitated. Neurologic: Alert and oriented X 3. Moves all ext. Additional: Right hip surgical site dressing clean, no swelling, right hand in crêpe bandage.     Medications:   Current Facility-Administered Medications   Medication Dose Route Frequency    polyethylene glycol (MIRALAX) packet 17 g  17 g Oral DAILY    HYDROcodone-acetaminophen (NORCO) 5-325 mg per tablet 1-2 Tablet  1-2 Tablet Oral Q4H PRN    ferrous sulfate tablet 325 mg  1 Tablet Oral DAILY WITH BREAKFAST    magnesium hydroxide (MILK OF MAGNESIA) 400 mg/5 mL oral suspension 30 mL  30 mL Oral DAILY PRN    bisacodyL (DULCOLAX) tablet 10 mg  10 mg Oral Q48H PRN    bisacodyL (DULCOLAX) suppository 10 mg  10 mg Rectal Q48H PRN    aspirin chewable tablet 81 mg  81 mg Oral BID    senna-docusate (PERICOLACE) 8.6-50 mg per tablet 1 Tablet  1 Tablet Oral BID    atorvastatin (LIPITOR) tablet 40 mg  40 mg Oral QHS    metoprolol succinate (TOPROL-XL) XL tablet 25 mg  25 mg Oral DAILY    amLODIPine (NORVASC) tablet 10 mg  10 mg Oral DAILY    isosorbide mononitrate ER (IMDUR) tablet 60 mg  60 mg Oral DAILY    levothyroxine (SYNTHROID) tablet 75 mcg  75 mcg Oral 6am    nitroglycerin (NITROSTAT) tablet 0.4 mg  0.4 mg SubLINGual PRN    insulin lispro (HUMALOG) injection   SubCUTAneous AC&HS    glucose chewable tablet 16 g  16 g Oral PRN    glucagon (GLUCAGEN) injection 1 mg  1 mg IntraMUSCular PRN    dextrose 10% infusion 0-250 mL  0-250 mL IntraVENous PRN       Labs:    Recent Results (from the past 24 hour(s))   GLUCOSE, POC    Collection Time: 09/26/22  4:46 PM   Result Value Ref Range    Glucose (POC) 116 (H) 70 - 110 mg/dL   GLUCOSE, POC    Collection Time: 09/26/22 10:12 PM   Result Value Ref Range    Glucose (POC) 120 (H) 70 - 110 mg/dL   GLUCOSE, POC    Collection Time: 09/27/22  7:43 AM   Result Value Ref Range    Glucose (POC) 107 70 - 110 mg/dL   GLUCOSE, POC    Collection Time: 09/27/22 11:42 AM   Result Value Ref Range    Glucose (POC) 126 (H) 70 - 110 mg/dL       Signed By: Placido Leon MD     September 27, 2022      Disclaimer: Sections of this note are dictated using utilizing voice recognition software. Minor typographical errors may be present. If questions arise, please do not hesitate to contact me or call our department.

## 2022-09-27 NOTE — PROGRESS NOTES
Problem: Mobility Impaired (Adult and Pediatric)  Goal: *Acute Goals and Plan of Care (Insert Text)  Description: Physical Therapy Short Term Goals  Initiated 9/23/2022 and to be accomplished within 7 day(s) (9/30/2022)  1. Patient will move from supine to sit and sit to supine , scoot up and down, and roll side to side in bed with supervision/set-up. 2.  Patient will transfer from bed to chair and chair to bed with minimal assistance/contact guard assist using the least restrictive device. 3.  Patient will perform sit to stand with minimal assistance/contact guard assist.  4.  Patient will ambulate with minimal assistance/contact guard assist for 50 feet with the least restrictive device. 5.  Patient will ascend/descend 5 stairs with bilateral handrail(s) with minimal assistance/contact guard assist.    Physical Therapy Long Term Goals  Initiated 9/23/2022 and to be accomplished within 14-21 day(s) (10/07/2022-10/14/2022)  1. Patient will move from supine to sit and sit to supine , scoot up and down, and roll side to side in bed with supervision/set-up. 2.  Patient will transfer from bed to chair and chair to bed with supervision/set-up using the least restrictive device. 3.  Patient will perform sit to stand with supervision/set-up. 4.  Patient will ambulate with supervision/set-up for 150 feet with the least restrictive device. 5.  Patient will ascend/descend 5 stairs with bilateral handrail(s) with supervision/set-up. Outcome: Progressing Towards Goal    PHYSICAL THERAPY TREATMENT    Patient: Dina Adams (32 y.o. female)  Date: 9/27/2022  [facility] [S72.009A]   Precautions: Total hip, Fall  Chart, physical therapy assessment, plan of care and goals were reviewed. Time [id number]:[id number]  Time [id number]    Patient seen for: Balance activities;Gait training;Patient education;Transfer training; Wheelchair mobility    Time In:1000  Time Out:1030    Patient seen for: Balance activities;Gait training;Patient education;Transfer training; Wheelchair mobility    Pain:  Pt pain was reported as ongoing right hip pain pre-treatment. Pt pain was reported as right hip pain post-treatment. Intervention: Pt reports receiving pain medication prior to treatment. Patient identified with name and : yes    SUBJECTIVE:      Pt states, \"I haven't done them in a while,\" re: negotiating stairs at home noting she has utilized the w/c ramp for home entry since it was installed. Pt's granddaughter verbalizes she or her parents will be present with the pt upon d/c to assist her noting they will modify pt's bed at home to take the box spring out to lower the mattress for improved safety with getting into and out of bed. OBJECTIVE DATA SUMMARY:    Objective:       BED/MAT MOBILITY Daily Assessment     Supine to Sit : 3 (Moderate assistance) (for trunk and right LE management)  Pt requires verbal reminders and cuing for hip precautions. During caregiver education opportunity, pt participated in bed mobility requiring maximal education for hip precautions with granddaughter observing PT providing minimal assistance for sit to supine transfer and minimal to moderate assistance for supine to sit transfer to maintain precautions. TRANSFERS Daily Assessment     Transfer Type: Other  Other: stand step with RW  Transfer Assistance : 4 (Minimal assistance)  Sit to Stand Assistance: Minimal assistance  Pt requires maximal reminders for safe hand placement with transition sit <> stand w/c <> RW. Therefor, pt participated in sit <> stand training performing 5 repetitions with minimal assistance for lift off with sit to stand and minimal assistance for slow controlled descent with stand to sit with maximal verbal cuing and intermittent hand over hand assist for safe hand placement with stand to sit. Pt was able to perform final 2 repetitions with safe hand placement with minimal cues.   Pt also requires moderate verbal cuing and minimal assistance for safe RW management with stand step transfers. GAIT Daily Assessment    Gait Description (WDL) Exceptions to WDL    Gait Abnormalities Antalgic;Decreased step clearance; Step to gait    Assistive device Gait belt;Walker, rolling    Ambulation assistance - level surface 4 (Minimal assistance)    Distance 35 Feet (ft)    Ambulation assistance- uneven surface NT    Comments  Pt ambulates with antalgic gait and forward flexed posture requiring increased time and verbal cuing for forward gaze to negotiating path safely. Pt requires minimal assistance for safe RW management. Pt also ambulated 2 x 12 feet in room with pt's granddaughter present for education at a close supervision to [facility] level with minimal verbal cues for safe RW management and upright posture. STEPS/STAIRS Daily Assessment     Steps/Stairs ambulated      Assistance Required 0 (Not tested)    Rail Use      Comments  Pt reports she does not negotiate stairs at home. Curbs/Ramps          BALANCE Daily Assessment     Sitting - Static: Good (unsupported)  Sitting - Dynamic: Fair (occasional)  Standing - Static: Fair  Standing - Dynamic : Impaired        WHEELCHAIR MOBILITY Daily Assessment     Able to Propel (ft): 100 feet  Functional Level: 2  Wheelchair Setup Assist Required : 2 (Maximal assistance)  Wheelchair Management: Manages left brake;Manages right brake  Pt requires maximal assistance for altagracia-technique with left UE and left LE.        ASSESSMENT:  Pt is progressing with improved activity tolerance and requiring decreased physical assistance with all functional mobility. Pt's granddaughter appears receptive and verbalizes understanding of PT recommendations.   Progression toward goals:  []      Improving appropriately and progressing toward goals  [x]      Improving slowly and progressing toward goals  []      Not making progress toward goals and plan of care will be adjusted PLAN:  Patient continues to benefit from skilled intervention to address the above impairments. Continue treatment per established plan of care. Emphasize functional strengthening to promote improved safety and increased activity tolerance prior to d/c home. Discharge Recommendations:  Home Physical Therapy  Further Equipment Recommendations for Discharge:  gait belt and rolling walker      Estimated Discharge Date: 2 weeks      Activity Tolerance:   Fair  Please refer to the flowsheet for vital signs taken during this treatment.     After treatment:   [] Patient left in no apparent distress in bed  [x] Patient left in no apparent distress sitting up in chair  [] Patient left in no apparent distress in w/c mobilizing under own power  [] Patient left in no apparent distress dining area  [x] Patient left in no apparent distress mobilizing under own power  [] Call bell left within reach  [] Nursing notified  [] Caregiver present  [] Bed alarm activated   [x] Chair alarm activated      Sourav Clark PT, DPT  9/27/2022

## 2022-09-27 NOTE — PROGRESS NOTES
SHIFT CHANGE NOTE FOR [facility]    Bedside and Verbal shift change report given to Ainsley Garduno RN (oncoming nurse) by Iban Viramontes RN (offgoing nurse). Report included the following information SBAR, Kardex, MAR and Recent Results. Situation:   Code Status: DNR   Hospital Day: 5   Problem List:   Hospital Problems  Date Reviewed: 5/31/2022            Codes Class Noted POA    Hip fracture [facility]) ICD-10-CM: E95.724O  ICD-9-CM: 820.8  9/18/2022 Unknown           Background:   Past Medical History:   Past Medical History:   Diagnosis Date    CAD (coronary artery disease) 2008    Fibromyalgia     Heart failure ([facility] [address]     Pt. states sge gas 5 heart blocks. Hypercholesterolemia     Hypertension     Menopause     Thyroid disease         Assessment:  Changes in Assessment throughout shift: No change to previous assessment    Patient has a central line: no Reasons if yes: Insertion date: Last dressing date:  Patient has Elliott Cath: no Reasons if yes:     Insertion date:  Shift elliott care completed:     Last Vitals:     Vitals:    09/25/22 2117 09/26/22 0733 09/26/22 1618 09/26/22 2027   BP: (!) 131/57 139/64 (!) 125/57 (!) 116/51   Pulse: 67 69 60 (!) 58   Resp: 18 19 18 18   Temp: 98.2 °F (36.8 °C) 97.3 °F (36.3 °C) 97.9 °F (36.6 °C) 99 °F (37.2 °C)   SpO2: 95% 97% 95% 95%   Weight:       Height:           PAIN    Pain Assessment    Pain Intensity 1: 0 (09/27/22 0450) Pain Intensity 1: 2 (12/29/14 1105)    Pain Location 1: Hip Pain Location 1: Abdomen    Pain Intervention(s) 1: Ice, Repositioned (refused pain med) Pain Intervention(s) 1: Medication (see MAR)  Patient Stated Pain Goal: 0 Patient Stated Pain Goal: 0  Intervention effective: yes  Other actions taken for pain: Ice;Repositioned (refused pain med)    Skin Assessment  Skin color Skin Color: Appropriate for ethnicity  Condition/Temperature    Integrity Skin Integrity: Incision (comment), Wound (add Wound LDA)  Turgor    Weekly Pressure Ulcer Documentation  Pressure Injury Documentation: No Pressure Injury Noted-Pressure Ulcer Prevention Initiated  Wound Prevention & Protection Methods  Orientation of wound Orientation of Wound Prevention: Posterior  Location of Prevention Location of Wound Prevention: Sacrum/Coccyx, Heel  Dressing Present Dressing Present : Yes  Dressing Status Dressing Status: Intact  Wound Offloading Wound Offloading (Prevention Methods): Bed, pressure reduction mattress, Foam silicone, Repositioning, Turning, Pillows, Bed, pressure redistribution/air    INTAKE/OUPUT  Date 09/26/22 0700 - 09/27/22 0659 09/27/22 0700 - 09/28/22 0659   Shift 4477-3921 9734-5031 24 Hour Total 3041-3468 5139-1071 24 Hour Total   INTAKE   P.O. 840 120 960        P. O. 840 120 960      Shift Total(mL/kg) 840(13.5) 120(1.9) 960(15.4)      OUTPUT   Urine(mL/kg/hr)  0 0        Urine Voided  0 0        Urine Occurrence(s) 3 x 3 x 6 x      Stool           Stool Occurrence(s) 0 x 0 x 0 x      Shift Total(mL/kg)  0(0) 0(0)       120 960      Weight (kg) 62.4 62.4 62.4 62.4 62.4 62.4       Recommendations:  Patient needs and requests: none at this time    Pending tests/procedures: none at this time     Functional Level/Equipment: Partial (one person) /      Fall Precautions:   Fall risk precautions were reinforced with the patient; she was instructed to call for help prior to getting up. The following fall risk precautions were continued: bed/ chair alarms, door signage, yellow bracelet and socks as well as update of the Julia Ground tool in the patient's room. Alma Rosa Score: 5    HEALS Safety Check    A safety check occurred in the patient's room between off going nurse and oncoming nurse listed above.     The safety check included the below items  Area Items   H  High Alert Medications Verify all high alert medication drips (heparin, PCA, etc.)   E  Equipment Suction is set up for ALL patients (with yanker)  Red plugs utilized for all equipment (IV pumps, etc.)  WOWs wiped down at end of shift. Room stocked with oxygen, suction, and other unit-specific supplies   A  Alarms Bed alarm is set for fall risk patients  Ensure chair alarm is in place and activated if patient is up in a chair   L  Lines Check IV for any infiltration  Talley bag is empty if patient has a Talley   Tubing and IV bags are labeled   S  Safety   Room is clean, patient is clean, and equipment is clean. Hallways are clear from equipment besides carts. Fall bracelet on for fall risk patients  Ensure room is clear and free of clutter  Suction is set up for ALL patients (with geneva)  Hallways are clear from equipment besides carts.    Isolation precautions followed, supplies available outside room, sign posted     Gaviota Hein RN

## 2022-09-27 NOTE — ROUTINE PROCESS
SHIFT CHANGE NOTE FOR [facility]    Bedside and Verbal shift change report given to Bobbi Lim RN (oncoming nurse) by Loa Leyden, RN (offgoing nurse). Report included the following information SBAR, Kardex, MAR and Recent Results. Situation:   Code Status: DNR   Hospital Day: 5   Problem List:   Hospital Problems  Date Reviewed: 5/31/2022            Codes Class Noted POA    Hip fracture [facility]) ICD-10-CM: L78.526B  ICD-9-CM: 820.8  9/18/2022 Unknown           Background:   Past Medical History:   Past Medical History:   Diagnosis Date    CAD (coronary artery disease) 2008    Fibromyalgia     Heart failure ([facility] [address]     Pt. states sge gas 5 heart blocks.     Hypercholesterolemia     Hypertension     Menopause     Thyroid disease         Assessment:  Changes in Assessment throughout shift: No change to previous assessment    Patient has a central line: no Reasons if yes: na  Insertion date:na Last dressing date:na  Patient has Elliott Cath: no Reasons if yes: na   Insertion date:na  Shift elliott care completed: NO    Last Vitals:     Vitals:    09/26/22 1618 09/26/22 2027 09/27/22 0735 09/27/22 1600   BP: (!) 125/57 (!) 116/51 (!) 159/62 (!) 144/61   Pulse: 60 (!) 58 60 64   Resp: 18 18 19 18   Temp: 97.9 °F (36.6 °C) 99 °F (37.2 °C) 98.7 °F (37.1 °C) 98 °F (36.7 °C)   SpO2: 95% 95% 96% 96%   Weight:       Height:           PAIN    Pain Assessment    Pain Intensity 1: 0 (09/27/22 1600) Pain Intensity 1: 2 (12/29/14 1105)    Pain Location 1: Leg, Hip Pain Location 1: Abdomen    Pain Intervention(s) 1: Medication (see MAR) Pain Intervention(s) 1: Medication (see MAR)  Patient Stated Pain Goal: 0 Patient Stated Pain Goal: 0  Intervention effective: yes  Other actions taken for pain: Medication (see MAR)    Skin Assessment  Skin color Skin Color: Appropriate for ethnicity  Condition/Temperature    Integrity Skin Integrity: Incision (comment), Wound (add Wound LDA)  Turgor    Weekly Pressure Ulcer Documentation  Pressure  Injury Documentation: No Pressure Injury Noted-Pressure Ulcer Prevention Initiated  Wound Prevention & Protection Methods  Orientation of wound Orientation of Wound Prevention: Posterior  Location of Prevention Location of Wound Prevention: Sacrum/Coccyx, Heel  Dressing Present Dressing Present : Changed, Yes  Dressing Status Dressing Status: Changed  Wound Offloading Wound Offloading (Prevention Methods): Foam silicone, Pillows, Turning, Chair cushion, Bed, pressure redistribution/air, Bed, pressure reduction mattress    INTAKE/OUPUT  Date 09/26/22 0700 - 09/27/22 0659 09/27/22 0700 - 09/28/22 0659   Shift 8256-3071 1799-1385 24 Hour Total 8732-7756 6103-2064 24 Hour Total   INTAKE   P.O.  1320  1320     P. O.  1320  1320   Shift Total(mL/kg) 840(13.5) 360(5.8) 1200(19.2) 1320(21.1)  1320(21.1)   OUTPUT   Urine(mL/kg/hr)  0(0) 0(0) 400  400     Urine Voided  0 0 400  400     Urine Occurrence(s) 3 x 4 x 7 x 4 x  4 x   Stool           Stool Occurrence(s) 0 x 0 x 0 x 0 x  0 x   Shift Total(mL/kg)  0(0) 0(0) 400(6.4)  400(6.4)    692 3633 920  920   Weight (kg) 62.4 62.4 62.4 62.4 62.4 62.4       Recommendations:  Patient needs and requests: na    Pending tests/procedures: na     Functional Level/Equipment: Partial (one person) / Bed (comment)    Fall Precautions:   Fall risk precautions were reinforced with the patient; she was instructed to call for help prior to getting up. The following fall risk precautions were continued: bed/ chair alarms, door signage, yellow bracelet and socks as well as update of the Kale Blight tool in the patient's room. Alma Rosa Score: 5    HEALS Safety Check    A safety check occurred in the patient's room between off going nurse and oncoming nurse listed above.     The safety check included the below items  Area Items   H  High Alert Medications Verify all high alert medication drips (heparin, PCA, etc.)   E  Equipment Suction is set up for ALL patients (with geneva)  Red plugs utilized for all equipment (IV pumps, etc.)  WOWs wiped down at end of shift. Room stocked with oxygen, suction, and other unit-specific supplies   A  Alarms Bed alarm is set for fall risk patients  Ensure chair alarm is in place and activated if patient is up in a chair   L  Lines Check IV for any infiltration  Talley bag is empty if patient has a Talley   Tubing and IV bags are labeled   S  Safety   Room is clean, patient is clean, and equipment is clean. Hallways are clear from equipment besides carts. Fall bracelet on for fall risk patients  Ensure room is clear and free of clutter  Suction is set up for ALL patients (with geneva)  Hallways are clear from equipment besides carts.    Isolation precautions followed, supplies available outside room, sign posted     Jeffrey High RN

## 2022-09-27 NOTE — PROGRESS NOTES
Wound Prevention Checklist    Patient: Elio Dang (86 y.o. female)  Date: 9/27/2022  Diagnosis: Hip fracture ([facility] [address] Leonemiko Ali <principal problem not specified>    Precautions:  Total hip, Fall       [x]  Heel off bed    [x]  Patient turned q2h during shift    [x]  Silicone dressing on sacral area    []  Patient on Peyman bed/Specialty bed    []  Each Wound is documented during shift (Stage, Color, drainage, odor, measurements, and dressings)    [x]  Dual skin check done with RALF Zaragoza RN

## 2022-09-27 NOTE — PROGRESS NOTES
conducted a Follow up consultation and Spiritual Assessment for Betito Melendez, who is a 80 y.o.,female. The  provided the following Interventions:  Patient was about to eat lunch upon visit. Continued the relationship of care and support. Listened empathically. Offered prayer, holy communion, and assurance of continued prayer on patient's behalf. Chart reviewed. The following outcomes were achieved:  Patient received holy communion prior to eating her meal.  Patient expressed gratitude for 's visit. Assessment:  There are no further spiritual or Bahai issues which require Spiritual Care Services interventions at this time. Plan:  Chaplains will continue to follow and will provide pastoral care on an as needed/requested basis.  recommends bedside caregivers page  on duty if patient shows signs of acute spiritual or emotional distress.      Melba Hernández [address]   [phone]

## 2022-09-27 NOTE — PROGRESS NOTES
Problem: Pain  Goal: *Control of Pain  Outcome: Progressing Towards Goal     Problem: Patient Education: Go to Patient Education Activity  Goal: Patient/Family Education  Outcome: Progressing Towards Goal     Problem: Falls - Risk of  Goal: *Absence of Falls  Description: Document David Demarco Fall Risk and appropriate interventions in the flowsheet. Outcome: Progressing Towards Goal  Note: Fall Risk Interventions:  Mobility Interventions: Bed/chair exit alarm, Patient to call before getting OOB, Utilize walker, cane, or other assistive device    Mentation Interventions: Bed/chair exit alarm, Door open when patient unattended, Room close to nurse's station    Medication Interventions: Bed/chair exit alarm, Patient to call before getting OOB, Teach patient to arise slowly    Elimination Interventions: Bed/chair exit alarm, Call light in reach, Patient to call for help with toileting needs, Stay With Me (per policy), Toileting schedule/hourly rounds    History of Falls Interventions: Bed/chair exit alarm, Door open when patient unattended, Room close to nurse's station         Problem: Patient Education: Go to Patient Education Activity  Goal: Patient/Family Education  Outcome: Progressing Towards Goal     Problem: Pressure Injury - Risk of  Goal: *Prevention of pressure injury  Description: Document Seamus Scale and appropriate interventions in the flowsheet. Outcome: Progressing Towards Goal  Note: Pressure Injury Interventions:  Sensory Interventions: Assess changes in LOC    Moisture Interventions: Absorbent underpads, Minimize layers, Moisture barrier, Apply protective barrier, creams and emollients    Activity Interventions: Increase time out of bed, Pressure redistribution bed/mattress(bed type)    Mobility Interventions: HOB 30 degrees or less, Pressure redistribution bed/mattress (bed type), Turn and reposition approx.  every two hours(pillow and wedges), Chair cushion    Nutrition Interventions: Document food/fluid/supplement intake    Friction and Shear Interventions: Lift sheet, HOB 30 degrees or less, Foam dressings/transparent film/skin sealants, Apply protective barrier, creams and emollients, Minimize layers, Transferring/repositioning devices                Problem: Patient Education: Go to Patient Education Activity  Goal: Patient/Family Education  Outcome: Progressing Towards Goal     Problem: Inpatient Rehab (Adult)  Goal: *LTG: Avoids injury/falls 100% of time related to deficits  Outcome: Progressing Towards Goal  Goal: *LTG: Avoids infection 100% of time related to deficits  Outcome: Progressing Towards Goal  Goal: *LTG: Verbalize understanding of diagnosis and risk factors for recurring stroke  Outcome: Progressing Towards Goal  Goal: *LTG: Absence of DVT during hospitalization  Outcome: Progressing Towards Goal  Goal: *LTG: Maintains Skin Integrity With No Evidence of Pressure Injury 100% of Time  Outcome: Progressing Towards Goal  Goal: Interventions  Outcome: Progressing Towards Goal     Problem: Patient Education: Go to Patient Education Activity  Goal: Patient/Family Education  Outcome: Progressing Towards Goal

## 2022-09-28 LAB
GLUCOSE BLD STRIP.AUTO-MCNC: 104 MG/DL (ref 70–110)
GLUCOSE BLD STRIP.AUTO-MCNC: 109 MG/DL (ref 70–110)
GLUCOSE BLD STRIP.AUTO-MCNC: 123 MG/DL (ref 70–110)
GLUCOSE BLD STRIP.AUTO-MCNC: 129 MG/DL (ref 70–110)

## 2022-09-28 PROCEDURE — 51798 US URINE CAPACITY MEASURE: CPT

## 2022-09-28 PROCEDURE — 82962 GLUCOSE BLOOD TEST: CPT

## 2022-09-28 PROCEDURE — 97110 THERAPEUTIC EXERCISES: CPT

## 2022-09-28 PROCEDURE — 97530 THERAPEUTIC ACTIVITIES: CPT

## 2022-09-28 PROCEDURE — 65310000000 HC RM PRIVATE REHAB

## 2022-09-28 PROCEDURE — 97116 GAIT TRAINING THERAPY: CPT

## 2022-09-28 PROCEDURE — 74011250637 HC RX REV CODE- 250/637: Performed by: HOSPITALIST

## 2022-09-28 PROCEDURE — 97535 SELF CARE MNGMENT TRAINING: CPT

## 2022-09-28 PROCEDURE — 99232 SBSQ HOSP IP/OBS MODERATE 35: CPT | Performed by: HOSPITALIST

## 2022-09-28 PROCEDURE — 74011250637 HC RX REV CODE- 250/637: Performed by: FAMILY MEDICINE

## 2022-09-28 RX ORDER — CALCIUM CARB/MAGNESIUM CARB 311-232MG
5 TABLET ORAL
Status: DISCONTINUED | OUTPATIENT
Start: 2022-09-28 | End: 2022-10-05 | Stop reason: HOSPADM

## 2022-09-28 RX ADMIN — ATORVASTATIN CALCIUM 40 MG: 40 TABLET, FILM COATED ORAL at 21:31

## 2022-09-28 RX ADMIN — SENNOSIDES AND DOCUSATE SODIUM 1 TABLET: 50; 8.6 TABLET ORAL at 18:11

## 2022-09-28 RX ADMIN — METOPROLOL SUCCINATE 25 MG: 25 TABLET, FILM COATED, EXTENDED RELEASE ORAL at 08:25

## 2022-09-28 RX ADMIN — HYDROCODONE BITARTRATE AND ACETAMINOPHEN 2 TABLET: 5; 325 TABLET ORAL at 03:10

## 2022-09-28 RX ADMIN — HYDROCODONE BITARTRATE AND ACETAMINOPHEN 1 TABLET: 5; 325 TABLET ORAL at 08:26

## 2022-09-28 RX ADMIN — AMLODIPINE BESYLATE 10 MG: 10 TABLET ORAL at 08:25

## 2022-09-28 RX ADMIN — ASPIRIN 81 MG CHEWABLE TABLET 81 MG: 81 TABLET CHEWABLE at 08:25

## 2022-09-28 RX ADMIN — POLYETHYLENE GLYCOL 3350 17 G: 17 POWDER, FOR SOLUTION ORAL at 08:33

## 2022-09-28 RX ADMIN — SENNOSIDES AND DOCUSATE SODIUM 1 TABLET: 50; 8.6 TABLET ORAL at 08:25

## 2022-09-28 RX ADMIN — BISACODYL 10 MG: 5 TABLET, COATED ORAL at 18:11

## 2022-09-28 RX ADMIN — HYDROCODONE BITARTRATE AND ACETAMINOPHEN 2 TABLET: 5; 325 TABLET ORAL at 19:04

## 2022-09-28 RX ADMIN — ISOSORBIDE MONONITRATE 60 MG: 60 TABLET, EXTENDED RELEASE ORAL at 08:25

## 2022-09-28 RX ADMIN — FERROUS SULFATE TAB 325 MG (65 MG ELEMENTAL FE) 325 MG: 325 (65 FE) TAB at 08:25

## 2022-09-28 RX ADMIN — ASPIRIN 81 MG CHEWABLE TABLET 81 MG: 81 TABLET CHEWABLE at 18:11

## 2022-09-28 RX ADMIN — LEVOTHYROXINE SODIUM 75 MCG: 0.05 TABLET ORAL at 06:41

## 2022-09-28 NOTE — PROGRESS NOTES
Problem: Pain  Goal: *Control of Pain  Outcome: Progressing Towards Goal     Problem: Patient Education: Go to Patient Education Activity  Goal: Patient/Family Education  Outcome: Progressing Towards Goal     Problem: Falls - Risk of  Goal: *Absence of Falls  Description: Document Gely Lamar Fall Risk and appropriate interventions in the flowsheet. Outcome: Progressing Towards Goal  Note: Fall Risk Interventions:  Mobility Interventions: Bed/chair exit alarm, Patient to call before getting OOB, Utilize walker, cane, or other assistive device    Mentation Interventions: Bed/chair exit alarm, Door open when patient unattended, Room close to nurse's station    Medication Interventions: Bed/chair exit alarm, Patient to call before getting OOB, Teach patient to arise slowly    Elimination Interventions: Bed/chair exit alarm, Call light in reach, Patient to call for help with toileting needs, Stay With Me (per policy)    History of Falls Interventions: Bed/chair exit alarm, Door open when patient unattended, Room close to nurse's station         Problem: Patient Education: Go to Patient Education Activity  Goal: Patient/Family Education  Outcome: Progressing Towards Goal     Problem: Pressure Injury - Risk of  Goal: *Prevention of pressure injury  Description: Document Semaus Scale and appropriate interventions in the flowsheet. Outcome: Progressing Towards Goal  Note: Pressure Injury Interventions:  Sensory Interventions: Assess changes in LOC, Keep linens dry and wrinkle-free, Minimize linen layers, Pressure redistribution bed/mattress (bed type), Turn and reposition approx.  every two hours (pillows and wedges if needed)    Moisture Interventions: Absorbent underpads, Limit adult briefs, Minimize layers, Moisture barrier, Apply protective barrier, creams and emollients    Activity Interventions: Increase time out of bed, Chair cushion, Pressure redistribution bed/mattress(bed type)    Mobility Interventions: Chair cushion, Float heels, HOB 30 degrees or less, Pressure redistribution bed/mattress (bed type)    Nutrition Interventions: Document food/fluid/supplement intake    Friction and Shear Interventions: Foam dressings/transparent film/skin sealants, Feet elevated on foot rest, HOB 30 degrees or less, Lift sheet, Minimize layers, Transferring/repositioning devices                Problem: Patient Education: Go to Patient Education Activity  Goal: Patient/Family Education  Outcome: Progressing Towards Goal     Problem: Inpatient Rehab (Adult)  Goal: *LTG: Avoids injury/falls 100% of time related to deficits  Outcome: Progressing Towards Goal  Goal: *LTG: Avoids infection 100% of time related to deficits  Outcome: Progressing Towards Goal  Goal: *LTG: Verbalize understanding of diagnosis and risk factors for recurring stroke  Outcome: Progressing Towards Goal  Goal: *LTG: Absence of DVT during hospitalization  Outcome: Progressing Towards Goal  Goal: *LTG: Maintains Skin Integrity With No Evidence of Pressure Injury 100% of Time  Outcome: Progressing Towards Goal  Goal: Interventions  Outcome: Progressing Towards Goal     Problem: Patient Education: Go to Patient Education Activity  Goal: Patient/Family Education  Outcome: Progressing Towards Goal

## 2022-09-28 NOTE — PROGRESS NOTES
[facility] Hospitalist Group  Progress Note    Patient: Mariella Pena Age: 80 y.o. : 1924 MR#: [id number] SSN: [id number]  Date/Time: 2022     Subjective: Patient feels fine, sitting in the wheelchair, hard of hearing. Denies any new complaints. Pain well controlled     Assessment/Plan:   Impaired Mobility and ADLs  Right hip fracture secondary to mechanical fall s/p repair  Anemia, multifactorial  Vitamin B12 deficiency  Iron deficiency  Oblqiue fracture of fifth metacarpal  HTN  CAD  History of hypothyroidism  Incidental right ureteric stone causing right ureteral hydronephrosis    MEDICAL PLAN:  Continue vitamin B12 injections and iron supplements  Continue amlodipine, metoprolol and monitor blood pressure closely, will monitor and adjust medications  Will continue aspirin and statin  Continue SSI and monitor blood sugar  Continue Synthroid  Continue bowel regimen with MiraLAX and Senokot  PT/OT eval and treatment  Wound care as needed    Discussed with the patient at the bedside    Case discussed with:  [x]Patient  []Family  [x]Nursing  []Case Management  DVT Prophylaxis:  []Lovenox  []Hep SQ  [x]SCDs  []Coumadin   []Eliquis/Xarelto     Objective:   VS: Visit Vitals  BP (!) 149/58 (BP 1 Location: Left upper arm, BP Patient Position: Supine)   Pulse 61   Temp 97.5 °F (36.4 °C)   Resp 18   Ht 5' 2\" (1.575 m)   Wt 62.4 kg (137 lb 9.6 oz)   SpO2 99%   Breastfeeding Unknown   BMI 25.17 kg/m²      Tmax/24hrs: Temp (24hrs), Av.1 °F (36.7 °C), Min:97.5 °F (36.4 °C), Max:98.7 °F (37.1 °C)  IOBRIEF  Intake/Output Summary (Last 24 hours) at 2022 1451  Last data filed at 2022 1316  Gross per 24 hour   Intake 1700 ml   Output 750 ml   Net 950 ml       General:  Alert, cooperative, no acute distress, hard of hearing  Pulmonary:  CTA Bilaterally. No Wheezing/Rales. Cardiovascular: Regular rate and Rhythm.   GI:  Soft, Non distended, Non tender. + Bowel sounds. Extremities:  No edema. No calf tenderness. Psych: Not anxious or agitated. Neurologic: Alert and oriented X 2-3. Moves all ext. Additional: Right hip surgical site dressing clean, no swelling, right hand in crêpe bandage.     Medications:   Current Facility-Administered Medications   Medication Dose Route Frequency    melatonin (rapid dissolve) tablet 5 mg  5 mg Oral QHS PRN    polyethylene glycol (MIRALAX) packet 17 g  17 g Oral DAILY    HYDROcodone-acetaminophen (NORCO) 5-325 mg per tablet 1-2 Tablet  1-2 Tablet Oral Q4H PRN    ferrous sulfate tablet 325 mg  1 Tablet Oral DAILY WITH BREAKFAST    magnesium hydroxide (MILK OF MAGNESIA) 400 mg/5 mL oral suspension 30 mL  30 mL Oral DAILY PRN    bisacodyL (DULCOLAX) tablet 10 mg  10 mg Oral Q48H PRN    bisacodyL (DULCOLAX) suppository 10 mg  10 mg Rectal Q48H PRN    aspirin chewable tablet 81 mg  81 mg Oral BID    senna-docusate (PERICOLACE) 8.6-50 mg per tablet 1 Tablet  1 Tablet Oral BID    atorvastatin (LIPITOR) tablet 40 mg  40 mg Oral QHS    metoprolol succinate (TOPROL-XL) XL tablet 25 mg  25 mg Oral DAILY    amLODIPine (NORVASC) tablet 10 mg  10 mg Oral DAILY    isosorbide mononitrate ER (IMDUR) tablet 60 mg  60 mg Oral DAILY    levothyroxine (SYNTHROID) tablet 75 mcg  75 mcg Oral 6am    nitroglycerin (NITROSTAT) tablet 0.4 mg  0.4 mg SubLINGual PRN    insulin lispro (HUMALOG) injection   SubCUTAneous AC&HS    glucose chewable tablet 16 g  16 g Oral PRN    glucagon (GLUCAGEN) injection 1 mg  1 mg IntraMUSCular PRN    dextrose 10% infusion 0-250 mL  0-250 mL IntraVENous PRN       Labs:    Recent Results (from the past 24 hour(s))   GLUCOSE, POC    Collection Time: 09/27/22  4:35 PM   Result Value Ref Range    Glucose (POC) 104 70 - 110 mg/dL   GLUCOSE, POC    Collection Time: 09/27/22  9:53 PM   Result Value Ref Range    Glucose (POC) 98 70 - 110 mg/dL   GLUCOSE, POC    Collection Time: 09/28/22  7:28 AM   Result Value Ref Range    Glucose (POC) 104 70 - 110 mg/dL   GLUCOSE, POC    Collection Time: 09/28/22 11:41 AM   Result Value Ref Range    Glucose (POC) 129 (H) 70 - 110 mg/dL       Signed By: Carmen Joaquin MD     September 28, 2022      Disclaimer: Sections of this note are dictated using utilizing voice recognition software. Minor typographical errors may be present. If questions arise, please do not hesitate to contact me or call our department.

## 2022-09-28 NOTE — PROGRESS NOTES
SHIFT CHANGE NOTE FOR [facility]    Bedside and Verbal shift change report given to Loida Taylor (oncoming nurse) by Lylia Castleman, RN (offgoing nurse). Report included the following information SBAR, Kardex, MAR and Recent Results. Situation:   Code Status: DNR   Hospital Day: 6   Problem List:   Hospital Problems  Date Reviewed: 5/31/2022            Codes Class Noted POA    Hip fracture [facility]) ICD-10-CM: D10.811X  ICD-9-CM: 820.8  9/18/2022 Unknown         Background:   Past Medical History:   Past Medical History:   Diagnosis Date    CAD (coronary artery disease) 2008    Fibromyalgia     Heart failure ([facility] [address]     Pt. states sge gas 5 heart blocks. Hypercholesterolemia     Hypertension     Menopause     Thyroid disease         Assessment:  Changes in Assessment throughout shift: No change to previous assessment    Patient has a central line: no Reasons if yes: Insertion date: Last dressing date:  Patient has Elliott Cath: no Reasons if yes:     Insertion date:  Shift elliott care completed:     Last Vitals:     Vitals:    09/26/22 2027 09/27/22 0735 09/27/22 1600 09/27/22 1955   BP: (!) 116/51 (!) 159/62 (!) 144/61 (!) 143/60   Pulse: (!) 58 60 64 60   Resp: 18 19 18 18   Temp: 99 °F (37.2 °C) 98.7 °F (37.1 °C) 98 °F (36.7 °C) 98.7 °F (37.1 °C)   SpO2: 95% 96% 96% 95%   Weight:       Height:           PAIN    Pain Assessment    Pain Intensity 1: 0 (09/28/22 0641) Pain Intensity 1: 2 (12/29/14 1105)    Pain Location 1: Hip Pain Location 1: Abdomen    Pain Intervention(s) 1: Medication (see MAR) Pain Intervention(s) 1: Medication (see MAR)  Patient Stated Pain Goal: 0 Patient Stated Pain Goal: 0  Intervention effective: yes  Other actions taken for pain: Medication (see MAR)    Skin Assessment  Skin color Skin Color: Appropriate for ethnicity  Condition/Temperature    Integrity Skin Integrity: Incision (comment), Wound (add Wound LDA)  Turgor    Weekly Pressure Ulcer Documentation  Pressure  Injury Documentation: No Pressure Injury Noted-Pressure Ulcer Prevention Initiated  Wound Prevention & Protection Methods  Orientation of wound Orientation of Wound Prevention: Posterior  Location of Prevention Location of Wound Prevention: Sacrum/Coccyx, Heel  Dressing Present Dressing Present : Yes  Dressing Status Dressing Status: Intact  Wound Offloading Wound Offloading (Prevention Methods): Bed, pressure reduction mattress, Bed, pressure redistribution/air, Elevate heels, Pillows, Repositioning, Walker, Foam silicone    INTAKE/OUPUT  Date 09/27/22 0700 - 09/28/22 0659 09/28/22 0700 - 09/29/22 0659   Shift 7656-2112 5664-5773 24 Hour Total 8529-4394 6966-9194 24 Hour Total   INTAKE   P.O. 7230 581 4943        P. O. 9455 786 9819      Shift Total(mL/kg) 1320(21.1) 480(7.7) 1800(28.8)      OUTPUT   Urine(mL/kg/hr) 650(0.9) 500(0.7) 1150(0.8)        Urine Voided         Urine Occurrence(s) 5 x 4 x 9 x      Stool           Stool Occurrence(s) 0 x 0 x 0 x      Shift Total(mL/kg) 650(10.4) 500(8) 1150(18.4)       -20 650      Weight (kg) 62.4 62.4 62.4 62.4 62.4 62.4         Recommendations:  Patient needs and requests: none at this time    Pending tests/procedures: none at this time     Functional Level/Equipment: Partial (one person) /      Fall Precautions:   Fall risk precautions were reinforced with the patient; she was instructed to call for help prior to getting up. The following fall risk precautions were continued: bed/ chair alarms, door signage, yellow bracelet and socks as well as update of the [employer] Alejandro tool in the patient's room. Alma Rosa Score: 5    HEALS Safety Check    A safety check occurred in the patient's room between off going nurse and oncoming nurse listed above.     The safety check included the below items  Area Items   H  High Alert Medications Verify all high alert medication drips (heparin, PCA, etc.)   E  Equipment Suction is set up for ALL patients (with yanker)  Red plugs utilized for all equipment (IV pumps, etc.)  WOWs wiped down at end of shift. Room stocked with oxygen, suction, and other unit-specific supplies   A  Alarms Bed alarm is set for fall risk patients  Ensure chair alarm is in place and activated if patient is up in a chair   L  Lines Check IV for any infiltration  Talley bag is empty if patient has a Talley   Tubing and IV bags are labeled   S  Safety   Room is clean, patient is clean, and equipment is clean. Hallways are clear from equipment besides carts. Fall bracelet on for fall risk patients  Ensure room is clear and free of clutter  Suction is set up for ALL patients (with geneva)  Hallways are clear from equipment besides carts.    Isolation precautions followed, supplies available outside room, sign posted     Eda Villeda RN

## 2022-09-28 NOTE — PROGRESS NOTES
Problem: Self Care Deficits Care Plan (Adult)  Goal: *Acute Goals and Plan of Care (Insert Text)  Description: Occupational Therapy Goals   Long Term Goals  Initiated (2022) and to be accomplished within 3 week(s), by (10/14/2022)  1. Pt will perform self-feeding with Mod I.  2. Pt will perform grooming with Mod I.  3. Pt will perform UB bathing with set-up. 4. Pt will perform LB bathing with supv using AE and/ or compensatory strategies while maintaining hip precautions. 5. Pt will perform tub/shower transfer with SBA using LRAD. 6. Pt will perform UB dressing with Mod I.  7. Pt will perform LB dressing with SBA using AE and/ or compensatory strategies while maintaining hip precautions. 8. Pt will perform toileting task with supv/ SBA. 9. Pt will perform toilet transfer with supv using LRAD. Short Term Goals   Initiated (2022) and to be accomplished within 7 day(s), by (2022)  1. Pt will perform self-feeding with set-up/ supv. 2. Pt will perform grooming with supv. 3. Pt will perform UB bathing with supv. 4. Pt will perform LB bathing with Min A using AE and/ or compensatory strategies while maintaining hip precautions. 5. Pt will perform tub/shower transfer with Min A using LRAD. 6. Pt will perform UB dressing with SBA. 7. Pt will perform LB dressing with Mod A using AE and/ or compensatory strategies while maintaining hip precautions. 8. Pt will perform toileting task with Mod A.  9. Pt will perform toilet transfer with Min A using LRAD. Outcome: Progressing Towards Goal  Goal: Interventions  Outcome: Progressing Towards Goal   Occupational Therapy TREATMENT    Patient: Sheri Abler   80 y.o. Patient identified with name and : yes    Date: 2022    First Tx Session  Time In: 1100  Time Out: 1230    Diagnosis: Hip fracture ([facility] [address] [S72.009A]   Precautions:  Total hip, Fall precautions, Aspiration, Safety, right hip precautions, RLE WBAT, RUE (wrist/ forearm cast; fifth metacarpal fx)  Chart, occupational therapy assessment, plan of care, and goals were reviewed. Pain:  Pt reports 3/10 pain or discomfort prior to treatment; right hip/ aching  Pt reports 3/10 pain or discomfort post treatment; right hip/ aching  Intervention Provided: repositioning; intermittent rest breaks. Pt denies need for pain medication at this time; will continue to assess. SUBJECTIVE:   Patient stated \"I slept better last night.     OBJECTIVE DATA SUMMARY:     THERAPEUTIC ACTIVITY Daily Assessment    RUE/ LUE [address] there act performed using wooden parquetry shapes to replicate visual image on board; pt completed 2 patterns (20 pieces each). Performed for pincer grasp and FM skills (in-hand manipulation); and light cognitive task (problem solving) for determining correct sized wooden shapes. Pt participated in [facility] [address] tabletop activity for completing a 24 piece puzzle; performed for pincer grasp and light cognitive task (problem solving) for increased functional (I) with ADL's. Min verbal cues for task initiation; Mod verbal cues for self-correcting errors. Verbal cues for continuation of task to completion. Patient participated in a safety card activity with therapist for identifying safety concerns on images provided (safety cards) related to community and ADL/ IADL activities. Therapist providing initial instructions. Pt correctly identified 12 out of 12 safety concerns. Increased time. Pt participated in RUE/ LUE bean bag toss activity (w/c level) for reaching out-front to toss bean bags into target (container) placed at varying distances floor level. Task performed (15 reps x1 each hand). Performed for functional reaching out-front, range of motion, eye-hand coordination, and activity tolerance for carryover to self-cares. Therapist providing initial instruction with demonstration. Verbal cues for continuation of task to completion. Intermittent rest breaks due to fatigue. THERAPEUTIC EXERCISE Daily Assessment    RUE/ LUE there ex performed using 2 lb dowel (10 reps x2) for elbow flexion/ extension, chest press, forward roll, and stirring motion. Verbal cues with demonstration for hand placement and technique. Performed for ROM and strengthening for increased (I) with ADL's. Intermittent rest breaks due to fatigue. Right/ left hand (pink) theraputty exercises performed (multiple repetitions) for rolling, pinching, squeezing, and stretching putty. Verbal cues with demonstration for technique and hand positioning. E/ E [facility] task performed for bead removal; 20 beads. Performed for tip pinch and bilateral integration for carryover to self-care tasks. FEEDING/EATING Daily Assessment    Functional Level: 5 (Mod I/ set-up)  Comments: Self-feeding performed (Mod I/ set-up); therapist assisted with cutting up pork loin. Pt able to open containers and lids on meal tray. GROOMING Daily Assessment    Functional Level: 5 (set-up/ supv)  Tasks Completed by Patient: Brushed teeth;Brushed hair (Hand hygiene using hand )  Comments: Self-care grooming tasks performed (set-up/ supv) w/c at sink. Oral hygiene: 5     TOILETING Daily Assessment    Functional Level: 3 (Mod A)  Comments: SBA for hygiene; Mod A for CM in stance (RW)     UPPER BODY DRESSING Daily Assessment    Functional Level: 5 (set-up/ supv)  Items Applied/Steps Completed: Pullover (4 steps)  Comments: UB dressing performed (set-up/ supv) for doffing/ donning pullover scrub top. LOWER BODY DRESSING Daily Assessment    Functional Level: 3 (Mod A)  Items Applied/Steps Completed:  (scrub pants (3 steps))  Comments:  (LB dressing performed (Mod A) w/c level for doffing/ donning scrub pants; edu/ instructed in use of reacher for threading pant legs over distal BLE's.  Pt stood using RW; Min A for pulling pants over hips to waist level.) Edu/ instructed in use of AE within context of LB dressing tasks for adherence to hip precautions. MOBILITY/TRANSFERS Daily Assessment    Toilet Transfer Score: 4 (Stand step transfer (Min A) RW; gait belt. Elevated seat over toilet.)  Comments: Stand step transfer (Min A) RW; gait belt. Elevated seat over toilet. ASSESSMENT:  Patient will benefit from continued skilled occupational therapy interventions to address decreased (I) with ADL's, decreased functional strength/ endurance, decreased activity tolerance, impaired balance/ coordination, decreased AROM, right wrist/ forearm soft cast 2/2 fifth metacarpal fx, right total hip/ WBAT, hip precautions, impaired functional transfers/ mobility, pain, impaired cognition, and safety awareness impeding pt's functional (I) with ADL's and mobility for return to PLOF. Intermittent rest breaks as patient fatigues easily. Edu/ instructed on use of energy conservation strategies (e.g. pacing, positioning, planning/ prioritizing tasks, and use of AE) for carryover to self-cares. Progression toward goals:  []          Improving appropriately and progressing toward goals  [x]          Improving slowly and progressing toward goals  []          Not making progress toward goals and plan of care will be adjusted     PLAN:  Patient continues to benefit from skilled intervention to address the above impairments. Continue treatment per established plan of care. Discharge Recommendations:  Home Health with family assist  Further Equipment Recommendations for Discharge:  bedside commode, gait belt, and tub transfer bench      Activity Tolerance:  Fair; intermittent rest breaks due to fatigue and low endurance  Estimated LOS: 10/5/2022    Please refer to the flowsheet for vital signs taken during this treatment.   After treatment:   [x]  Patient left in no apparent distress sitting up in wheelchair with needs met  []  Patient left in no apparent distress in bed  [x]  Call bell left within reach  [x]  Nursing notified  []  Caregiver present  [x]  Wheelchair alarm activated    COMMUNICATION/EDUCATION:   [] Home safety education was provided and the patient/caregiver indicated understanding. [x] Patient/family have participated as able in goal setting and plan of care. [x] Patient/family agree to work toward stated goals and plan of care. [] Patient understands intent and goals of therapy, but is neutral about his/her participation. [] Patient is unable to participate in goal setting and plan of care.     [address] [facility], OT

## 2022-09-28 NOTE — PROGRESS NOTES
Patient complaining of inability to sleep at night ,informed Nurse Practitioner Luz Marina Reyes order was received for 5 mg Melatonin [email or web address] verified by read back.

## 2022-09-28 NOTE — PROGRESS NOTES
conducted a Follow up consultation and Spiritual Assessment for Phyllis Blanco, who is a 80 y.o.,female. The  provided the following Interventions:  Patient sitting on her wheelchair upon visit. Continued the relationship of care and support. Listened empathically. Offered prayer, holy communion, and assurance of continued prayer on patient's behalf. Chart reviewed. The following outcomes were achieved:  Patient received holy communion. Patient expressed gratitude for 's visit. Assessment:  There are no further spiritual or Denominational issues which require Spiritual Care Services interventions at this time. Plan:  Chaplains will continue to follow and will provide pastoral care on an as needed/requested basis.  recommends bedside caregivers page  on duty if patient shows signs of acute spiritual or emotional distress.      Melba Alexis [phone]

## 2022-09-28 NOTE — INTERDISCIPLINARY ROUNDS
[facility] PHYSICAL REHABILITATION  [address] [place] [address], [address]    INPATIENT REHABILITATION  PRE-TEAM CONFERENCE SUMMARY     Date of Conference: 9/29/2022    Patient Information:        Name: Kristel Wiley Age / Sex: 80 y.o. / female   CSN: [id number] MRN: [id number]   [facility] Date: 9/22/2022 Length of Stay: 6 days     Primary Rehabilitation Diagnosis  1. Impaired Mobility and ADLs  2. Right hip fracture secondary to mechanical fall s/p repair    Comorbidities  Patient Active Problem List   Diagnosis Code    Hypertension I10    Hypercholesterolemia E78.00    Diaphoresis R61    Exertional angina (HCC) I20.8    Bradycardia R00.1    Coronary artery disease involving native coronary artery I25.10    Hip fracture ([address] S72.009A    Encounter for palliative care Z51.5    Advanced age R48    Debility R53.81         Therapy:     FIM SCORES Initial Assessment Weekly Progress Assessment 9/28/2022   Eating Functional Level: 5 (supv/ set-up)  Comments: Self-feeding (supv/ set-up); assistance with opening containers, lids, and small packets due to RUE (wrist/ forearm cast; fifth metacarpal fx) Functional Level: 5 (Mod I/ set-up)  Comments: Self-feeding performed (Mod I/ set-up); therapist assisted with cutting up pork loin. Pt able to open containers and lids on meal tray. Swallowing     Grooming 5 (SBA) 5 (set-up/ supv)   Bathing 3 (UB bathing: SBA; LB bathing: Mod A)  3 (UB: set-up/ supv); LB: Mod A)   Upper Body Dressing Functional Level: 4 (Min A)  Items Applied/Steps Completed: Pullover (4 steps)  Comments: UB dressing performed Min A for doffing hospital gown; SBA/ Min A for donning pullover scrub top. Functional Level: 5 (set-up/ supv)  Items Applied/Steps Completed: Pullover (4 steps)  Comments: UB dressing performed (set-up/ supv) for doffing/ donning pullover scrub top.    Lower Body Dressing Functional Level: 2 (Max A)  Items Applied/Steps Completed: Sock, left (1 step), Sock, right (1 step) (Scrub pants (3 steps))  Comments: LB dressing performed (Max A) for donning scrub pants and bilateral slipper socks. Pt requiring assistance with threading clothing over distal BLE's and for pulling up over hips to waist; edu/ instruction for maintaining hip precautions. Max A for donning left sock; total assist for donning right slipper sock. Hip kit issued to patient during today's treatment session. Functional Level: 3 (Mod A)  Items Applied/Steps Completed:  (scrub pants (3 steps))  Comments:  (LB dressing performed (Mod A) w/c level for doffing/ donning scrub pants; edu/ instructed in use of reacher for threading pant legs over distal BLE's. Pt stood using RW; Min A for pulling pants over hips to waist level.)   Toileting Functional Level: 2 (Max A (change of brief bed level; therapist removed PureWick on arrival to room); pt voided using BSC at bedside (SBA for hygiene; Max A CM))  Comments: Max A (change of brief bed level; therapist removed PureWick on arrival to room); pt voided using BSC at bedside (SBA hygiene; Max A CM) Functional Level: 3 (Mod A)  Comments: SBA for hygiene; Mod A for CM in stance (RW)   Bladder 0 1   Bowel  0 0   Toilet Transfer Rush Toilet Transfer Score: 3 (Stand step xfer (Mod A) RW; EOB > BSC at bedside. Limited due to hip pain.)  Comments: Stand step xfer (Mod A) RW; EOB > BSC at bedside. Limited due to hip pain. Toilet Transfer Score: 4 (Stand step transfer (Min A) RW; gait belt. Elevated seat over toilet.)  Comments: Stand step transfer (Min A) RW; gait belt. Elevated seat over toilet. Tub/Shower Transfer Rush Tub or Shower Type: Tub/Shower combination (Pt reports performing sink bath at home)  Tub/Shower Transfer Score: 0 (Not assessed due to safety concerns and pain level (right hip). )  Comments: Not assessed due to safety concerns and pain level (right hip).     Tub or shower transfer: 4 (Stand step transfer (Min A) RW; gait belt); not formally assessed-based on current functional transfers)   Comprehension Primary Mode of Comprehension: Auditory  Score: 4  Score: 4     Expression Primary Mode of Expression: Verbal  Score: 5  Score: 5   Social Interaction Score: 5  Score: 5   Problem Solving Score: 3  Score: 3   Memory Score: 3  Score: 3     FIM SCORES Initial Assessment Weekly Progress Assessment 9/28/2022   Bed/Chair/Wheelchair Transfers Transfer Type: Other  Other: stand step with RW  Transfer Assistance : 3 (Moderate assistance )  Sit to Stand Assistance: Moderate assistance  Car Transfers: Moderate assistance (stand step transfer to/from transfer . Unable to fully complete transfer managing right LE into car  2/2 inability to maintain hip precautions in .)  Car Type: car transfer  Transfer Type: Other  Other:  (stand step without AD)  Transfer Assistance : 4 (Minimal assistance)  Sit to Stand Assistance: Minimal assistance  Car transfers not addressed this session. Bed Mobility Rolling Right 0 (Not tested)   Rolling Left 4 (Minimal assistance)   Supine to Sit 3 (Moderate assistance)   Sit to Stand Moderate assistance   Sit to Supine 3 (Moderate assistance)    Rolling Right   0 (Not tested)   Rolling Left   4 (Contact guard assistance) (with HRs)   Supine to Sit   4 (Minimal assistance)   Sit to Stand   Minimal assistance   Sit to Supine   3 (Moderate assistance)      Locomotion (W/C) Able to Propel (ft): 100 feet  Functional Level: 2  Curbs/Ramps Assist Required (FIM Score): 0 (Not tested)  Wheelchair Setup Assist Required : 1 (Dependent/total assistance)  Wheelchair Management: Manages left brake, Manages right brake (with supervision & cues) ABLE TO PROPEL (FT): 68 ft  Functional level: 2  Curbs and ramps not tested this session FIM score:0  Wheelchair set up assistance:  2 (moderate assistance)  Wheelchair management: manages left and right brake with VCs.          Locomotion (W/C distance)    68 ft   Locomotion (Walk) 4 (Minimal assistance) 4 (Minimal assistance)  Gait belt;Walker, rolling   Locomotion (Walk dist.) 14 Feet (ft)  28 ft   Steps/Stairs Steps/Stairs Ambulated (#): 0  Level of Assist : 0 (Not tested) (pt requires increased time and rest breaks throughout and maximal encouragement to participate in gait assessment)  Rail Use: None  Steps not challenged this session. Nursing:     Neuro:   AAA&O x  4          Respiratory:   [x] WNL   [] O2 LPM:   Other:  Peripheral Vascular:   [x] TEDS present   [] Edema present ____ Grade   Cardiac:   [] WNL   [] Other  Genitourinary:   [x] continent   [] incontinent   [] elliott  Abdominal _______ LBM  GI: __reg___ Diet __thin_ Liquids _no_ tube feeds  Musculoskeletal: ____ ROM Transfers _____ Assistive Device Used  ____ Level of Assistance  Skin Integumentary:   [x] Intact   [] Not Intact   _mepilex __Preventative Measures  Details______________________________________________________________  Pain: [x] Controlled   [] Not Controlled   Pain Meds:   [] Scheduled   [x] PRN        Interdisciplinary Team Goals:     1. Discipline  Physical Therapy    Goal  Patient will perform rolling to left, supine <> sit with supervision while maintaining THR precautions. Patient will repeat THR precautions with 100% accuracy 3/3 trials throughout a skilled session. Barrier  Cognition, decreased strength, transfers, mobility, safety and balance. Intervention  Patient will participate in skilled sessions to address strength, transfer,ambulation, safety and balance deficits    Goal written by:        2. Discipline  Occupational Therapy    Goal  Patient will perform toileting (Min A) for CM and hygiene. Barrier  Decreased (I) with ADL's, decreased functional strength/ endurance, impaired balance, decreased standing tolerance, decreased safety    Intervention  ADL training, there ex, there act, patient education    Goal written by:  Hayes Guillen OTR/L      3.  Discipline  Speech Therapy    Goal Barrier      Intervention      Goal written by:       4. Discipline  Nursing    Goal  Patient pain will be in a manageable level by letting the nurses know & asking for PRN medication before intensity increases    Barrier  Post operative pain    Intervention Educating patient to inform nurses the level of pain ,administer pain meds according to level of pain & reassess. Goal written by:  Tavo Esparza RN     5. Discipline  Clinical Psychology    Goal  Maintain mood stability and maximum treatment effort    Barrier  Stress with illness and recovery    Intervention  Support  as needed and behavioral redirection    Goal written by:  Man Washburn, PhD         Disposition / Discharge Planning:      Follow-up services:  [x] Physical Therapy             [x] Occupational Therapy       [] Speech Therapy           [] Skilled Nursing      [] Medical Social Worker   [] Aide        [] Outpatient      [] vs   [x] Home Health  [] vs       [] to progress to outpatient       [] with 24-hour supervision       [] with 24-hour assistance   [] Ez DONNELLY recommendations:  RW   Estimated discharge date:  10/5/2022   Discharge Location:  [x] Home  [] versus    [] [facility]    [] [address]   [] Other:           Electronic Signatures:      Signature Date Signed   Physical Therapist    BONNIE Gallardo, PT, DPT  9/28/2022 9/29/2022   Occupational Therapist    Johnathon Ho, OTR/L  9/29/2022   Speech Therapist         Nursing    Tavo Esparza RN  09/28/22   Clinical Psychologist    Man Washburn, PhD  9/29/2022    Physician    Amairani Cox MD    09/28/22         CATIA Acosta 9/28/2022

## 2022-09-28 NOTE — PROGRESS NOTES
Problem: Mobility Impaired (Adult and Pediatric)  Goal: *Acute Goals and Plan of Care (Insert Text)  Description: Physical Therapy Short Term Goals  Initiated 9/23/2022 and to be accomplished within 7 day(s) (9/30/2022)  1. Patient will move from supine to sit and sit to supine , scoot up and down, and roll side to side in bed with supervision/set-up. 2.  Patient will transfer from bed to chair and chair to bed with minimal assistance/contact guard assist using the least restrictive device. 3.  Patient will perform sit to stand with minimal assistance/contact guard assist.  4.  Patient will ambulate with minimal assistance/contact guard assist for 50 feet with the least restrictive device. 5.  Patient will ascend/descend 5 stairs with bilateral handrail(s) with minimal assistance/contact guard assist.    Physical Therapy Long Term Goals  Initiated 9/23/2022 and to be accomplished within 14-21 day(s) (10/07/2022-10/14/2022)  1. Patient will move from supine to sit and sit to supine , scoot up and down, and roll side to side in bed with supervision/set-up. 2.  Patient will transfer from bed to chair and chair to bed with supervision/set-up using the least restrictive device. 3.  Patient will perform sit to stand with supervision/set-up. 4.  Patient will ambulate with supervision/set-up for 150 feet with the least restrictive device. 5.  Patient will ascend/descend 5 stairs with bilateral handrail(s) with supervision/set-up. Outcome: Progressing Towards Goal   PHYSICAL THERAPY TREATMENT    Patient: Xiao Su (25 y.o. female)  Date: 9/28/2022  Diagnosis: Hip fracture ([facility] [S72.009A]   Precautions: Total hip, Fall  Chart, physical therapy assessment, plan of care and goals were reviewed. Time PJ:3115  Time Out:1030    Patient seen for: AM;Balance activities;Gait training;Patient education; Therapeutic exercise;Transfer training; Wheelchair mobility  TIme In: 1530  Time Out: 1600  Patient seen for stair negotiation and strengthening    Pain:  Pt w/o pain complaints prior to session as she reported taking her pain medication a short time ago. Patient identified with name and : Yes    SUBJECTIVE:      Oh honey, I can get off the bed from either side, but I have a broken hip. OBJECTIVE DATA SUMMARY:    Objective:     GROSS ASSESSMENT Daily Assessment     AROM: Generally decreased, functional  Strength: Generally decreased, functional  Coordination: Within functional limits  Tone: Normal  Sensation: Impaired      BED/MAT MOBILITY Daily Assessment     Rolling Right : 0 (Not tested)  Rolling Left : 4 (Contact guard assistance) (with HRs)  Supine to Sit : 4 (Minimal assistance)  Sit to Supine : 3 (Moderate assistance)      TRANSFERS Daily Assessment     Transfer Type: Other  Other:  (stand step without AD)  Transfer Assistance : 4 (Minimal assistance)  Sit to Stand Assistance: Minimal assistance        GAIT Daily Assessment    Gait Description (WDL) Exceptions to WDL    Gait Abnormalities Antalgic;Decreased step clearance; Step to gait    Assistive device Gait belt;Walker, rolling    Ambulation assistance - level surface 4 (Minimal assistance)    Distance  28 ft  PM-32 ft    Ambulation assistance- uneven surface  NT    Comments When patient fatigues, she tends to ER her right hip and she requires cues to correct so that the foot does not hit the RW. STEPS/STAIRS Daily Assessment     Steps/Stairs ambulated  3    Assistance Required Minimal assistance    Rail Use  both    Comments  Patient with slight hesitation and anxiety as she attempts the steps. She was verbally cued and reassured that the therapist was there to assist her and that she was safe. Therapist gave tactile and verbal cues for hand and foot placement on steps for both ascending and descending.       Curbs/Ramps NT         BALANCE Daily Assessment     Sitting - Static: Good (unsupported)  Sitting - Dynamic: Fair (occasional)  Standing - Static: Fair  Standing - Dynamic : Impaired        WHEELCHAIR MOBILITY Daily Assessment     Functional Level:  (2)        THERAPEUTIC EXERCISES Daily Assessment       Seated-BLEs; LAQs, TR/HR x 15 reps each; 1 set            ASSESSMENT:  Patient Is seen for deficits in strength, mobility, transfers, ambulation, safety and balance. Patient is agreeable to participation in skilled session and she is making gains towards STGs. She does fatigue easily and she requires VCs during transfers for safety. Patient did not recall her THR protocol restrictions after session and she was reminded of them throughout this am session. Left patient with handout with restrictions and therapist went over them with patient at the end of session. Will retest accuracy next visit. Patient was told to have a pillow between her legs when sleeping and at rest to maintain abduction. Continue to address deficits for improved functional independence. PM session-patient negotiated 3-4 inch steps with BHRS and minimal assistance. She required re-assurance to safely negotiate steps, however she was able to complete the activity successfully and she reported feeling better able trying the steps. Patient is again asked what her precautions are and she can not recall them. She is able to repeat after the therapist what the precautions are and to maintain them for improved healing of the right hip. Progression toward goals:  []      Improving appropriately and progressing toward goals  [x]      Improving slowly and progressing toward goals  []      Not making progress toward goals and plan of care will be adjusted      PLAN:  Patient continues to benefit from skilled intervention to address the above impairments. Continue treatment per established plan of care.   Discharge Recommendations:  Home Physical Therapy  Further Equipment Recommendations for Discharge:  rolling walker and N/A      Estimated Discharge Date: 10/5/22      Activity Tolerance: Tolerates session fair, patient fatigues easily and requires rest to recuperate. Please refer to the flowsheet for vital signs taken during this treatment.     After treatment:   [] Patient left in no apparent distress in bed  [x] Patient left in no apparent distress sitting up in chair  [] Patient left in no apparent distress in w/c mobilizing under own power  [] Patient left in no apparent distress dining area  [] Patient left in no apparent distress mobilizing under own power  [x] Call bell left within reach  [x] Nursing notified  [] Caregiver present  [] Bed alarm activated   [x] Chair alarm activated      Rand Jaime  9/28/2022

## 2022-09-28 NOTE — PROGRESS NOTES
Wound Prevention Checklist    Patient: Jennifer Owens (66 y.o. female)  Date: 9/28/2022  Diagnosis: Hip fracture ([address] Obdulio Flank <principal problem not specified>    Precautions:  Total hip, Fall       [x]  Heel off bed    [x]  Patient turned q2h during shift    [x]  Silicone dressing on sacral area    []  Patient on [facility]yman bed/Specialty bed    []  Each Wound is documented during shift (Stage, Color, drainage, odor, measurements, and dressings)    [x]  Dual skin check done with RALF Fuller RN

## 2022-09-28 NOTE — INTERDISCIPLINARY ROUNDS
[address]  [address] [place] [address], [address]     INPATIENT REHABILITATION  DISCHARGE RECOMMENDATION SHEET    Date: 9/28/2022     Name: Janak Osborn Age / Sex: 80 y.o. / female   CSN: [id number] MRN: [id number]   [facility] Date: 9/22/2022 Discharge Date: 10/5/2022        Critical access hospital WALKING AIDS FOLLOW-UP SERVICES      Height  []  Straight cane  [] DMV Handicap Placard    []  #14 ½ primo height  []  Forearm crutches OUTPATIENT    []  Primo height  []  Axillary crutches  []  PT    []  Standard height  []  LBQC  []  OT    []  Reclining high back  []  SBQC  []  ST       Weight  HOME HEALTH    []  Standard weight WALKERS  [x]  PT    []  Lightweight  []  Standard Adult Rolling Walker  [x]  OT    []  High-strength lightweight  [x]  Small Adult Rolling Walker  []  ST    []  Heavy Duty   []  Tall Adult Rolling Walker  []  Nursing    []  Patient is unable to self-propel a standard weight wheelchair  []  Bariatric Adult Rolling Walker  [x]  Wound care  Location of wound: right hip  Specify needs: site check      []  Anticoagulation management       Width   []  Diabetes management    []  Narrow (16)   []  Insulin management    []  Standard (18) ACCESSORIES  []  No insulin management    []  Wide (20)  []  Rear sure glide brakes  []  BP management    []  Other  []  10 rear wheel brake  []  CHF Telehealth       Arms  []  Tall leg extensions  []  Post-CABG care/monitoring    []  Standard  []  Other:  []  Colostomy care    []  Desk/Tray   []  Tube feeding    []  Removable BATHROOM EQUIPMENT  []  PICC line care      Foot/Leg Rests  [x]  Bedside commode  []  Talley catheter care    []  Removable  []  Extra wide bedside commode  []  Other:     []  Elevating  []  3:1 commode WITH drop arms  []  Medical Social Worker      Other  [x]  Tub transfer bench  []  Aide    []  Brake extensions  []  Shower chair     []  Antitippers      []  Right Arm Tray       []  Left Arm Tray                CUSHIONS OTHER     []  Foam cushion      []  Gel cushion []  Transfer board - Size:     []  Lulu Tryon II  []  Oxygen     []  Roho  []  CPM     []  Other   []  Melania Coleman      []  [address]      []  Hospital bed      []  Special mattress      []  Trapeze bar         Physical Therapy BONNIE Contreras   Occupational Therapy Bryn Alonzo, OTR/L   Speech-Language Therapy    Social Work Char Dunn, MSW   Nursing Shanice Worley RN

## 2022-09-29 LAB
GLUCOSE BLD STRIP.AUTO-MCNC: 102 MG/DL (ref 70–110)
GLUCOSE BLD STRIP.AUTO-MCNC: 110 MG/DL (ref 70–110)
GLUCOSE BLD STRIP.AUTO-MCNC: 123 MG/DL (ref 70–110)
GLUCOSE BLD STRIP.AUTO-MCNC: 133 MG/DL (ref 70–110)

## 2022-09-29 PROCEDURE — 82962 GLUCOSE BLOOD TEST: CPT

## 2022-09-29 PROCEDURE — 97110 THERAPEUTIC EXERCISES: CPT

## 2022-09-29 PROCEDURE — 97530 THERAPEUTIC ACTIVITIES: CPT

## 2022-09-29 PROCEDURE — 65310000000 HC RM PRIVATE REHAB

## 2022-09-29 PROCEDURE — 97116 GAIT TRAINING THERAPY: CPT

## 2022-09-29 PROCEDURE — 97535 SELF CARE MNGMENT TRAINING: CPT

## 2022-09-29 PROCEDURE — 74011250637 HC RX REV CODE- 250/637: Performed by: FAMILY MEDICINE

## 2022-09-29 PROCEDURE — 74011250637 HC RX REV CODE- 250/637: Performed by: HOSPITALIST

## 2022-09-29 PROCEDURE — 99232 SBSQ HOSP IP/OBS MODERATE 35: CPT | Performed by: EMERGENCY MEDICINE

## 2022-09-29 RX ADMIN — METOPROLOL SUCCINATE 25 MG: 25 TABLET, FILM COATED, EXTENDED RELEASE ORAL at 08:06

## 2022-09-29 RX ADMIN — MAGNESIUM HYDROXIDE 30 ML: 400 SUSPENSION ORAL at 13:35

## 2022-09-29 RX ADMIN — SENNOSIDES AND DOCUSATE SODIUM 1 TABLET: 50; 8.6 TABLET ORAL at 08:06

## 2022-09-29 RX ADMIN — ASPIRIN 81 MG CHEWABLE TABLET 81 MG: 81 TABLET CHEWABLE at 08:06

## 2022-09-29 RX ADMIN — POLYETHYLENE GLYCOL 3350 17 G: 17 POWDER, FOR SOLUTION ORAL at 08:06

## 2022-09-29 RX ADMIN — ISOSORBIDE MONONITRATE 60 MG: 60 TABLET, EXTENDED RELEASE ORAL at 08:06

## 2022-09-29 RX ADMIN — ASPIRIN 81 MG CHEWABLE TABLET 81 MG: 81 TABLET CHEWABLE at 18:02

## 2022-09-29 RX ADMIN — ATORVASTATIN CALCIUM 40 MG: 40 TABLET, FILM COATED ORAL at 21:21

## 2022-09-29 RX ADMIN — AMLODIPINE BESYLATE 10 MG: 10 TABLET ORAL at 08:08

## 2022-09-29 RX ADMIN — FERROUS SULFATE TAB 325 MG (65 MG ELEMENTAL FE) 325 MG: 325 (65 FE) TAB at 08:06

## 2022-09-29 RX ADMIN — LEVOTHYROXINE SODIUM 75 MCG: 0.05 TABLET ORAL at 06:07

## 2022-09-29 NOTE — PROGRESS NOTES
Wound Prevention Checklist    Patient: Silva Shoulders (1 y.o. female)  Date: 9/29/2022  Diagnosis: Hip fracture ([facility] Rocky Retana <principal problem not specified>    Precautions:  Total hip, Fall       [x]  Heel off bed    []  Patient turned q2h during shift    [x]  Silicone dressing on sacral area    []  Patient on Peyman bed/Specialty bed    [x]  Each Wound is documented during shift (Stage, Color, drainage, odor, measurements, and dressings)    [x]  Dual skin check done with Bart Toribio RN

## 2022-09-29 NOTE — PROGRESS NOTES
Problem: Self Care Deficits Care Plan (Adult)  Goal: *Acute Goals and Plan of Care (Insert Text)  Description: Occupational Therapy Goals   Long Term Goals  Initiated (2022) and to be accomplished within 3 week(s), by (10/14/2022)  1. Pt will perform self-feeding with Mod I.  2. Pt will perform grooming with Mod I.  3. Pt will perform UB bathing with set-up. 4. Pt will perform LB bathing with supv using AE and/ or compensatory strategies while maintaining hip precautions. 5. Pt will perform tub/shower transfer with SBA using LRAD. 6. Pt will perform UB dressing with Mod I.  7. Pt will perform LB dressing with SBA using AE and/ or compensatory strategies while maintaining hip precautions. 8. Pt will perform toileting task with supv/ SBA. 9. Pt will perform toilet transfer with supv using LRAD. Short Term Goals   Initiated (2022) and to be accomplished within 7 day(s), by (2022)  1. Pt will perform self-feeding with set-up/ supv. 2. Pt will perform grooming with supv. 3. Pt will perform UB bathing with supv. 4. Pt will perform LB bathing with Min A using AE and/ or compensatory strategies while maintaining hip precautions. 5. Pt will perform tub/shower transfer with Min A using LRAD. 6. Pt will perform UB dressing with SBA. 7. Pt will perform LB dressing with Mod A using AE and/ or compensatory strategies while maintaining hip precautions. 8. Pt will perform toileting task with Mod A.  9. Pt will perform toilet transfer with Min A using LRAD. Outcome: Progressing Towards Goal  Goal: Interventions  Outcome: Progressing Towards Goal   Occupational Therapy TREATMENT    Patient: Betito Melendez   80 y.o. 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 Pain:  Pt reports 0/10 pain or discomfort prior to treatment. Pt reports 0/10 pain or discomfort post treatment. Intervention Provided: No complaints of pain at onset, during, or at conclusion of skilled occupational therapy session. SUBJECTIVE:   Patient stated I will try.  Pt's response to addressing toileting self-care needs. OBJECTIVE DATA SUMMARY:     THERAPEUTIC ACTIVITY Daily Assessment    First tx session: RUE/ LUE there act performed using tabletop shoulder arch for reaching across midline and passing plastic ring across arch to other side; x27 trials each hand. Increased task challenge with use of 1 lb wrist weight on right side; 2 lb on left. Verbal cues for slow and controlled movement patterns. Performed for ROM, functional reach/ grasp, and reaching out-front/ overhead and across midline for increased functional (I) with self-cares. Intermittent rest breaks due to fatigue. Pt participated in [facility] tabletop there act performed in stance during card matching activity; x50 cards onto correct position on board. Pt stood 4 mins 46 sec with SBA and one upper extremity support on tabletop; followed by seated rest break due to fatigue. Remainder of task performed seated 2/2 fatigue and decreased standing tolerance. Performed for FM skills (pincer grasp; in-hand manipulation) and standing tolerance/ balance for improved (I) with ADL's. THERAPEUTIC EXERCISE Daily Assessment    First tx session: RUE/ LUE tabletop there act performed for reaching out-front and overhead to insert large pegs onto vertical foam pegboard; increased task challenge with use of 1 lb wrist weight on right; 2 lb wrist weight on left side.  30 pegs x1 each hand (60 total) for insertion; followed by removal. Performed for [facility] (tip pinch; in-hand manipulation), functional reaching, and ROM/ strengthening for increased (I) with self-cares. Verbal cues for alternating upper extremities. Verbal cues for peg color selection and for placement location onto vertical foam pegboard surface. Increased time. Intermittent rest breaks due to fatigue. Pt participated in balloon tapping activity with therapist seated w/c level; multiple repetitions. Increased task challenge with wrist weights (1 lb right side; 2 lb left). Performed for ROM, strengthening, and activity tolerance for carryover to self-cares. Second tx session: LIDAE/ RASHID there ex performed seated w/c level (1 lb hand weight right hand; 2 lb left hand); pt performed 10 reps x1 for shoulder flexion to ~90 degrees, shoulder abduction/ adduction to 90 degrees, chest press across midline, overhead press, and elbow flexion/ extension. Verbal cues with demonstration for technique. Rest breaks between sets due to fatigue and low endurance. Performed for ROM and strengthening for increased functional (I) with ADL's and mobility using LRAD. Pt participated in beach ball toss/ catching activity with therapist (w/c level); performed for ROM, eye-hand coordination, and activity tolerance for increased (I) with ADL's and mobility using LRAD. Good participation. GROOMING Daily Assessment     Hand hygiene (set-up) using foam hand  following toileting self-care. TOILETING Daily Assessment    Functional Level: 3 (SBA/ Min A for hygiene; Mod A for pants/ brief (up/ down))  Comments: SBA/ Min A for hygiene; Mod A for pants/ brief (up/ down) (use of elevated seat over toilet in bathroom)     LOWER BODY DRESSING Daily Assessment    Functional Level: 3 (Min/ Mod A)  Items Applied/Steps Completed: Sock, left (1 step); Sock, right (1 step) (Scrub pants (3 steps))  Comments: Min/ Mod A for donning scrub pants; edu/ instructed in use of AE (performed seated on elevated seat over toilet). Pt doffed/ donned slipper socks seated w/c level (Min A) with Mod verbal cues and demonstration/ edu & instructed for use of AE (dressing stick; sock aid). Sock and/or Shoe management: 4 (Min A)      MOBILITY/TRANSFERS Daily Assessment    Toilet Transfer Score: 4 (Stand step xfer (CGA) RW; gait belt)  Comments: Stand step xfer (CGA) RW; gait belt       ASSESSMENT:  Patient will benefit from continued skilled occupational therapy interventions to address decreased (I) with ADL's, decreased functional strength/ endurance, decreased activity tolerance, impaired balance/ coordination, decreased AROM, right wrist/ forearm soft cast 2/2 fifth metacarpal fx, right total hip/ WBAT, hip precautions, impaired functional transfers/ mobility, pain, impaired cognition, and safety awareness impeding pt's functional (I) with ADL's and mobility for return to PLOF. Intermittent rest breaks as patient fatigues easily. Progression toward goals:  []          Improving appropriately and progressing toward goals  [x]          Improving slowly and progressing toward goals  []          Not making progress toward goals and plan of care will be adjusted     PLAN:  Patient continues to benefit from skilled intervention to address the above impairments. Continue treatment per established plan of care. Discharge Recommendations:  Home Health with family assist  Further Equipment Recommendations for Discharge:  bedside commode, gait belt, and tub transfer bench      Activity Tolerance:  Fair; intermittent rest breaks due to fatigue and low endurance. Estimated LOS: 10/5/2022    Please refer to the flowsheet for vital signs taken during this treatment.   After treatment:   [x]  Patient left in no apparent distress sitting up in wheelchair with needs met  []  Patient left in no apparent distress in bed  [x]  Call bell left within reach  [x]  Nursing notified  []  Caregiver present  [x]  Wheelchair alarm activated    COMMUNICATION/EDUCATION:   [] Home safety education was provided and the patient/caregiver indicated understanding. [x] Patient/family have participated as able in goal setting and plan of care. [x] Patient/family agree to work toward stated goals and plan of care. [] Patient understands intent and goals of therapy, but is neutral about his/her participation. [] Patient is unable to participate in goal setting and plan of care.     [id number] [facility], OT

## 2022-09-29 NOTE — PROGRESS NOTES
Problem: Mobility Impaired (Adult and Pediatric)  Goal: *Acute Goals and Plan of Care (Insert Text)  Description: Physical Therapy Short Term Goals  Initiated 9/23/2022 and to be accomplished within 7 day(s) (9/30/2022)  1. Patient will move from supine to sit and sit to supine , scoot up and down, and roll side to side in bed with supervision/set-up. 2.  Patient will transfer from bed to chair and chair to bed with minimal assistance/contact guard assist using the least restrictive device. 3.  Patient will perform sit to stand with minimal assistance/contact guard assist.  4.  Patient will ambulate with minimal assistance/contact guard assist for 50 feet with the least restrictive device. 5.  Patient will ascend/descend 5 stairs with bilateral handrail(s) with minimal assistance/contact guard assist.    Physical Therapy Long Term Goals  Initiated 9/23/2022 and to be accomplished within 14-21 day(s) (10/07/2022-10/14/2022)  1. Patient will move from supine to sit and sit to supine , scoot up and down, and roll side to side in bed with supervision/set-up. 2.  Patient will transfer from bed to chair and chair to bed with supervision/set-up using the least restrictive device. 3.  Patient will perform sit to stand with supervision/set-up. 4.  Patient will ambulate with supervision/set-up for 150 feet with the least restrictive device. 5.  Patient will ascend/descend 5 stairs with bilateral handrail(s) with supervision/set-up. Outcome: Progressing Towards Goal   PHYSICAL THERAPY TREATMENT    Patient: Jennifer Owens (3 y.o. female)  Date: 9/29/2022  [facility]osis: [facility] [S72.009A]   Precautions: Total hip, Fall  Chart, physical therapy assessment, plan of care and goals were reviewed. Time In:1200  Time Out:1230    Patient seen for: AM;Balance activities; Patient education;Transfer training  Time In: 1500  Time Out: 1600  Patient seen for: PM; gait, transfer, balance, safety training and patient education. Pain:  Pt with no c/o pain, however she grimaces and moans with movement  Patient reports a little pain this PM session and she has difficulty quantifying the amount. Patient does report having to take pain medication x 2 today. Patient identified with name and :yes    SUBJECTIVE:      AM-I am feeling better today. PM-patient is asleep in her wheelchair upon therapist's arrival. Patient reports increased fatigue and weakness. OBJECTIVE DATA SUMMARY:    Objective:     GROSS ASSESSMENT Daily Assessment     AROM: Generally decreased, functional  Strength: Generally decreased, functional  Coordination: Within functional limits  Sensation: Impaired      BED/MAT MOBILITY Daily Assessment     Rolling Right : 0 (Not tested)  Rolling Left : 4 (Contact guard assistance)  Supine to Sit : 4 (Minimal assistance)  Sit to Supine : 4 (Minimal assistance) PM session-sit to supine with SBA, rolling left with SBA and use of HRs. Patient with improved strength as indicated by her ability to lift RLE into the bed without SBA. Patient is positioned with pillow between LEs and the bed alarm is on with visual monitor placed on patient in bed. TRANSFERS Daily Assessment     Transfer Type: Other  Transfer Assistance : 4 (Minimal assistance)  Sit to Stand Assistance: Contact guard assistance        GAIT Daily Assessment    Gait Description (WDL) Exceptions to WDL    Gait Abnormalities Antalgic;Decreased step clearance; Step to gait    Assistive device Gait belt;Walker, rolling    Ambulation assistance - level surface 4 (Minimal assistance)    Distance 28 Feet (ft) 54 ft 2nd trial this am session  PM-25 ft x 2 Vlad/CGA    Ambulation assistance- uneven surface NT     Comments Patient with increased distance this am session, she fatigues easily and requires cues to communicate with therapist regarding her tolerance.  Patient became anxious after 2nd trial of ambulation and when asked if she was doing ok, she reported that she went too far. PM-patient was challenged with short distances this session to focus on getting her to/from the bathroom in her living space upon discharge home. STEPS/STAIRS Daily Assessment     Steps/Stairs ambulated  (4-6 inch steps)    Assistance Required 4 (Minimal assistance)    Rail Use Both    Comments  Patient with increased assistance 2/2 higher step this session to challenge an average standard height. Curbs/Ramps NT        BALANCE Daily Assessment     Sitting - Static: Good (unsupported)  Sitting - Dynamic: Fair (occasional)  Standing - Static: Fair  Standing - Dynamic : Impaired        WHEELCHAIR MOBILITY Daily Assessment              THERAPEUTIC EXERCISES Daily Assessment       PM session-Seated: LLE; AP, LAQs, hip abduction with red t-band x 15 reps each; 2 sets. Patient required additional rest break between sets and on RLE; LAQs, AP, 5 reps x 3 sets 2/2 increased fatigue with LAQs. Neuro Re-Education:  Patient is challenged with static standing balance at the RW to assist with standing ADLs. She was able to tolerate 3 minutes of standing without rest and when she began to fatigue, she had difficulty communicating to the therapist that she was tired and she needed therapist to cue her x 2 before agreeing to a rest break. ASSESSMENT:  Patient is seen for deficits in strength, mobility, transfers, safety and balance. Patient  is agreeable to both sessions, however she has improved performance in the am 2/2 increased fatigue during the afternoon sessions and with increased rest time to recuperate. Patient has difficulty communication that she needs rest and she was educated this session extensively that her communication is importance for her progress and for her safety. Patient reports that she will communicate when she is fatigued. Patient was able to recall 1/3 precautions x 2 trials this AM session and 3/3 this PM session x 2 trials.  Continue to address deficits for improved functional independence. Progression toward goals:  []      Improving appropriately and progressing toward goals  [x]      Improving slowly and progressing toward goals  []      Not making progress toward goals and plan of care will be adjusted      PLAN:  Patient continues to benefit from skilled intervention to address the above impairments. Continue treatment per established plan of care. Discharge Recommendations:  Home Physical Therapy  Further Equipment Recommendations for Discharge:  rolling walker and N/A      Estimated Discharge Date:10/5/22      Activity Tolerance:   Patient is seen this am and tolerates session fairly. Patient fatigues easily, however she is advanced age and does not report being overly active in the past few years. Please refer to the flowsheet for vital signs taken during this treatment.     After treatment:   [] Patient left in no apparent distress in bed  [x] Patient left in no apparent distress sitting up in chair  [] Patient left in no apparent distress in w/c mobilizing under own power  [] Patient left in no apparent distress dining area  [] Patient left in no apparent distress mobilizing under own power  [x] Call bell left within reach  [x] Nursing notified  [] Caregiver present  [] Bed alarm activated   [x] Chair alarm activated      [facility] [employer]  9/29/2022

## 2022-09-29 NOTE — PROGRESS NOTES
SHIFT CHANGE NOTE FOR [facility]    Bedside and Verbal shift change report given to Dian Tomas RN (oncoming nurse) by Aaron Houser RN (offgoing nurse). Report included the following information SBAR, Kardex, MAR and Recent Results. Situation:   Code Status: DNR   Hospital Day: 7   Problem List:   Hospital Problems  Date Reviewed: 5/31/2022            Codes Class Noted POA    Hip fracture [facility]) ICD-10-CM: C38.568F  ICD-9-CM: 820.8  9/18/2022 Unknown       Background:   Past Medical History:   Past Medical History:   Diagnosis Date    CAD (coronary artery disease) 2008    Fibromyalgia     Heart failure ([facility]     Pt. states sge gas 5 heart blocks. Hypercholesterolemia     Hypertension     Menopause     Thyroid disease         Assessment:  Changes in Assessment throughout shift: No change to previous assessment    Patient has a central line: no Reasons if yes: Insertion date: Last dressing date:  Patient has Elliott Cath: no Reasons if yes:     Insertion date:  Shift elliott care completed:     Last Vitals:     Vitals:    09/28/22 1603 09/28/22 2042 09/29/22 0736 09/29/22 1615   BP: (!) 146/57 (!) 134/56 (!) 145/91 (!) 128/59   Pulse: 63 61 60 70   Resp: 18 18 17 18   Temp: 98 °F (36.7 °C) 98.2 °F (36.8 °C) 98.2 °F (36.8 °C) 98.9 °F (37.2 °C)   SpO2: 99% 95% 97% 96%   Weight:       Height:           PAIN    Pain Assessment    Pain Intensity 1: 2 (09/29/22 1630) Pain Intensity 1: 2 (12/29/14 1105)    Pain Location 1: Hip Pain Location 1: Abdomen    Pain Intervention(s) 1: Repositioned Pain Intervention(s) 1: Medication (see MAR)  Patient Stated Pain Goal: 0 Patient Stated Pain Goal: 0  Intervention effective: yes  Other actions taken for pain: Repositioned    Skin Assessment  Skin color Skin Color: Appropriate for ethnicity  Condition/Temperature    Integrity Skin Integrity: Incision (comment)  Turgor    Weekly Pressure Ulcer Documentation  Pressure  Injury Documentation: No Pressure Injury Noted-Pressure Ulcer Prevention Initiated  Wound Prevention & Protection Methods  Orientation of wound Orientation of Wound Prevention: Posterior  Location of Prevention Location of Wound Prevention: Buttocks, Heel, Sacrum/Coccyx  Dressing Present Dressing Present : Yes  Dressing Status Dressing Status: Intact  Wound Offloading Wound Offloading (Prevention Methods): Bed, pressure reduction mattress, Elevate heels, Wheelchair, Pillows, Repositioning    INTAKE/OUPUT  Date 09/28/22 1900 - 09/29/22 0659 09/29/22 0700 - 09/30/22 0659   Shift 1686-9803 24 Hour Total 1612-9089 7709-2631 24 Hour Total   INTAKE   P.O.  980 780  780     P. O.  980 780  780   Shift Total(mL/kg)  980(15.7) 780(12.5)  780(12.5)   OUTPUT   Urine(mL/kg/hr)          Urine Occurrence(s) 4 x 10 x 6 x  6 x   Stool          Stool Occurrence(s) 0 x 0 x 0 x  0 x   Shift Total(mL/kg)        NET  980 780  780   Weight (kg) 62.4 62.4 62.4 62.4 62.4         Recommendations:  Patient needs and requests: none at this time    Pending tests/procedures: none at this time     Functional Level/Equipment: Partial (one person) / Wheelchair    Fall Precautions:   Fall risk precautions were reinforced with the patient; she was instructed to call for help prior to getting up. The following fall risk precautions were continued: bed/ chair alarms, door signage, yellow bracelet and socks as well as update of the [employer] tool in the patient's room. Alma Rosa Score: 5    HEALS Safety Check    A safety check occurred in the patient's room between off going nurse and oncoming nurse listed above. The safety check included the below items  Area Items   H  High Alert Medications Verify all high alert medication drips (heparin, PCA, etc.)   E  Equipment Suction is set up for ALL patients (with yanker)  Red plugs utilized for all equipment (IV pumps, etc.)  WOWs wiped down at end of shift.   Room stocked with oxygen, suction, and other unit-specific supplies   A  Alarms Bed alarm is set for fall risk patients  Ensure chair alarm is in place and activated if patient is up in a chair   L  Lines Check IV for any infiltration  Talley bag is empty if patient has a Talley   Tubing and IV bags are labeled   S  Safety   Room is clean, patient is clean, and equipment is clean. Hallways are clear from equipment besides carts. Fall bracelet on for fall risk patients  Ensure room is clear and free of clutter  Suction is set up for ALL patients (with geneva)  Hallways are clear from equipment besides carts.    Isolation precautions followed, supplies available outside room, sign posted     Greg Russell RN

## 2022-09-29 NOTE — PROGRESS NOTES
SHIFT CHANGE NOTE FOR [facility]    Bedside and Verbal shift change report given to Giuseppe Bowden RN (oncoming nurse) by Greg Russell RN (offgoing nurse). Report included the following information SBAR, Kardex, MAR and Recent Results. Situation:   Code Status: DNR   Hospital Day: 6   Problem List:   Hospital Problems  Date Reviewed: 5/31/2022            Codes Class Noted POA    Hip fracture [facility]) ICD-10-CM: U58.190S  ICD-9-CM: 820.8  9/18/2022 Unknown       Background:   Past Medical History:   Past Medical History:   Diagnosis Date    CAD (coronary artery disease) 2008    Fibromyalgia     Heart failure ([facility]     Pt. states sge gas 5 heart blocks. Hypercholesterolemia     Hypertension     Menopause     Thyroid disease         Assessment:  Changes in Assessment throughout shift: No change to previous assessment    Patient has a central line: no Reasons if yes: Insertion date: Last dressing date:  Patient has Elliott Cath: no Reasons if yes:     Insertion date:  Shift elliott care completed:     Last Vitals:     Vitals:    09/27/22 1600 09/27/22 1955 09/28/22 0720 09/28/22 1603   BP: (!) 144/61 (!) 143/60 (!) 149/58 (!) 146/57   Pulse: 64 60 61 63   Resp: 18 18 18 18   Temp: 98 °F (36.7 °C) 98.7 °F (37.1 °C) 97.5 °F (36.4 °C) 98 °F (36.7 °C)   SpO2: 96% 95% 99% 99%   Weight:       Height:           PAIN    Pain Assessment    Pain Intensity 1: 9 (09/28/22 1906) Pain Intensity 1: 2 (12/29/14 1105)    Pain Location 1: Hip Pain Location 1: Abdomen    Pain Intervention(s) 1: Medication (see MAR) Pain Intervention(s) 1: Medication (see MAR)  Patient Stated Pain Goal: 0 Patient Stated Pain Goal: 0  Intervention effective: yes  Other actions taken for pain: Medication (see MAR)    Skin Assessment  Skin color Skin Color: Appropriate for ethnicity  Condition/Temperature    Integrity Skin Integrity: Incision (comment), Wound (add Wound LDA)  Turgor    Weekly Pressure Ulcer Documentation  Pressure  Injury Documentation: No Pressure Injury Noted-Pressure Ulcer Prevention Initiated  Wound Prevention & Protection Methods  Orientation of wound Orientation of Wound Prevention: Posterior  Location of Prevention Location of Wound Prevention: Sacrum/Coccyx, Heel  Dressing Present Dressing Present : Yes  Dressing Status Dressing Status: Intact  Wound Offloading Wound Offloading (Prevention Methods): Bed, pressure reduction mattress, Bed, pressure redistribution/air, Elevate heels, Pillows, Repositioning, Walker, Foam silicone    INTAKE/OUPUT  Date 09/27/22 1900 - 09/28/22 0659 09/28/22 0700 - 09/29/22 0659   Shift 2297-1251 24 Hour Total 7490-3017 9732-0061 24 Hour Total   INTAKE   P.O. 480 1800 980  980     P. O. 480 1800 980  980   Shift Total(mL/kg) 480(7.7) 1800(28.8) 980(15.7)  980(15.7)   OUTPUT   Urine(mL/kg/hr) 500 1150        Urine Voided 500 1150        Urine Occurrence(s) 4 x 9 x 6 x  6 x   Stool          Stool Occurrence(s) 0 x 0 x 0 x  0 x   Shift Total(mL/kg) 500(8) 1150(18.4)      NET -20 650 980  980   Weight (kg) 62.4 62.4 62.4 62.4 62.4         Recommendations:  Patient needs and requests: none at this time    Pending tests/procedures: none at this time     Functional Level/Equipment: Partial (one person) /      Fall Precautions:   Fall risk precautions were reinforced with the patient; she was instructed to call for help prior to getting up. The following fall risk precautions were continued: bed/ chair alarms, door signage, yellow bracelet and socks as well as update of the Sherill Espinoza tool in the patient's room. Alma Rosa Score: 5    HEALS Safety Check    A safety check occurred in the patient's room between off going nurse and oncoming nurse listed above.     The safety check included the below items  Area Items   H  High Alert Medications Verify all high alert medication drips (heparin, PCA, etc.)   E  Equipment Suction is set up for ALL patients (with yanker)  Red plugs utilized for all equipment (IV pumps, etc.)  WOWs wiped down at end of shift. Room stocked with oxygen, suction, and other unit-specific supplies   A  Alarms Bed alarm is set for fall risk patients  Ensure chair alarm is in place and activated if patient is up in a chair   L  Lines Check IV for any infiltration  Talley bag is empty if patient has a Talley   Tubing and IV bags are labeled   S  Safety   Room is clean, patient is clean, and equipment is clean. Hallways are clear from equipment besides carts. Fall bracelet on for fall risk patients  Ensure room is clear and free of clutter  Suction is set up for ALL patients (with geneva)  Hallways are clear from equipment besides carts.    Isolation precautions followed, supplies available outside room, sign posted     Viktoria Tidwell RN

## 2022-09-29 NOTE — PROGRESS NOTES
Progress Note    Patient: Elio Dang Age: 80 y.o. : 1924 MR#: [id number] SSN: [id number]  Date/Time: 2022     Subjective:    Patient is sitting in bed in no apparent distress, awake, follows simple commands. Denies any distress    Assessment/Plan:   Impaired Mobility and ADLs  Right hip fracture secondary to mechanical fall s/p repair  Anemia, multifactorial  Vitamin B12 deficiency  Iron deficiency  Oblqiue fracture of fifth metacarpal  HTN  CAD  History of hypothyroidism  Incidental right ureteric stone causing right ureteral hydronephrosis    MEDICAL PLAN:  Continue B12 and iron supplement  Monitor blood pressure. On amlodipine and metoprolol   On aspirin and statin  Monitor sugars, sliding scale insulin  On Synthroid  Bowel regimen  PT and OT    Discussed with patient.   Discussed with     Case discussed with:  [x]Patient  []Family  [x]Nursing  []Case Management  DVT Prophylaxis:  []Lovenox  []Hep SQ  [x]SCDs  []Coumadin   []Eliquis/Xarelto     Objective:   VS: Visit Vitals  BP (!) 128/59 (BP 1 Location: Right upper arm, BP Patient Position: Supine)   Pulse 70   Temp 98.9 °F (37.2 °C)   Resp 18   Ht 5' 2\" (1.575 m)   Wt 62.4 kg (137 lb 9.6 oz)   SpO2 96%   Breastfeeding Unknown   BMI 25.17 kg/m²      Tmax/24hrs: Temp (24hrs), Av.4 °F (36.9 °C), Min:98.2 °F (36.8 °C), Max:98.9 °F (37.2 °C)  IOBRIEF  Intake/Output Summary (Last 24 hours) at 2022 1835  Last data filed at 2022 1328  Gross per 24 hour   Intake 540 ml   Output --   Net 540 ml       General:  Awake, alert  Cardiovascular:  S1S2+, RRR  Pulmonary:  CTA b/l  GI:  Soft, BS+, NT, ND  Extremities:  No edema  Right forearm in soft cast    Functional Progress:    PHYSICAL THERAPY    ON ADMISSION MOST RECENT   Wheelchair Mobility/Management  Able to Propel (ft): 100 feet  Functional Level: 2  Curbs/Ramps Assist Required (FIM Score): 0 (Not tested)  Wheelchair Setup Assist Required : 1 (Dependent/total assistance)  Wheelchair Management: Manages left brake, Manages right brake (with supervision & cues) Wheelchair Mobility/Management  Able to Propel (ft): 100 feet  Functional Level:  (2)  Curbs/Ramps Assist Required (FIM Score): 0 (Not tested)  Wheelchair Setup Assist Required : 2 (Maximal assistance)  Wheelchair Management: Manages left brake, Manages right brake     Gait  Amount of Assistance: 4 (Minimal assistance)  Distance (ft): 14 Feet (ft)  Assistive Device: Walker, rolling, Gait belt Gait  Amount of Assistance: 4 (Minimal assistance)  Distance (ft): 28 Feet (ft)  Assistive Device: Gait belt, Walker, rolling     Balance-Sitting/Standing  Sitting - Static: Good (unsupported)  Sitting - Dynamic: Good (unsupported)  Standing - Static: Poor  Standing - Dynamic : Impaired Balance-Sitting/Standing  Sitting - Static: Good (unsupported)  Sitting - Dynamic: Fair (occasional)  Standing - Static: Fair  Standing - Dynamic : Impaired     Bed/Mat Mobility  Rolling Right : 0 (Not tested)  Rolling Left : 4 (Minimal assistance)  Supine to Sit : 3 (Moderate assistance)  Sit to Supine : 3 (Moderate assistance) Bed/Mat Mobility  Rolling Right : 0 (Not tested)  Rolling Left : 4 (Contact guard assistance)  Supine to Sit : 4 (Minimal assistance)  Sit to Supine : 4 (Minimal assistance)     Transfers  Transfer Type: Other  Other: stand step with RW  Transfer Assistance : 3 (Moderate assistance )  Sit to Stand Assistance: Moderate assistance  Car Transfers: Moderate assistance (stand step transfer to/from transfer . Unable to fully complete transfer managing right LE into car  2/2 inability to maintain hip precautions in .)  Car Type: car transfer  Transfers  Transfer Type: Other  Other:  (stand step without AD)  Transfer Assistance : 4 (Minimal assistance)  Sit to Stand Assistance: Contact guard assistance  Car Transfers: Moderate assistance (stand step transfer to/from transfer .   Unable to fully complete transfer managing right LE into car  2/2 inability to maintain hip precautions in .)  Car Type: car transfer      Steps or Stairs  Steps/Stairs Ambulated (#): 0  Level of Assist : 0 (Not tested) (pt requires increased time and rest breaks throughout and maximal encouragement to participate in gait assessment)  Rail Use: None Steps or Stairs  Steps/Stairs Ambulated (#):  (4-6 inch steps)  Level of Assist : 4 (Minimal assistance)  Rail Use: Both       Functional Progress:    OCCUPATIONAL THERAPY    ON ADMISSION MOST RECENT   Eating  Functional Level: 5 (supv/ set-up)   Eating  Functional Level: 5 (Mod I/ set-up)     Grooming  Functional Level: 5 (SBA)   Grooming  Functional Level: 5 (set-up/ supv)     Bathing  Functional Level: 3 (UB bathing: SBA; LB bathing: Mod A)   Bathing  Functional Level: 3 (UB bathing: set-up/ supv; LB bathing: Mod A)     Upper Body Dressing  Functional Level: 4 (Min A)   Upper Body Dressing  Functional Level: 5 (set-up/ supv)     Lower Body Dressing  Functional Level: 2 (Max A)   Lower Body Dressing  Functional Level: 3 (Min/ Mod A)     Toileting  Functional Level: 2 (Max A (change of brief bed level; therapist removed PureWick on arrival to room); pt voided using BSC at bedside (SBA for hygiene; Max A CM))   Toileting  Functional Level: 3 (SBA/ Min A for hygiene; Mod A for pants/ brief (up/ down))     Toilet Transfers  Toilet Transfer Score: 3 (Stand step xfer (Mod A) RW; EOB > BSC at bedside. Limited due to hip pain.)   Toilet Transfers  Toilet Transfer Score: 4 (Stand step xfer (CGA) RW; gait belt)     Tub /Shower Transfers  Tub/Shower Transfer Score: 0 (Not assessed due to safety concerns and pain level (right hip). )   Tub/Shower Transfers  Tub/Shower Transfer Score: 0 (Not assessed due to safety concerns and pain level (right hip). )       Legend:   7 - Independent   6 - Modified Independent   5 - Standby Assistance / Supervision / Set-up   4 - Minimum Assistance / Contact Guard Assistance   3 - Moderate Assistance   2 - Maximum Assistance   1 - Total Assistance / Dependent        Medications:   Current Facility-Administered Medications   Medication Dose Route Frequency    melatonin (rapid dissolve) tablet 5 mg  5 mg Oral QHS PRN    polyethylene glycol (MIRALAX) packet 17 g  17 g Oral DAILY    HYDROcodone-acetaminophen (NORCO) 5-325 mg per tablet 1-2 Tablet  1-2 Tablet Oral Q4H PRN    ferrous sulfate tablet 325 mg  1 Tablet Oral DAILY WITH BREAKFAST    magnesium hydroxide (MILK OF MAGNESIA) 400 mg/5 mL oral suspension 30 mL  30 mL Oral DAILY PRN    bisacodyL (DULCOLAX) tablet 10 mg  10 mg Oral Q48H PRN    bisacodyL (DULCOLAX) suppository 10 mg  10 mg Rectal Q48H PRN    aspirin chewable tablet 81 mg  81 mg Oral BID    senna-docusate (PERICOLACE) 8.6-50 mg per tablet 1 Tablet  1 Tablet Oral BID    atorvastatin (LIPITOR) tablet 40 mg  40 mg Oral QHS    metoprolol succinate (TOPROL-XL) XL tablet 25 mg  25 mg Oral DAILY    amLODIPine (NORVASC) tablet 10 mg  10 mg Oral DAILY    isosorbide mononitrate ER (IMDUR) tablet 60 mg  60 mg Oral DAILY    levothyroxine (SYNTHROID) tablet 75 mcg  75 mcg Oral 6am    nitroglycerin (NITROSTAT) tablet 0.4 mg  0.4 mg SubLINGual PRN    insulin lispro (HUMALOG) injection   SubCUTAneous AC&HS    glucose chewable tablet 16 g  16 g Oral PRN    glucagon (GLUCAGEN) injection 1 mg  1 mg IntraMUSCular PRN    dextrose 10% infusion 0-250 mL  0-250 mL IntraVENous PRN       Labs:    Recent Results (from the past 24 hour(s))   GLUCOSE, POC    Collection Time: 09/28/22  9:33 PM   Result Value Ref Range    Glucose (POC) 123 (H) 70 - 110 mg/dL   GLUCOSE, POC    Collection Time: 09/29/22  7:40 AM   Result Value Ref Range    Glucose (POC) 102 70 - 110 mg/dL   GLUCOSE, POC    Collection Time: 09/29/22 11:33 AM   Result Value Ref Range    Glucose (POC) 123 (H) 70 - 110 mg/dL   GLUCOSE, POC    Collection Time: 09/29/22  4:16 PM   Result Value Ref Range Glucose (POC) 133 (H) 70 - 110 mg/dL       Signed By: Baron Juanito MD     September 29, 2022      Dragon medical dictation software was used for portions of this report. Unintended errors may occur.

## 2022-09-29 NOTE — PROGRESS NOTES
Problem: Pain  Goal: *Control of Pain  Outcome: Progressing Towards Goal     Problem: Falls - Risk of  Goal: *Absence of Falls  Description: Document Alma Rosa Fall Risk and appropriate interventions in the flowsheet. Outcome: Progressing Towards Goal  Note: Fall Risk Interventions:  Mobility Interventions: Bed/chair exit alarm, Communicate number of staff needed for ambulation/transfer    Mentation Interventions: Bed/chair exit alarm, Gait belt with transfers/ambulation, Toileting rounds, Update white board    Medication Interventions: Bed/chair exit alarm, Patient to call before getting OOB, Teach patient to arise slowly, Evaluate medications/consider consulting pharmacy, Assess postural VS orthostatic hypotension, Utilize gait belt for transfers/ambulation    Elimination Interventions: Call light in reach, Bed/chair exit alarm, Elevated toilet seat, Patient to call for help with toileting needs, Stay With Me (per policy), Toilet paper/wipes in reach, Toileting schedule/hourly rounds    History of Falls Interventions: Bed/chair exit alarm         Problem: Pressure Injury - Risk of  Goal: *Prevention of pressure injury  Description: Document Seamus Scale and appropriate interventions in the flowsheet.   Outcome: Progressing Towards Goal     Problem: Patient Education: Go to Patient Education Activity  Goal: Patient/Family Education  Outcome: Progressing Towards Goal     Problem: Patient Education: Go to Patient Education Activity  Goal: Patient/Family Education  Outcome: Progressing Towards Goal

## 2022-09-29 NOTE — REHAB NOTE
SHIFT CHANGE NOTE FOR [facility]    Bedside and Verbal shift change report given to RALF Silverio (oncoming nurse) by Jeb Morales (offgoing nurse). Report included the following information SBAR, Kardex, MAR and Recent Results. Situation:   Code Status: DNR   Reason for Admission: Right hip fracture  Hospital Day: 7   Problem List:   Hospital Problems  Date Reviewed: 5/31/2022            Codes Class Noted POA    Hip fracture [facility]) ICD-10-CM: G55.154D  ICD-9-CM: 820.8  9/18/2022 Unknown           Background:   Past Medical History:   Past Medical History:   Diagnosis Date    CAD (coronary artery disease) 2008    Fibromyalgia     Heart failure ([facility] [address]     Pt. states sge gas 5 heart blocks. Hypercholesterolemia     Hypertension     Menopause     Thyroid disease       Patient taking anticoagulants ASA    Patient has a defibrillator: no     Assessment:  Changes in Assessment throughout shift: none    Patient has central line: no Reasons if yes: Insertion date: Last dressing date:  Patient has Talley Cath: no Reasons if yes:     Insertion date:    Last Vitals:     Vitals:    09/27/22 1955 09/28/22 0720 09/28/22 1603 09/28/22 2042   BP: (!) 143/60 (!) 149/58 (!) 146/57 (!) 134/56   Pulse: 60 61 63 61   Resp: 18 18 18 18   Temp: 98.7 °F (37.1 °C) 97.5 °F (36.4 °C) 98 °F (36.7 °C) 98.2 °F (36.8 °C)   SpO2: 95% 99% 99% 95%   Weight:       Height:           PAIN    Pain Assessment    Pain Intensity 1: 0 (09/29/22 0400) Pain Intensity 1: 2 (12/29/14 1105)    Pain Location 1: Hip Pain Location 1: Abdomen    Pain Intervention(s) 1: Medication (see MAR) Pain Intervention(s) 1: Medication (see MAR)  Patient Stated Pain Goal: 0 Patient Stated Pain Goal: 0  Intervention effective: na    Other actions taken for pain: na    Skin Assessment  Skin color Skin Color: Appropriate for ethnicity  Condition/Temperature    Integrity Skin Integrity: Incision (comment)  Turgor    Weekly Pressure Ulcer Documentation  Pressure  Injury Documentation: No Pressure Injury Noted-Pressure Ulcer Prevention Initiated  Wound Prevention & Protection Methods  Orientation of wound Orientation of Wound Prevention: Posterior  Location of Prevention Location of Wound Prevention: Buttocks, Sacrum/Coccyx  Dressing Present Dressing Present : Yes  Dressing Status Dressing Status: Intact  Wound Offloading Wound Offloading (Prevention Methods): Bed, pressure redistribution/air    INTAKE/OUPUT  Date 09/28/22 0700 - 09/29/22 0659 09/29/22 0700 - 09/30/22 0659   Shift 0700-1859 1900-0659 24 Hour Total 0700-1859 1900-0659 24 Hour Total   INTAKE   P.O. 980  980        P. O. 980  980      Shift Total(mL/kg) 980(15.7)  980(15.7)      OUTPUT   Urine(mL/kg/hr)           Urine Occurrence(s) 6 x 3 x 9 x      Stool           Stool Occurrence(s) 0 x 0 x 0 x      Shift Total(mL/kg)           980      Weight (kg) 62.4 62.4 62.4 62.4 62.4 62.4       Recommendations:  Patient needs and requests: toileting,ADL's    Diet: Regular    Pending tests/procedures: none     Functional Level/Equipment: assist x 1/wheelchair    Estimated Discharge Date: 10/05/22 Posted on Whiteboard in [facility] Room: yes     HEALS Safety Check    A safety check occurred in the patient's room between off going nurse and oncoming nurse listed above. The safety check included the below items  Area Items   H  High Alert Medications Verify all high alert medication drips (heparin, PCA, etc.)   E  Equipment Suction is set up for ALL patients (with yanker)  Red plugs utilized for all equipment (IV pumps, etc.)  WOWs wiped down at end of shift.   Room stocked with oxygen, suction, and other unit-specific supplies   A  Alarms Bed alarm is set for fall risk patients  Ensure chair alarm is in place and activated if patient is up in a chair   L  Lines Check IV for any infiltration  Talley bag is empty if patient has a Talley   Tubing and IV bags are labeled   S  Safety   Room is clean, patient is clean, and equipment is clean. Hallways are clear from equipment besides carts. Fall bracelet on for fall risk patients  Ensure room is clear and free of clutter  Suction is set up for ALL patients (with geneva)  Hallways are clear from equipment besides carts.    Isolation precautions followed, supplies available outside room, sign posted

## 2022-09-30 LAB
GLUCOSE BLD STRIP.AUTO-MCNC: 108 MG/DL (ref 70–110)
GLUCOSE BLD STRIP.AUTO-MCNC: 110 MG/DL (ref 70–110)
GLUCOSE BLD STRIP.AUTO-MCNC: 135 MG/DL (ref 70–110)
GLUCOSE BLD STRIP.AUTO-MCNC: 145 MG/DL (ref 70–110)

## 2022-09-30 PROCEDURE — 74011250637 HC RX REV CODE- 250/637: Performed by: FAMILY MEDICINE

## 2022-09-30 PROCEDURE — 82962 GLUCOSE BLOOD TEST: CPT

## 2022-09-30 PROCEDURE — 97530 THERAPEUTIC ACTIVITIES: CPT

## 2022-09-30 PROCEDURE — 97110 THERAPEUTIC EXERCISES: CPT

## 2022-09-30 PROCEDURE — 97535 SELF CARE MNGMENT TRAINING: CPT

## 2022-09-30 PROCEDURE — 65310000000 HC RM PRIVATE REHAB

## 2022-09-30 PROCEDURE — 99232 SBSQ HOSP IP/OBS MODERATE 35: CPT | Performed by: EMERGENCY MEDICINE

## 2022-09-30 PROCEDURE — 97116 GAIT TRAINING THERAPY: CPT

## 2022-09-30 PROCEDURE — 74011250637 HC RX REV CODE- 250/637: Performed by: HOSPITALIST

## 2022-09-30 RX ADMIN — AMLODIPINE BESYLATE 10 MG: 10 TABLET ORAL at 08:40

## 2022-09-30 RX ADMIN — FERROUS SULFATE TAB 325 MG (65 MG ELEMENTAL FE) 325 MG: 325 (65 FE) TAB at 08:40

## 2022-09-30 RX ADMIN — METOPROLOL SUCCINATE 25 MG: 25 TABLET, FILM COATED, EXTENDED RELEASE ORAL at 08:40

## 2022-09-30 RX ADMIN — ISOSORBIDE MONONITRATE 60 MG: 60 TABLET, EXTENDED RELEASE ORAL at 08:40

## 2022-09-30 RX ADMIN — ASPIRIN 81 MG CHEWABLE TABLET 81 MG: 81 TABLET CHEWABLE at 17:40

## 2022-09-30 RX ADMIN — SENNOSIDES AND DOCUSATE SODIUM 1 TABLET: 50; 8.6 TABLET ORAL at 17:40

## 2022-09-30 RX ADMIN — LEVOTHYROXINE SODIUM 75 MCG: 0.05 TABLET ORAL at 06:37

## 2022-09-30 RX ADMIN — ASPIRIN 81 MG CHEWABLE TABLET 81 MG: 81 TABLET CHEWABLE at 08:40

## 2022-09-30 RX ADMIN — POLYETHYLENE GLYCOL 3350 17 G: 17 POWDER, FOR SOLUTION ORAL at 08:40

## 2022-09-30 RX ADMIN — ATORVASTATIN CALCIUM 40 MG: 40 TABLET, FILM COATED ORAL at 21:32

## 2022-09-30 NOTE — PROGRESS NOTES
SHIFT CHANGE NOTE FOR [facility]    Bedside and Verbal shift change report given to RALF Loja (oncoming nurse) by Vince García RN (offgoing nurse). Report included the following information SBAR, Kardex, MAR and Recent Results. Situation:   Code Status: DNR   Hospital Day: 8   Problem List:   Hospital Problems  Date Reviewed: 5/31/2022            Codes Class Noted POA    Hip fracture [facility]) ICD-10-CM: U08.555J  ICD-9-CM: 820.8  9/18/2022 Unknown       Background:   Past Medical History:   Past Medical History:   Diagnosis Date    CAD (coronary artery disease) 2008    Fibromyalgia     Heart failure ([facility] [address]     Pt. states sge gas 5 heart blocks. Hypercholesterolemia     Hypertension     Menopause     Thyroid disease         Assessment:  Changes in Assessment throughout shift: No change to previous assessment    Patient has a central line: no Reasons if yes: Insertion date: Last dressing date:  Patient has Elliott Cath: no Reasons if yes:     Insertion date:  Shift elliott care completed:     Last Vitals:     Vitals:    09/29/22 1615 09/29/22 2100 09/30/22 0733 09/30/22 1624   BP: (!) 128/59 136/62 (!) 157/62 (!) 119/50   Pulse: 70 66 68 62   Resp: 18 19 19 18   Temp: 98.9 °F (37.2 °C) 97.3 °F (36.3 °C) 97.9 °F (36.6 °C) 98.6 °F (37 °C)   SpO2: 96% 98% 98% 98%   Weight:       Height:           PAIN    Pain Assessment    Pain Intensity 1: 0 (09/30/22 1600) Pain Intensity 1: 2 (12/29/14 1105)    Pain Location 1: Hip Pain Location 1: Abdomen    Pain Intervention(s) 1: Repositioned Pain Intervention(s) 1: Medication (see MAR)  Patient Stated Pain Goal: 0 Patient Stated Pain Goal: 0  Intervention effective: yes  Other actions taken for pain:      Skin Assessment  Skin color Skin Color: Appropriate for ethnicity  Condition/Temperature    Integrity Skin Integrity: Incision (comment)  Turgor    Weekly Pressure Ulcer Documentation  Pressure  Injury Documentation: No Pressure Injury Noted-Pressure Ulcer Prevention Initiated  Wound Prevention & Protection Methods  Orientation of wound Orientation of Wound Prevention: Posterior  Location of Prevention Location of Wound Prevention: Buttocks, Sacrum/Coccyx  Dressing Present Dressing Present : Yes  Dressing Status Dressing Status: Intact  Wound Offloading Wound Offloading (Prevention Methods): Bed, pressure redistribution/air    INTAKE/OUPUT  Date 09/29/22 0700 - 09/30/22 0659 09/30/22 0700 - 10/01/22 0659   Shift 0700-1859 1900-0659 24 Hour Total 0700-1859 1900-0659 24 Hour Total   INTAKE   P.O. 780  780        P. O. 780  780      Shift Total(mL/kg) 780(12.5)  780(12.5)      OUTPUT   Urine(mL/kg/hr)           Urine Occurrence(s) 6 x 4 x 10 x 1 x  1 x   Stool           Stool Occurrence(s) 0 x 0 x 0 x 0 x  0 x   Shift Total(mL/kg)           780      Weight (kg) 62.4 62.4 62.4 62.4 62.4 62.4         Recommendations:  Patient needs and requests: none at this time    Pending tests/procedures: none at this time     Functional Level/Equipment: Partial (one person) / Wheelchair;Walker    Fall Precautions:   Fall risk precautions were reinforced with the patient; she was instructed to call for help prior to getting up. The following fall risk precautions were continued: bed/ chair alarms, door signage, yellow bracelet and socks as well as update of the [employer] tool in the patient's room. Lama Rosa Score: 5    HEALS Safety Check    A safety check occurred in the patient's room between off going nurse and oncoming nurse listed above. The safety check included the below items  Area Items   H  High Alert Medications Verify all high alert medication drips (heparin, PCA, etc.)   E  Equipment Suction is set up for ALL patients (with yanker)  Red plugs utilized for all equipment (IV pumps, etc.)  WOWs wiped down at end of shift.   Room stocked with oxygen, suction, and other unit-specific supplies   A  Alarms Bed alarm is set for fall risk patients  Ensure chair alarm is in place and activated if patient is up in a chair   L  Lines Check IV for any infiltration  Talley bag is empty if patient has a Talley   Tubing and IV bags are labeled   S  Safety   Room is clean, patient is clean, and equipment is clean. Hallways are clear from equipment besides carts. Fall bracelet on for fall risk patients  Ensure room is clear and free of clutter  Suction is set up for ALL patients (with geneva)  Hallways are clear from equipment besides carts.    Isolation precautions followed, supplies available outside room, sign posted     Karen Sierra RN

## 2022-09-30 NOTE — PROGRESS NOTES
Problem: Mobility Impaired (Adult and Pediatric)  Goal: *Acute Goals and Plan of Care (Insert Text)  Description: Physical Therapy Short Term Goals  Initiated 9/23/2022 and re-assessed 9/30/2022 and to be accomplished within 7 day(s) (10/7/2022)  1. Patient will move from supine to sit and sit to supine , scoot up and down, and roll side to side in bed with supervision/set-up. Goal not met-currently SBA/minimal assistance 9/30/2022  2. Patient will transfer from bed to chair and chair to bed with minimal assistance/contact guard assist using the least restrictive device. Met goal-currently CGA 9/30/2022  3. Patient will perform sit to stand with minimal assistance/contact guard assist. Goal met-currently CGA 9/30/2022  4. Patient will ambulate with minimal assistance/contact guard assist for 50 feet with the least restrictive device. Goal not met consistently-currently 30 feet consistently with min/CGA and WC follow 2/2 increased fatigue 9/30/2022  5. Patient will ascend/descend 5 stairs with bilateral handrail(s) with minimal assistance/contact guard assist. Goal met-currently 6 steps with CGA and verbal cues for sequencing and hand/foot placement 9/30/2022    Physical Therapy Long Term Goals  Initiated 9/23/2022 and to be accomplished within 14-21 day(s) (10/07/2022-10/14/2022)  1. Patient will move from supine to sit and sit to supine , scoot up and down, and roll side to side in bed with supervision/set-up. 2.  Patient will transfer from bed to chair and chair to bed with supervision/set-up using the least restrictive device. 3.  Patient will perform sit to stand with supervision/set-up. 4.  Patient will ambulate with supervision/set-up for 150 feet with the least restrictive device. 5.  Patient will ascend/descend 5 stairs with bilateral handrail(s) with supervision/set-up.       9/30/2022 0948 by Sanjana Ho  Outcome: Progressing Towards Goal  PHYSICAL THERAPY WEEKLY PROGRESS NOTE    Patient: Mary Ramsey (38 y.o. female)  Date: 2022  [facility]: [facility] [S72.009A]   Precautions: Total hip, Fall  Chart, physical therapy assessment, plan of care and goals were reviewed. Time in:0930  Time out:1030    Patient seen for: AM;Gait training;Patient education; Therapeutic exercise;Transfer training  Time In: 1500  Time Out: 1530  Patient seen for: PM; Gait training; therapeutic exercise    Pain:  Pt pain was reported as  4 pre-treatment. Pt pain was reported as  2 post-treatment. Patient identified with name and :yes    SUBJECTIVE:     My hip is sore this morning. OBJECTIVE DATA SUMMARY:       GROSS ASSESSMENT Weekly Progress Assessment 2022   AROM Generally decreased, functional   Strength Generally decreased, functional   Coordination Within functional limits   Tone Normal   Sensation Impaired   PROM         POSTURE Weekly Progress Assessment 2022   Posture (WDL) Exceptions to [facility]   Posture Assessment Forward head;Kyphosis;Rounded shoulders       BALANCE Weekly Progress Assessment 2022    Sitting - Static: Good (unsupported)  Sitting - Dynamic: Fair (occasional)  Standing - Static: Fair  Standing - Dynamic : Impaired     BED/CHAIR/WHEELCHAIR TRANSFERS Initial Assessment Weekly Progress Assessment 2022   Rolling Right 0 (Not tested) 0 (Not tested)   Rolling Left 4 (Minimal assistance) 5 (Stand-by assistance)   Supine to Sit 3 (Moderate assistance) 4 (Minimal assistance)   Sit to Stand Moderate assistance Contact guard assistance   Sit to Supine 3 (Moderate assistance)  (CGA)   Transfer Type Other Other   Transfer Assistance Needed 3 (Moderate assistance ) 4 (Contact guard assistance)   Comments    Patient is making progress towards goals with bed mobility and with transfers. Patient has been challenged to perform bed to SHARE [facility] transfers x 2 trials each way to address safety.  Patient needed reminders for hand placement when using stand step transfers to and from the [facility] and bed. Car Transfer Moderate assistance (stand step transfer to/from transfer . Unable to fully complete transfer managing right LE into car  2/2 inability to maintain hip precautions in .) Minimum assistance   Car Type car transfer  car simulator       WHEELCHAIR MOBILITY/MANAGEMENT Initial Assessment Weekly Progress Assessment 9/30/2022   Able to Propel (dist) 100 feet (P) 161 feet   Assistance Required 2 Vlad   Curbs/ramps assistance required 0 (Not tested) 0 (Not tested)   Wheelchair set up assistance required 1 (Dependent/total assistance)     Wheelchair management Manages left brake, Manages right brake (with supervision & cues) Manages left brake;Manages right brake   Comments  Patient requires Vlad for steering and for directional turns 2/2 resistance to use right UE for assistance with propulsion. (Her R forearm is in a soft splint/cast, however she is WBAT and does use the hand to assist)       GAIT Weekly Progress Assessment 9/30/2022   Gait Description (WDL) Exceptions to WDL   Gait Abnormalities Antalgic;Decreased step clearance; Step to gait       WALKING INDEPENDENCE Initial Assessment Weekly Progress Assessment 9/30/2022   Assistive device Walker, rolling, Gait belt Gait belt;Walker, rolling   Ambulation assistance - level surface 4 (Minimal assistance) 4 (Contact guard assistance)   Distance 14 Feet (ft)  (30 ft) 40 ft   Comments   Patient does not appear to be as fatigued as she has been throughout the week and she was able to increase her short distance ambulation to 40 ft this session. Her goal is to go 50 feet, however she was unable to achieve that distance this session 2/2 increased right hip pain and fatigue.    Ambulation assistance - unlevel surfaces           STEPS/STAIRS Initial Assessment Weekly Progress Assessment 9/30/2022   Steps/Stairs ambulated 0  6 steps   Assistance Required   Minimal assistance   Rail Use None (P) Both Comments    Patient reports not having steps in her house, however she is willing to attempt negotiation and requires Vlad, verbal cues for hand and foot placement and when to reach out for RW when she is on the floor again with increased stability. Curbs/Ramps    Not tested       ASSESSMENT:  Patient seen for weekly progress and she has met 4/5 STGs. Patient continues to have difficulty with supine to sit 2/2 cumbersome position with pillow between legs to comply with hip precautions as well as UE weakness. Continue to address bed mobility for improved supine to sit transfers. Patient is making gains with short distances as she progressed to 30 ft and 40 ft this am session. Address deficits for improved functional independence. Progression toward goals:  []      Improving appropriately and progressing toward goals  [x]      Improving slowly and progressing toward goals  []      Not making progress toward goals and plan of care will be adjusted     PLAN:  Patient continues to benefit from skilled intervention to address the above impairments. Continue treatment per established plan of care. Discharge Recommendations:  Home Physical Therapy  Further Equipment Recommendations for Discharge:  rolling walker and N/A     Estimated Discharge Date:10/5/2022    Activity Tolerance: Tolerates session fair 2/2 increased fatigue. Please refer to the flowsheet for vital signs taken during this treatment.   After treatment:   [] Patient left in no apparent distress in bed  [] Patient left in no apparent distress sitting up in chair  [] Patient left in no apparent distress in w/c mobilizing under own power  [] Patient left in no apparent distress dining area  [] Patient left in no apparent distress mobilizing under own power  [] Call bell left within reach  [] Nursing notified  [] Caregiver present  [] Bed alarm activated   [] Chair alarm activated      Kin Lucio  9/30/2022

## 2022-09-30 NOTE — PROGRESS NOTES
Progress Note    Patient: Betito Melendez Age: 80 y.o. : 1924 MR#: [id number] SSN: [id number]  Date/Time: 2022     Subjective:    Patient is laying in bed in no apparent distress, awake, follows simple commands    Assessment/Plan:   Impaired Mobility and ADLs  Right hip fracture secondary to mechanical fall s/p repair  Anemia, multifactorial  Vitamin B12 deficiency  Iron deficiency  Oblqiue fracture of fifth metacarpal  HTN  CAD  History of hypothyroidism  Incidental right ureteric stone causing right ureteral hydronephrosis    MEDICAL PLAN:  Continue supplements  On amlodipine and metoprolol.   Monitor  Continue statin and aspirin  Monitor sugars, sliding scale insulin  On Synthroid  Bowel regimen  PT and OT    Discussed with patient    Case discussed with:  [x]Patient  []Family  [x]Nursing  []Case Management  DVT Prophylaxis:  []Lovenox  []Hep SQ  [x]SCDs  []Coumadin   []Eliquis/Xarelto     Objective:   VS: Visit Vitals  BP (!) 119/50 (BP 1 Location: Left upper arm, BP Patient Position: Supine)   Pulse 62   Temp 98.6 °F (37 °C)   Resp 18   Ht 5' 2\" (1.575 m)   Wt 62.4 kg (137 lb 9.6 oz)   SpO2 98%   Breastfeeding Unknown   BMI 25.17 kg/m²      Tmax/24hrs: Temp (24hrs), Av.9 °F (36.6 °C), Min:97.3 °F (36.3 °C), Max:98.6 °F (37 °C)  IOBRIEFNo intake or output data in the 24 hours ending 22      General:  Awake, follows simple commands  Cardiovascular:  S1S2+, RRR  Pulmonary:  CTA b/l  GI:  Soft, BS+, NT, ND  Extremities:  No edema  Right forearm in soft cast    Functional Progress:    PHYSICAL THERAPY    ON ADMISSION MOST RECENT   Wheelchair Mobility/Management  Able to Propel (ft): 100 feet  Functional Level: 2  Curbs/Ramps Assist Required (FIM Score): 0 (Not tested)  Wheelchair Setup Assist Required : 1 (Dependent/total assistance)  Wheelchair Management: Manages left brake, Manages right brake (with supervision & cues) Wheelchair Mobility/Management  Able to Propel (ft): (P) 161 feet  Functional Level:  (4)  Curbs/Ramps Assist Required (FIM Score): 0 (Not tested)  Wheelchair Setup Assist Required : 3 (Moderate assistance)  Wheelchair Management: Manages left brake, Manages right brake     Gait  Amount of Assistance: 4 (Minimal assistance)  Distance (ft): 14 Feet (ft)  Assistive Device: Walker, rolling, Gait belt Gait  Amount of Assistance: 4 (Contact guard assistance)  Distance (ft):  (30 ft)  Assistive Device: Gait belt, Walker, rolling     Balance-Sitting/Standing  Sitting - Static: Good (unsupported)  Sitting - Dynamic: Good (unsupported)  Standing - Static: Poor  Standing - Dynamic : Impaired Balance-Sitting/Standing  Sitting - Static: Good (unsupported)  Sitting - Dynamic: Fair (occasional)  Standing - Static: Fair  Standing - Dynamic : Impaired     Bed/Mat Mobility  Rolling Right : 0 (Not tested)  Rolling Left : 4 (Minimal assistance)  Supine to Sit : 3 (Moderate assistance)  Sit to Supine : 3 (Moderate assistance) Bed/Mat Mobility  Rolling Right : 0 (Not tested)  Rolling Left : 5 (Stand-by assistance)  Supine to Sit : 4 (Minimal assistance)  Sit to Supine :  (CGA)     Transfers  Transfer Type: Other  Other: stand step with RW  Transfer Assistance : 3 (Moderate assistance )  Sit to Stand Assistance: Moderate assistance  Car Transfers: Moderate assistance (stand step transfer to/from transfer . Unable to fully complete transfer managing right LE into car  2/2 inability to maintain hip precautions in .)  Car Type: car transfer  Transfers  Transfer Type:  Other  Other: stand step without AD  Transfer Assistance : 4 (Contact guard assistance)  Sit to Stand Assistance: Contact guard assistance  Car Transfers: Minimum assistance  Car Type: car simulator     Steps or Stairs  Steps/Stairs Ambulated (#): 0  Level of Assist : 0 (Not tested) (pt requires increased time and rest breaks throughout and maximal encouragement to participate in gait assessment)  Rail Use: None Steps or Stairs  Steps/Stairs Ambulated (#): (P)  (5)  Level of Assist : (P) 4 (Contact guard assistance)  Rail Use: (P) Both       Functional Progress:    OCCUPATIONAL THERAPY    ON ADMISSION MOST RECENT   Eating  Functional Level: 5 (supv/ set-up)   Eating  Functional Level: 5 (set-up/ Mod I)     Grooming  Functional Level: 5 (SBA)   Grooming  Functional Level: 5 (supv/ set-up)     Bathing  Functional Level: 3 (UB bathing: SBA; LB bathing: Mod A)   Bathing  Functional Level: 4 (UB bathing: supv; LB bathing: Min A (use of TTB; ADL shower))     Upper Body Dressing  Functional Level: 4 (Min A)   Upper Body Dressing  Functional Level: 5 (supv)     Lower Body Dressing  Functional Level: 2 (Max A)   Lower Body Dressing  Functional Level: 4 (Min A)     Toileting  Functional Level: 2 (Max A (change of brief bed level; therapist removed PureWick on arrival to room); pt voided using BSC at bedside (SBA for hygiene; Max A CM))   Toileting  Functional Level: 4 (Min A CM; SBA for hygiene)     Toilet Transfers  Toilet Transfer Score: 3 (Stand step xfer (Mod A) RW; EOB > BSC at bedside. Limited due to hip pain.)   Toilet Transfers  Toilet Transfer Score: 4 (Stand step xfer (CGA) RW; gait belt)     Tub /Shower Transfers  Tub/Shower Transfer Score: 0 (Not assessed due to safety concerns and pain level (right hip). )   Tub/Shower Transfers  Tub/Shower Transfer Score: 4 (Stand step xfer (CGA) RW; gait belt (to/ from TTB))       Legend:   7 - Independent   6 - Modified Independent   5 - Standby Assistance / Supervision / Set-up   4 - Minimum Assistance / Contact Guard Assistance   3 - Moderate Assistance   2 - Maximum Assistance   1 - Total Assistance / Dependent        Medications:   Current Facility-Administered Medications   Medication Dose Route Frequency    melatonin (rapid dissolve) tablet 5 mg  5 mg Oral QHS PRN    polyethylene glycol (MIRALAX) packet 17 g  17 g Oral DAILY    HYDROcodone-acetaminophen (NORCO) 5-325 mg per tablet 1-2 Tablet  1-2 Tablet Oral Q4H PRN    ferrous sulfate tablet 325 mg  1 Tablet Oral DAILY WITH BREAKFAST    magnesium hydroxide (MILK OF MAGNESIA) 400 mg/5 mL oral suspension 30 mL  30 mL Oral DAILY PRN    bisacodyL (DULCOLAX) tablet 10 mg  10 mg Oral Q48H PRN    bisacodyL (DULCOLAX) suppository 10 mg  10 mg Rectal Q48H PRN    aspirin chewable tablet 81 mg  81 mg Oral BID    senna-docusate (PERICOLACE) 8.6-50 mg per tablet 1 Tablet  1 Tablet Oral BID    atorvastatin (LIPITOR) tablet 40 mg  40 mg Oral QHS    metoprolol succinate (TOPROL-XL) XL tablet 25 mg  25 mg Oral DAILY    amLODIPine (NORVASC) tablet 10 mg  10 mg Oral DAILY    isosorbide mononitrate ER (IMDUR) tablet 60 mg  60 mg Oral DAILY    levothyroxine (SYNTHROID) tablet 75 mcg  75 mcg Oral 6am    nitroglycerin (NITROSTAT) tablet 0.4 mg  0.4 mg SubLINGual PRN    insulin lispro (HUMALOG) injection   SubCUTAneous AC&HS    glucose chewable tablet 16 g  16 g Oral PRN    glucagon (GLUCAGEN) injection 1 mg  1 mg IntraMUSCular PRN    dextrose 10% infusion 0-250 mL  0-250 mL IntraVENous PRN       Labs:    Recent Results (from the past 24 hour(s))   GLUCOSE, POC    Collection Time: 09/29/22  8:52 PM   Result Value Ref Range    Glucose (POC) 110 70 - 110 mg/dL   GLUCOSE, POC    Collection Time: 09/30/22  7:31 AM   Result Value Ref Range    Glucose (POC) 110 70 - 110 mg/dL   GLUCOSE, POC    Collection Time: 09/30/22 11:24 AM   Result Value Ref Range    Glucose (POC) 145 (H) 70 - 110 mg/dL   GLUCOSE, POC    Collection Time: 09/30/22  4:23 PM   Result Value Ref Range    Glucose (POC) 135 (H) 70 - 110 mg/dL       Signed By: Dimas Rosales MD     September 30, 2022      Dragon medical dictation software was used for portions of this report. Unintended errors may occur.

## 2022-09-30 NOTE — REHAB NOTE
SHIFT CHANGE NOTE FOR [facility]    Bedside and Verbal shift change report given to RALF Silverio (oncoming nurse) by Madelyn Cleveland (offgoing nurse). Report included the following information SBAR, Kardex, MAR and Recent Results. Situation:   Code Status: DNR   Reason for Admission: Right hip fracture  Hospital Day: 8   Problem List:   Hospital Problems  Date Reviewed: 5/31/2022            Codes Class Noted POA    Hip fracture [facility]) ICD-10-CM: Q75.401W  ICD-9-CM: 820.8  9/18/2022 Unknown           Background:   Past Medical History:   Past Medical History:   Diagnosis Date    CAD (coronary artery disease) 2008    Fibromyalgia     Heart failure ([facility]     Pt. states sge gas 5 heart blocks. Hypercholesterolemia     Hypertension     Menopause     Thyroid disease       Patient taking anticoagulants ASA    Patient has a defibrillator: no     Assessment:  Changes in Assessment throughout shift: none    Patient has central line: no Reasons if yes: Insertion date: Last dressing date:  Patient has Talley Cath: no Reasons if yes:     Insertion date:    Last Vitals:     Vitals:    09/28/22 2042 09/29/22 0736 09/29/22 1615 09/29/22 2100   BP: (!) 134/56 (!) 145/91 (!) 128/59 136/62   Pulse: 61 60 70 66   Resp: 18 17 18 19   Temp: 98.2 °F (36.8 °C) 98.2 °F (36.8 °C) 98.9 °F (37.2 °C) 97.3 °F (36.3 °C)   SpO2: 95% 97% 96% 98%   Weight:       Height:           PAIN    Pain Assessment    Pain Intensity 1: 0 (09/30/22 0000) Pain Intensity 1: 2 (12/29/14 1105)    Pain Location 1: Hip Pain Location 1: Abdomen    Pain Intervention(s) 1: Repositioned Pain Intervention(s) 1: Medication (see MAR)  Patient Stated Pain Goal: 0 Patient Stated Pain Goal: 0  Intervention effective: no    Other actions taken for pain: no    Skin Assessment  Skin color Skin Color: Appropriate for ethnicity  Condition/Temperature    Integrity Skin Integrity: Incision (comment)  Turgor    Weekly Pressure Ulcer Documentation  Pressure  Injury Documentation: No Pressure Injury Noted-Pressure Ulcer Prevention Initiated  Wound Prevention & Protection Methods  Orientation of wound Orientation of Wound Prevention: Posterior  Location of Prevention Location of Wound Prevention: Buttocks, Sacrum/Coccyx  Dressing Present Dressing Present : Yes  Dressing Status Dressing Status: Intact  Wound Offloading Wound Offloading (Prevention Methods): Bed, pressure redistribution/air    INTAKE/OUPUT  Date 09/29/22 0700 - 09/30/22 0659 09/30/22 0700 - 10/01/22 0659   Shift 0700-1859 1900-0659 24 Hour Total 0700-1859 1900-0659 24 Hour Total   INTAKE   P.O. 780  780        P. O. 780  780      Shift Total(mL/kg) 780(12.5)  780(12.5)      OUTPUT   Urine(mL/kg/hr)           Urine Occurrence(s) 6 x 2 x 8 x      Stool           Stool Occurrence(s) 0 x 0 x 0 x      Shift Total(mL/kg)           780      Weight (kg) 62.4 62.4 62.4 62.4 62.4 62.4       Recommendations:  Patient needs and requests: toileting,ADL's    Diet: Regular    Pending tests/procedures: none    Functional Level/Equipment: assist x 1/wheelchair    Estimated Discharge Date: 10/05/22 Posted on Whiteboard in [facility] Room: yes     HEALS Safety Check    A safety check occurred in the patient's room between off going nurse and oncoming nurse listed above. The safety check included the below items  Area Items   H  High Alert Medications Verify all high alert medication drips (heparin, PCA, etc.)   E  Equipment Suction is set up for ALL patients (with yanker)  Red plugs utilized for all equipment (IV pumps, etc.)  WOWs wiped down at end of shift.   Room stocked with oxygen, suction, and other unit-specific supplies   A  Alarms Bed alarm is set for fall risk patients  Ensure chair alarm is in place and activated if patient is up in a chair   L  Lines Check IV for any infiltration  Talley bag is empty if patient has a Talley   Tubing and IV bags are labeled   S  Safety   Room is clean, patient is clean, and equipment is clean.  Hallways are clear from equipment besides carts. Fall bracelet on for fall risk patients  Ensure room is clear and free of clutter  Suction is set up for ALL patients (with geneva)  Hallways are clear from equipment besides carts.    Isolation precautions followed, supplies available outside room, sign posted

## 2022-09-30 NOTE — PROGRESS NOTES
Problem: Self Care Deficits Care Plan (Adult)  Goal: *Acute Goals and Plan of Care (Insert Text)  Description: Occupational Therapy Goals   Long Term Goals  Initiated (9/23/2022) and to be accomplished within 3 week(s), by (10/14/2022)  1. Pt will perform self-feeding with Mod I.  2. Pt will perform grooming with Mod I.  3. Pt will perform UB bathing with set-up. 4. Pt will perform LB bathing with supv using AE and/ or compensatory strategies while maintaining hip precautions. 5. Pt will perform tub/shower transfer with SBA using LRAD. 6. Pt will perform UB dressing with Mod I.  7. Pt will perform LB dressing with SBA using AE and/ or compensatory strategies while maintaining hip precautions. 8. Pt will perform toileting task with supv/ SBA. 9. Pt will perform toilet transfer with supv using LRAD. Short Term Goals   Initiated (9/23/2022) and to be accomplished within 7 day(s), by (9/30/2022) (Reassessed on 9/30/2022)  1. Pt will perform self-feeding with set-up/ supv. (Goal met 9/30/2022)  2. Pt will perform grooming with supv. (Goal met 9/30/2022)  3. Pt will perform UB bathing with supv. (Goal met 9/30/2022)  4. Pt will perform LB bathing with Min A using AE and/ or compensatory strategies while maintaining hip precautions. (Goal met 9/30/2022)  5. Pt will perform tub/shower transfer with Min A using LRAD. (Goal met 9/30/2022)  6. Pt will perform UB dressing with SBA. (Goal met 9/30/2022)  7. Pt will perform LB dressing with Mod A using AE and/ or compensatory strategies while maintaining hip precautions. (Goal met 9/30/2022)  8. Pt will perform toileting task with Mod A. (Goal met 9/30/2022)  9. Pt will perform toilet transfer with Min A using LRAD.  (Goal met 9/30/2022)    Outcome: Progressing Towards Goal  Goal: Interventions  Outcome: Progressing Towards Goal   OT WEEKLY PROGRESS NOTE  Patient Name:Trudy WHITFIELD 59 [facility] [address]      First tx session:  Time In: 5158  Time Out: 6712    Second tx session:  Time In: 0294  Time Out: 1400    Medical Diagnosis:  Hip fracture ([facility] [address] [S72.009A] <principal problem not specified>     Pain at start of tx:0/10 pain or discomfort. Pain at stop of tx:0/10 pain or discomfort. Patient identified with name and : yes  Subjective: \"I would like to wash my hair. \"    Precautions: Total hip, Fall precautions, Aspiration, Safety, right hip precautions, RLE WBAT, RUE (wrist/ forearm cast; fifth metacarpal fx); hard of hearing (hearing aid right ear)         Outcome Measures: AROM: RUE AROM (generally decreased); right wrist/ forearm soft cast 2/2 fifth metacarpal fx. LUE AROM WFL    COGNITION/PERCEPTION Initial Assessment Weekly Progress Assessment 2022   Premorbid Reading Status       Premorbid Writing Status       Arousal/Alertness       Orientation Level Oriented X4 Oriented X4   Visual Fields       Praxis       Body Scheme       COMPREHENSION MODE Initial Assessment Weekly Progress Assessment 2022   Primary Mode of Comprehension Auditory Auditory   Hearing Aide Bilateral, With patient Right (Left hearing aid sent home with granddaughter)   Corrective Lenses Glasses Glasses   Score 4 4     EXPRESSION Initial Assessment Weekly Progress Assessment 2022   Primary Mode of Expression Verbal Verbal   Score 5 5   Comments         SOCIAL INTERACTION/ PRAGMATICS Initial Assessment Weekly Progress Assessment 2022   Score 5 5   Comments         PROBLEM SOLVING Initial Assessment Weekly Progress Assessment 2022   Score 3 3   Comments         MEMORY Initial Assessment Weekly Progress Assessment 2022   Score 3 3   Comments         EATING Initial Assessment Weekly Progress Assessment 2022   Functional Level 5 (supv/ set-up) Functional Level: 5 (set-up/ Mod I)   Comments Self-feeding (supv/ set-up); assistance with opening containers, lids, and small packets due to RUE (wrist/ forearm cast; fifth metacarpal fx) Comments: Self-feeding (set-up/ Mod I) for meal trays. GROOMING Initial Assessment Weekly Progress Assessment 9/30/2022   Functional Level 5 (SBA) Functional Level: 5 (supv/ set-up)   Tasks completed by patient Brushed teeth, Washed face Tasks Completed by Patient: Brushed hair;Brushed teeth; Washed face   Comments Self-care grooming performed (SBA) for brushing teeth; washing face using washcloth with basin set-up on tray table. Verbal cues for task initiation. Comments: Self-care grooming tasks performed (supv/ set-up) w/c level at sink; pt washed her face (set-up/ supv) seated on TTB (ADL shower). BATHING Initial Assessment Weekly Progress Assessment 9/30/2022   Functional Level 3 (UB bathing: SBA; LB bathing: Mod A) Functional Level: 4 (UB bathing: supv; LB bathing: Min A (use of TTB; ADL shower))   Body parts patient bathed Abdomen, Arm, left, Arm, right, Buttocks, Chest, Lower leg and foot, left, Lower leg and foot, right, Delroy area, Thigh, left, Thigh, right Body Parts Patient Bathed: Abdomen;Arm, left;Arm, right;Buttocks; Chest;Lower leg and foot, left; Lower leg and foot, right;Delroy area; Thigh, left; Thigh, right   Comments UB bathing performed (SBA) basin set-up on tray table; therapist assisting with back. LB bathing performed (Mod A) bed level 2/2 pain/ discomfort. Pt requiring assistance with washing RLE, delroy-area, and sacrum/ buttocks. Comments: Waterproof barrier applied over RUE soft arm cast and over right hip dressing prior to ADL shower. UB bathing performed (supv); LB bathing (Min A) seated on tub bench during ADL shower. Edu/ instruction for use of weight shift for washing sacrum; verbal cues for use of LH sponge to wash distal BLE's. TUB/SHOWER TRANSFER INDEPENDENCE Initial Assessment Weekly Progress Assessment 9/30/2022   Score 0 (Not assessed due to safety concerns and pain level (right hip). ) Tub/Shower Transfer Score: 4 (Stand step xfer (CGA) RW; gait belt (to/ from TTB))   Comments Not assessed due to safety concerns and pain level (right hip). Comments: Stand step xfer (CGA) RW; gait belt (to/ from TTB)     UPPER BODY DRESSING/UNDRESSING Initial Assessment Weekly Progress Assessment 9/30/2022   Functional Level 4 (Min A) Functional Level: 5 (supv)   Items applied/Steps completed Pullover (4 steps) Items Applied/Steps Completed: Pullover (4 steps)   Comments UB dressing performed Min A for doffing hospital gown; SBA/ Min A for donning pullover scrub top. Comments: UB dressing performed (supv) for doffing/ donning scrub top. LOWER BODY DRESSING/UNDRESSING Initial Assessment Weekly Progress Assessment 9/30/2022   Functional Level 2 (Max A) Functional Level: 4 (Min A)   Items applied/Steps completed Sock, left (1 step), Sock, right (1 step) (Scrub pants (3 steps)) Items Applied/Steps Completed: Sock, left (1 step); Sock, right (1 step) (Scrub pants (3 steps))   Comments LB dressing performed (Max A) for donning scrub pants and bilateral slipper socks. Pt requiring assistance with threading clothing over distal BLE's and for pulling up over hips to waist; edu/ instruction for maintaining hip precautions. Max A for donning left sock; total assist for donning right slipper sock. Hip kit issued to patient during today's treatment session. Comments: Pt donned scrub pants (Min A) w/c level. Mod verbal cues for use of reacher to thread pant legs over distal BLE's. Pt stood using grab bar in order to pull pants over hips to waist. Pt donned slipper socks (Min A) level using sock aid. Edu/ instruction in use of AE for improved functional ability e.g. dressing stick, sock aid, and reacher. Increased time. Therapist doffed/ donned thigh high compression hose. TOILETING Initial Assessment Weekly Progress Assessment 9/30/2022   Functional Level 2 (Max A (change of brief bed level; therapist removed PureWick on arrival to room); pt voided using BSC at bedside (SBA for hygiene;  Max A CM)) Functional Level: 4 (Min A CM; SBA for hygiene)   Comments Max A (change of brief bed level; therapist removed PureWick on arrival to room); pt voided using BSC at bedside (SBA hygiene; Max A CM) Comments: Min A CM; SBA for hygiene     TOILET TRANSFER INDEPENDENCE Initial Assessment Weekly Progress Assessment 9/30/2022   Transfer score 3 (Stand step xfer (Mod A) RW; EOB > BSC at bedside. Limited due to hip pain.) Toilet Transfer Score: 4 (Stand step xfer (CGA) RW; gait belt)   Comments Stand step xfer (Mod A) RW; EOB > BSC at bedside. Limited due to hip pain. Comments: Stand step xfer (CGA) RW; gait belt          ASSESSMENT:  Patient improving slowly and progressing toward goals having achieved 9/9 STG's this reporting period. Therapist reassessed and updated goals based on pt's current functional ability and demonstrated performance. Pt progressed to UB ADL's performed (supv) level. LB ADL's performed (Min A) using AE for adherence to hip precautions. Patient requiring Mod verbal cues with edu/ instruction and demonstration for use of AE (e.g. reacher, dressing stick, and sock aid). Verbal cues with reminders for adherence to total hip precautions. Increased time for all ADL's as pt fatigues easily requiring frequent rest breaks. Patient will benefit from continued skilled occupational therapy interventions to address decreased (I) with ADL's, decreased functional strength/ endurance, decreased activity tolerance, impaired balance/ coordination, decreased AROM, right wrist/ forearm soft cast 2/2 fifth metacarpal fx, right total hip/ WBAT, hip precautions, impaired functional transfers/ mobility, pain, impaired cognition, and safety awareness impeding pt's functional (I) with ADL's and mobility. Second tx session:  RUE/ LUE there ex performed using 2 lb dowel (15 reps x2) for elbow flexion/ extension, chest press, forward roll, and stirring motion. Verbal cues with demonstration for hand placement and technique.  Performed for ROM and strengthening for increased (I) with ADL's. Intermittent rest breaks due to fatigue. Pt participated in balloon tapping activity with therapist seated w/c level; multiple repetitions. Performed for ROM, strengthening, and activity tolerance for carryover to self-cares. Verbal cues for alternating right/ left upper extremities. Intermittent rest breaks due to fatigue. Progression toward goals:  []          Improving appropriately and progressing toward goals  [x]          Improving slowly and progressing toward goals  []          Not making progress toward goals and plan of care will be adjusted     PLAN:  Patient continues to benefit from skilled intervention to address the above impairments. Continue treatment per established plan of care. Discharge Recommendations:  Home Health with family assist  Further Equipment Recommendations for Discharge:  bedside commode, gait belt, and tub transfer bench      Please refer to the flow sheet for vital signs taken during this treatment. After treatment:   [x]  Patient left in no apparent distress sitting up in wheelchair with needs met  []  Patient left in no apparent distress in bed  [x]  Call bell left within reach  [x]  Nursing notified  []  Caregiver present  [x]  Wheelchair alarm activated    COMMUNICATION/EDUCATION:   [] Home safety education was provided and the patient/caregiver indicated understanding. [x] Patient/family have participated as able in goal setting and plan of care. [x] Patient/family agree to work toward stated goals and plan of care. [] Patient understands intent and goals of therapy, but is neutral about his/her participation. [] Patient is unable to participate in goal setting and plan of care. Plan of Care: Please see Care Plan for updated STG/LTGs.    Family Training:  to be scheduled  Estimated LOS: 10/5/2022    Terry Green OT  9/30/2022

## 2022-09-30 NOTE — PROGRESS NOTES
Wound Prevention Checklist    Patient: Leonila Correa (89 y.o. female)  Date: 9/30/2022  Diagnosis: Hip fracture ([facility] [address] Kami Arch <principal problem not specified>    Precautions:  Total hip, Fall       []  Heel prevention boots placed on patient    []  Patient turned q2h during shift    []  Lift team ordered    []  Patient on Peyman bed/Specialty bed    []  Each Wound is documented during shift (Stage, Color, drainage, odor, measurements, and dressings)    [x]  Dual skin check done with JARRET Mcgregor/SHANA Houser, RN BSN

## 2022-10-01 LAB
GLUCOSE BLD STRIP.AUTO-MCNC: 104 MG/DL (ref 70–110)
GLUCOSE BLD STRIP.AUTO-MCNC: 112 MG/DL (ref 70–110)
GLUCOSE BLD STRIP.AUTO-MCNC: 113 MG/DL (ref 70–110)
GLUCOSE BLD STRIP.AUTO-MCNC: 115 MG/DL (ref 70–110)

## 2022-10-01 PROCEDURE — 82962 GLUCOSE BLOOD TEST: CPT

## 2022-10-01 PROCEDURE — 74011250637 HC RX REV CODE- 250/637: Performed by: FAMILY MEDICINE

## 2022-10-01 PROCEDURE — 74011250637 HC RX REV CODE- 250/637: Performed by: HOSPITALIST

## 2022-10-01 PROCEDURE — 65310000000 HC RM PRIVATE REHAB

## 2022-10-01 RX ADMIN — ATORVASTATIN CALCIUM 40 MG: 40 TABLET, FILM COATED ORAL at 21:29

## 2022-10-01 RX ADMIN — ISOSORBIDE MONONITRATE 60 MG: 60 TABLET, EXTENDED RELEASE ORAL at 08:52

## 2022-10-01 RX ADMIN — ASPIRIN 81 MG CHEWABLE TABLET 81 MG: 81 TABLET CHEWABLE at 18:42

## 2022-10-01 RX ADMIN — AMLODIPINE BESYLATE 10 MG: 10 TABLET ORAL at 08:52

## 2022-10-01 RX ADMIN — METOPROLOL SUCCINATE 25 MG: 25 TABLET, FILM COATED, EXTENDED RELEASE ORAL at 08:52

## 2022-10-01 RX ADMIN — SENNOSIDES AND DOCUSATE SODIUM 1 TABLET: 50; 8.6 TABLET ORAL at 18:42

## 2022-10-01 RX ADMIN — SENNOSIDES AND DOCUSATE SODIUM 1 TABLET: 50; 8.6 TABLET ORAL at 08:52

## 2022-10-01 RX ADMIN — ASPIRIN 81 MG CHEWABLE TABLET 81 MG: 81 TABLET CHEWABLE at 08:52

## 2022-10-01 RX ADMIN — LEVOTHYROXINE SODIUM 75 MCG: 0.05 TABLET ORAL at 06:09

## 2022-10-01 RX ADMIN — FERROUS SULFATE TAB 325 MG (65 MG ELEMENTAL FE) 325 MG: 325 (65 FE) TAB at 08:52

## 2022-10-01 RX ADMIN — POLYETHYLENE GLYCOL 3350 17 G: 17 POWDER, FOR SOLUTION ORAL at 08:53

## 2022-10-01 NOTE — REHAB NOTE
Wound Prevention Checklist    Patient: Mary Ramsey (89 y.o. female)  Date: 10/1/2022  Diagnosis: Hip fracture ([address] Manuelita Ill <principal problem not specified>    Precautions:  Total hip, Fall       []  Heel prevention boots placed on patient    []  Patient turned q2h during shift    []  Lift team ordered    []  Patient on Peyman bed/Specialty bed    []  Each Wound is documented during shift (Stage, Color, drainage, odor, measurements, and dressings)    []  Dual skin check done with JARRET Napier RN

## 2022-10-01 NOTE — ROUTINE PROCESS
0800 Pt. Awake sitting up in bed no change in assessment pt. Reported to be feeling fine. 0930 PT. Sitting up in chair eating breakfast.   1200 no complaints. Family in room. 1330 able to transfer from bed to chair with assist.   1500 no change in assessment. 1800 PT. Sitting up in chair eating dinner.

## 2022-10-01 NOTE — PROGRESS NOTES
SHIFT CHANGE NOTE FOR [facility]    Bedside and Verbal shift change report given to Jian Muñiz RN (oncoming nurse) by Lisa Norris RN (offgoing nurse). Report included the following information SBAR, Kardex, MAR and Recent Results. Situation:   Code Status: DNR   Hospital Day: 9   Problem List:   Hospital Problems  Date Reviewed: 5/31/2022            Codes Class Noted POA    Hip fracture [facility]) ICD-10-CM: V20.361P  ICD-9-CM: 820.8  9/18/2022 Unknown           Background:   Past Medical History:   Past Medical History:   Diagnosis Date    CAD (coronary artery disease) 2008    Fibromyalgia     Heart failure ([facility] [address]     Pt. states sge gas 5 heart blocks. Hypercholesterolemia     Hypertension     Menopause     Thyroid disease         Assessment:  Changes in Assessment throughout shift: No change to previous assessment    Patient has a central line: no Reasons if yes: Insertion date: Last dressing date:  Patient has Elliott Cath: no Reasons if yes:     Insertion date:  Shift elliott care completed:     Last Vitals:     Vitals:    09/29/22 2100 09/30/22 0733 09/30/22 1624 09/30/22 2044   BP: 136/62 (!) 157/62 (!) 119/50 (!) 150/60   Pulse: 66 68 62 65   Resp: 19 19 18 16   Temp: 97.3 °F (36.3 °C) 97.9 °F (36.6 °C) 98.6 °F (37 °C) 98.2 °F (36.8 °C)   SpO2: 98% 98% 98% 99%   Weight:       Height:           PAIN    Pain Assessment    Pain Intensity 1: 0 (10/01/22 0213) Pain Intensity 1: 2 (12/29/14 1105)    Pain Location 1: Hip Pain Location 1: Abdomen    Pain Intervention(s) 1: Repositioned Pain Intervention(s) 1: Medication (see MAR)  Patient Stated Pain Goal: 0 Patient Stated Pain Goal: 0  Intervention effective: yes  Other actions taken for pain:      Skin Assessment  Skin color Skin Color: Appropriate for ethnicity  Condition/Temperature    Integrity Skin Integrity: Incision (comment) (R hip)  Turgor    Weekly Pressure Ulcer Documentation  Pressure  Injury Documentation: No Pressure Injury Noted-Pressure Ulcer Prevention Initiated  Wound Prevention & Protection Methods  Orientation of wound Orientation of Wound Prevention: Posterior  Location of Prevention Location of Wound Prevention: Sacrum/Coccyx  Dressing Present Dressing Present : Yes  Dressing Status Dressing Status: Intact  Wound Offloading Wound Offloading (Prevention Methods): Bed, pressure reduction mattress, Wheelchair, Repositioning, Pillows, Foam silicone, Turning, Chair cushion    INTAKE/OUPUT  Date 09/30/22 0700 - 10/01/22 0659 10/01/22 0700 - 10/02/22 0659   Shift 0700-1859 1900-0659 24 Hour Total 0700-1859 1900-0659 24 Hour Total   INTAKE   P.O.  240 240        P. O.  240 240      Shift Total(mL/kg)  240(3.8) 240(3.8)      OUTPUT   Urine(mL/kg/hr)  0 0        Urine Voided  0 0        Urine Occurrence(s) 1 x 1 x 2 x      Stool           Stool Occurrence(s) 0 x 0 x 0 x      Shift Total(mL/kg)  0(0) 0(0)      NET  240 240      Weight (kg) 62.4 62.4 62.4 62.4 62.4 62.4       Recommendations:  Patient needs and requests: none at this time    Pending tests/procedures: none at this time     Functional Level/Equipment: Partial (one person) /      Fall Precautions:   Fall risk precautions were reinforced with the patient; she was instructed to call for help prior to getting up. The following fall risk precautions were continued: bed/ chair alarms, door signage, yellow bracelet and socks as well as update of the [employer] [place] tool in the patient's room. Alma Rosa Score: 5    HEALS Safety Check    A safety check occurred in the patient's room between off going nurse and oncoming nurse listed above. The safety check included the below items  Area Items   H  High Alert Medications Verify all high alert medication drips (heparin, PCA, etc.)   E  Equipment Suction is set up for ALL patients (with geneva)  Red plugs utilized for all equipment (IV pumps, etc.)  WOWs wiped down at end of shift.   Room stocked with oxygen, suction, and other unit-specific supplies   A  Alarms Bed alarm is set for fall risk patients  Ensure chair alarm is in place and activated if patient is up in a chair   L  Lines Check IV for any infiltration  Talley bag is empty if patient has a Talley   Tubing and IV bags are labeled   S  Safety   Room is clean, patient is clean, and equipment is clean. Hallways are clear from equipment besides carts. Fall bracelet on for fall risk patients  Ensure room is clear and free of clutter  Suction is set up for ALL patients (with yanker)  Hallways are clear from equipment besides carts.    Isolation precautions followed, supplies available outside room, sign posted     Lylia Castleman, RN

## 2022-10-01 NOTE — PROGRESS NOTES
Progress Note    Patient: Vera Sutton MRN: [id number]  CSN: [id number]    YOB: 1924  Age: 80 y.o. Sex: female    DOA: 9/22/2022 LOS:  LOS: 9 days                    Subjective:   No complaints. Feels well. Hip pain well controlled. No chest pain, SOB< papitations, dizziness, nausea, vomit, fevers or chills. Review of systems  General: No fevers or chills. Cardiovascular: No chest pain or pressure. No palpitations. Pulmonary: No shortness of breath, cough or wheeze. Gastrointestinal: No abdominal pain, nausea, vomiting or diarrhea. Genitourinary: No urinary frequency, urgency, hesitancy or dysuria. Musculoskeletal: No joint or muscle pain, no back pain, no recent trauma. Neurologic: No headache, numbness, tingling or weakness. Objective:     Physical Exam:      Visit Vitals  BP (!) 147/53 (BP 1 Location: Left upper arm, BP Patient Position: Lying)   Pulse 65   Temp 98.2 °F (36.8 °C)   Resp 18   Ht 5' 2\" (1.575 m)   Wt 62.4 kg (137 lb 9.6 oz)   SpO2 97%   Breastfeeding Unknown   BMI 25.17 kg/m²        General:         Alert, cooperative, no acute distress    HEENT: NC, Atraumatic. PERRLA, anicteric sclerae. Lungs: CTA Bilaterally. No Wheezing/Rhonchi/Rales. Heart:  Regular  rhythm,  No murmur, No Rubs, No Gallops  Abdomen: Soft, Non distended, Non tender. +Bowel sounds, no HSM  Extremities: No c/c/e  Psych:   Good insight. Not anxious or agitated. Neurologic:  CN 2-12 grossly intact, Alert and oriented X 3. No acute neurological                                 Deficits,     Intake and Output:  Current Shift:  No intake/output data recorded. Last three shifts:  09/30 0701 - 10/01 1900  In: 1560 [P.O.:1560]  Out: 400 [Urine:400]    Labs: Results:       Chemistry No results for input(s): GLU, NA, K, CL, CO2, BUN, CREA, CA, AGAP, BUCR, TBIL, AP, TP, ALB, GLOB, AGRAT in the last 72 hours.     No lab exists for component: GPT   CBC w/Diff No results for input(s): WBC, RBC, HGB, HCT, PLT, GRANS, LYMPH, EOS, HGBEXT, HCTEXT, PLTEXT in the last 72 hours. Cardiac Enzymes No results for input(s): CPK, CKND1, IMANI in the last 72 hours. No lab exists for component: CKRMB, TROIP   Coagulation No results for input(s): PTP, INR, APTT, INREXT in the last 72 hours. Lipid Panel Lab Results   Component Value Date/Time    Cholesterol, total 165 08/20/2019 09:43 AM    HDL Cholesterol 47 08/20/2019 09:43 AM    LDL, calculated 86.8 08/20/2019 09:43 AM    VLDL, calculated 31.2 08/20/2019 09:43 AM    Triglyceride 156 (H) 08/20/2019 09:43 AM    CHOL/HDL Ratio 3.5 08/20/2019 09:43 AM      BNP No results for input(s): BNPP in the last 72 hours. Liver Enzymes No results for input(s): TP, ALB, TBIL, AP in the last 72 hours.     No lab exists for component: SGOT, GPT, DBIL   Thyroid Studies Lab Results   Component Value Date/Time    TSH 0.85 08/20/2019 09:43 AM          Procedures/imaging: see electronic medical records for all procedures/Xrays and details which were not copied into this note but were reviewed prior to creation of Plan    Medications:   Current Facility-Administered Medications   Medication Dose Route Frequency    melatonin (rapid dissolve) tablet 5 mg  5 mg Oral QHS PRN    polyethylene glycol (MIRALAX) packet 17 g  17 g Oral DAILY    HYDROcodone-acetaminophen (NORCO) 5-325 mg per tablet 1-2 Tablet  1-2 Tablet Oral Q4H PRN    ferrous sulfate tablet 325 mg  1 Tablet Oral DAILY WITH BREAKFAST    magnesium hydroxide (MILK OF MAGNESIA) 400 mg/5 mL oral suspension 30 mL  30 mL Oral DAILY PRN    bisacodyL (DULCOLAX) tablet 10 mg  10 mg Oral Q48H PRN    bisacodyL (DULCOLAX) suppository 10 mg  10 mg Rectal Q48H PRN    aspirin chewable tablet 81 mg  81 mg Oral BID    senna-docusate (PERICOLACE) 8.6-50 mg per tablet 1 Tablet  1 Tablet Oral BID    atorvastatin (LIPITOR) tablet 40 mg  40 mg Oral QHS    metoprolol succinate (TOPROL-XL) XL tablet 25 mg  25 mg Oral DAILY    amLODIPine (NORVASC) tablet 10 mg  10 mg Oral DAILY    isosorbide mononitrate ER (IMDUR) tablet 60 mg  60 mg Oral DAILY    levothyroxine (SYNTHROID) tablet 75 mcg  75 mcg Oral 6am    nitroglycerin (NITROSTAT) tablet 0.4 mg  0.4 mg SubLINGual PRN    insulin lispro (HUMALOG) injection   SubCUTAneous AC&HS    glucose chewable tablet 16 g  16 g Oral PRN    glucagon (GLUCAGEN) injection 1 mg  1 mg IntraMUSCular PRN    dextrose 10% infusion 0-250 mL  0-250 mL IntraVENous PRN       Assessment/Plan     Active Problems:    Hip fracture (HCC) (9/18/2022)    Impaired Mobility and ADLs  Right hip fracture secondary to mechanical fall s/p repair  Anemia, multifactorial  Vitamin B12 deficiency  Iron deficiency  Oblqiue fracture of fifth metacarpal  HTN  CAD  History of hypothyroidism  Incidental right ureteric stone causing right ureteral hydronephrosis      Continue supplements  On amlodipine and metoprolol.   Monitor  Continue statin and aspirin  Monitor sugars, sliding scale insulin  On Synthroid  Bowel regimen  PT and OT    Jak Escobar MD  10/1/2022 11:24 PM      Time spent: 30 minutes

## 2022-10-01 NOTE — ROUTINE PROCESS
SHIFT CHANGE NOTE FOR [facility]    Bedside and Verbal shift change report given to Mila MENDOZA RN (oncoming nurse) by Sue Feldman RN (offgoing nurse). Report included the following information SBAR, Kardex, MAR and Recent Results. Situation:   Code Status: DNR   Hospital Day: 9   Problem List:   Hospital Problems  Date Reviewed: 5/31/2022            Codes Class Noted POA    Hip fracture [facility]) ICD-10-CM: L34.624P  ICD-9-CM: 820.8  9/18/2022 Unknown           Background:   Past Medical History:   Past Medical History:   Diagnosis Date    CAD (coronary artery disease) 2008    Fibromyalgia     Heart failure ([facility] [address]     Pt. states sge gas 5 heart blocks.     Hypercholesterolemia     Hypertension     Menopause     Thyroid disease         Assessment:  Changes in Assessment throughout shift: No change to previous assessment    Patient has a central line: no Reasons if yes: na  Insertion date:na Last dressing date:na  Patient has Elliott Cath: no Reasons if yes: na   Insertion date:na  Shift elliott care completed: NO    Last Vitals:     Vitals:    09/30/22 1624 09/30/22 2044 10/01/22 0736 10/01/22 1615   BP: (!) 119/50 (!) 150/60 (!) 168/66 (!) 130/59   Pulse: 62 65 65 70   Resp: 18 16 18 18   Temp: 98.6 °F (37 °C) 98.2 °F (36.8 °C) 98.4 °F (36.9 °C) 98.5 °F (36.9 °C)   SpO2: 98% 99% 97% 97%   Weight:       Height:           PAIN    Pain Assessment    Pain Intensity 1: 0 (10/01/22 1615) Pain Intensity 1: 2 (12/29/14 1105)    Pain Location 1: Hip Pain Location 1: Abdomen    Pain Intervention(s) 1: Repositioned Pain Intervention(s) 1: Medication (see MAR)  Patient Stated Pain Goal: 0 Patient Stated Pain Goal: 0  Intervention effective: yes  Other actions taken for pain:      Skin Assessment  Skin color Skin Color: Appropriate for ethnicity  Condition/Temperature    Integrity Skin Integrity: Incision (comment) (R hip)  Turgor    Weekly Pressure Ulcer Documentation  Pressure  Injury Documentation: No Pressure Injury Noted-Pressure Ulcer Prevention Initiated  Wound Prevention & Protection Methods  Orientation of wound Orientation of Wound Prevention: Posterior  Location of Prevention Location of Wound Prevention: Sacrum/Coccyx  Dressing Present Dressing Present : Yes  Dressing Status Dressing Status: Intact  Wound Offloading Wound Offloading (Prevention Methods): Bed, pressure reduction mattress, Wheelchair, Repositioning, Pillows, Foam silicone, Turning, Chair cushion    INTAKE/OUPUT  Date 09/30/22 1900 - 10/01/22 0659 10/01/22 0700 - 10/02/22 0659   Shift 2520-9060 24 Hour Total 2026-5409 7837-6667 24 Hour Total   INTAKE   P.O.   1200     P. O.   1200   Shift Total(mL/kg) 360(5.8) 360(5.8) 1200(19.2)  1200(19.2)   OUTPUT   Urine(mL/kg/hr) 0 0 400(0.5)  400     Urine Voided 0 0 400  400     Urine Occurrence(s) 2 x 3 x 4 x  4 x   Stool          Stool Occurrence(s) 0 x 0 x 0 x  0 x   Shift Total(mL/kg) 0(0) 0(0) 400(6.4)  400(6.4)    360 800  800   Weight (kg) 62.4 62.4 62.4 62.4 62.4       Recommendations:  Patient needs and requests: na    Pending tests/procedures: na     Functional Level/Equipment: Partial (one person) /      Fall Precautions:   Fall risk precautions were reinforced with the patient; she was instructed to call for help prior to getting up. The following fall risk precautions were continued: bed/ chair alarms, door signage, yellow bracelet and socks as well as update of the Bobbetta Hoop tool in the patient's room. Alma Rosa Score: 5    HEALS Safety Check    A safety check occurred in the patient's room between off going nurse and oncoming nurse listed above. The safety check included the below items  Area Items   H  High Alert Medications Verify all high alert medication drips (heparin, PCA, etc.)   E  Equipment Suction is set up for ALL patients (with yanker)  Red plugs utilized for all equipment (IV pumps, etc.)  WOWs wiped down at end of shift.   Room stocked with oxygen, suction, and other unit-specific supplies   A  Alarms Bed alarm is set for fall risk patients  Ensure chair alarm is in place and activated if patient is up in a chair   L  Lines Check IV for any infiltration  Talley bag is empty if patient has a Talley   Tubing and IV bags are labeled   S  Safety   Room is clean, patient is clean, and equipment is clean. Hallways are clear from equipment besides carts. Fall bracelet on for fall risk patients  Ensure room is clear and free of clutter  Suction is set up for ALL patients (with geneva)  Hallways are clear from equipment besides carts.    Isolation precautions followed, supplies available outside room, sign posted     Mallory Holloway RN

## 2022-10-02 LAB
GLUCOSE BLD STRIP.AUTO-MCNC: 102 MG/DL (ref 70–110)
GLUCOSE BLD STRIP.AUTO-MCNC: 103 MG/DL (ref 70–110)
GLUCOSE BLD STRIP.AUTO-MCNC: 108 MG/DL (ref 70–110)
GLUCOSE BLD STRIP.AUTO-MCNC: 95 MG/DL (ref 70–110)

## 2022-10-02 PROCEDURE — 65310000000 HC RM PRIVATE REHAB

## 2022-10-02 PROCEDURE — 74011250637 HC RX REV CODE- 250/637: Performed by: HOSPITALIST

## 2022-10-02 PROCEDURE — 97530 THERAPEUTIC ACTIVITIES: CPT

## 2022-10-02 PROCEDURE — 74011250637 HC RX REV CODE- 250/637: Performed by: FAMILY MEDICINE

## 2022-10-02 PROCEDURE — 82962 GLUCOSE BLOOD TEST: CPT

## 2022-10-02 PROCEDURE — 97535 SELF CARE MNGMENT TRAINING: CPT

## 2022-10-02 RX ADMIN — METOPROLOL SUCCINATE 25 MG: 25 TABLET, FILM COATED, EXTENDED RELEASE ORAL at 09:25

## 2022-10-02 RX ADMIN — LEVOTHYROXINE SODIUM 75 MCG: 0.05 TABLET ORAL at 06:01

## 2022-10-02 RX ADMIN — ASPIRIN 81 MG CHEWABLE TABLET 81 MG: 81 TABLET CHEWABLE at 18:27

## 2022-10-02 RX ADMIN — SENNOSIDES AND DOCUSATE SODIUM 1 TABLET: 50; 8.6 TABLET ORAL at 18:27

## 2022-10-02 RX ADMIN — SENNOSIDES AND DOCUSATE SODIUM 1 TABLET: 50; 8.6 TABLET ORAL at 09:25

## 2022-10-02 RX ADMIN — FERROUS SULFATE TAB 325 MG (65 MG ELEMENTAL FE) 325 MG: 325 (65 FE) TAB at 09:25

## 2022-10-02 RX ADMIN — ISOSORBIDE MONONITRATE 60 MG: 60 TABLET, EXTENDED RELEASE ORAL at 09:25

## 2022-10-02 RX ADMIN — ASPIRIN 81 MG CHEWABLE TABLET 81 MG: 81 TABLET CHEWABLE at 09:25

## 2022-10-02 RX ADMIN — ATORVASTATIN CALCIUM 40 MG: 40 TABLET, FILM COATED ORAL at 21:23

## 2022-10-02 RX ADMIN — POLYETHYLENE GLYCOL 3350 17 G: 17 POWDER, FOR SOLUTION ORAL at 09:25

## 2022-10-02 RX ADMIN — AMLODIPINE BESYLATE 10 MG: 10 TABLET ORAL at 09:25

## 2022-10-02 RX ADMIN — HYDROCODONE BITARTRATE AND ACETAMINOPHEN 1 TABLET: 5; 325 TABLET ORAL at 18:27

## 2022-10-02 NOTE — PROGRESS NOTES
Problem: Self Care Deficits Care Plan (Adult)  Goal: *Acute Goals and Plan of Care (Insert Text)  Description: Occupational Therapy Goals   Long Term Goals  Initiated (2022) and to be accomplished within 3 week(s), by (10/14/2022)  1. Pt will perform self-feeding with Mod I.  2. Pt will perform grooming with Mod I.  3. Pt will perform UB bathing with set-up. 4. Pt will perform LB bathing with supv using AE and/ or compensatory strategies while maintaining hip precautions. 5. Pt will perform tub/shower transfer with SBA using LRAD. 6. Pt will perform UB dressing with Mod I.  7. Pt will perform LB dressing with SBA using AE and/ or compensatory strategies while maintaining hip precautions. 8. Pt will perform toileting task with supv/ SBA. 9. Pt will perform toilet transfer with supv using LRAD. Short Term Goals   Initiated (2022) and to be accomplished within 7 day(s), by (2022) (Reassessed on 2022)  1. Pt will perform self-feeding with set-up/ supv. (Goal met 2022)  2. Pt will perform grooming with supv. (Goal met 2022)  3. Pt will perform UB bathing with supv. (Goal met 2022)  4. Pt will perform LB bathing with Min A using AE and/ or compensatory strategies while maintaining hip precautions. (Goal met 2022)  5. Pt will perform tub/shower transfer with Min A using LRAD. (Goal met 2022)  6. Pt will perform UB dressing with SBA. (Goal met 2022)  7. Pt will perform LB dressing with Mod A using AE and/ or compensatory strategies while maintaining hip precautions. (Goal met 2022)  8. Pt will perform toileting task with Mod A. (Goal met 2022)  9. Pt will perform toilet transfer with Min A using LRAD. (Goal met 2022)    Outcome: Progressing Towards Goal     Occupational Therapy TREATMENT    Patient: Kings Bunn   80 y.o.     Patient identified with name and : yes    Date: 10/2/2022    First Tx Session  Time In: 0738  Time Out[de-identified] 8012    Diagnosis: Hip fracture ([facility] [address] [S72.009A]   Precautions: Total hip, Fall  Chart, occupational therapy assessment, plan of care, and goals were reviewed. Pain:  Pt reports 0/10 pain or discomfort prior to treatment. Pt reports 0/10 pain or discomfort post treatment. Pt reported a \"slight ache\" during ADL tasks, however states discomfort subsided quickly. SUBJECTIVE:   Patient stated Oh I want to just wash up. I just took a shower a few days ago and I don't shower that often at home.  \"I do everything on my own- all of my medications, cleaning, changing my bed- they're not used to helping me. I hope they don't mind\"    OBJECTIVE DATA SUMMARY:     THERAPEUTIC ACTIVITY Daily Assessment    Pt sorted 6 simulated medications into a twice daily pill organizer to facilitate [facility] and increase independence w/ medication management. Therapist provided verbal instruction and visual demonstration for task. Pt able to sort 1/6 simulated medications correctly despite VC's and visual demo. Increased time spent educating pt on importance of proper medication adherence for safety. Pt recommended to complete IADL task w/ caregivers to ensure accuracy upon d/c. The pt retrieved 22 beads from red putty to promote dexterity and hand strength via pincer grasp pattern. Increased time needed to complete task. The pt placed large pegs onto upright peg board imitating 2 given designs to facilitate pincer grasp and functional reach outside of KAMILA. Mod VC's to initiate task, however pt copied design w/ 90% accuracy. GROOMING Daily Assessment    Functional Level: 5  Tasks Completed by Patient: Brushed teeth; Washed face  Comments: Pt completed oral hygiene standing at sinkside w/ FWW and CGA. VC's to position self closer to countertop for stability and to maintain upright posture.  Pt washed face seated at EOB using washcloth w/ SPV/set up    Oral hygiene: 5     BATHING Daily Assessment    Functional Level: 4  Body Parts Patient Bathed: Abdomen;Arm, left;Arm, right;Chest;Delroy area; Thigh, left; Thigh, right  Comments: Pt bathed BUE, trunk, jian thighs, and delroy area seated at EOB using washcloth and basin. Pt used long handled sponge to wash distal BLE w/ min A to completely wash feet and buttocks. CGA while standing to wash delroy area. UPPER BODY DRESSING Daily Assessment    Functional Level: 5  Items Applied/Steps Completed: Pullover (4 steps)  Comments: Pt donned long sleeve shirt seated at EOB w/ SPV. Increased time needed to thread shirt over R soft arm cast.     LOWER BODY DRESSING Daily Assessment    Functional Level: 4  Items Applied/Steps Completed: Underpants (3 steps); Sock, right (1 step); Sock, left (1 step); Elastic waist pants (3 steps)  Comments: CGA while standing to pull scrub pants off of hips w/ FWW. Pt used reacher to pull pants off of BLE while maintaining hip precautions. Pt used reacher to thread BLE through elastic waist pants and CGA needed to maintain balance while standing to manage clothing over hips. VC's to maintain alternating unilateral support on FWW for safety. Pt used sock aid w/ min VC's for technique to don jian socks while seated at EOB. Assistance needed from therapist to don jian compression stockings. MOBILITY/TRANSFERS Daily Assessment    The pt completed w/c mobility from room > therapy gym using primarily LUE w/ min A for navigation. No rest breaks needed. ASSESSMENT:  The pt is making good progress towards goals and demo'd good carryover of AE (long handled sponge, long handled reacher, sock aid) for LB dressing and bathing. The pt was limited this session by difficulty hearing and sequencing tasks and reports she does not have batteries in her L hearing aid. The pt was able to verbalize 2/3 hip precautions and needed min VC's throughout ADL tasks to prevent crossing legs or bending at the hip.  Pt would benefit from mass practice of AE for LB dressing, functional transfers, and standing tolerance to increase independence w/ ADLs and IADLs and reduce level of caregiver burden. Progression toward goals:  []          Improving appropriately and progressing toward goals  [x]          Improving slowly and progressing toward goals  []          Not making progress toward goals and plan of care will be adjusted     PLAN:  Patient continues to benefit from skilled intervention to address the above impairments. Continue treatment per established plan of care. Discharge Recommendations:  Home Health w/ family assist  Further Equipment Recommendations for Discharge:  bedside commode, transfer bench, and gait belt     Activity Tolerance:  Fair      Estimated LOS: expected d/c 10/05/22  IRF-SINTIA Completed: N/A      Please refer to the flowsheet for vital signs taken during this treatment. After treatment:   [x]  Patient left in no apparent distress sitting up in chair (chair alarm activated)  []  Patient left in no apparent distress in bed  [x]  Call bell left within reach  [x]  Nursing notified  []  Caregiver present  []  Bed alarm activated    COMMUNICATION/EDUCATION:   [x] Home safety education was provided and the patient/caregiver indicated understanding. [x] Patient/family have participated as able in goal setting and plan of care. [x] Patient/family agree to work toward stated goals and plan of care. [] Patient understands intent and goals of therapy, but is neutral about his/her participation. [] Patient is unable to participate in goal setting and plan of care.       Coral Helm, OT

## 2022-10-02 NOTE — PROGRESS NOTES
Progress Note    Patient: Leonarda Mccord MRN: [id number]  CSN: [id number]    YOB: 1924  Age: 80 y.o. Sex: female    DOA: 9/22/2022 LOS:  LOS: 10 days                    Subjective:   Feels well. Doing well with PT. Did not sleep well overnight. Did not ask for melatonin. Otherwise, denies HA, CP, SOB, dizziness, hip pain, nausea, vomit. Review of systems  General: No fevers or chills. Cardiovascular: No chest pain or pressure. No palpitations. Pulmonary: No shortness of breath, cough or wheeze. Gastrointestinal: No abdominal pain, nausea, vomiting or diarrhea. Genitourinary: No urinary frequency, urgency, hesitancy or dysuria. Musculoskeletal: No joint or muscle pain, no back pain, no recent trauma. Neurologic: No headache, numbness, tingling or weakness. Objective:     Physical Exam:      Visit Vitals  BP (!) 164/67 (BP 1 Location: Left upper arm, BP Patient Position: Supine)   Pulse (!) 58   Temp 98.6 °F (37 °C)   Resp 18   Ht 5' 2\" (1.575 m)   Wt 62.4 kg (137 lb 9.6 oz)   SpO2 96%   Breastfeeding Unknown   BMI 25.17 kg/m²        General:         Alert, cooperative, no acute distress    HEENT: NC, Atraumatic. PERRLA, anicteric sclerae. Lungs: CTA Bilaterally. No Wheezing/Rhonchi/Rales. Heart:  Regular  rhythm,  No murmur, No Rubs, No Gallops  Abdomen: Soft, Non distended, Non tender. +Bowel sounds, no HSM  Extremities: No c/c/e  Psych:   Good insight. Not anxious or agitated. Neurologic:  CN 2-12 grossly intact, Alert and oriented X 3. No acute neurological                                 Deficits,     Intake and Output:  Current Shift:  10/02 0701 - 10/02 1900  In: 840 [P.O.:840]  Out: -   Last three shifts:  09/30 1901 - 10/02 0700  In: 1560 [P.O.:1560]  Out: 700 [Urine:700]    Labs: Results:       Chemistry No results for input(s): GLU, NA, K, CL, CO2, BUN, CREA, CA, AGAP, BUCR, TBIL, AP, TP, ALB, GLOB, AGRAT in the last 72 hours.     No lab exists for component: GPT   CBC w/Diff No results for input(s): WBC, RBC, HGB, HCT, PLT, GRANS, LYMPH, EOS, HGBEXT, HCTEXT, PLTEXT, HGBEXT, HCTEXT, PLTEXT in the last 72 hours. Cardiac Enzymes No results for input(s): CPK, CKND1, IMANI in the last 72 hours. No lab exists for component: CKRMB, TROIP   Coagulation No results for input(s): PTP, INR, APTT, INREXT, INREXT in the last 72 hours. Lipid Panel Lab Results   Component Value Date/Time    Cholesterol, total 165 08/20/2019 09:43 AM    HDL Cholesterol 47 08/20/2019 09:43 AM    LDL, calculated 86.8 08/20/2019 09:43 AM    VLDL, calculated 31.2 08/20/2019 09:43 AM    Triglyceride 156 (H) 08/20/2019 09:43 AM    CHOL/HDL Ratio 3.5 08/20/2019 09:43 AM      BNP No results for input(s): BNPP in the last 72 hours. Liver Enzymes No results for input(s): TP, ALB, TBIL, AP in the last 72 hours.     No lab exists for component: SGOT, GPT, DBIL   Thyroid Studies Lab Results   Component Value Date/Time    TSH 0.85 08/20/2019 09:43 AM          Procedures/imaging: see electronic medical records for all procedures/Xrays and details which were not copied into this note but were reviewed prior to creation of Plan    Medications:   Current Facility-Administered Medications   Medication Dose Route Frequency    melatonin (rapid dissolve) tablet 5 mg  5 mg Oral QHS PRN    polyethylene glycol (MIRALAX) packet 17 g  17 g Oral DAILY    HYDROcodone-acetaminophen (NORCO) 5-325 mg per tablet 1-2 Tablet  1-2 Tablet Oral Q4H PRN    ferrous sulfate tablet 325 mg  1 Tablet Oral DAILY WITH BREAKFAST    magnesium hydroxide (MILK OF MAGNESIA) 400 mg/5 mL oral suspension 30 mL  30 mL Oral DAILY PRN    bisacodyL (DULCOLAX) tablet 10 mg  10 mg Oral Q48H PRN    bisacodyL (DULCOLAX) suppository 10 mg  10 mg Rectal Q48H PRN    aspirin chewable tablet 81 mg  81 mg Oral BID    senna-docusate (PERICOLACE) 8.6-50 mg per tablet 1 Tablet  1 Tablet Oral BID    atorvastatin (LIPITOR) tablet 40 mg  40 mg Oral QHS metoprolol succinate (TOPROL-XL) XL tablet 25 mg  25 mg Oral DAILY    amLODIPine (NORVASC) tablet 10 mg  10 mg Oral DAILY    isosorbide mononitrate ER (IMDUR) tablet 60 mg  60 mg Oral DAILY    levothyroxine (SYNTHROID) tablet 75 mcg  75 mcg Oral 6am    nitroglycerin (NITROSTAT) tablet 0.4 mg  0.4 mg SubLINGual PRN    insulin lispro (HUMALOG) injection   SubCUTAneous AC&HS    glucose chewable tablet 16 g  16 g Oral PRN    glucagon (GLUCAGEN) injection 1 mg  1 mg IntraMUSCular PRN    dextrose 10% infusion 0-250 mL  0-250 mL IntraVENous PRN       Assessment/Plan     Active Problems:    Hip fracture (HCC) (9/18/2022)  Impaired Mobility and ADLs  Right hip fracture secondary to mechanical fall s/p repair  Anemia, multifactorial  Vitamin B12 deficiency  Iron deficiency  Oblqiue fracture of fifth metacarpal  HTN  CAD  History of hypothyroidism  Incidental right ureteric stone causing right ureteral hydronephrosis      Continue supplements  On amlodipine and metoprolol.     Continue statin and aspirin  Monitor sugars, sliding scale insulin  On Synthroid  Bowel regimen  PT and OT    Pt seen and examined review chart discussed with patient and nursing staff     John Garrido MD  10/2/2022 11:24 PM      Time spent: 33 minutes

## 2022-10-02 NOTE — ROUTINE PROCESS
[facility]    Bedside and Verbal shift change report given to Marsha Zaman RN (oncoming nurse) by John Gaston RN (offgoing nurse). Report included the following information SBAR, Kardex, MAR and Recent Results. Situation:   Code Status: DNR   Hospital Day: 10   Problem List:   Hospital Problems  Date Reviewed: 5/31/2022            Codes Class Noted POA    Hip fracture [facility]) ICD-10-CM: L14.870D  ICD-9-CM: 820.8  9/18/2022 Unknown           Background:   Past Medical History:   Past Medical History:   Diagnosis Date    CAD (coronary artery disease) 2008    Fibromyalgia     Heart failure ([facility] [address]     Pt. states sge gas 5 heart blocks.     Hypercholesterolemia     Hypertension     Menopause     Thyroid disease         Assessment:  Changes in Assessment throughout shift: No change to previous assessment    Patient has a central line: no Reasons if yes: na  Insertion date:na Last dressing date:na  Patient has Elliott Cath: no Reasons if yes: na   Insertion date:na  Shift elliott care completed: NO    Last Vitals:     Vitals:    10/01/22 1615 10/01/22 2029 10/02/22 0727 10/02/22 1612   BP: (!) 130/59 (!) 147/53 (!) 155/55 (!) 164/67   Pulse: 70 65 62 (!) 58   Resp: 18 18 18 18   Temp: 98.5 °F (36.9 °C) 98.2 °F (36.8 °C) 98.2 °F (36.8 °C) 98.6 °F (37 °C)   SpO2: 97% 97% 98% 96%   Weight:       Height:           PAIN    Pain Assessment    Pain Intensity 1: 6 (10/02/22 1828) Pain Intensity 1: 2 (12/29/14 1105)    Pain Location 1: Leg Pain Location 1: Abdomen    Pain Intervention(s) 1: Medication (see MAR) Pain Intervention(s) 1: Medication (see MAR)  Patient Stated Pain Goal: 0 Patient Stated Pain Goal: 0  Intervention effective: yes  Other actions taken for pain: Medication (see MAR)    Skin Assessment  Skin color Skin Color: Appropriate for ethnicity  Condition/Temperature    Integrity Skin Integrity: Incision (comment)  Turgor    Weekly Pressure Ulcer Documentation  Pressure  Injury Documentation: No Pressure Injury Noted-Pressure Ulcer Prevention Initiated  Wound Prevention & Protection Methods  Orientation of wound Orientation of Wound Prevention: Posterior  Location of Prevention Location of Wound Prevention: Sacrum/Coccyx  Dressing Present Dressing Present : Yes  Dressing Status Dressing Status: Intact  Wound Offloading Wound Offloading (Prevention Methods): Bed, pressure reduction mattress, Elevate heels, Pillows, Walker, Wheelchair    INTAKE/OUPUT  Date 10/01/22 0700 - 10/02/22 0659 10/02/22 0700 - 10/03/22 0659   Shift 0700-1859 1900-0659 24 Hour Total 5829-0083 2642-7373 24 Hour Total   INTAKE   P.O. 1200  1200 1320  1320     P. O. 1200  1200 1320  1320   Shift Total(mL/kg) 1200(19.2)  1200(19.2) 1320(21.1)  1320(21.1)   OUTPUT   Urine(mL/kg/hr) 400(0.5) 300(0.4) 700(0.5)        Urine Voided 400 300 700        Urine Occurrence(s) 4 x 4 x 8 x 4 x  4 x   Emesis/NG output           Emesis Occurrence(s)  0 x 0 x      Stool           Stool Occurrence(s) 0 x 0 x 0 x 0 x  0 x   Shift Total(mL/kg) 400(6.4) 300(4.8) 700(11.2)       -  1320   Weight (kg) 62.4 62.4 62.4 62.4 62.4 62.4       Recommendations:  Patient needs and requests: na    Pending tests/procedures: na     Functional Level/Equipment: Partial (one person) /      Fall Precautions:   Fall risk precautions were reinforced with the patient; she was instructed to call for help prior to getting up. The following fall risk precautions were continued: bed/ chair alarms, door signage, yellow bracelet and socks as well as update of the Shirley Hedge tool in the patient's room. Alma Rosa Score: 5    HEALS Safety Check    A safety check occurred in the patient's room between off going nurse and oncoming nurse listed above.     The safety check included the below items  Area Items   H  High Alert Medications Verify all high alert medication drips (heparin, PCA, etc.)   E  Equipment Suction is set up for ALL patients (with geneva)  Red plugs utilized for all equipment (IV pumps, etc.)  WOWs wiped down at end of shift. Room stocked with oxygen, suction, and other unit-specific supplies   A  Alarms Bed alarm is set for fall risk patients  Ensure chair alarm is in place and activated if patient is up in a chair   L  Lines Check IV for any infiltration  Talley bag is empty if patient has a Talley   Tubing and IV bags are labeled   S  Safety   Room is clean, patient is clean, and equipment is clean. Hallways are clear from equipment besides carts. Fall bracelet on for fall risk patients  Ensure room is clear and free of clutter  Suction is set up for ALL patients (with yanker)  Hallways are clear from equipment besides carts.    Isolation precautions followed, supplies available outside room, sign posted     Ricarda Washburn RN

## 2022-10-02 NOTE — REHAB NOTE
Wound Prevention Checklist    Patient: Pool Arce (1 y.o. female)  Date: 10/2/2022  Diagnosis: Hip fracture ([facility] [address] Neema Hinkle <principal problem not specified>    Precautions:  Total hip, Fall       []  Heel prevention boots placed on patient    []  Patient turned q2h during shift    []  Lift team ordered    []  Patient on [facility] bed/Specialty bed    []  Each Wound is documented during shift (Stage, Color, drainage, odor, measurements, and dressings)    []  Dual skin check done with Ronny Fothergill, CNA Susana Gearing, RN

## 2022-10-03 LAB
GLUCOSE BLD STRIP.AUTO-MCNC: 101 MG/DL (ref 70–110)
GLUCOSE BLD STRIP.AUTO-MCNC: 108 MG/DL (ref 70–110)
GLUCOSE BLD STRIP.AUTO-MCNC: 93 MG/DL (ref 70–110)
GLUCOSE BLD STRIP.AUTO-MCNC: 94 MG/DL (ref 70–110)

## 2022-10-03 PROCEDURE — 82962 GLUCOSE BLOOD TEST: CPT

## 2022-10-03 PROCEDURE — 97110 THERAPEUTIC EXERCISES: CPT

## 2022-10-03 PROCEDURE — 97530 THERAPEUTIC ACTIVITIES: CPT

## 2022-10-03 PROCEDURE — 74011250637 HC RX REV CODE- 250/637: Performed by: FAMILY MEDICINE

## 2022-10-03 PROCEDURE — 99232 SBSQ HOSP IP/OBS MODERATE 35: CPT | Performed by: INTERNAL MEDICINE

## 2022-10-03 PROCEDURE — 2709999900 HC NON-CHARGEABLE SUPPLY

## 2022-10-03 PROCEDURE — 97535 SELF CARE MNGMENT TRAINING: CPT

## 2022-10-03 PROCEDURE — 74011250637 HC RX REV CODE- 250/637: Performed by: HOSPITALIST

## 2022-10-03 PROCEDURE — 97116 GAIT TRAINING THERAPY: CPT

## 2022-10-03 PROCEDURE — 65310000000 HC RM PRIVATE REHAB

## 2022-10-03 RX ADMIN — ASPIRIN 81 MG CHEWABLE TABLET 81 MG: 81 TABLET CHEWABLE at 18:01

## 2022-10-03 RX ADMIN — FERROUS SULFATE TAB 325 MG (65 MG ELEMENTAL FE) 325 MG: 325 (65 FE) TAB at 08:29

## 2022-10-03 RX ADMIN — ASPIRIN 81 MG CHEWABLE TABLET 81 MG: 81 TABLET CHEWABLE at 08:29

## 2022-10-03 RX ADMIN — LEVOTHYROXINE SODIUM 75 MCG: 0.05 TABLET ORAL at 06:23

## 2022-10-03 RX ADMIN — ATORVASTATIN CALCIUM 40 MG: 40 TABLET, FILM COATED ORAL at 21:19

## 2022-10-03 RX ADMIN — ISOSORBIDE MONONITRATE 60 MG: 60 TABLET, EXTENDED RELEASE ORAL at 08:29

## 2022-10-03 RX ADMIN — BISACODYL 10 MG: 5 TABLET, COATED ORAL at 08:29

## 2022-10-03 RX ADMIN — POLYETHYLENE GLYCOL 3350 17 G: 17 POWDER, FOR SOLUTION ORAL at 08:28

## 2022-10-03 RX ADMIN — HYDROCODONE BITARTRATE AND ACETAMINOPHEN 1 TABLET: 5; 325 TABLET ORAL at 20:03

## 2022-10-03 RX ADMIN — SENNOSIDES AND DOCUSATE SODIUM 1 TABLET: 50; 8.6 TABLET ORAL at 08:29

## 2022-10-03 RX ADMIN — AMLODIPINE BESYLATE 10 MG: 10 TABLET ORAL at 08:29

## 2022-10-03 RX ADMIN — METOPROLOL SUCCINATE 25 MG: 25 TABLET, FILM COATED, EXTENDED RELEASE ORAL at 08:29

## 2022-10-03 NOTE — PROGRESS NOTES
Wound Prevention Checklist    Patient: Silva Shoulders (76 y.o. female)  Date: 10/3/2022  Diagnosis: Hip fracture ([facility] [address] Rocky Retana <principal problem not specified>    Precautions:  Total hip, Fall       []  Heel prevention boots placed on patient    []  Patient turned q2h during shift    []  Lift team ordered    []  Patient on Peyman bed/Specialty bed    [x]  Each Wound is documented during shift (Stage, Color, drainage, odor, measurements, and dressings)    [x]  Dual skin check done with Claudia Metzger RN

## 2022-10-03 NOTE — PROGRESS NOTES
Problem: Pain  Goal: *Control of Pain  Outcome: Progressing Towards Goal     Problem: Patient Education: Go to Patient Education Activity  Goal: Patient/Family Education  Outcome: Progressing Towards Goal     Problem: Falls - Risk of  Goal: *Absence of Falls  Description: Document Anisa Diego Fall Risk and appropriate interventions in the flowsheet. Outcome: Progressing Towards Goal  Note: Fall Risk Interventions:  Mobility Interventions: Bed/chair exit alarm, Patient to call before getting OOB, Utilize walker, cane, or other assistive device    Mentation Interventions: Bed/chair exit alarm, Door open when patient unattended, Room close to nurse's station, More frequent rounding    Medication Interventions: Bed/chair exit alarm, Patient to call before getting OOB, Teach patient to arise slowly    Elimination Interventions: Bed/chair exit alarm, Call light in reach, Patient to call for help with toileting needs, Stay With Me (per policy), Toileting schedule/hourly rounds    History of Falls Interventions: Bed/chair exit alarm, Door open when patient unattended, Room close to nurse's station         Problem: Patient Education: Go to Patient Education Activity  Goal: Patient/Family Education  Outcome: Progressing Towards Goal     Problem: Pressure Injury - Risk of  Goal: *Prevention of pressure injury  Description: Document Seamus Scale and appropriate interventions in the flowsheet. Outcome: Progressing Towards Goal  Note: Pressure Injury Interventions:  Sensory Interventions: Keep linens dry and wrinkle-free, Minimize linen layers, Pressure redistribution bed/mattress (bed type), Assess changes in LOC, Float heels, Turn and reposition approx.  every two hours (pillows and wedges if needed), Check visual cues for pain    Moisture Interventions: Absorbent underpads, Limit adult briefs, Minimize layers, Moisture barrier    Activity Interventions: Increase time out of bed, Pressure redistribution bed/mattress(bed type)    Mobility Interventions: HOB 30 degrees or less, Pressure redistribution bed/mattress (bed type)    Nutrition Interventions: Document food/fluid/supplement intake    Friction and Shear Interventions: HOB 30 degrees or less, Lift sheet, Minimize layers, Foam dressings/transparent film/skin sealants, Apply protective barrier, creams and emollients                Problem: Patient Education: Go to Patient Education Activity  Goal: Patient/Family Education  Outcome: Progressing Towards Goal     Problem: Inpatient Rehab (Adult)  Goal: *LTG: Avoids injury/falls 100% of time related to deficits  Outcome: Progressing Towards Goal  Goal: *LTG: Avoids infection 100% of time related to deficits  Outcome: Progressing Towards Goal  Goal: *LTG: Verbalize understanding of diagnosis and risk factors for recurring stroke  Outcome: Progressing Towards Goal  Goal: *LTG: Absence of DVT during hospitalization  Outcome: Progressing Towards Goal  Goal: *LTG: Maintains Skin Integrity With No Evidence of Pressure Injury 100% of Time  Outcome: Progressing Towards Goal  Goal: Interventions  Outcome: Progressing Towards Goal     Problem: Patient Education: Go to Patient Education Activity  Goal: Patient/Family Education  Outcome: Progressing Towards Goal

## 2022-10-03 NOTE — PROGRESS NOTES
Sw returned call to pt's son. Sw stated he had something to discuss that may impact pt's dc plan. Son declined to discuss on the phone and agreed to an 11 am meeting tomorrow and noted it needs to be held outside of the pt's room. Sw will remain available.

## 2022-10-03 NOTE — PROGRESS NOTES
Wound Prevention Checklist    Patient: Reji Chavez (87 y.o. female)  Date: 10/3/2022  Diagnosis: Hip fracture ([address] Rain Azevedo <principal problem not specified>    Precautions:  Total hip, Fall       []  Heel prevention boots placed on patient    [x]  Patient turned q2h during shift    []  Lift team ordered    []  Patient on [facility] bed/Specialty bed    []  Each Wound is documented during shift (Stage, Color, drainage, odor, measurements, and dressings)    [x]  Dual skin check done with AMY, RN Marina Scheuermann, RN

## 2022-10-03 NOTE — PROGRESS NOTES
Progress Note    Patient: Jodi Rizo MRN: [id number]  CSN: [id number]    YOB: 1924  Age: 80 y.o. Sex: female    DOA: 9/22/2022 LOS:  LOS: 11 days                    Subjective:     Fatigue on exertion. Otherwise, no new symptoms. Patient was seen in presence of PT. Right hip pain is much better currently. No tingling or numbness. Denies any chest or abdominal pain but no shortness of breath or cough. Objective:     Physical Exam:      Visit Vitals  BP (!) 159/70 (BP 1 Location: Left upper arm, BP Patient Position: Supine)   Pulse (!) 57   Temp 98.5 °F (36.9 °C)   Resp 18   Ht 5' 2\" (1.575 m)   Wt 62.4 kg (137 lb 9.6 oz)   SpO2 97%   Breastfeeding Unknown   BMI 25.17 kg/m²      General appearance - alert, follows verbal cues appropriately, and in no distress  Chest -clear air entry noted in bases, no wheezes  Heart - S1 and S2 normal  Abdomen - soft, nontender, nondistended, Bowel sounds present  Neurological - alert, oriented, normal speech, no focal findings noted  Musculoskeletal - no joint tenderness or swelling of knees bilaterally, limited range of motion of right hip while sitting in a chair. Right wrist immobilizer is in situ. Extremities - no pedal edema noted      Intake and Output:  Current Shift:  No intake/output data recorded. Last three shifts:  10/01 1901 - 10/03 0700  In: 1680 [P.O.:1680]  Out: 300 [Urine:300]    Labs: Results:       Chemistry No results for input(s): GLU, NA, K, CL, CO2, BUN, CREA, CA, AGAP, BUCR, TBIL, AP, TP, ALB, GLOB, AGRAT in the last 72 hours. No lab exists for component: GPT   CBC w/Diff No results for input(s): WBC, RBC, HGB, HCT, PLT, GRANS, LYMPH, EOS, HGBEXT, HCTEXT, PLTEXT, HGBEXT, HCTEXT, PLTEXT in the last 72 hours. Cardiac Enzymes No results for input(s): CPK, CKND1, IMANI in the last 72 hours.     No lab exists for component: CKRMB, TROIP   Coagulation No results for input(s): PTP, INR, APTT, INREXT, INREXT in the last 72 hours. Lipid Panel Lab Results   Component Value Date/Time    Cholesterol, total 165 08/20/2019 09:43 AM    HDL Cholesterol 47 08/20/2019 09:43 AM    LDL, calculated 86.8 08/20/2019 09:43 AM    VLDL, calculated 31.2 08/20/2019 09:43 AM    Triglyceride 156 (H) 08/20/2019 09:43 AM    CHOL/HDL Ratio 3.5 08/20/2019 09:43 AM      BNP No results for input(s): BNPP in the last 72 hours. Liver Enzymes No results for input(s): TP, ALB, TBIL, AP in the last 72 hours.     No lab exists for component: SGOT, GPT, DBIL   Thyroid Studies Lab Results   Component Value Date/Time    TSH 0.85 08/20/2019 09:43 AM          Procedures/imaging: see electronic medical records for all procedures/Xrays and details which were not copied into this note but were reviewed prior to creation of Plan    Medications:   Current Facility-Administered Medications   Medication Dose Route Frequency    melatonin (rapid dissolve) tablet 5 mg  5 mg Oral QHS PRN    polyethylene glycol (MIRALAX) packet 17 g  17 g Oral DAILY    HYDROcodone-acetaminophen (NORCO) 5-325 mg per tablet 1-2 Tablet  1-2 Tablet Oral Q4H PRN    ferrous sulfate tablet 325 mg  1 Tablet Oral DAILY WITH BREAKFAST    magnesium hydroxide (MILK OF MAGNESIA) 400 mg/5 mL oral suspension 30 mL  30 mL Oral DAILY PRN    bisacodyL (DULCOLAX) tablet 10 mg  10 mg Oral Q48H PRN    bisacodyL (DULCOLAX) suppository 10 mg  10 mg Rectal Q48H PRN    aspirin chewable tablet 81 mg  81 mg Oral BID    senna-docusate (PERICOLACE) 8.6-50 mg per tablet 1 Tablet  1 Tablet Oral BID    atorvastatin (LIPITOR) tablet 40 mg  40 mg Oral QHS    metoprolol succinate (TOPROL-XL) XL tablet 25 mg  25 mg Oral DAILY    amLODIPine (NORVASC) tablet 10 mg  10 mg Oral DAILY    isosorbide mononitrate ER (IMDUR) tablet 60 mg  60 mg Oral DAILY    levothyroxine (SYNTHROID) tablet 75 mcg  75 mcg Oral 6am    nitroglycerin (NITROSTAT) tablet 0.4 mg  0.4 mg SubLINGual PRN    insulin lispro (HUMALOG) injection   SubCUTAneous AC&HS glucose chewable tablet 16 g  16 g Oral PRN    glucagon (GLUCAGEN) injection 1 mg  1 mg IntraMUSCular PRN    dextrose 10% infusion 0-250 mL  0-250 mL IntraVENous PRN       Assessment/Plan     Active Problems:    Hip fracture (HCC) (9/18/2022)  Impaired Mobility and ADLs  Right hip fracture secondary to mechanical fall s/p repair  Anemia, multifactorial including iron deficiency  Vitamin B12 deficiency  Oblqiue fracture of fifth metacarpal  HTN  CAD  History of hypothyroidism  Incidental right ureteric stone causing right ureteral hydronephrosis    Resume PT and OT  Continue supplements  On amlodipine, Imdur and metoprolol. Continue statin and aspirin  Monitor sugars, sliding scale insulin  On Synthroid  Continue current pain management with bowel regimen      Ion Aranda MD  10/3/2022     Total time to take care of this patient was 25 minutes and more than 50% of time was spent counseling and coordinating care. Disclaimer: Sections of this note are dictated using utilizing voice recognition software, which may have resulted in some phonetic based errors in grammar and contents. Even though attempts were made to correct all the mistakes, some may have been missed, and remained in the body of the document. If questions arise, please contact our department.

## 2022-10-03 NOTE — PROGRESS NOTES
Problem: Mobility Impaired (Adult and Pediatric)  Goal: *Acute Goals and Plan of Care (Insert Text)  Description: Physical Therapy Short Term Goals  Initiated 9/23/2022 and re-assessed 9/30/2022 and to be accomplished within 7 day(s) (10/7/2022)  1. Patient will move from supine to sit and sit to supine , scoot up and down, and roll side to side in bed with supervision/set-up. Goal not met-currently SBA/minimal assistance 9/30/2022  2. Patient will transfer from bed to chair and chair to bed with minimal assistance/contact guard assist using the least restrictive device. Met goal-currently CGA 9/30/2022  3. Patient will perform sit to stand with minimal assistance/contact guard assist. Goal met-currently CGA 9/30/2022  4. Patient will ambulate with minimal assistance/contact guard assist for 50 feet with the least restrictive device. Goal not met consistently-currently 30 feet consistently with min/CGA and WC follow 2/2 increased fatigue 9/30/2022  5. Patient will ascend/descend 5 stairs with bilateral handrail(s) with minimal assistance/contact guard assist. Goal met-currently 6 steps with CGA and verbal cues for sequencing and hand/foot placement 9/30/2022    Physical Therapy Long Term Goals  Initiated 9/23/2022 and to be accomplished within 14-21 day(s) (10/07/2022-10/14/2022)  1. Patient will move from supine to sit and sit to supine , scoot up and down, and roll side to side in bed with supervision/set-up. 2.  Patient will transfer from bed to chair and chair to bed with supervision/set-up using the least restrictive device. 3.  Patient will perform sit to stand with supervision/set-up. 4.  Patient will ambulate with supervision/set-up for 150 feet with the least restrictive device. 5.  Patient will ascend/descend 5 stairs with bilateral handrail(s) with supervision/set-up.       Outcome: Progressing Towards Goal   PHYSICAL THERAPY TREATMENT    Patient: Penelope Aguilar (80 y.o. female)  Date: 10/3/2022  Diagnosis: Hip fracture ([facility] [address] [S72.009A]   Precautions: Total hip, Fall  Chart, physical therapy assessment, plan of care and goals were reviewed. Time In:1030  Time Out:1130    Patient seen for: AM;Balance activities; Family training;Gait training;Patient education; Therapeutic exercise;Transfer training    Pain:  Pt pain was reported as 0 pre-treatment. Pt pain was reported as 0 post-treatment. Intervention: not at this time    Patient identified with name and :Yes    SUBJECTIVE:      I am concerned about my medication when I get home. I don't know how I am going to get them organized. (Confirmed with nursing that patient's daughter in law is going to fill her medication box and she will also assist with the distribution.) Notified by MSW that patient's granddaughter will assist patient with medication distribution. OBJECTIVE DATA SUMMARY:    Objective:     GROSS ASSESSMENT Daily Assessment     AROM: Generally decreased, functional  Strength: Generally decreased, functional  Coordination: Within functional limits  Tone: Normal  Sensation: Impaired      BED/MAT MOBILITY Daily Assessment     Rolling Right : 0 (Not tested)  Rolling Left : 5 (Stand-by assistance)  Supine to Sit : 4 (Minimal assistance)  Sit to Supine : 5 (Supervision)      TRANSFERS Daily Assessment     Transfer Type: Other  Other:  (stand step without AD)  Transfer Assistance : 4 (Contact guard assistance)  Sit to Stand Assistance: Contact guard assistance  Car Transfers: Minimum assistance  Car Type: car simulator        GAIT Daily Assessment    Gait Description (WDL) Exceptions to WDL    Gait Abnormalities Antalgic;Decreased step clearance; Step to gait    Assistive device Gait belt;Walker, rolling    Ambulation assistance - level surface 5 (Stand-by assistance)    Distance  (62 ft)    Ambulation assistance- uneven surface      Comments Patient with decreased stance, right LE and when asked if she is ok, she reports that she is just very tired. STEPS/STAIRS Daily Assessment     Steps/Stairs ambulated  8    Assistance Required 4 (Contact guard assistance)    Rail Use BHR     Comments Patient requires verbal cues for sequencing, safe foot placement on step for ascending/descending and for hand placement on rails. Curbs/Ramps Contact guard assistance        BALANCE Daily Assessment     Sitting - Static: Good (unsupported)  Sitting - Dynamic: Fair (occasional)  Standing - Static: Fair  Standing - Dynamic : Impaired        WHEELCHAIR MOBILITY Daily Assessment     Able to Propel (ft):  (161)  Functional Level:  (4)  Curbs/Ramps Assist Required (FIM Score): 0 (Not tested)  Wheelchair Setup Assist Required : 3 (Moderate assistance)  Wheelchair Management: Manages left brake;Manages right brake        THERAPEUTIC EXERCISES Daily Assessment       Seated: AP, LAQs, abd/add with ball and red t-band x 15 reps each; 2 sets and patient reported feeling tired. Standing: Marches, ham curls, hip abduction x 10 reps each; 2 sets and patient reported increased fatigue after strength program.      Neuro Re-Education:  Standing unsupported with RW in front of her for 4 minutes  x 2 trials to improve standing balance with transfers. Patient required cues to reach out if she felt unsteady at any time, however patient did not require reaching balance checks. ASSESSMENT:  Patient is seen for deficits in strength, mobility, transfers, ambulation, safety and balance. Patient's son, daughter in law and granddaughter were present during the 1st 15 minutes of the session to ask and to address any concerns that they may have regarding the patient's return home. Family CGs reported that they felt comfortable with what they have been shown as patient performed bed mobility, transfers, ambulation and strength exercises.  Family was given the opportunity to ask questions and they were asked if all their questions had been address to which they all replied that their concerns had been addressed. Patient was then taken to the gym and she asked the therapist who was going to help her with her medicines. Therapist re-assured the patient that she would find out from the nurse and that she would ask her family members who would assist patient with her medication distribution. Spoke to assigned nurse, Ivis Iyer who reported that she had education with family regarding medication and that the patient's daughter in law and her granddaughter would be assisting her with medication. Patient was given information and she appeared to be relieved. Continue to address current deficits for improved functional independence . Progression toward goals:  [x]      Improving appropriately and progressing toward goals  []      Improving slowly and progressing toward goals  []      Not making progress toward goals and plan of care will be adjusted      PLAN:  Patient continues to benefit from skilled intervention to address the above impairments. Continue treatment per established plan of care. Discharge Recommendations:  Home Physical Therapy  Further Equipment Recommendations for Discharge:  rolling walker and N/A      Estimated Discharge Date:10/5/22      Activity Tolerance:   Patient tolerates session fair, patient's family was present and actively participated in family education session. Please refer to the flowsheet for vital signs taken during this treatment.     After treatment:   [] Patient left in no apparent distress in bed  [x] Patient left in no apparent distress sitting up in chair  [] Patient left in no apparent distress in w/c mobilizing under own power  [] Patient left in no apparent distress dining area  [] Patient left in no apparent distress mobilizing under own power  [x] Call bell left within reach  [x] Nursing notified  [x] Caregiver present  [] Bed alarm activated   [x] Chair alarm activated      Joanna Zeng  10/3/2022

## 2022-10-03 NOTE — PROGRESS NOTES
Problem: Self Care Deficits Care Plan (Adult)  Goal: *Acute Goals and Plan of Care (Insert Text)  Description: Occupational Therapy Goals   Long Term Goals  Initiated (2022) and to be accomplished within 3 week(s), by (10/14/2022)  1. Pt will perform self-feeding with Mod I.  2. Pt will perform grooming with Mod I.  3. Pt will perform UB bathing with set-up. 4. Pt will perform LB bathing with supv using AE and/ or compensatory strategies while maintaining hip precautions. 5. Pt will perform tub/shower transfer with SBA using LRAD. 6. Pt will perform UB dressing with Mod I.  7. Pt will perform LB dressing with SBA using AE and/ or compensatory strategies while maintaining hip precautions. 8. Pt will perform toileting task with supv/ SBA. 9. Pt will perform toilet transfer with supv using LRAD. Short Term Goals   Initiated (2022) and to be accomplished within 7 day(s), by (2022) (Reassessed on 2022)  1. Pt will perform self-feeding with set-up/ supv. (Goal met 2022)  2. Pt will perform grooming with supv. (Goal met 2022)  3. Pt will perform UB bathing with supv. (Goal met 2022)  4. Pt will perform LB bathing with Min A using AE and/ or compensatory strategies while maintaining hip precautions. (Goal met 2022)  5. Pt will perform tub/shower transfer with Min A using LRAD. (Goal met 2022)  6. Pt will perform UB dressing with SBA. (Goal met 2022)  7. Pt will perform LB dressing with Mod A using AE and/ or compensatory strategies while maintaining hip precautions. (Goal met 2022)  8. Pt will perform toileting task with Mod A. (Goal met 2022)  9. Pt will perform toilet transfer with Min A using LRAD. (Goal met 2022)    Outcome: Progressing Towards Goal  Goal: Interventions  Outcome: Progressing Towards Goal   Occupational Therapy TREATMENT    Patient: Xiao Su   80 y.o.     Patient identified with name and : yes    Date: 10/3/2022    First Tx Session  Time In: 36 (caregiver edu session with pt's granddaughter, son, and daughter-in-law in attendance)  Time Out: 1000    Second Tx Session  Time In: 1300  Time Out: 1410    Diagnosis: Hip fracture ([address] [S72.009A]   Precautions: Total hip, Fall precautions, Aspiration, Safety, right hip precautions, RLE WBAT, RUE (wrist/ forearm cast; fifth metacarpal fx); hard of hearing (hearing aid right ear)  Chart, occupational therapy assessment, plan of care, and goals were reviewed. Pain:  Pt reports 0/10 pain or discomfort prior to treatment. Pt reports 0/10 pain or discomfort post treatment. Intervention Provided: No complaints of pain at onset, during, or at conclusion of skilled occupational therapy session. SUBJECTIVE:   Patient stated I don't think that I need to go, but I will try.  Pt referring to addressing toileting self-care needs. OBJECTIVE DATA SUMMARY:     THERAPEUTIC ACTIVITY Daily Assessment    First tx session: Functional transfer training performed (SBA/ CGA) using RW; gait belt. Verbal cues for safe handling of AD (RW) and for body positioning. Edu/ instructed with practice/ repetition in use of AE for improved functional (I) with LB dressing tasks for adherence to total hip precautions. Second tx session: LIDAE/ RASHID [address] tabletop there act performed while participating in light cognitive activity (word search). Performed for visually scanning word search in order to locate and then [place] words found from a list. Pt able to locate 23 words. Performed to facilitate FM skills (functional grasp) during a light cognitive task (attention to task; problem solving) for carryover to improved functional (I) with ADL's/ IADL's.      Patient participated in [email or web address] there act during balloon tapping with therapist; performed for eye-hand coordination, range of motion, functional reach out-front, and activity tolerance for increased (I) with ADL's and functional mobility using LRAD (RW). Good participation. THERAPEUTIC EXERCISE Daily Assessment    First tx session: RUE/ LUE there ex performed using graded clothespins for reaching out-front and overhead to secure onto horizontal/ vertical tabletop stand. Increased task challenge with use of 1 lb wrist weight on right wrist; 1.5 lb left wrist. Verbal cues for alternating right/ left upper extremities and pinch patterns (tip pinch; three jaw coy; key pinch). Performed for ROM, pinch strength, and activity tolerance for increased functional (I) with ADL's. RUE/ LUE there ex performed using / arm bike (8 min duration) light resistance for ROM and strengthening for increased (I) with self-cares and functional mobility. Rest break x1 due to fatigue. Second tx session: RUE/ LUE there ex performed (15 reps x1) for shoulder flexion to 90 degrees, chest press/ across midline, elbow flexion/ extension, shoulder internal/ external rotation, and diagonals. Increased task challenge with use of 1 lb hand weight (right hand); 2 lb hand weight (left hand). Verbal cues for alternating upper extremities and for technique. Performed for ROM and strengthening for carryover to self-cares. RUE/ LUE yellow theraband exercises performed (10 reps x1) for chest pull, bicep curls, diagonals, and over head pull-down. Verbal cues with demonstration for hand placement and technique. HEP handout provided. GROOMING Daily Assessment    Functional Level: 5  Tasks Completed by Patient: Washed hands  Comments: Hand hygiene performed (set-up/ supv) at sink following toileting self-care. TOILETING Daily Assessment    Functional Level: 5 (SBA for CM and hygiene)  Comments: SBA for CM and hygiene; pt voided at this time using elevated seat over toilet. LOWER BODY DRESSING Daily Assessment    Functional Level: 5 (SBA)  Items Applied/Steps Completed: Sock, left (1 step); Sock, right (1 step)  Comments: Pt doffed/ donned slipper socks (SBA) seated edge of bed using AE (dressing stick; sock aid) for adherence to total hip precautions. Verbal cues with return demonstration for carryover of use of AE for LB dressing tasks. Sock and/or Shoe management: 5 SBA (slipper socks)      MOBILITY/TRANSFERS Daily Assessment    Toilet Transfer Score: 4 (Stand step xfer (SBA/ CGA) RW; gait belt)  Comments: Stand step xfer (SBA/ CGA) RW; gait belt         ASSESSMENT:  First tx session: caregiver edu session with pt's granddaughter, son Alice Squires, and daughter-in-law Senia Islas) in attendance. Therapist discussed pt's current level of assistance with self-cares. Pt performed functional mobility (SBA/ CGA) using RW and gait belt within pt's room environment to address toileting self-care needs. Edu/ instructed in safe handling of AD (RW). Reviewed use of AE for improved functional independence with LB dressing tasks and for adherence to total hip precautions. Discussed equipment recommendations for dc planning needs. Family verbalizing understanding of information discussed. Patient will benefit from continued skilled occupational therapy interventions to address decreased (I) with ADL's, decreased functional strength/ endurance, decreased activity tolerance, impaired balance/ coordination, decreased AROM, right wrist/ forearm soft cast 2/2 fifth metacarpal fx, right total hip/ WBAT, hip precautions, impaired functional transfers/ mobility, pain, impaired cognition, and safety awareness limiting pt's functional (I) with ADL's and mobility. Progression toward goals:  []          Improving appropriately and progressing toward goals  [x]          Improving slowly and progressing toward goals  []          Not making progress toward goals and plan of care will be adjusted     PLAN:  Patient continues to benefit from skilled intervention to address the above impairments. Continue treatment per established plan of care.   Discharge Recommendations: Home Health therapy with family assist   Further Equipment Recommendations for Discharge:  bedside commode, gait belt, and transfer bench     Activity Tolerance:  Fair; intermittent rest breaks due to fatigue and low endurance  Estimated LOS: 10/5/2022    Please refer to the flowsheet for vital signs taken during this treatment. After treatment:   [x]  Patient left in no apparent distress sitting up in wheelchair with needs met  []  Patient left in no apparent distress in bed  [x]  Call bell left within reach  [x]  Nursing notified  []  Caregiver present  [x]  Wheelchair alarm activated    COMMUNICATION/EDUCATION:   [x] Home safety education was provided and the patient/caregiver indicated understanding. [x] Patient/family have participated as able in goal setting and plan of care. [x] Patient/family agree to work toward stated goals and plan of care. [] Patient understands intent and goals of therapy, but is neutral about his/her participation. [] Patient is unable to participate in goal setting and plan of care.     [address] [facility], OT

## 2022-10-03 NOTE — PROGRESS NOTES
[facility]    Bedside and Verbal shift change report given to Yeny Ash (oncoming nurse) by Angie Kong RN (offgoing nurse). Report included the following information SBAR, Kardex, MAR and Recent Results. Situation:   Code Status: DNR   Hospital Day: 11   Problem List:   Hospital Problems  Date Reviewed: 5/31/2022            Codes Class Noted POA    Hip fracture [facility]) ICD-10-CM: O38.146W  ICD-9-CM: 820.8  9/18/2022 Unknown           Background:   Past Medical History:   Past Medical History:   Diagnosis Date    CAD (coronary artery disease) 2008    Fibromyalgia     Heart failure [address] [address]     Pt. states sge gas 5 heart blocks. Hypercholesterolemia     Hypertension     Menopause     Thyroid disease         Assessment:  Changes in Assessment throughout shift: No change to previous assessment    Patient has a central line: no Reasons if yes: Insertion date: Last dressing date:  Patient has Elliott Cath: no Reasons if yes:     Insertion date:  Shift elliott care completed:     Last Vitals:     Vitals:    10/01/22 2029 10/02/22 0727 10/02/22 1612 10/02/22 2009   BP: (!) 147/53 (!) 155/55 (!) 164/67 (!) 147/63   Pulse: 65 62 (!) 58 60   Resp: 18 18 18 18   Temp: 98.2 °F (36.8 °C) 98.2 °F (36.8 °C) 98.6 °F (37 °C) 97.9 °F (36.6 °C)   SpO2: 97% 98% 96% 96%   Weight:       Height:           PAIN    Pain Assessment    Pain Intensity 1: 0 (10/03/22 0441) Pain Intensity 1: 2 (12/29/14 1105)    Pain Location 1: Leg Pain Location 1: Abdomen    Pain Intervention(s) 1: Medication (see MAR) Pain Intervention(s) 1: Medication (see MAR)  Patient Stated Pain Goal: 0 Patient Stated Pain Goal: 0  Intervention effective: yes  Other actions taken for pain: Medication (see MAR)    Skin Assessment  Skin color Skin Color: Appropriate for ethnicity  Condition/Temperature    Integrity Skin Integrity: Incision (comment) (R hip)  Turgor    Weekly Pressure Ulcer Documentation  Pressure  Injury Documentation: Pressure Injury Noted-See Wound LDA to Document  Wound Prevention & Protection Methods  Orientation of wound Orientation of Wound Prevention: Posterior  Location of Prevention Location of Wound Prevention: Sacrum/Coccyx, Heel  Dressing Present Dressing Present : Yes  Dressing Status Dressing Status: Intact  Wound Offloading Wound Offloading (Prevention Methods): Bed, pressure reduction mattress, Bed, pressure redistribution/air, Elevate heels, Repositioning, Pillows, Turning, Wheelchair, Walker    INTAKE/OUPUT  Date 10/02/22 0700 - 10/03/22 0659 10/03/22 0700 - 10/04/22 0659   Shift 1723-2270 9500-4686 24 Hour Total 2695-4804 4861-9860 24 Hour Total   INTAKE   P.O. 2034 113 2877        P.O. 8601 559 0413      Shift Total(mL/kg) 1320(21.1) 360(5.8) 1680(26.9)      OUTPUT   Urine(mL/kg/hr)  0 0        Urine Voided  0 0        Urine Occurrence(s) 4 x 2 x 6 x      Stool           Stool Occurrence(s) 0 x 0 x 0 x      Shift Total(mL/kg)  0(0) 0(0)      NET 5621 671 6323      Weight (kg) 62.4 62.4 62.4 62.4 62.4 62.4       Recommendations:  Patient needs and requests: none at this time    Pending tests/procedures: none at this time     Functional Level/Equipment: Partial (one person) /      Fall Precautions:   Fall risk precautions were reinforced with the patient; she was instructed to call for help prior to getting up. The following fall risk precautions were continued: bed/ chair alarms, door signage, yellow bracelet and socks as well as update of the [employer] tool in the patient's room. Alma Rosa Score: 5    HEALS Safety Check    A safety check occurred in the patient's room between off going nurse and oncoming nurse listed above. The safety check included the below items  Area Items   H  High Alert Medications Verify all high alert medication drips (heparin, PCA, etc.)   E  Equipment Suction is set up for ALL patients (with yanker)  Red plugs utilized for all equipment (IV pumps, etc.)  WOWs wiped down at end of shift.   Room stocked with oxygen, suction, and other unit-specific supplies   A  Alarms Bed alarm is set for fall risk patients  Ensure chair alarm is in place and activated if patient is up in a chair   L  Lines Check IV for any infiltration  Talley bag is empty if patient has a Talley   Tubing and IV bags are labeled   S  Safety   Room is clean, patient is clean, and equipment is clean. Hallways are clear from equipment besides carts. Fall bracelet on for fall risk patients  Ensure room is clear and free of clutter  Suction is set up for ALL patients (with kolbyker)  Hallways are clear from equipment besides carts.    Isolation precautions followed, supplies available outside room, sign posted     Zaira Saavedra RN

## 2022-10-03 NOTE — ROUTINE PROCESS
SHIFT CHANGE NOTE FOR [facility]    Bedside and Verbal shift change report given to RALF Zaragoza (oncoming nurse) by Isidoro Meneses RN (offgoing nurse). Report included the following information SBAR, Kardex, MAR and Recent Results. Situation:   Code Status: DNR   Hospital Day: 11   Problem List:   Hospital Problems  Date Reviewed: 5/31/2022            Codes Class Noted POA    Hip fracture [facility]) ICD-10-CM: F65.136V  ICD-9-CM: 820.8  9/18/2022 Unknown         Background:   Past Medical History:   Past Medical History:   Diagnosis Date    CAD (coronary artery disease) 2008    Fibromyalgia     Heart failure ([facility] [address]     Pt. states sge gas 5 heart blocks.     Hypercholesterolemia     Hypertension     Menopause     Thyroid disease         Assessment:  Changes in Assessment throughout shift: No change to previous assessment    Patient has a central line: no Reasons if yes: na  Insertion date:na Last dressing date:na  Patient has Elliott Cath: no Reasons if yes: na   Insertion date:na  Shift elliott care completed: NO    Last Vitals:     Vitals:    10/02/22 1612 10/02/22 2009 10/03/22 0726 10/03/22 1619   BP: (!) 164/67 (!) 147/63 (!) 159/70 123/60   Pulse: (!) 58 60 (!) 57 60   Resp: 18 18 18 18   Temp: 98.6 °F (37 °C) 97.9 °F (36.6 °C) 98.5 °F (36.9 °C) 98.5 °F (36.9 °C)   SpO2: 96% 96% 97% 98%   Weight:       Height:           PAIN    Pain Assessment    Pain Intensity 1: 0 (10/03/22 1546) Pain Intensity 1: 2 (12/29/14 1105)    Pain Location 1: Leg Pain Location 1: Abdomen    Pain Intervention(s) 1: Medication (see MAR) Pain Intervention(s) 1: Medication (see MAR)  Patient Stated Pain Goal: 0 Patient Stated Pain Goal: 0  Intervention effective: yes  Other actions taken for pain:      Skin Assessment  Skin color Skin Color: Appropriate for ethnicity  Condition/Temperature    Integrity Skin Integrity: Incision (comment)  Turgor    Weekly Pressure Ulcer Documentation  Pressure  Injury Documentation: Pressure Injury Noted-See Wound LDA to Document  Wound Prevention & Protection Methods  Orientation of wound Orientation of Wound Prevention: Posterior  Location of Prevention Location of Wound Prevention: Buttocks, Sacrum/Coccyx  Dressing Present Dressing Present : Yes  Dressing Status Dressing Status: Intact  Wound Offloading Wound Offloading (Prevention Methods): Pillows    INTAKE/OUPUT  Date 10/02/22 1900 - 10/03/22 0659 10/03/22 0700 - 10/04/22 0659   Shift 5046-1403 24 Hour Total 8423-8088 8973-4934 24 Hour Total   INTAKE   P.O. 360 1680 350  350     P. O. 360 1680 350  350   Shift Total(mL/kg) 360(5.8) 1680(26.9) 350(5.6)  350(5.6)   OUTPUT   Urine(mL/kg/hr) 0 0        Urine Voided 0 0        Urine Occurrence(s) 3 x 7 x 4 x  4 x   Stool          Stool Occurrence(s) 0 x 0 x 2 x  2 x   Shift Total(mL/kg) 0(0) 0(0)       1680 350  350   Weight (kg) 62.4 62.4 62.4 62.4 62.4         Recommendations:  Patient needs and requests: na    Pending tests/procedures: na     Functional Level/Equipment: Partial (one person) / Wheelchair    Fall Precautions:   Fall risk precautions were reinforced with the patient; she was instructed to call for help prior to getting up. The following fall risk precautions were continued: bed/ chair alarms, door signage, yellow bracelet and socks as well as update of the [employer] tool in the patient's room. Alma Rosa Score: 5    HEALS Safety Check    A safety check occurred in the patient's room between off going nurse and oncoming nurse listed above. The safety check included the below items  Area Items   H  High Alert Medications Verify all high alert medication drips (heparin, PCA, etc.)   E  Equipment Suction is set up for ALL patients (with yanker)  Red plugs utilized for all equipment (IV pumps, etc.)  WOWs wiped down at end of shift.   Room stocked with oxygen, suction, and other unit-specific supplies   A  Alarms Bed alarm is set for fall risk patients  Ensure chair alarm is in place and activated if patient is up in a chair   L  Lines Check IV for any infiltration  Talley bag is empty if patient has a Talley   Tubing and IV bags are labeled   S  Safety   Room is clean, patient is clean, and equipment is clean. Hallways are clear from equipment besides carts. Fall bracelet on for fall risk patients  Ensure room is clear and free of clutter  Suction is set up for ALL patients (with [place])  Hallways are clear from equipment besides carts.    Isolation precautions followed, supplies available outside room, sign posted     Greg Russell RN

## 2022-10-04 ENCOUNTER — HOME HEALTH ADMISSION (OUTPATIENT)
Dept: HOME HEALTH SERVICES | Facility: HOME HEALTH | Age: 87
End: 2022-10-04
Payer: MEDICARE

## 2022-10-04 ENCOUNTER — APPOINTMENT (OUTPATIENT)
Dept: GENERAL RADIOLOGY | Age: 87
DRG: 561 | End: 2022-10-04
Attending: ORTHOPAEDIC SURGERY
Payer: MEDICARE

## 2022-10-04 LAB
GLUCOSE BLD STRIP.AUTO-MCNC: 109 MG/DL (ref 70–110)
GLUCOSE BLD STRIP.AUTO-MCNC: 113 MG/DL (ref 70–110)
GLUCOSE BLD STRIP.AUTO-MCNC: 149 MG/DL (ref 70–110)
GLUCOSE BLD STRIP.AUTO-MCNC: 87 MG/DL (ref 70–110)

## 2022-10-04 PROCEDURE — 97110 THERAPEUTIC EXERCISES: CPT

## 2022-10-04 PROCEDURE — 74011250637 HC RX REV CODE- 250/637: Performed by: HOSPITALIST

## 2022-10-04 PROCEDURE — 97116 GAIT TRAINING THERAPY: CPT

## 2022-10-04 PROCEDURE — BP0CZZZ PLAIN RADIOGRAPHY OF RIGHT HAND/FINGER JOINT: ICD-10-PCS | Performed by: EMERGENCY MEDICINE

## 2022-10-04 PROCEDURE — 74011250637 HC RX REV CODE- 250/637: Performed by: FAMILY MEDICINE

## 2022-10-04 PROCEDURE — 82962 GLUCOSE BLOOD TEST: CPT

## 2022-10-04 PROCEDURE — 97530 THERAPEUTIC ACTIVITIES: CPT

## 2022-10-04 PROCEDURE — 73502 X-RAY EXAM HIP UNI 2-3 VIEWS: CPT

## 2022-10-04 PROCEDURE — 65310000000 HC RM PRIVATE REHAB

## 2022-10-04 PROCEDURE — BQ00ZZZ PLAIN RADIOGRAPHY OF RIGHT HIP: ICD-10-PCS | Performed by: EMERGENCY MEDICINE

## 2022-10-04 PROCEDURE — 99232 SBSQ HOSP IP/OBS MODERATE 35: CPT | Performed by: INTERNAL MEDICINE

## 2022-10-04 PROCEDURE — 73130 X-RAY EXAM OF HAND: CPT

## 2022-10-04 PROCEDURE — 97535 SELF CARE MNGMENT TRAINING: CPT

## 2022-10-04 RX ADMIN — ATORVASTATIN CALCIUM 40 MG: 40 TABLET, FILM COATED ORAL at 21:57

## 2022-10-04 RX ADMIN — ASPIRIN 81 MG CHEWABLE TABLET 81 MG: 81 TABLET CHEWABLE at 18:24

## 2022-10-04 RX ADMIN — ASPIRIN 81 MG CHEWABLE TABLET 81 MG: 81 TABLET CHEWABLE at 09:07

## 2022-10-04 RX ADMIN — FERROUS SULFATE TAB 325 MG (65 MG ELEMENTAL FE) 325 MG: 325 (65 FE) TAB at 09:07

## 2022-10-04 RX ADMIN — ISOSORBIDE MONONITRATE 60 MG: 60 TABLET, EXTENDED RELEASE ORAL at 09:07

## 2022-10-04 RX ADMIN — LEVOTHYROXINE SODIUM 75 MCG: 0.05 TABLET ORAL at 05:51

## 2022-10-04 RX ADMIN — METOPROLOL SUCCINATE 25 MG: 25 TABLET, FILM COATED, EXTENDED RELEASE ORAL at 13:13

## 2022-10-04 RX ADMIN — AMLODIPINE BESYLATE 10 MG: 10 TABLET ORAL at 09:07

## 2022-10-04 RX ADMIN — SENNOSIDES AND DOCUSATE SODIUM 1 TABLET: 50; 8.6 TABLET ORAL at 09:07

## 2022-10-04 NOTE — PROGRESS NOTES
Progress Note    Patient: Lord Anand MRN: [id number]  CSN: [id number]    YOB: 1924  Age: 80 y.o. Sex: female    DOA: 9/22/2022 LOS:  LOS: 12 days                    Subjective:     Patient is sitting up in chair. No new complaint today. She says she is excited to go home tomorrow. Denies any chest or abdominal pain. No nausea or vomiting. Leg pain is better currently. No tingling or numbness. Objective:     Physical Exam:      Visit Vitals  BP (!) 162/57 (BP 1 Location: Left upper arm, BP Patient Position: Supine)   Pulse (!) 59   Temp 98.4 °F (36.9 °C)   Resp 18   Ht 5' 2\" (1.575 m)   Wt 62.4 kg (137 lb 9.6 oz)   SpO2 96%   Breastfeeding Unknown   BMI 25.17 kg/m²      General appearance - alert, follows verbal cues appropriately, and in no distress  Chest -clear air entry noted in bases, no wheezes  Heart - S1 and S2 normal  Abdomen - soft, nontender, nondistended, Bowel sounds present  Neurological - alert, oriented, normal speech, no focal findings noted  Musculoskeletal - no joint tenderness or swelling of knees bilaterally, limited range of motion of right hip while sitting in a chair. Right wrist immobilizer is in situ.   Extremities - no pedal edema noted    Functional Progress:    PHYSICAL THERAPY    ON ADMISSION MOST RECENT   Wheelchair Mobility/Management  Able to Propel (ft): 100 feet  Functional Level: 2  Curbs/Ramps Assist Required (FIM Score): 0 (Not tested)  Wheelchair Setup Assist Required : 1 (Dependent/total assistance)  Wheelchair Management: Manages left brake, Manages right brake (with supervision & cues) Wheelchair Mobility/Management  Able to Propel (ft):  (100 ft)  Functional Level:  (4)  Curbs/Ramps Assist Required (FIM Score): 0 (Not tested)  Wheelchair Setup Assist Required : 3 (Moderate assistance)  Wheelchair Management: Manages left brake, Manages right brake     Gait  Amount of Assistance: 4 (Minimal assistance)  Distance (ft): 14 Feet (ft)  Assistive Device: Walker, rolling, Gait belt Gait  Amount of Assistance: 5 (Stand-by assistance)  Distance (ft):  (100 ft)  Assistive Device: Gait belt, Walker, rolling     Balance-Sitting/Standing  Sitting - Static: Good (unsupported)  Sitting - Dynamic: Good (unsupported)  Standing - Static: Poor  Standing - Dynamic : Impaired Balance-Sitting/Standing  Sitting - Static: Good (unsupported)  Sitting - Dynamic: Fair (occasional)  Standing - Static: Fair  Standing - Dynamic : Impaired     Bed/Mat Mobility  Rolling Right : 0 (Not tested)  Rolling Left : 4 (Minimal assistance)  Supine to Sit : 3 (Moderate assistance)  Sit to Supine : 3 (Moderate assistance) Bed/Mat Mobility  Rolling Right : 5 (Stand-by assistance)  Rolling Left : 5 (Stand-by assistance)  Supine to Sit : 5 (Stand-by assistance)  Sit to Supine : 4 (Minimal assistance)     Transfers  Transfer Type: Other  Other: stand step with RW  Transfer Assistance : 3 (Moderate assistance )  Sit to Stand Assistance: Moderate assistance  Car Transfers: Moderate assistance (stand step transfer to/from transfer . Unable to fully complete transfer managing right LE into car  2/2 inability to maintain hip precautions in .)  Car Type: car transfer  Transfers  Transfer Type:  Other  Other:  (stand step with AD)  Transfer Assistance : 5 (Stand-by assistance)  Sit to Stand Assistance: Stand-by assistance  Car Transfers: Minimum assistance  Car Type: actual car     Steps or Stairs  Steps/Stairs Ambulated (#): 0  Level of Assist : 0 (Not tested) (pt requires increased time and rest breaks throughout and maximal encouragement to participate in gait assessment)  Rail Use: None Steps or Stairs  Steps/Stairs Ambulated (#):  (4)  Level of Assist : 4 (Contact guard assistance)  Rail Use: Both       Functional Progress:    OCCUPATIONAL THERAPY    ON ADMISSION MOST RECENT   Eating  Functional Level: 5 (supv/ set-up)   Eating  Functional Level: 6 (Mod I)     Grooming  Functional Level: 5 (SBA)   Grooming  Functional Level: 6 (Mod I)     Bathing  Functional Level: 3 (UB bathing: SBA; LB bathing: Mod A)   Bathing  Functional Level: 5 (UB bathing: set-up/ Mod I; LB bathing: SBA (basin at sink))     Upper Body Dressing  Functional Level: 4 (Min A)   Upper Body Dressing  Functional Level: 6 (Mod I)     Lower Body Dressing  Functional Level: 2 (Max A)   Lower Body Dressing  Functional Level: 5 (SBA overall)     Toileting  Functional Level: 2 (Max A (change of brief bed level; therapist removed PureWick on arrival to room); pt voided using BSC at bedside (SBA for hygiene; Max A CM))   Toileting  Functional Level: 5 (SBA)     Toilet Transfers  Toilet Transfer Score: 3 (Stand step xfer (Mod A) RW; EOB > BSC at bedside. Limited due to hip pain.)   Toilet Transfers  Toilet Transfer Score: 5 (Stand step transfer (SBA) RW; gait belt (elevated seat over toilet))     Tub /Shower Transfers  Tub/Shower Transfer Score: 0 (Not assessed due to safety concerns and pain level (right hip). )   Tub/Shower Transfers  Tub/Shower Transfer Score: 4 (Stand step transfer (CGA) RW; gait belt (TTB transfer))       Legend:   7 - Independent   6 - Modified Independent   5 - Standby Assistance / Supervision / Set-up   4 - Minimum Assistance / Contact Guard Assistance   3 - Moderate Assistance   2 - Maximum Assistance   1 - Total Assistance / Dependent       Intake and Output:  Current Shift:  10/04 0701 - 10/04 1900  In: 200 [P.O.:200]  Out: -   Last three shifts:  10/02 1901 - 10/04 0700  In: 710 [P.O.:710]  Out: 0     Labs: Results:       Chemistry No results for input(s): GLU, NA, K, CL, CO2, BUN, CREA, CA, AGAP, BUCR, TBIL, AP, TP, ALB, GLOB, AGRAT in the last 72 hours. No lab exists for component: GPT   CBC w/Diff No results for input(s): WBC, RBC, HGB, HCT, PLT, GRANS, LYMPH, EOS, HGBEXT, HCTEXT, PLTEXT, HGBEXT, HCTEXT, PLTEXT in the last 72 hours.    Cardiac Enzymes No results for input(s): CPK, CKND1, IMANI in the last 72 hours. No lab exists for component: CKRMB, TROIP   Coagulation No results for input(s): PTP, INR, APTT, INREXT, INREXT in the last 72 hours. Lipid Panel Lab Results   Component Value Date/Time    Cholesterol, total 165 08/20/2019 09:43 AM    HDL Cholesterol 47 08/20/2019 09:43 AM    LDL, calculated 86.8 08/20/2019 09:43 AM    VLDL, calculated 31.2 08/20/2019 09:43 AM    Triglyceride 156 (H) 08/20/2019 09:43 AM    CHOL/HDL Ratio 3.5 08/20/2019 09:43 AM      BNP No results for input(s): BNPP in the last 72 hours. Liver Enzymes No results for input(s): TP, ALB, TBIL, AP in the last 72 hours.     No lab exists for component: SGOT, GPT, DBIL   Thyroid Studies Lab Results   Component Value Date/Time    TSH 0.85 08/20/2019 09:43 AM          Procedures/imaging: see electronic medical records for all procedures/Xrays and details which were not copied into this note but were reviewed prior to creation of Plan    Medications:   Current Facility-Administered Medications   Medication Dose Route Frequency    melatonin (rapid dissolve) tablet 5 mg  5 mg Oral QHS PRN    polyethylene glycol (MIRALAX) packet 17 g  17 g Oral DAILY    HYDROcodone-acetaminophen (NORCO) 5-325 mg per tablet 1-2 Tablet  1-2 Tablet Oral Q4H PRN    ferrous sulfate tablet 325 mg  1 Tablet Oral DAILY WITH BREAKFAST    magnesium hydroxide (MILK OF MAGNESIA) 400 mg/5 mL oral suspension 30 mL  30 mL Oral DAILY PRN    bisacodyL (DULCOLAX) tablet 10 mg  10 mg Oral Q48H PRN    bisacodyL (DULCOLAX) suppository 10 mg  10 mg Rectal Q48H PRN    aspirin chewable tablet 81 mg  81 mg Oral BID    senna-docusate (PERICOLACE) 8.6-50 mg per tablet 1 Tablet  1 Tablet Oral BID    atorvastatin (LIPITOR) tablet 40 mg  40 mg Oral QHS    metoprolol succinate (TOPROL-XL) XL tablet 25 mg  25 mg Oral DAILY    amLODIPine (NORVASC) tablet 10 mg  10 mg Oral DAILY    isosorbide mononitrate ER (IMDUR) tablet 60 mg  60 mg Oral DAILY levothyroxine (SYNTHROID) tablet 75 mcg  75 mcg Oral 6am    nitroglycerin (NITROSTAT) tablet 0.4 mg  0.4 mg SubLINGual PRN    insulin lispro (HUMALOG) injection   SubCUTAneous AC&HS    glucose chewable tablet 16 g  16 g Oral PRN    glucagon (GLUCAGEN) injection 1 mg  1 mg IntraMUSCular PRN    dextrose 10% infusion 0-250 mL  0-250 mL IntraVENous PRN       Assessment/Plan     Active Problems:    Hip fracture (HCC) (9/18/2022)  Impaired Mobility and ADLs  Right hip fracture secondary to mechanical fall s/p repair  Anemia, multifactorial including iron deficiency  Vitamin B12 deficiency  Oblqiue fracture of fifth metacarpal  HTN  CAD  History of hypothyroidism  Incidental right ureteric stone causing right ureteral hydronephrosis    Resume PT and OT  Continue supplements  On amlodipine, Imdur and metoprolol. Continue statin and aspirin  Monitor sugars, sliding scale insulin  On Synthroid  Continue current pain management with bowel regimen  Patient is excited to go home tomorrow  Can be discharged home tomorrow    Sandhya Rg MD  10/4/2022     Total time to take care of this patient was 25 minutes and more than 50% of time was spent counseling and coordinating care. Disclaimer: Sections of this note are dictated using utilizing voice recognition software, which may have resulted in some phonetic based errors in grammar and contents. Even though attempts were made to correct all the mistakes, some may have been missed, and remained in the body of the document. If questions arise, please contact our department.

## 2022-10-04 NOTE — DISCHARGE SUMMARY
[facility] PHYSICAL REHABILITATION  [address] [place] [address], [phone] \[facility]\"" SUMMARY    Name: Say Franks MRN: [id number]   Age / Sex: 80 y.o. / female CSN: [id number]   YOB: 1924 Length of Stay: 13 days   Admit Date: 9/22/2022 Discharge Date: 10/5/2022       PRIMARY CARE PHYSICIAN: Dianna Arriaga NP      DISCHARGE DIAGNOSES:    Primary Rehabilitation Diagnosis  1. Impaired Mobility and ADLs  2. Right hip fracture secondary to mechanical fall s/p repair    Comorbidities  Patient Active Problem List   Diagnosis Code    Hypertension I10    Hypercholesterolemia E78.00    Diaphoresis R61    Exertional angina (HCC) I20.8    Bradycardia R00.1    Coronary artery disease involving native coronary artery I25.10    Hip fracture ([address] S72.009A    Encounter for palliative care Z51.5    Advanced age R48    Debility R50.80       CONSULTS CALLED: None      PROCEDURES DONE: None      BRIEF HISTORY: (from the history and physical completed by Dr. Marmolejo Momo  This is a 43-year-old  female presented to hospital on September 18, 2022 after having a fall at home. Please refer to hospital admission H&P for further detail. Patient was found out to have a right hip and right upper extremity pain. CT head was unremarkable. CT C-spine was reported no acute cervical spine fracture. CT abdomen pelvis showed right transcervical femoral neck fracture and right ureteral 7 mm stone causing proximal hydroureteronephrosis. X-ray right hand was showing oblique fracture of the fifth metacarpal without evidence of displacement. X-ray right hip showed right femoral neck fracture. Patient was seen by orthopedic surgeon and underwent surgery. Postoperatively x-ray showed s/p right hip hemiarthroplasty with expected for surgical changes. Postoperatively her hemoglobin was stable.   Postoperatively her pain was managed initially with IV then p.o. medication in last 24 hours without any issues. She also had a small bowel movement. She has been tolerating diet and medications very well. She will be continued on aspirin for DVT prophylaxis. She was resumed on home medication for the comorbidities. Patient was also on noted to have incidental finding of right ureter stone causing hydronephrosis, patient does not have any sepsis. No flank pain. No leukocytosis. Had normal urine analysis. Remained afebrile. Creatinine is normal.  She is voiding urine without any issues. Discharging physician from the hospital spoke to urologist PA and they will follow this patient while patient is in rehab. They also did not recommend further intervention. Family was made aware of it. The patient had remained hemodynamically stable but due to the above events, the patient was noted to have impaired mobility and ADLs. Patient was felt to be a good candidate for acute inpatient rehabilitation. Upon evaluation by Physical Therapy and Occupational Therapy, the patient was recommended for acute inpatient rehabilitation. The patient was discharged and was subsequently admitted to the [facility] for intensive rehabilitation to help recover strength, function and mobility. Patient complains of mild pain at the surgical site otherwise she is doing much better. She denies any new complaints. Patient very enthusiastic about participating in the physical therapy and Occupational Therapy. COURSE IN THE HOSPITAL: Upon admission to the [facility], the patient underwent physical therapy, occupational therapy and speech therapy. The patient was able to actively participate in the rehabilitation activities and progressed well. On discharge, the patient was able to perform the following activities:    1.  Occupational Therapy    ON ADMISSION ON DISCHARGE   Eating  Functional Level: 5 (supv/ set-up)   Eating  Functional Level: 6 (Mod I)     Grooming  Functional Level: 5 (SBA)   Grooming  Functional Level: 6 (Mod I)     Bathing  Functional Level: 3 (UB bathing: SBA; LB bathing: Mod A)   Bathing  Functional Level: 5 (UB bathing: set-up/ Mod I; LB bathing: SBA (basin at sink))     Upper Body Dressing  Functional Level: 4 (Min A)   Upper Body Dressing  Functional Level: 6 (Mod I)     Lower Body Dressing  Functional Level: 2 (Max A)   Lower Body Dressing  Functional Level: 5 (SBA overall)     Toileting  Functional Level: 2 (Max A (change of brief bed level; therapist removed PureWick on arrival to room); pt voided using BSC at bedside (SBA for hygiene; Max A CM))   Toileting  Functional Level: 5 (SBA)     Toilet Transfers  Toilet Transfer Score: 3 (Stand step xfer (Mod A) RW; EOB > BSC at bedside. Limited due to hip pain.)   Toilet Transfers  Toilet Transfer Score: 5 (Stand step transfer (SBA) RW; gait belt (elevated seat over toilet))     Tub /Shower Transfers  Tub/Shower Transfer Score: 0 (Not assessed due to safety concerns and pain level (right hip). )   Tub/Shower Transfers  Tub/Shower Transfer Score: 4 (Stand step transfer (CGA) RW; gait belt (TTB transfer))       2.  Physical Therapy    ON ADMISSION ON DISCHARGE   Wheelchair Mobility/Management  Able to Propel (ft): 100 feet  Functional Level: 2  Curbs/Ramps Assist Required (FIM Score): 0 (Not tested)  Wheelchair Setup Assist Required : 1 (Dependent/total assistance)  Wheelchair Management: Manages left brake, Manages right brake (with supervision & cues) Wheelchair Mobility/Management  Able to Propel (ft):  (100 ft)  Functional Level:  (4)  Curbs/Ramps Assist Required (FIM Score): 0 (Not tested)  Wheelchair Setup Assist Required : 3 (Moderate assistance)  Wheelchair Management: Manages left brake, Manages right brake     Gait  Amount of Assistance: 4 (Minimal assistance)  Distance (ft): 14 Feet (ft)  Assistive Device: Walker, rolling, Gait belt Gait  Amount of Assistance: 5 (Stand-by assistance)  Distance (ft):  (100 ft)  Assistive Device: Gait belt, Walker, rolling     Balance-Sitting/Standing  Sitting - Static: Good (unsupported)  Sitting - Dynamic: Good (unsupported)  Standing - Static: Poor  Standing - Dynamic : Impaired Balance-Sitting/Standing  Sitting - Static: Good (unsupported)  Sitting - Dynamic: Fair (occasional)  Standing - Static: Fair  Standing - Dynamic : Impaired     Bed/Mat Mobility  Rolling Right : 0 (Not tested)  Rolling Left : 4 (Minimal assistance)  Supine to Sit : 3 (Moderate assistance)  Sit to Supine : 3 (Moderate assistance) Bed/Mat Mobility  Rolling Right : 5 (Stand-by assistance)  Rolling Left : 5 (Stand-by assistance)  Supine to Sit : 5 (Stand-by assistance)  Sit to Supine : 4 (Minimal assistance)     Transfers  Transfer Type: Other  Other: stand step with RW  Transfer Assistance : 3 (Moderate assistance )  Sit to Stand Assistance: Moderate assistance  Car Transfers: Moderate assistance (stand step transfer to/from transfer . Unable to fully complete transfer managing right LE into car  2/2 inability to maintain hip precautions in .)  Car Type: car transfer  Transfers  Transfer Type:  Other  Other:  (stand step with AD)  Transfer Assistance : 5 (Stand-by assistance)  Sit to Stand Assistance: Stand-by assistance  Car Transfers: Minimum assistance  Car Type: actual car     Steps or Stairs  Steps/Stairs Ambulated (#): 0  Level of Assist : 0 (Not tested) (pt requires increased time and rest breaks throughout and maximal encouragement to participate in gait assessment)  Rail Use: None Steps or Stairs  Steps/Stairs Ambulated (#):  (4)  Level of Assist : 4 (Contact guard assistance)  Rail Use: Both       3. Speech and Language Pathology    ON ADMISSION ON DISCHARGE   Comprehension (Native Language)  Primary Mode of Comprehension: Auditory  Score: 4 Comprehension (Native Language)  Primary Mode of Comprehension: Auditory  Score: 4 (Hard of hearing; may need words repeated)     Expression (Native Language)  Primary Mode of Expression: Verbal  Score: 5   Expression (Native Language)  Primary Mode of Expression: Verbal  Score: 5     Social Interaction/Pragmatics  Score: 5 Social Interaction/Pragmatics  Score: 5     Problem Solving  Score: 3   Problem Solving  Score: 3     Memory  Score: 3 Memory  Score: 3       Legend:   7 - Independent   6 - Modified Independent   5 - Standby Assistance / Supervision / Set-up   4 - Minimum Assistance / Contact Guard Assistance   3 - Moderate Assistance   2 - Maximum Assistance   1 - Total Assistance / Dependent       ACUTE MEDICAL ISSUES ADDRESSED IN INPATIENT REHABILITATION FACILITY:     Impaired Mobility and ADLs  Right hip fracture secondary to mechanical fall s/p repair  Anemia, multifactorial including iron deficiency  Vitamin B12 deficiency  Oblqiue fracture of fifth metacarpal  HTN  CAD  History of hypothyroidism  Incidental right ureteric stone causing right ureteral hydronephrosis     Resume PT and OT  Continue supplements  On amlodipine, Imdur and metoprolol. Continue statin and aspirin  Monitor sugars, sliding scale insulin  On Synthroid  Continue current pain management with bowel regimen    MEDICATIONS ON DISCHARGE:    Current Discharge Medication List        START taking these medications    Details   aspirin 81 mg chewable tablet Take 1 Tablet by mouth two (2) times a day for 30 days. Qty: 60 Tablet, Refills: 0  Start date: 10/5/2022, End date: 11/4/2022      bisacodyL (DULCOLAX) 5 mg EC tablet Take 2 Tablets by mouth every forty-eight (48) hours as needed for Constipation. Qty: 10 Tablet, Refills: 0  Start date: 10/5/2022      ferrous sulfate 325 mg (65 mg iron) tablet Take 1 Tablet by mouth daily (with breakfast) for 30 days. Qty: 30 Tablet, Refills: 0  Start date: 10/5/2022, End date: 11/4/2022      polyethylene glycol (MIRALAX) 17 gram packet Take 1 Packet by mouth daily.   Qty: 5 Packet, Refills: 0  Start date: 10/5/2022      !! OTHER Incentive Spirometer - Use as instructed  Qty: 1 Each, Refills: 0  Start date: 10/5/2022      !! OTHER CBC, BMP, and Mg in 4 days. Results to PCP immediately. Qty: 1 Each, Refills: 0  Start date: 10/5/2022       !! - Potential duplicate medications found. Please discuss with provider. CONTINUE these medications which have CHANGED    Details   HYDROcodone-acetaminophen (NORCO) 5-325 mg per tablet Take 1 Tablet by mouth every four (4) hours as needed for Pain for up to 3 days. Max Daily Amount: 6 Tablets. Qty: 10 Tablet, Refills: 0  Start date: 10/5/2022, End date: 10/8/2022    Associated Diagnoses: Closed fracture of right hip, initial encounter ([facility] [address]           CONTINUE these medications which have NOT CHANGED    Details   senna-docusate (PERICOLACE) 8.6-50 mg per tablet Take 1 Tablet by mouth two (2) times a day. Qty: 15 Tablet, Refills: 0  Start date: 9/22/2022      atorvastatin (LIPITOR) 40 mg tablet Take 40 mg by mouth daily. metoprolol succinate (TOPROL-XL) 25 mg XL tablet TAKE 1 TABLET BY MOUTH EVERY DAY  Qty: 90 Tablet, Refills: 3      amLODIPine (NORVASC) 10 mg tablet TAKE 1 TABLET BY MOUTH EVERY DAY  Qty: 90 Tablet, Refills: 3      isosorbide mononitrate ER (IMDUR) 60 mg CR tablet TAKE 1 TABLET BY MOUTH  EVERY MORNING  Qty: 90 Tab, Refills: 3      levothyroxine (SYNTHROID) 75 mcg tablet Take 75 mcg by mouth Daily (before breakfast). ascorbic acid, vitamin C, (VITAMIN C) 500 mg tablet Take  by mouth. nitroglycerin (NITROSTAT) 0.4 mg SL tablet by SubLINGual route every five (5) minutes as needed for Chest Pain.     Associated Diagnoses: Coronary artery disease involving native coronary artery of native heart without angina pectoris; POP (dyspnea on exertion)           STOP taking these medications       acetaminophen (TYLENOL) 500 mg tablet Comments:   Reason for Stopping:         aspirin delayed-release 81 mg tablet Comments:   Reason for Stopping:               DISCHARGE VITAL SIGNS:  Visit Vitals  /63 (BP 1 Location: Left upper arm, BP Patient Position: Supine)   Pulse 60   Temp 97.3 °F (36.3 °C)   Resp 18   Ht 5' 2\" (1.575 m)   Wt 62.4 kg (137 lb 9.6 oz)   SpO2 99%   Breastfeeding Unknown   BMI 25.17 kg/m²       DISCHARGE PHYSICAL EXAMINATION:  General appearance - alert, follows verbal cues appropriately, and in no distress  Chest -clear air entry noted in bases, no wheezes  Heart - S1 and S2 normal  Abdomen - soft, nontender, nondistended, Bowel sounds present  Neurological - alert, oriented, normal speech, no focal findings noted  Musculoskeletal - no joint tenderness or swelling of knees bilaterally, limited range of motion of right hip while sitting in a chair. Right wrist immobilizer is in situ. Extremities - no pedal edema noted    CONDITION ON DISCHARGE: Stable. DISPOSITION: Patient clinically improved and was discharged to home with home health physical therapy, occupational therapy, and skilled nursing. The patient is temporarily homebound secondary to functional deficits due to Right hip fracture secondary to mechanical fall s/p repair. The patient can ambulate using a small adult rolling walker. The patient would benefit from continued skilled physical therapy to improve independent functional mobility within the home with use of least restrictive device. The patient would also benefit from continued skilled occupational therapy to improve self-care and functional mobility within the home with use of least restrictive device. Short-term skilled nursing is needed for medication reconciliation and disease education. Medical social worker for needs assessment and community resources.      FOLLOW-UP RECOMMENDATIONS:   Follow-up Information       Follow up With Specialties Details Why [address] [facility] Follow up  [facility]  [phone] Gin Lehman NP Nurse Practitioner Follow up on 10/19/2022 Patient has an appointment scheduled with PCP/NP group on October 19, 2022 @ 2:30pm.  NP, Baxter Epley appointment schedules are booked out until January, 2023, so patient will be seen by NP Chris Wall. [address]  [phone], [address] [place] [address],  Orthopedic Surgery Follow up follow up in 6 weeks per Dr. Morrison Cons. [address]              OTHER INSTRUCTIONS:  1. Diet. Adult Diet Regular   2. Activity. As tolerated. 3. Safety / fall precautions. 4. Weightbearing status. TIME SPENT ON DISCHARGE ACTIVITIES: 39 minutes.       Signed:  Yaquelin Stoddard NP    10/4/2022

## 2022-10-04 NOTE — PROGRESS NOTES
(ortho surgeon) came and checked surgical site. He removed dressing on the hip & splint of the right arm ,provider said site can be left open to air.

## 2022-10-04 NOTE — PROGRESS NOTES
Dedra spoke with pt's son who expressed concern for sustained ability to support the pt in his home. Dedra reviewed possible support options from community placement, private duty, [address] [facility] benefits and home care. Son stated understanding and appreciation of conversation. Dedra placed referral for [facility] per [address] and notified liaisons. Son to purchase a bedside commode, noting knowledge of [facility]. Son will wait to discuss purchase of tub transfer bench with pt at home as she has historically preferred a sink bath. Son to call dedra with dc time for tomorrow. Dedra will follow.

## 2022-10-04 NOTE — PROGRESS NOTES
Wound Prevention Checklist    Patient: Mary Ramsey (81 y.o. female)  Date: 10/4/2022  Diagnosis: Hip fracture ([facility] [address] Manuelita Ill <principal problem not specified>    Precautions:  Total hip, Fall       []  Heel prevention boots placed on patient    []  Patient turned q2h during shift    []  Lift team ordered    []  Patient on [facility] bed/Specialty bed    [x]  Each Wound is documented during shift (Stage, Color, drainage, odor, measurements, and dressings)    [x]  Dual skin check done with Evangelina Cabrera RN

## 2022-10-04 NOTE — ROUTINE PROCESS
SHIFT CHANGE NOTE FOR [facility]    Bedside and Verbal shift change report given to RALF Zaragoza (oncoming nurse) by Michael Walter RN (offgoing nurse). Report included the following information SBAR, Kardex, MAR and Recent Results. Situation:   Code Status: DNR   Hospital Day: 12   Problem List:   Hospital Problems  Date Reviewed: 5/31/2022            Codes Class Noted POA    Hip fracture [facility]) ICD-10-CM: F14.541Z  ICD-9-CM: 820.8  9/18/2022 Unknown       Background:   Past Medical History:   Past Medical History:   Diagnosis Date    CAD (coronary artery disease) 2008    Fibromyalgia     Heart failure ([facility] [address]     Pt. states sge gas 5 heart blocks.     Hypercholesterolemia     Hypertension     Menopause     Thyroid disease         Assessment:  Changes in Assessment throughout shift: Changes documented below    Patient has a central line: no Reasons if yes: na  Insertion date:na Last dressing date:na  Patient has Elliott Cath: no Reasons if yes: na   Insertion date:na  Shift elliott care completed: NO    Last Vitals:     Vitals:    10/03/22 1619 10/03/22 2027 10/04/22 0705 10/04/22 1616   BP: 123/60 (!) 153/64 (!) 162/57 127/63   Pulse: 60 (!) 57 (!) 59 60   Resp: 18 18 18 18   Temp: 98.5 °F (36.9 °C) 97 °F (36.1 °C) 98.4 °F (36.9 °C) 97.3 °F (36.3 °C)   SpO2: 98% 97% 96% 99%   Weight:       Height:           PAIN    Pain Assessment    Pain Intensity 1: 0 (10/04/22 1620) Pain Intensity 1: 2 (12/29/14 1105)    Pain Location 1: Leg Pain Location 1: Abdomen    Pain Intervention(s) 1: Position, Medication (see MAR) Pain Intervention(s) 1: Medication (see MAR)  Patient Stated Pain Goal: 0 Patient Stated Pain Goal: 0  Intervention effective: yes  Other actions taken for pain:      Skin Assessment  Skin color Skin Color: Appropriate for ethnicity  Condition/Temperature    Integrity Skin Integrity: Incision (comment)  Turgor    Weekly Pressure Ulcer Documentation  Pressure  Injury Documentation: No Pressure Injury Noted-Pressure Ulcer Prevention Initiated  Wound Prevention & Protection Methods  Orientation of wound Orientation of Wound Prevention: Posterior  Location of Prevention Location of Wound Prevention: Buttocks, Sacrum/Coccyx  Dressing Present Dressing Present : Yes  Dressing Status Dressing Status: Intact  Wound Offloading Wound Offloading (Prevention Methods): Bed, pressure reduction mattress    INTAKE/OUPUT  Date 10/03/22 1900 - 10/04/22 0659 10/04/22 0700 - 10/05/22 0659   Shift 1900-0659 24 Hour Total 0700-1859 1900-0659 24 Hour Total   INTAKE   P.O.  350 200  200     P. O.  350 200  200   Shift Total(mL/kg)  350(5.6) 200(3.2)  200(3.2)   OUTPUT   Urine(mL/kg/hr)          Urine Occurrence(s) 1 x 5 x 0 x  0 x   Emesis/NG output          Emesis Occurrence(s) 0 x 0 x      Stool          Stool Occurrence(s) 0 x 2 x 0 x  0 x   Shift Total(mL/kg)        NET  350 200  200   Weight (kg) 62.4 62.4 62.4 62.4 62.4         Recommendations:  Patient needs and requests: na    Pending tests/procedures: na     Functional Level/Equipment: Partial (one person) / Wheelchair;Walker    Fall Precautions:   Fall risk precautions were reinforced with the patient; she was instructed to call for help prior to getting up. The following fall risk precautions were continued: bed/ chair alarms, door signage, yellow bracelet and socks as well as update of the Evelyne Karel tool in the patient's room. Alma Rosa Score: 5    HEALS Safety Check    A safety check occurred in the patient's room between off going nurse and oncoming nurse listed above. The safety check included the below items  Area Items   H  High Alert Medications Verify all high alert medication drips (heparin, PCA, etc.)   E  Equipment Suction is set up for ALL patients (with yanker)  Red plugs utilized for all equipment (IV pumps, etc.)  WOWs wiped down at end of shift.   Room stocked with oxygen, suction, and other unit-specific supplies   A  Alarms Bed alarm is set for fall risk patients  Ensure chair alarm is in place and activated if patient is up in a chair   L  Lines Check IV for any infiltration  Talley bag is empty if patient has a Talley   Tubing and IV bags are labeled   S  Safety   Room is clean, patient is clean, and equipment is clean. Hallways are clear from equipment besides carts. Fall bracelet on for fall risk patients  Ensure room is clear and free of clutter  Suction is set up for ALL patients (with yanker)  Hallways are clear from equipment besides carts.    Isolation precautions followed, supplies available outside room, sign posted     Simone Bradley RN

## 2022-10-04 NOTE — PROGRESS NOTES
Problem: Mobility Impaired (Adult and Pediatric)  Goal: *Acute Goals and Plan of Care (Insert Text)  Description: Physical Therapy Short Term Goals  Initiated 9/23/2022 and re-assessed for discharge on 10/4/22   1. Patient will move from supine to sit and sit to supine , scoot up and down, and roll side to side in bed with supervision/set-up. Goal met 10/4/22  2. Patient will transfer from bed to chair and chair to bed with minimal assistance/contact guard assist using the least restrictive device. Goal met 10/4/22  3. Patient will perform sit to stand with minimal assistance/contact guard assist. Goal met 10/4/22  4. Patient will ambulate with minimal assistance/contact guard assist for 50 feet with the least restrictive device. Goal met 10/4/22  5. Patient will ascend/descend 5 stairs with bilateral handrail(s) with minimal assistance/contact guard assist. Goal met-currently 6 steps with CGA    Physical Therapy Long Term Goals  Initiated 9/23/2022 and assessed 10/4/22 for discharge. 1.  Patient will move from supine to sit and sit to supine , scoot up and down, and roll side to side in bed with supervision/set-up. Goal met 10/4/22  2. Patient will transfer from bed to chair and chair to bed with supervision/set-up using the least restrictive device. Goal partially met 10/4/22  3. Patient will perform sit to stand with supervision/set-up. Goal partially met 10/4/22  4. Patient will ambulate with supervision/set-up for 150 feet with the least restrictive device. Goal not met 10/4/22  5. Patient will ascend/descend 5 stairs with bilateral handrail(s) with supervision/set-up. Goal not met 10/4/22      Outcome: Progressing Towards Goal   PHYSICAL THERAPY DISCHARGE NOTE    Patient: Lorri Bains (78 y.o. female)  Date: 10/4/2022  Diagnosis: Hip fracture ([facility] [address] [S72.009A]   Precautions: Total hip, Fall  Chart, physical therapy assessment, plan of care and goals were reviewed.     Time in:1100  Time out:1230    Patient seen for: AM;Balance activities;Gait training;Patient education; Family training    Pain:  Pt pain was reported as  0 pre-treatment. Pt pain was reported as 0 post-treatment. Intervention: 0    Patient identified with name and : Yes    SUBJECTIVE:     Patient stated:I do feel that I have been making progress. OBJECTIVE DATA SUMMARY:     GROSS ASSESSMENT Discharge Assessment 10/4/2022   AROM Generally decreased, functional   Strength Generally decreased, functional   Coordination Within functional limits   Tone Normal   Sensation Impaired   PROM Generally decreased, functional       POSTURE Discharge Assessment 10/4/2022   Posture (WDL) Exceptions to [facility]   Posture Assessment Forward head;Rounded shoulders           BALANCE Discharge Assessment 10/4/2022    Sitting - Static: Good (unsupported)  Sitting - Dynamic: Fair (occasional)  Standing - Static: Fair  Standing - Dynamic : Impaired    Ability to  small object from floor:Patient is under hip precautions at this time and can not bend past 90 degrees. BED/CHAIR/WHEELCHAIR TRANSFERS Initial Assessment Discharge Assessment   Rolling Right 0 (Not tested) 5 (Stand-by assistance)   Rolling Left 4 (Minimal assistance) 5 (Stand-by assistance)   Supine to Sit 3 (Moderate assistance) 5 (Stand-by assistance)   Sit to Stand Moderate assistance Stand-by assistance   Sit to Supine 3 (Moderate assistance) 4 (Minimal assistance)   Transfer Assistance Level 3 (Moderate assistance ) 5 (Stand-by assistance)   Transfer Type Other Other   Comments    Patient has made good progress with bed mobility, transfers from bed to WC and WC to mat with SBA. Car Transfer Moderate assistance (stand step transfer to/from transfer .   Unable to fully complete transfer managing right LE into car  2/2 inability to maintain hip precautions in .) Contact guard assistance      Car Type car transfer  Car simulator       [employer] MOBILITY/MANAGEMENT Initial Assessment Discharge Assessment   Able to Propel 100 feet  (100 ft)   Assistance Level 2 4 minimal assistance   Curbs/ramps assistance required 0 (Not tested) 0 (Not tested)   Wheelchair set up assistance required 1 (Dependent/total assistance) 3 (Moderate assistance)   Wheelchair management Manages left brake, Manages right brake (with supervision & cues) Manages left brake;Manages right brake       GAIT Discharge Assessment 10/4/2022   Gait Description (WDL) Exceptions to WDL   Gait Abnormalities Antalgic;Decreased step clearance; Step to gait       WALKING INDEPENDENCE Initial Assessment Discharge Assessment   Assistive device Walker, rolling, Gait belt Gait belt;Walker, rolling   Ambulation assistance - level surface 4 (Minimal assistance) 5 (Stand-by assistance)   Distance 14 Feet (ft)  (100 ft)   Comments    Patient with decreasing assistance required with ambulation and with verbal cues for safety and hand placement with descending and ascending from [facility] and other surfaces. Periodically, the patient requires reminders for hand placement for getting up and sitting down. Ambulation assistance - unlevel surface   NT        STEPS/STAIRS Initial Assessment Discharge Assessment   Steps/Stairs ambulated 0 (Not tested) (pt requires increased time and rest breaks throughout and maximal encouragement to participate in gait assessment) 4 steps   Rail Use None Both   Assistance Level   4 (Contact guard assistance)   Comments    Patient has made good progress with stair negotiation and she reports that she will not have to attempt steps upon discharge. Curbs/Ramps Contact guard assistance  Contact guard assistance   Therapeutic Exercises:   Standing-marches, ham curls x 10 reps each; 1 set. ASSESSMENT:  Patient is seen for deficits in strength, mobility, transfers, ambulation, safety and balance.  She has made good progress toward goals and achieved many short term goals, and she is progressing appropriately toward LTGs. LTGs: Patient met 1/5 LTGs, 2/5 partially met and 2/5 not met, however progressing. PLAN:  Pt would benefit from continued skilled physical therapy in order to improve independent functional mobility at supervision level. Interventions may include range of motion (AROM, PROM B LE/trunk), motor function (B LE/trunk strengthening/coordination), activity tolerance (vitals, oxygen saturation levels), bed mobility training, balance activities, gait training (progressive ambulation program), and functional transfer training. Discharge Recommendations:  Home Physical Therapy  Further Equipment Recommendations for Discharge:  rolling walker and N/A       Activity Tolerance:   Fair tolerance to session  Please refer to the flowsheet for vital signs taken during this treatment.   After treatment:   [] Patient left in no apparent distress in bed  [x] Patient left in no apparent distress sitting up in chair  [] Patient left in no apparent distress in w/c mobilizing under own power  [] Patient left in no apparent distress dining area  [] Patient left in no apparent distress mobilizing under own power  [x] Call bell left within reach  [] Nursing notified  [x] Caregiver present  [] Bed alarm activated   [x] Chair alarm activated      IRF-SINTIA Completed: YES    Anna Yates  10/4/2022

## 2022-10-04 NOTE — ROUTINE PROCESS
SHIFT CHANGE NOTE FOR [facility]    Bedside and Verbal shift change report given to [facility], RN (oncoming nurse) by Scott Powell RN (offgoing nurse). Report included the following information SBAR, Kardex, MAR and Recent Results. Situation:   Code Status: DNR   Hospital Day: 12   Problem List:   Hospital Problems  Date Reviewed: 5/31/2022            Codes Class Noted POA    Hip fracture [facility]) ICD-10-CM: U41.417B  ICD-9-CM: 820.8  9/18/2022 Unknown       Background:   Past Medical History:   Past Medical History:   Diagnosis Date    CAD (coronary artery disease) 2008    Fibromyalgia     Heart failure ([facility] [address]     Pt. states sge gas 5 heart blocks.     Hypercholesterolemia     Hypertension     Menopause     Thyroid disease         Assessment:  Changes in Assessment throughout shift: No change to previous assessment    Patient has a central line: no Reasons if yes: na  Insertion date:na Last dressing date:na  Patient has Elliott Cath: no Reasons if yes: na   Insertion date:na  Shift elliott care completed: NO    Last Vitals:     Vitals:    10/02/22 2009 10/03/22 0726 10/03/22 1619 10/03/22 2027   BP: (!) 147/63 (!) 159/70 123/60 (!) 153/64   Pulse: 60 (!) 57 60 (!) 57   Resp: 18 18 18 18   Temp: 97.9 °F (36.6 °C) 98.5 °F (36.9 °C) 98.5 °F (36.9 °C) 97 °F (36.1 °C)   SpO2: 96% 97% 98% 97%   Weight:       Height:           PAIN    Pain Assessment    Pain Intensity 1: 0 (10/04/22 0411) Pain Intensity 1: 2 (12/29/14 1105)    Pain Location 1: Leg Pain Location 1: Abdomen    Pain Intervention(s) 1: Position, Medication (see MAR) Pain Intervention(s) 1: Medication (see MAR)  Patient Stated Pain Goal: 0 Patient Stated Pain Goal: 0  Intervention effective: yes  Other actions taken for pain: Position;Medication (see MAR)    Skin Assessment  Skin color Skin Color: Appropriate for ethnicity  Condition/Temperature    Integrity Skin Integrity: Incision (comment)  Turgor    Weekly Pressure Ulcer Documentation  Pressure  Injury Documentation: No Pressure Injury Noted-Pressure Ulcer Prevention Initiated  Wound Prevention & Protection Methods  Orientation of wound Orientation of Wound Prevention: Posterior  Location of Prevention Location of Wound Prevention: Sacrum/Coccyx  Dressing Present Dressing Present : Yes  Dressing Status Dressing Status: Intact  Wound Offloading Wound Offloading (Prevention Methods): Bed, pressure reduction mattress, Chair cushion, Elevate heels, Pillows, Walker, Wheelchair    INTAKE/OUPUT  Date 10/03/22 0700 - 10/04/22 0659 10/04/22 0700 - 10/05/22 0659   Shift 0700-1859 1900-0659 24 Hour Total 0700-1859 1900-0659 24 Hour Total   INTAKE   P.O. 350  350        P. O. 350  350      Shift Total(mL/kg) 350(5.6)  350(5.6)      OUTPUT   Urine(mL/kg/hr)           Urine Occurrence(s) 4 x 1 x 5 x      Emesis/NG output           Emesis Occurrence(s)  0 x 0 x      Stool           Stool Occurrence(s) 2 x 0 x 2 x      Shift Total(mL/kg)           350      Weight (kg) 62.4 62.4 62.4 62.4 62.4 62.4         Recommendations:  Patient needs and requests: na    Pending tests/procedures: na     Functional Level/Equipment: Partial (one person) / Bed (comment); Susy Linnette; Wheelchair;Prosthetic/orthotic/brace    Fall Precautions:   Fall risk precautions were reinforced with the patient; she was instructed to call for help prior to getting up. The following fall risk precautions were continued: bed/ chair alarms, door signage, yellow bracelet and socks as well as update of the Terrye Ontiveros tool in the patient's room. Alma Rosa Score: 5    HEALS Safety Check    A safety check occurred in the patient's room between off going nurse and oncoming nurse listed above.     The safety check included the below items  Area Items   H  High Alert Medications Verify all high alert medication drips (heparin, PCA, etc.)   E  Equipment Suction is set up for ALL patients (with yanker)  Red plugs utilized for all equipment (IV pumps, etc.)  WOWs wiped down at end of shift. Room stocked with oxygen, suction, and other unit-specific supplies   A  Alarms Bed alarm is set for fall risk patients  Ensure chair alarm is in place and activated if patient is up in a chair   L  Lines Check IV for any infiltration  Talley bag is empty if patient has a Talley   Tubing and IV bags are labeled   S  Safety   Room is clean, patient is clean, and equipment is clean. Hallways are clear from equipment besides carts. Fall bracelet on for fall risk patients  Ensure room is clear and free of clutter  Suction is set up for ALL patients (with [place]eva)  Hallways are clear from equipment besides carts.    Isolation precautions followed, supplies available outside room, sign posted     Pamela Plaza RN

## 2022-10-04 NOTE — INTERDISCIPLINARY ROUNDS
[facility]  Team Conference  Date: 10/4/2022  ACTIVE MONITORING OF CO-MORBID CONDITIONS/MANAGEMENT OF NEW MEDICAL ISSUES: Hip fracture (HCC) [S72.009A]    **Please refer to patient's electronic medical record to view the care plan and goals**  NURSING:     Making gains   Wound Care: Skin color Skin Color: Appropriate for ethnicity Condition/Temperature     Integrity Skin Integrity: Incision (comment)   Turgor     Orientation of wound Orientation of Wound Prevention: Posterior Location of Prevention Location of Wound Prevention: Sacrum/Coccyx   Dressing Present Dressing Present : Yes  Dressing Status Dressing Status: Intact   Wound Offloading Wound Offloading (Prevention Methods): Bed, pressure reduction mattress, Chair cushion, Elevate heels, Pillows, Walker, Wheelchair   Pain: Pain Intensity 1: 0 (10/04/22 0807) Pain Intensity 1: Pain Location 1: Leg Pain Location 1:    Continent bladder/bowel   Education:  Wound Care Education   Lab Value Concerns:  No     Family/Caregiver Teaching Complete     Occupational Therapy: Making gains   Treatment Barriers:  Fatigue and Precautions   Treatment Areas of Focus/Interventions: Strength, ADL Training, Transfer Training, and Functional Mobility Training   Family/Caregiver Teaching Complete   Accessibility Limitations:  no     Physical Therapy: Making gains   Treatment Barriers:  Fatigue, Cognition, and Precautions   Treatment Areas of Focus/Interventions: Strength, Transfer Training, Mobility, and Gait Training   Family Teaching   Complete   Accessibility Limitations:no     Speech Therapy:    Treatment Barriers:       Treatment Areas of Focus/Interventions:    Family/Caregiver Teaching         CMG D/C Date: 10/5/2022  Discharge Date: 10/5/2022    Rehabilitation goals from IPOC/Treatment plan reviewed:  No changes identified  New Goals if applicable:   none    Plan for upcoming treatment period/intervention for current barriers:  Plan for discharge to home. Patient needs identified:   None identified at this time    Recommended Discharge Plan:   Home with s/o/spouse/family    Home Health    CURRENT EQUIPMENT NEEDS:  Equipment needed at discharge: bedside commode, transfer bench, and rolling walker    Plan/Adjustments to Plan  Medical conditions exist that require a minimum of 3 times/week physician oversight and 24-hour rehabilitation nursing to manage/progress the plan of care, Functional deficits require intensive and coordinated therapies to achieve goals outlined in plan of care, 3 hours therapy 5 days/week OR 15 hours therapy over 7 days, and Continued plan of care as patient is showing progress and /or has an expectation to benefit    Patient's plan of care has been reviewed and/or adjusted. Continue treatment as outlined. Team Conference Recorder Shital Santa  Date 10/4/2022    Team members participating in today's conference. Michela Lyons, PT        , Kasia Galvan, OT       , Juju Bliss, OT       , Kwame Tavarez, OT          , Virginia Mcguire, SLP  , SANDY Russo        , and Cisco Kirby, NP    ===============================================================================  Patients plan of care has been reviewed and/or adjusted. Continue treatment as outlined. I certify that I have led this team conference and agree with continue POC as discussed.

## 2022-10-04 NOTE — HOME CARE
Referral has been noted and initiated. Awaiting additional information for completion.       ---   Claudia Schmitt LPN  [facility] - INPATIENT Liaison

## 2022-10-04 NOTE — PROGRESS NOTES
Patient: Mariella Pena                MRN: [id number]       SSN: [id number]  YOB: 1924        AGE: 80 y.o. SEX: female  Body mass index is 25.17 kg/m². PCP: Eric Figueroa  10/04/22    Chief Complaint: s/p right hip hemiarthroplasty and right fifth metacarpal fracture    IMAGING:  Imaging read by myself and interpreted as follows:  pending    ASSESSMENT & PLAN  Diagnosis: 80 y.o. female  with   Right hip hemiarthroplasty 2 weeks out. Right fifth metacarpal fracture nondisplaced    Right hip dressing removed. Continue ambulation and WBAT with walker. Follow up in office in 6 weeks for re-evaluation. Right hand splint removed. Add Ace wrap. Advance motion and weight bearing as tolerated. Prescription medication management discussed with patient. HPI: Mariella Pena is a 80 y.o. female patient who is seen in subacute rehab at [facility] for 2 week followup. She has been ambulating without pain for several days using the walker. Her hand is only mildly sore in her splint. PHYSICAL EXAMINATION:  Visit Vitals  BP (!) 162/57 (BP 1 Location: Left upper arm, BP Patient Position: Supine)   Pulse (!) 59   Temp 98.4 °F (36.9 °C)   Resp 18   Ht 5' 2\" (1.575 m)   Wt 137 lb 9.6 oz (62.4 kg)   SpO2 96%   Breastfeeding Unknown   BMI 25.17 kg/m²     Body mass index is 25.17 kg/m². GENERAL: Alert and oriented x3, in no acute distress. HEENT: Normocephalic, atraumatic. MSK: Right Hip Exam     Tenderness   The patient is experiencing no tenderness. Comments:  Able to perform SLR without pain  No pain to log roll  NVID  Swelling and resolving ecchymosis around incision but nontender and nonerythematous. Incision is C/D/I. Right Hand Exam     Tenderness   The patient is experiencing tenderness in the ulnar area. Comments:  NVID  Mild TTP around head of fifth MC  Able to make strong fist and move ulnar digits without pain.             Past Medical History: Diagnosis Date    CAD (coronary artery disease) 2008    Fibromyalgia     Heart failure (HCC)     Pt. states sge gas 5 heart blocks.     Hypercholesterolemia     Hypertension     Menopause     Thyroid disease        Family History   Problem Relation Age of Onset    Breast Cancer Mother [de-identified]       Current Facility-Administered Medications   Medication Dose Route Frequency Provider Last Rate Last Admin    melatonin (rapid dissolve) tablet 5 mg  5 mg Oral QHS PRN Sarah Sidhu NP        polyethylene glycol (MIRALAX) packet 17 g  17 g Oral DAILY Soni Melgar MD   17 g at 10/03/22 2963    HYDROcodone-acetaminophen (NORCO) 5-325 mg per tablet 1-2 Tablet  1-2 Tablet Oral Q4H PRN Soni Melgar MD   1 Tablet at 10/03/22 2003    ferrous sulfate tablet 325 mg  1 Tablet Oral DAILY WITH BREAKFAST Soni Melgar MD   325 mg at 10/04/22 0907    magnesium hydroxide (MILK OF MAGNESIA) 400 mg/5 mL oral suspension 30 mL  30 mL Oral DAILY PRN Kathe Mayorga MD   30 mL at 09/29/22 1335    bisacodyL (DULCOLAX) tablet 10 mg  10 mg Oral Q48H PRN Kathe Mayorga MD   10 mg at 10/03/22 0829    bisacodyL (DULCOLAX) suppository 10 mg  10 mg Rectal Q48H PRN Kathe Mayorga MD   10 mg at 09/25/22 1328    aspirin chewable tablet 81 mg  81 mg Oral BID Kathe Mayorga MD   81 mg at 10/04/22 0907    senna-docusate (PERICOLACE) 8.6-50 mg per tablet 1 Tablet  1 Tablet Oral BID Tapan Brush MD   1 Tablet at 10/04/22 0907    atorvastatin (LIPITOR) tablet 40 mg  40 mg Oral QHS Kathe Mayorga MD   40 mg at 10/03/22 2119    metoprolol succinate (TOPROL-XL) XL tablet 25 mg  25 mg Oral DAILY Kathe Mayorga MD   25 mg at 10/04/22 1313    amLODIPine (NORVASC) tablet 10 mg  10 mg Oral DAILY Kathe Mayorga MD   10 mg at 10/04/22 7519    isosorbide mononitrate ER (IMDUR) tablet 60 mg  60 mg Oral DAILY Kathe Mayorga MD   60 mg at 10/04/22 0907    levothyroxine (SYNTHROID) tablet 75 mcg  75 mcg Oral 6am aKthe Mayorga MD   75 mcg at 10/04/22 0551    nitroglycerin (NITROSTAT) tablet 0.4 mg  0.4 mg SubLINGual PRN Kathe Mayorga MD        insulin lispro (HUMALOG) injection   SubCUTAneous AC&HS Janiya Spencer MD   2 Units at 09/25/22 1218    glucose chewable tablet 16 g  16 g Oral PRN Kathe Mayorga MD        glucagon (GLUCAGEN) injection 1 mg  1 mg IntraMUSCular PRN Kathe Mayorga MD        dextrose 10% infusion 0-250 mL  0-250 mL IntraVENous PRN Kathe Mayorga MD           Allergies   Allergen Reactions    Codeine Other (comments)     dizziness    Dapiprazole Vertigo    Elavil Vertigo    Voltaren [Diclofenac Sodium] Vertigo       Past Surgical History:   Procedure Laterality Date    HX CHOLECYSTECTOMY      HX HYSTERECTOMY         Social History     Socioeconomic History    Marital status:      Spouse name: Not on file    Number of children: Not on file    Years of education: Not on file    Highest education level: Not on file   Occupational History    Not on file   Tobacco Use    Smoking status: Never    Smokeless tobacco: Never   Substance and Sexual Activity    Alcohol use: No    Drug use: No    Sexual activity: Not on file   Other Topics Concern    Not on file   Social History Narrative    Not on file     Social Determinants of Health     Financial Resource Strain: Not on file   Food Insecurity: Not on file   Transportation Needs: Not on file   Physical Activity: Not on file   Stress: Not on file   Social Connections: Not on file   Intimate Partner Violence: Not on file   Housing Stability: Not on file       REVIEW OF SYSTEMS:      No changes from previous review of systems unless noted. Electronically signed by: Kim Zhang DO    Note: This note was completed using voice recognition software.   Any typographical/name errors or mistakes are unintentional.

## 2022-10-04 NOTE — PROGRESS NOTES
Problem: Self Care Deficits Care Plan (Adult)  Goal: *Acute Goals and Plan of Care (Insert Text)  Description: Occupational Therapy Goals   Long Term Goals  Initiated (9/23/2022) and to be accomplished within 3 week(s), by (10/14/2022) (Reassessed on 10/4/2022 for dc)  1. Pt will perform self-feeding with Mod I. (Goal met 10/4/2022)  2. Pt will perform grooming with Mod I. (Goal met 10/4/2022)  3. Pt will perform UB bathing with set-up. (Goal met 10/4/2022)  4. Pt will perform LB bathing with supv using AE and/ or compensatory strategies while maintaining hip precautions. (Goal not met 10/4/2022; currently SBA for adherence to hip precautions)  5. Pt will perform tub/shower transfer with SBA using LRAD. (Goal not met 10/4/2022; currently CGA)  6. Pt will perform UB dressing with Mod I. (Goal met 10/4/2022)  7. Pt will perform LB dressing with SBA using AE and/ or compensatory strategies while maintaining hip precautions. (Goal met 10/4/2022)  8. Pt will perform toileting task with supv/ SBA. (Goal partially met 10/4/2022; currently SBA)  9. Pt will perform toilet transfer with supv using LRAD. (Goal not met 10/4/2022; currently SBA using RW)    Short Term Goals   Initiated (9/23/2022) and to be accomplished within 7 day(s), by (9/30/2022) (Reassessed on 9/30/2022)  1. Pt will perform self-feeding with set-up/ supv. (Goal met 9/30/2022)  2. Pt will perform grooming with supv. (Goal met 9/30/2022)  3. Pt will perform UB bathing with supv. (Goal met 9/30/2022)  4. Pt will perform LB bathing with Min A using AE and/ or compensatory strategies while maintaining hip precautions. (Goal met 9/30/2022)  5. Pt will perform tub/shower transfer with Min A using LRAD. (Goal met 9/30/2022)  6. Pt will perform UB dressing with SBA. (Goal met 9/30/2022)  7. Pt will perform LB dressing with Mod A using AE and/ or compensatory strategies while maintaining hip precautions. (Goal met 9/30/2022)  8.  Pt will perform toileting task with Mod A. (Goal met 2022)  9. Pt will perform toilet transfer with Min A using LRAD. (Goal met 2022)    Outcome: Resolved/Not Met   Problem: Self Care Deficits Care Plan (Adult)  Goal: Interventions  Outcome: Resolved/Met   OCCUPATIONAL THERAPY DISCHARGE    Patient: Jennifer Owens (20 y.o. female)  Date: 10/4/2022    First Tx Session  Time In: 0703  Time Out: 0836    Second Tx Session  Time In: 1330  Time Out: 1400    Primary Diagnosis: Hip fracture ([facility] [address] [S72.009A] <principal problem not specified>    Precautions: Fall precautions, Aspiration, Safety, right hip precautions, RLE WBAT, RUE (wrist/ forearm cast; fifth metacarpal fx)    Barriers to Learning/Limitations: yes;  cognitive/ forgetful at times, sensory deficits-vision/hearing/speech, and physical  Compensate with: visual, verbal, tactile, kinesthetic cues/model     Patient identified with name and : yes    SUBJECTIVE:   Patient stated I slept well last night.     OBJECTIVE DATA SUMMARY:     Past Medical History:   Diagnosis Date    CAD (coronary artery disease)     Fibromyalgia     Heart failure ([facility] [address]     Pt. states sge gas 5 heart blocks. Hypercholesterolemia     Hypertension     Menopause     Thyroid disease      Past Surgical History:   Procedure Laterality Date    HX CHOLECYSTECTOMY      HX HYSTERECTOMY     Previous Functional Level: Pre-Morbid Level of Function: Per patient and chart review, pt was (I) with self-care/ ADL's. Pt resides with son in 2 story home (lives on first floor). Functional mobility performed RW level within the home. Pt reports cleaning up at sink for bathing. Mod I for cooking (frozen meals) and medication management. Patient's son does the laundry. Pt's son drives her to grocery store and to appointments.      Home Situation  Home Environment: Private residence  # Steps to Enter: 6 (w/c ramp installed )  Wheelchair Ramp: Yes  One/Two Story Residence: Two story, live on 1st floor  # of Interior Steps: 12  Living Alone: No  Support Systems: Child(earle) (Pt resides with her son Khushboo Brown)  Patient Expects to be Discharged to[de-identified] Home  Current DME Used/Available at Home: [address], [place], straight, Walker, rollator, Walker, rolling  Tub or Shower Type: Tub/Shower combination (Pt reports performing sink bath at home)  [x]     Right hand dominant   []     Left hand dominant    Therapeutic Exercise:  RUE/ LUE yellow theraband exercises (10 reps x2) for chest pull, bicep curls, diagonals, and over head pull-down. Verbal cues with demonstration for hand placement and technique. HEP handout provided during a previous treatment session. Right/ left hand (pink) theraputty exercises (10 reps x1) for rolling, pinching, squeezing, and stretching putty. Verbal cues with demonstration for technique and hand positioning. HEP theraputty handout provided during today's treatment session for carryover to home environment. Pain:  Pt reports 0/10 pain or discomfort prior to treatment. Pt reports 0/10 pain or discomfort post treatment. Problem List:    Decreased strength B UE  [x]     Decreased strength trunk/core  [x]     Decreased AROM   [x]     Decreased PROM  []     Decreased balance sitting  []     Decreased balance standing  [x]     Decreased endurance  [x]     Pain  []       Functional Limitations:   Decreased independence with ADL  [x]     Decreased independence with functional transfers  [x]     Decreased independence with ambulation  [x]     Decreased independence with IADL  [x]       Outcome Measures:      MMT Initial Assessment   Right Upper Extremity  Left Upper Extremity    UE AROM RUE AROM (generally decreased); right wrist/ forearm soft cast 2/2 fifth metacarpal fx. RUE MMT not assessed 2/2 fracture. Care coordinated with unit NP for clarification regarding use of RUE.     LUE AROM WFL   Shoulder flexion  3+/5   Shoulder extension  3+/5   Elbow Flexion  3+/5   Elbow Extension  3+/5   Wrist Extension/Flexion  3+/5     3+/5 MMT Discharge Assessment   Right Upper Extremity  Left Upper Extremity    UE AROM RUE AROM (appears WFL) for right shoulder and elbow range of motion. Right wrist/ forearm soft cast 2/2 fifth metacarpal fx intact. LUE AROM WFL   Shoulder flexion 4-/5 4-/5   Shoulder extension 4-/5 4-/5   Elbow Flexion  4-/5   Elbow Extension  4-/5   Wrist Extension/Flexion  4-/5    3-/5 (secondary to soft cast/ fifth metacarpal fx) 3+/5       0/5 No palpable muscle contraction  1/5 Palpable muscle contraction, no joint movement  2-/5 Less than full range of motion in gravity eliminated position  2/5 Able to complete full range of motion in gravity eliminated position  2+/5 Able to initiate movement against gravity  3-/5 More than half but not full range of motion against gravity  3/5 Able to complete full range of motion against gravity  3+/5 Completes full range of motion against gravity with minimal resistance  4-/5 Completes full range of motion against gravity with minimal resistance  4/5 Completes full range of motion against gravity with moderate resistance  5/5 Completes full range of motion against gravity with maximum resistance    Coordination: RUE FMC (decreased) functional (RUE soft cast; fifth metacarpal fracture); LUE FMC appears intact  Sensation: appears intact to light touch    FIM SCORES Initial Assessment Discharge Assessment   Eating 5 (supv/ set-up) Functional Level: 6 (Mod I)   Grooming 5 (SBA) Functional Level: 6 (Mod I)    Oral Hygiene FIM: 6 Mod I    Bathing 3 (UB bathing: SBA; LB bathing: Mod A) Functional Level: 5 (UB bathing: set-up/ Mod I; LB bathing: SBA (basin at sink))  Body Parts Patient Bathed: Abdomen;Arm, left; Buttocks; Chest;Lower leg and foot, left; Lower leg and foot, right;Radha area; Thigh, left; Thigh, right (Right shoulder/upper arm, axilla, and right hand (RUE soft cast))  Comments: UB bathing performed set-up/ Mod I with basin in sink; LB bathing performed SBA for aspects performed in stance e.g. to wash buttocks and delroy-area. Verbal cues for use of LH sponge to wash BLE's for adherence to total hip precautions. Upper Body Dressing 4 (Min A) Functional Level: 6 (Mod I)  Items Applied/Steps Completed: Pullover (4 steps); Bra (3 steps)  Comments: UB dressing performed (Mod I); pt demonstrated ability to fasten bra and don (Mod I) level. Pt donned pullover shirt (Mod I) with increased time to adjust shirt over RUE soft cast.   Lower Body Dressing 2 (Max A) Functional Level: 5 (SBA overall)     Comments: Edu/ instructed on use of reacher to thread pull-up and clothing over distal BLE's for adherence to hip precautions. Pt progressed to (SBA) on second attempt with additional instruction. Edu/ instructed and reviewed using of AE for LB dressing tasks for adherence to hip precautions e.g. reacher, dressing stick, sock aid, and LH shoe horn. Pt donned sneakers (CGA) using LH shoe horn with increased time; therapist managing shoe laces for maintaining hip precautions. Therapist doffed/ donned thigh high compression hose. Sock and/or Shoe Management FIM: 4 CGA for sneakers/ use of LH shoe horn; SBA for doffing/ donning slipper socks using AE   Toileting 2 (Max A (change of brief bed level; therapist removed PureWick on arrival to room); pt voided using BSC at bedside (SBA for hygiene; Max A CM)) Functional Level: 5 (SBA)  Comments: SBA for CM; supv for hygiene     Tub/Shower Transfer 0 (Not assessed due to safety concerns and pain level (right hip). ) Tub/Shower Transfer Score: 4 (Stand step transfer (CGA) RW; gait belt (TTB transfer))  Comments: Stand step transfer (CGA) RW; gait belt (TTB transfer). Task simulation. ADL shower was offered; pt declined. Pt requesting to clean up at sink. Toilet Transfer 3 (Stand step xfer (Mod A) RW; EOB > BSC at bedside.  Limited due to hip pain.) Toilet Transfer Score: 5 (Stand step transfer (SBA) RW; gait belt (elevated seat over toilet))  Comments: Stand step transfer (SBA) RW; gait belt (elevated seat over toilet)    Comprehension 4 Score: 4 (Hard of hearing; may need words repeated)   Expression 5 Score: 5   Social Interaction 5 Score: 5   Problem Solving 3 Score: 3   Memory 3 Score: 3   Please see [facility] Interdisciplinary Eval: Coordination/Balance Section for details regarding FIM score description. Activity Tolerance:   Fair; intermittent rest breaks due to fatigue and low endurance    ASSESSMENT:  Patient improved slowly and progressed toward goals having achieved 6/9 LTG's and 9/9 STG's during her inpatient rehab stay. Therapist reassessed goals based on pt's current functional ability and demonstrated performance. Today's treatment session focused on ADL training, edu/ instruction in use of AE for LB ADL's (e.g. reacher, LH sponge, and dressing stick), review of hip precautions within context of self-care performance, RUE/ LUE there ex, and functional transfer training using LRAD (RW). Pt progressed to UB ADL's performed (set-up/ Mod I) level; LB ADL's performed (SBA) with use of AE and increased time. Functional transfers performed (SBA/ CGA) using RW; gait belt. Verbal cues with reminders for adherence to total hip precautions. Increased time for ADL performance as pt fatigues easily; requiring intermittent rest breaks. Patient will benefit from home health skilled occupational therapy to further address decreased (I) with ADL's, decreased functional strength/ endurance, decreased activity tolerance, impaired balance/ coordination, decreased AROM, right wrist/ forearm soft cast 2/2 fifth metacarpal fx, right total hip/ WBAT, hip precautions, impaired functional transfers/ mobility, pain, impaired cognition/ forgetful, and safety limiting pt's functional (I) with ADL's and mobility for return to PLOF.     Second tx session:   There ex: RUE/ LUE there ex performed (15 reps x2) for shoulder flexion/ extension, chest press, overhead press, and elbow flexion/ extension; use of 1 lb hand weight right hand and 2 lb left side. Verbal cues with demonstration for technique. Verbal cues for alternating upper extremities. Rest break between sets due to fatigue. Performed for ROM and strengthening for increased (I) with ADL's. RUE/ LUE there ex performed using 1 lb weighted bar (10 reps x2) for chest press and bicep curls. Verbal cues for hand placement. There act: Patient participated in [place] [employer] there act during balloon tapping activity with therapist; performed for eye-hand coordination, range of motion, functional reach out-front, and activity tolerance for increased (I) with ADL's and functional mobility using LRAD (RW). Progression toward goals:  []      Improving appropriately and progressing toward goals  [x]      Improving slowly and progressing toward goals  []      Not making progress toward goals and plan of care will be adjusted     PLAN:  Pt would benefit from continued skilled occupational therapy in order to improve ADL function and IADL with use of least restrictive device. Interventions may include range of motion (AROM, PROM B UE/trunk), motor function (B UE/trunk strengthening/coordination), activity tolerance (vitals, oxygen saturation levels), ADL, balance activities, IADL, and functional transfer training. Discharge Recommendations:  Home Health with family assist  Further Equipment Recommendations for Discharge:  bedside commode, gait belt, and tub transfer bench      Please refer to the flow sheet for vital signs taken during this treatment.   After treatment:   [x]  Patient left in no apparent distress sitting up in wheelchair with needs met  []  Patient left in no apparent distress in bed  [x]  Call bell left within reach  [x]  Nursing notified  []  Caregiver present  [x]  Wheelchair alarm activated    COMMUNICATION/EDUCATION:   Communication/Collaboration:  [x]      Home safety education was provided and the patient/caregiver indicated understanding. [x]      Patient/family have participated as able and agree with findings and recommendations. []      Patient is unable to participate in plan of care at this time.     Marc Arango OT  10/4/2022

## 2022-10-04 NOTE — PROGRESS NOTES
Patient is off to the floor (radiology). Patient is not available to be assessed at this time.       Melba Milian 5 [phone]

## 2022-10-05 VITALS
RESPIRATION RATE: 16 BRPM | TEMPERATURE: 98.1 F | WEIGHT: 137.6 LBS | SYSTOLIC BLOOD PRESSURE: 155 MMHG | BODY MASS INDEX: 25.32 KG/M2 | OXYGEN SATURATION: 99 % | HEIGHT: 62 IN | DIASTOLIC BLOOD PRESSURE: 60 MMHG | HEART RATE: 56 BPM

## 2022-10-05 LAB
GLUCOSE BLD STRIP.AUTO-MCNC: 140 MG/DL (ref 70–110)
GLUCOSE BLD STRIP.AUTO-MCNC: 95 MG/DL (ref 70–110)

## 2022-10-05 PROCEDURE — 99239 HOSP IP/OBS DSCHRG MGMT >30: CPT | Performed by: INTERNAL MEDICINE

## 2022-10-05 PROCEDURE — 74011250637 HC RX REV CODE- 250/637: Performed by: FAMILY MEDICINE

## 2022-10-05 PROCEDURE — 82962 GLUCOSE BLOOD TEST: CPT

## 2022-10-05 PROCEDURE — 74011250637 HC RX REV CODE- 250/637: Performed by: HOSPITALIST

## 2022-10-05 RX ORDER — LANOLIN ALCOHOL/MO/W.PET/CERES
325 CREAM (GRAM) TOPICAL
Qty: 30 TABLET | Refills: 0 | Status: SHIPPED | OUTPATIENT
Start: 2022-10-05 | End: 2022-11-04

## 2022-10-05 RX ORDER — POLYETHYLENE GLYCOL 3350 17 G/17G
17 POWDER, FOR SOLUTION ORAL DAILY
Qty: 5 PACKET | Refills: 0 | Status: SHIPPED | OUTPATIENT
Start: 2022-10-05

## 2022-10-05 RX ORDER — HYDROCODONE BITARTRATE AND ACETAMINOPHEN 5; 325 MG/1; MG/1
1 TABLET ORAL
Qty: 10 TABLET | Refills: 0 | Status: SHIPPED | OUTPATIENT
Start: 2022-10-05 | End: 2022-10-08

## 2022-10-05 RX ORDER — BISACODYL 5 MG
10 TABLET, DELAYED RELEASE (ENTERIC COATED) ORAL
Qty: 10 TABLET | Refills: 0 | Status: SHIPPED | OUTPATIENT
Start: 2022-10-05

## 2022-10-05 RX ORDER — GUAIFENESIN 100 MG/5ML
81 LIQUID (ML) ORAL 2 TIMES DAILY
Qty: 60 TABLET | Refills: 0 | Status: SHIPPED | OUTPATIENT
Start: 2022-10-05 | End: 2022-11-04

## 2022-10-05 RX ADMIN — LEVOTHYROXINE SODIUM 75 MCG: 0.05 TABLET ORAL at 05:39

## 2022-10-05 RX ADMIN — ASPIRIN 81 MG CHEWABLE TABLET 81 MG: 81 TABLET CHEWABLE at 09:44

## 2022-10-05 RX ADMIN — FERROUS SULFATE TAB 325 MG (65 MG ELEMENTAL FE) 325 MG: 325 (65 FE) TAB at 09:43

## 2022-10-05 RX ADMIN — AMLODIPINE BESYLATE 10 MG: 10 TABLET ORAL at 09:44

## 2022-10-05 RX ADMIN — ISOSORBIDE MONONITRATE 60 MG: 60 TABLET, EXTENDED RELEASE ORAL at 09:43

## 2022-10-05 RX ADMIN — METOPROLOL SUCCINATE 25 MG: 25 TABLET, FILM COATED, EXTENDED RELEASE ORAL at 09:44

## 2022-10-05 RX ADMIN — SENNOSIDES AND DOCUSATE SODIUM 1 TABLET: 50; 8.6 TABLET ORAL at 09:44

## 2022-10-05 NOTE — PROGRESS NOTES
Wound Prevention Checklist    Patient: Zulema Davison (76 y.o. female)  Date: 10/5/2022  Diagnosis: Hip fracture ([facility] [address] Maritza Perkins <principal problem not specified>    Precautions:  Total hip, Fall       []  Heel prevention boots placed on patient    []  Patient turned q2h during shift    []  Lift team ordered    []  Patient on [facility] bed/Specialty bed    [x]  Each Wound is documented during shift (Stage, Color, drainage, odor, measurements, and dressings)    [x]  Dual skin check done with Valencia Wood RN

## 2022-10-05 NOTE — ROUTINE PROCESS
SHIFT CHANGE NOTE FOR [facility]    Bedside and Verbal shift change report given to [address] (oncoming nurse) by Tyrone St RN (offgoing nurse). Report included the following information SBAR, Kardex, MAR and Recent Results. Situation:   Code Status: DNR   Hospital Day: 13   Problem List:   Hospital Problems  Date Reviewed: 5/31/2022            Codes Class Noted POA    Hip fracture [facility]) ICD-10-CM: N65.777M  ICD-9-CM: 820.8  9/18/2022 Unknown       Background:   Past Medical History:   Past Medical History:   Diagnosis Date    CAD (coronary artery disease) 2008    Fibromyalgia     Heart failure ([facility] [address]     Pt. states sge gas 5 heart blocks.     Hypercholesterolemia     Hypertension     Menopause     Thyroid disease         Assessment:  Changes in Assessment throughout shift: No change to previous assessment    Patient has a central line: no Reasons if yes: na  Insertion date:na Last dressing date:na  Patient has Elliott Cath: no Reasons if yes: na   Insertion date:na  Shift elliott care completed: NO    Last Vitals:     Vitals:    10/04/22 0705 10/04/22 1616 10/04/22 2035 10/05/22 0711   BP: (!) 162/57 127/63 (!) 138/59 (!) 155/60   Pulse: (!) 59 60  (!) 56   Resp: 18 18 18 16   Temp: 98.4 °F (36.9 °C) 97.3 °F (36.3 °C) 97.4 °F (36.3 °C) 98.1 °F (36.7 °C)   SpO2: 96% 99% 97% 99%   Weight:       Height:           PAIN    Pain Assessment    Pain Intensity 1: 0 (10/05/22 0400) Pain Intensity 1: 2 (12/29/14 1105)    Pain Location 1: Leg Pain Location 1: Abdomen    Pain Intervention(s) 1: Position, Medication (see MAR) Pain Intervention(s) 1: Medication (see MAR)  Patient Stated Pain Goal: 0 Patient Stated Pain Goal: 0  Intervention effective: yes  Other actions taken for pain:      Skin Assessment  Skin color Skin Color: Appropriate for ethnicity  Condition/Temperature    Integrity Skin Integrity: Incision (comment)  Turgor    Weekly Pressure Ulcer Documentation  Pressure  Injury Documentation: No Pressure Injury Noted-Pressure Ulcer Prevention Initiated  Wound Prevention & Protection Methods  Orientation of wound Orientation of Wound Prevention: Posterior  Location of Prevention Location of Wound Prevention: Sacrum/Coccyx  Dressing Present Dressing Present : No  Dressing Status Dressing Status: Intact  Wound Offloading Wound Offloading (Prevention Methods): Bed, pressure reduction mattress, Chair cushion, Pillows, Walker    INTAKE/OUPUT  Date 10/04/22 0700 - 10/05/22 0659 10/05/22 0700 - 10/06/22 0659   Shift 0700-1859 1900-0659 24 Hour Total 0700-1859 1900-0659 24 Hour Total   INTAKE   P.O. 200  200        P. O. 200  200      Shift Total(mL/kg) 200(3.2)  200(3.2)      OUTPUT   Urine(mL/kg/hr)           Urine Occurrence(s) 0 x 2 x 2 x      Emesis/NG output           Emesis Occurrence(s)  0 x 0 x      Stool           Stool Occurrence(s) 0 x 0 x 0 x      Shift Total(mL/kg)           200      Weight (kg) 62.4 62.4 62.4 62.4 62.4 62.4         Recommendations:  Patient needs and requests: na    Pending tests/procedures: na     Functional Level/Equipment:   /      Fall Precautions:   Fall risk precautions were reinforced with the patient; she was instructed to call for help prior to getting up. The following fall risk precautions were continued: bed/ chair alarms, door signage, yellow bracelet and socks as well as update of the Floobits Luis Antonio tool in the patient's room. Alma Rosa Score:      HEALS Safety Check    A safety check occurred in the patient's room between off going nurse and oncoming nurse listed above. The safety check included the below items  Area Items   H  High Alert Medications Verify all high alert medication drips (heparin, PCA, etc.)   E  Equipment Suction is set up for ALL patients (with yanker)  Red plugs utilized for all equipment (IV pumps, etc.)  WOWs wiped down at end of shift.   Room stocked with oxygen, suction, and other unit-specific supplies   A  Alarms Bed alarm is set for fall risk patients  Ensure chair alarm is in place and activated if patient is up in a chair   L  Lines Check IV for any infiltration  Talley bag is empty if patient has a Talley   Tubing and IV bags are labeled   S  Safety   Room is clean, patient is clean, and equipment is clean. Hallways are clear from equipment besides carts. Fall bracelet on for fall risk patients  Ensure room is clear and free of clutter  Suction is set up for ALL patients (with kolbyker)  Hallways are clear from equipment besides carts.    Isolation precautions followed, supplies available outside room, sign posted     Blanka Vazquez RN

## 2022-10-05 NOTE — PROGRESS NOTES
conducted a Follow up consultation and Spiritual Assessment for Shazia Marmolejo, who is a 80 y.o.,female. The  provided the following Interventions:  Continued the relationship of care and support as patient is getting ready to be discharged. Listened empathically as patient shared her concern: \"I fear to go home because I don't know if my granddaughter will get any help in taking care of me. \"Coping skills were discussed with assurance that it may take sometime to gain back her strength but with prayers and efforts on her part, things may get better. Offered prayer, holy communion, and assurance of continued prayer on patient's behalf. Chart reviewed. The following outcomes were achieved:  Patient received holy communion. Patient expressed gratitude for 's visit. Assessment:  Patient expressed her concerns on the burden her granddaughter will endure taking care of her at home. There are no further spiritual or Confucianist issues which require Spiritual Care Services interventions at this time. Plan:  Chaplains will continue to follow and will provide pastoral care on an as needed/requested basis.  recommends bedside caregivers page  on duty if patient shows signs of acute spiritual or emotional distress.      Melba Savage 5 [phone]

## 2022-10-05 NOTE — PROGRESS NOTES
NP discussed and went over in detail with granddaughter at bedside prior to admission, current, and discharge medications. NP explained what medications to stop, resume, and continue upon discharge. NP discussed and answered questions regarding follow up care/pcp/specialist appointments, and expectations for discharge process. NP reached out to Dr. Chauncey Valentine regarding discharge due pending x-rays ordered yesterday on 10/5/2022. Dr. Chauncey Valentine cleared patient to discharge, routine post-op x-rays.      David Anders NP  10/5/2022

## 2022-10-05 NOTE — HOME CARE
Received home health referral for [facility] for (SN, PT, OT, HHA). Inquiries done with daughter and patient via phone and in room;  patient identifiers verified. Explained home care services and routines. Demographics verified including insurance, phone and address confirmed. Patient has the following DME: has available - BSC, wheelchair, shower chair, rolling walker and rollator. Caregivers:  lives with son - both grand-daughter Do Allred and son Gerda Pleitez to assist with patient's needs. Orders noted and arranged to be processed to central intake.       ---   Claudia Michaels LPN  [facility] [place] - INPATIENT Liaison

## 2022-10-06 ENCOUNTER — HOME CARE VISIT (OUTPATIENT)
Dept: HOME HEALTH SERVICES | Facility: HOME HEALTH | Age: 87
End: 2022-10-06

## 2022-10-06 ENCOUNTER — HOME CARE VISIT (OUTPATIENT)
Dept: SCHEDULING | Facility: HOME HEALTH | Age: 87
End: 2022-10-06
Payer: MEDICARE

## 2022-10-06 VITALS
SYSTOLIC BLOOD PRESSURE: 120 MMHG | RESPIRATION RATE: 18 BRPM | HEART RATE: 76 BPM | DIASTOLIC BLOOD PRESSURE: 80 MMHG | OXYGEN SATURATION: 97 % | TEMPERATURE: 97.6 F

## 2022-10-06 PROCEDURE — 3331090002 HH PPS REVENUE DEBIT

## 2022-10-06 PROCEDURE — 400013 HH SOC

## 2022-10-06 PROCEDURE — G0151 HHCP-SERV OF PT,EA 15 MIN: HCPCS

## 2022-10-06 PROCEDURE — 3331090001 HH PPS REVENUE CREDIT

## 2022-10-06 PROCEDURE — 400018 HH-NO PAY CLAIM PROCEDURE

## 2022-10-06 NOTE — HOME HEALTH
Skilled services/Home bound verification:     Skilled Reason for admission/summary of clinical condition:  History of the Present Illness: This is a 80-year-old  female presented to hospital on September 18, 2022 after having a fall at home. Please refer to hospital admission H&P for further detail. Patient was found out to have a right hip and right upper extremity pain. CT head was unremarkable. CT C-spine was reported no acute cervical spine fracture. CT abdomen pelv is showed right transcervical femoral neck fracture and right ureteral 7 mm stone causing proximal hydroureteronephrosis. X-ray right hand was showing oblique fracture of the fifth metacarpal without evidence of displacement. X-ray right hip showed right femoral neck fracture. Patient was seen by orthopedic surgeon and underwent surgery. Postoperatively x-ray showed s/p right hip hemiarthroplasty with expected for surgical  changes. Postoperatively her hemoglobin was stable. Postoperatively her pain was managed initially with IV then p.o. medication in last 24 hours without any issues. She also had a small bowel movement. She has been tolerating diet and medications very well. She will be continued on aspirin for DVT prophylaxis. She was resumed on home medication for the comorbidities. Patient was also on noted to have incidental findi ng of right ureter stone causing hydronephrosis, patient does not have any sepsis. No flank pain. No leukocytosis. Had normal urine analysis. Remained afebrile. Creatinine is normal.  She is voiding urine without any issues. Discharging physician from the hospital spoke to urologist PA and they will follow this patient while patient is in rehab. They also did not recommend further intervention. Family was made aware of  it. .  This patient is homebound for the following reasons Requires considerable and taxing effort to leave the home .     Caregiver: son Remy Taylor assists with IADL's. Medications reconciled and all medications are not available in the home this visit.    norco, aspirin, iron  is till at pharmacy,     High risk medication teaching regarding anticoagulants, hyperglycemic agents or opiod narcotics performed (specify) norco for risk of overdose ,    [facility] NP notified of any discrepancies/look a like medications/medication interactions no severe interaction noted . Home health supplies by type and quantity ordered/delivered this visit include: none    Patient education provided this visit to include: HEP, fall prevention, dc planning . Patient level of understanding of education provided: Patient was able to partially teach back of fall prevention     Sharps Education Provided: n/a  Patient response to procedure performed:  Patient needed verbal and visual cues for HEP     Home exercise program/Homework provided: patient educated with HEP including seated hip flex, knee extension, hip abduction, hip adduction, ankle PF and DF and ball squeeze x 20 reps x 3 sets daily to improve MMT on BLE to facilitate with improved bed mobility, transfers and gait with AD. patient also educated with deep breathing exercises to be done daily x 10 reps x 3 sets to prevent SOB during activity. Patient educated with fall prevention technique by decluttering space, proper use of AD and footwear    Pt/Caregiver instructed on plan of care and are agreeable to plan of care at this time. Physician [facility] NP notified of patient admission to home health and plan of care including anticipated frequency of 1w1 2w4 for PT   Discharge planning discussed with patient and caregiver. Discharge planning as follows: dc to family with MD supervision when all goals are met . Pt/Caregiver did verbalize understanding of discharge planning.      Next MD appointment TBD  Patient/caregiver encouraged/instructed to keep appointment as lack of follow through with physician appointment could result in discontinuation of home care services for non-compliance. PMHx: CAD (coronary artery disease) 2008    Fibromyalgia      Heart failure (HCC)        Pt. states sge gas 5 heart blocks.  Hypercholes terolemia      Hypertension      Menopause      Thyroid disease   S: complained of pain on R hip 2/10 which ic managed by rest, pain medication is still at pharmacy during admit   O:Patients Goals= Be able to go back to PLOF  Wound/Incision: location, description, drainage: has intact hip incision measuring 14 cm in length without dressing and drainage noted and well approximated   PLOF: Lives with son in a 1 level house with a ramp to enter main floor, prior to surgery, she was independent with ADL's and IADL's and was using a RW for ambulation   STRENGTH R hip flexor 3/5, R knee flexor and extensor 3/5, L hip and knee flexor and extensor 3+/5  FTSTS 29 seconds   TUG 51 seconds   BALANCE Tinetti 11/28 high fall risk due to reduced strength on BLE, gait deviation and decreased efficiency of balance reactions. Patient demonstrated poor use of hip and ankle strategy during standing balance activity. Patient requires support from AD at all times for safety and stability. GAIT Patient was able to ambulate with RW x 30 feet with Mod A x1 with noted antalgic gait on RLE, decreased step height and stride length on RLE. patient was educated with clearing mobility pathway to have a safe passage for ambulation using RW  BED MOBILITY Patient needed Min A x1 with bed mobility   TRANSFERS Patient needed Mod A x1 with use of RW to and from bed, chair and toilet   A: PT evaluation completed with the presence of son who is the primary CG, POC established, Med rec done, HEP established  P:Home Safety eval/recommendations: Home health physical therapy initial evaluation performed.  Patient demonstrates decreased strength, balance, and endurance which increases patient's overall fall risk and burden of care.Patient would benefit from home health physical therapy to improve balance, strength, and endurance which would decrease fall risk and allow patient to return to prior level of function once all functional goals or full rehab potential is met. patient educated with HEP including seated hip flex, knee extension, hip abduction, hip adduction, ankle PF and DF and ball squeeze x 20 reps x 3 sets daily to improve MMT on BLE to facilitate with improved bed mobility, transfers and gait with AD. patient also educated with deep breathing exercises to be done daily x 10 reps x 3 sets to prevent SOB during activity.  Patient educated with fall prevention technique by decluttering space, proper use of AD and footwear

## 2022-10-07 ENCOUNTER — HOME CARE VISIT (OUTPATIENT)
Dept: SCHEDULING | Facility: HOME HEALTH | Age: 87
End: 2022-10-07
Payer: MEDICARE

## 2022-10-07 ENCOUNTER — HOME CARE VISIT (OUTPATIENT)
Dept: HOME HEALTH SERVICES | Facility: HOME HEALTH | Age: 87
End: 2022-10-07
Payer: MEDICARE

## 2022-10-07 PROCEDURE — 3331090002 HH PPS REVENUE DEBIT

## 2022-10-07 PROCEDURE — 3331090001 HH PPS REVENUE CREDIT

## 2022-10-07 PROCEDURE — G0299 HHS/HOSPICE OF RN EA 15 MIN: HCPCS

## 2022-10-08 PROCEDURE — 3331090002 HH PPS REVENUE DEBIT

## 2022-10-08 PROCEDURE — 3331090001 HH PPS REVENUE CREDIT

## 2022-10-09 VITALS
TEMPERATURE: 97.6 F | HEART RATE: 80 BPM | OXYGEN SATURATION: 97 % | DIASTOLIC BLOOD PRESSURE: 78 MMHG | SYSTOLIC BLOOD PRESSURE: 126 MMHG | RESPIRATION RATE: 16 BRPM

## 2022-10-09 PROCEDURE — 3331090001 HH PPS REVENUE CREDIT

## 2022-10-09 PROCEDURE — 3331090002 HH PPS REVENUE DEBIT

## 2022-10-09 NOTE — HOME HEALTH
Skilled Reason for admission/summary of clinical condition:  fall resulting in right hip fracture . This patient is homebound for the following reasons Requires considerable and taxing effort to leave the home  and Requires the assistance of 1 or more persons to leave the home   Caregiver: relative. Caregiver assists with iadl, adl, meds, transportation  Medications reconciled and all medications are available in the home this visit. The following education was provided regarding medications: sn instructed patient on Norvasc, use for BP control and potential for dizziness, falls and drop in BP. Medications  are effective at this time. High risk medication teaching regarding anticoagulants, hyperglycemic agents or opoid narcotics performed: patient son not  oxy yet. sn instructed cg on need for med compliance  notified of any discrepancies/look a like medications/medication interactions na  Home health supplies by type and quantity ordered/delivered this visit include: na  Patient education provided this visit to include: Patient is 59-year-old  female who lives with son and daughter-in-law. Past medical history for CAD, hypertension. Patient has had a few falls in the past year, last one resulting in fractured right hip. Incision to right hip with small scabs, no swelling or redness noted. Skilled nurse instructed patient to monitor site for redness, drainage, odor or increased pain. Patient wearing Teds since hospital discharge. No swelling noted. Skilled nurse instructed patient and caregiver to remove Art's hose at night and replace in the morning and to keep legs elevated. Patient with some complaints of right heel pain. No redness or signs Of pressure injury noted. Skilled nurse instructed patient to float heels to prevent pressure. Patient using walker, slow gait, some balance issues.  Skilled nurse instructed patient to change positions slowly to prevent dizziness and falls into use walker and to wear shoes or non-ski socks to prevent falls    Patient level of understanding of education provided: good  Sharps Education Provided: na  Patient response to procedure performed:  tolerated assessment without issue  Home exercise program/Homework provided: sn instructed patient to perform deep breathing exercises, 5-6 breaths 5 x daily to promote air exchange and prevent pneumonia   Pt/Caregiver instructed on plan of care and are agreeable to plan of care at this time. Physician Faisal Gann NP notified of patient admission to home health and plan of care including anticipated frequency of 1w1, 2w1, 1w4 and treatments/interventions/modalities of pt/ot  Discharge planning discussed with patient and caregiver. Discharge planning as follows: dc when goals met  Pt/Caregiver did verbalize understanding of discharge planning. Next MD appointment 10/12 with corey padilla NP. Patient/caregiver encouraged/instructed to keep appointment as lack of follow through with physician appointment could result in discontinuation of home care services for non-compliance.

## 2022-10-10 ENCOUNTER — HOSPITAL ENCOUNTER (OUTPATIENT)
Dept: LAB | Age: 87
Discharge: HOME | End: 2022-10-10
Payer: MEDICARE

## 2022-10-10 ENCOUNTER — HOME CARE VISIT (OUTPATIENT)
Dept: SCHEDULING | Facility: HOME HEALTH | Age: 87
End: 2022-10-10
Payer: MEDICARE

## 2022-10-10 VITALS
TEMPERATURE: 98.2 F | RESPIRATION RATE: 20 BRPM | OXYGEN SATURATION: 97 % | DIASTOLIC BLOOD PRESSURE: 67 MMHG | SYSTOLIC BLOOD PRESSURE: 118 MMHG | HEART RATE: 68 BPM

## 2022-10-10 VITALS
OXYGEN SATURATION: 97 % | TEMPERATURE: 98.3 F | RESPIRATION RATE: 16 BRPM | HEART RATE: 65 BPM | SYSTOLIC BLOOD PRESSURE: 140 MMHG | DIASTOLIC BLOOD PRESSURE: 60 MMHG

## 2022-10-10 LAB
ALBUMIN SERPL-MCNC: 3.1 G/DL (ref 3.4–5)
ALBUMIN/GLOB SERPL: 1.1 {RATIO} (ref 0.8–1.7)
ALP SERPL-CCNC: 95 U/L (ref 45–117)
ALT SERPL-CCNC: 18 U/L (ref 13–56)
ANION GAP SERPL CALC-SCNC: 13 MMOL/L (ref 3–18)
AST SERPL-CCNC: 18 U/L (ref 10–38)
BILIRUB SERPL-MCNC: 0.6 MG/DL (ref 0.2–1)
BUN SERPL-MCNC: 18 MG/DL (ref 7–18)
BUN/CREAT SERPL: 23 (ref 12–20)
CALCIUM SERPL-MCNC: 8.1 MG/DL (ref 8.5–10.1)
CHLORIDE SERPL-SCNC: 104 MMOL/L (ref 100–111)
CO2 SERPL-SCNC: 23 MMOL/L (ref 21–32)
CREAT SERPL-MCNC: 0.78 MG/DL (ref 0.6–1.3)
GLOBULIN SER CALC-MCNC: 2.8 G/DL (ref 2–4)
GLUCOSE SERPL-MCNC: 69 MG/DL (ref 74–99)
MAGNESIUM SERPL-MCNC: 2.3 MG/DL (ref 1.6–2.6)
POTASSIUM SERPL-SCNC: 3.5 MMOL/L (ref 3.5–5.5)
PROT SERPL-MCNC: 5.9 G/DL (ref 6.4–8.2)
SODIUM SERPL-SCNC: 140 MMOL/L (ref 136–145)

## 2022-10-10 PROCEDURE — 83735 ASSAY OF MAGNESIUM: CPT

## 2022-10-10 PROCEDURE — G0300 HHS/HOSPICE OF LPN EA 15 MIN: HCPCS

## 2022-10-10 PROCEDURE — 3331090002 HH PPS REVENUE DEBIT

## 2022-10-10 PROCEDURE — 3331090001 HH PPS REVENUE CREDIT

## 2022-10-10 PROCEDURE — G0152 HHCP-SERV OF OT,EA 15 MIN: HCPCS

## 2022-10-10 PROCEDURE — 80053 COMPREHEN METABOLIC PANEL: CPT

## 2022-10-10 NOTE — Clinical Note
Mrs. Esha Luong presents with good rehab potential to increase her strength, safety and independence with ADLs/functional mobility since her most recent hip fx and surgery. Pt with increased assistance from granddaughter for lower body ADLs due to her hip precautions. She would benefit from continued home health occupational therapy services to increase her safety and independence within her home environment. PLAN: D/C 2w3 with plans to discharge when goals are met or maximal potential achieved.

## 2022-10-10 NOTE — Clinical Note
Pt lives with her 2 adult children and has adult granddaughter visit daily to assist with ADLs. She is s/p right hip altagracia and has hip precautions Pt presents with decreased independence with her ADLs due to her hip precautions limting her bending and crossing her legs. Pt has the needed adaptive equipment and assistive devices however has not been utlizing consistantly with grandaughter and children assistsing. Pt would benifit from reinforcment and education on proper use of adaptive equipment. Pt presents with BUE strength of 4-/5 and would benifit from light BUE strengthening to aid in her functonal trasnfers. Pt has been using bedside commode for toileting and would benifit frm toilet trasnfer options including placing 3:1 commode over toilet. Pt has tub transfer bench however it is placed in shower incorrectly with bar facing outside of shower. Pt however reporting preference for sponge bathing and disliking showers. Please address her independnce with object retieval and bed mobility with her hip precaution adherance.  Please reach out with any questions/concerns,

## 2022-10-10 NOTE — HOME HEALTH
Skilled reason for visit: right hip fracture  Caregiver involvement: son available as needed for assistance. Medications reviewed and all medications are available in the home this visit. The following education was provided regarding medications, medication interactions, and look alike medications (specify): no med changes noted or reported, pt aware of taking all meds prescibed . Medications  are effective at this time. Home health supplies by type and quantity ordered/delivered this visit include: none  Patient education provided this visit:Patient is a fall risk. Educated pateint to sit on the side of the chair/bed, take a slow deep breaths, have feet firmly planted before standing up, use cane/walker if available, or have someone to assist.  pt aware to keep incision clean and dry as ordered, to report any new drainage to staff. Progress toward goals: Pt lab obtained for NP randall padilla from left forearm for CMP,BMP,MG, and drop off at [facility] lab.   Home exercise program: as tolerated  Continued need for the following skills: Nursing and Physical Therapy  Patient and/or caregiver notified and agrees to changes in the Plan of Care YES/NO/NA: N/A    Patient will be discharged once education and wounds if applicable has been completed, patient is medically stable, and all goals met

## 2022-10-10 NOTE — Clinical Note
Therapy Functional Score Assessment  Question                                            Score   Grooming                            1       Upper Dressing   1      Lower Dressing   2      Bathing                 5      Toilet Transfer                   2    Transfer                1            Ambulation                         3   Dyspnea                     2       Pain Interfering with activity        1  Est number therapy visits      6    Mrs. Idris Ford presents with good rehab potential to increase her strength, safety and independence with ADLs/functional mobility since her most recent hip fx and surgery. Pt with increased assistance from granddaughter for lower body ADLs due to her hip precautions. She would benefit from continued home health occupational therapy services to increase her safety and independence within her home environment. PLAN: D/C 2w3 with plans to discharge when goals are met or maximal potential achieved.

## 2022-10-10 NOTE — HOME HEALTH
Clinical Condition per EPIC:  Skilled Reason for admission/summary of clinical condition: History of the Present Illness:   This is a 77-year-old  female presented to hospital on September 18, 2022 after having a fall at home. Patient was found out to have a right hip and right upper extremity pain. CT head was unremarkable. CT C-spine was reported no acute cervical spine fracture. CT abdomen pelvis showed right transcervical femoral neck fracture and right ureteral 7 mm stone causing proximal hydroureteronephrosis. X-ray right hand was showing oblique fracture of the fifth metacarpal without evidence of displacement. X-ray right hip showed right femoral neck fracture. Patient was seen by orthopedic surgeon and underwent surgery. Postoperatively x-ray showed s/p right hip hemiarthroplasty with expected for surgical changes. Postoperatively her hemoglobin was stable. Postoperatively her pain was managed initially with IV then p.o. medication in last 24 hours without any issues. She also had a small bowel movement. She has been tolerating diet and medications very well. She will be continued on aspirin for DVT prophylaxis. She was resumed on home medication for the comorbidities. Patient was also on noted to have incidental finding of right ureter stone causing hydronephrosis, patient does not have any sepsis. No flank pain. No leukocytosis. Had normal urine analysis. Remained afebrile. Creatinine is normal. She is voiding urine without any issues. Discharging physician from the hospital spoke to urologist PA and they will follow this patient while patient is in rehab. They also did not recommend further intervention. Family was made aware of it.     PMHx: CAD (coronary artery disease) 2008  Fibromyalgia  Heart failure (HCC) Pt. states sge gas 5 heart blocks.  Hypercholesterolemia  Hypertension  Menopause  Thyroid disease     PLOF: Pt lives with son in a 1 level house with a ramp to enter main floor. Prior to surgery, she was independent with ADL's and most IADL's and was using a rollator for ambulation. Caregiver: Pt son Kyle Salinas, daughter and grandaughter assist her with ADLs, functional mobility and with IADL's. MEDICATION RECONCILIATION: Pt reports no changes since last reviewed. Educated to continue as directed per MD.  SUBJECTIVE: Pt reports no pain. Pt presetns with hip precautions for no adduction, internal rotation and flexion past 90 degrees pt is s/p right hip hemiarthroplasty. DME ORDERED/RECOMMENEDED: N/A  DME pt has: Pt has rolling walker, bedside commode, rollator, wheelchair, and tub transfer bench. OBJECTIVE:  BATHING: Pt CGA/minimal assistance for bathing with assistance from her grandaughter. Pt preferes sponge baths but has tub transfer bench and tub shower unit. TOILETING: Pt CGA for toileting using bedside commode toilet. Pt family is always present  UB DRESSING: Pt set up for upper body dressing  LB DRESSING: Pt moderate assistance for lower body dressing to bharat/doff socks, shoes, and pants with her precautions but has sock aid, dressing stick, long shoe horn, and reacher in the home  GROOMING: Pt set up/supervision for grooming sitting up in chair. FEEDING: Pt independent for self feeding    Modified [place] RPE 3/10 after performing ambulation, transfers, and I/ADL assessment     OT instructed/demonstrated pt the following with good understanding:     IADL: Pt son, daughter and grandaughter assists with IADLs as needed like cooking, cleaning, transportation etc.     AMBULATION: Pt CGA for ambulating short house hold distances using rolling walker  EOB/BED TRANSFER: Pt CGA for bed mobility with cues for precautions and completion  COUCH: Pt CGA for sit to stand transfers  TOILET:  Pt using raised bedside commode for toileting with pt completing with CGA. TUB SHOWER: N/A with pt preference to sponge bath.  Pt has tub transfer bench with her tub shower unit    PATIENT RESPONSE TO TREATMENT: Pt responded well to home health occupational therapy assessment with pt motivated to increase her safety and independnce within her home. PATIENT EDUCATION PROVIDED THIS VISIT: Pt educated on her hip precautions, OT role, energy conservation, fall prevention/safety training ADL/IADL tasks, continue diet and medications as instructed per MD, consult MD or urgent care for medical assistance as opposed to ER unless situation emergent. PATIENT LEVEL OF UNDERSTANDING OF EDUCATION PROVIDED: Pt able to teach back role of OT with good understanding. Pt presented with good understanding of her hip precautions with use of pillow between legs when sitting to reduce chance for crossing her legs. Pt able to teach back use of her adaptive equipment but will require increased practice for confidence with consistant use. REHAB POTENTIAL:Mrs. Idris [address] presents with good rehab potential to increase her strength, safety and independence with ADLs/functoinal mobility since her most recent hip fx and surgery. Pt with increased assistance from [facility] for lower body ADLs due to her hip precautions. She would benifit from continued home health occupational therapy services to increase her safety and independence within her home environment. HOME EXERCISE PROGRAM:Pt to practice to use her adaptive equipment for lower body ADLs like dressing and bathing opposed to relyin on her grandaughter.      CONTINUED NEED FOR THE FOLLOWING SKILLS: HH OT is medically necessary to address pain,  decreased functional strength, impaired bed mobility to perform ADL tasks, decreased independence and safety with functional transfers, decreased independence and safety performing ADL/IADL tasks, decreased activity and standing tolerance, decreased functional endurance, and impaired balance in order to improve functional independence, obtain set goals, reduce risk of falls, reduce pain, improve quality of life, and return to PLOF.    ASSESSMENT: Pt presents with decreased independence with her ADLs due to her hip precautions limting her bending and crossing her legs. Pt has the needed adaptive equipment and assistive devices however has not been utlizing consistantly with grandaughter and children assistsing. Pt would benifit from reinforcment and education on proper use of adaptive equipment. Pt presents with BUE strength of 4-/5 and would benifit from light BUE strengthening to aid in her functonal trasnfers. Pt has been using bedside commode for toileting and would benifit [facility] toilet trasnfer options including placing 3:1 commode over toilet. Pt has tub transfer bench however it is placed in shower incorrectly with bar facing outside of shower. Pt however reporting preference for sponge bathing and disliking showers. Pt daughter was educated on proper use of tub transfer bench but declinded changes being made within OT session. Skilled Care Provided: Pt completed full occupational therapy assessment to include balance, coordination, strengthening, ADL education/training, functional mobility and home safety. PLAN: D/C 2w3 with plans to discharge when goals are met or maximal potential achieved.

## 2022-10-11 ENCOUNTER — HOME CARE VISIT (OUTPATIENT)
Dept: SCHEDULING | Facility: HOME HEALTH | Age: 87
End: 2022-10-11
Payer: MEDICARE

## 2022-10-11 VITALS
TEMPERATURE: 97.1 F | SYSTOLIC BLOOD PRESSURE: 122 MMHG | RESPIRATION RATE: 16 BRPM | OXYGEN SATURATION: 99 % | DIASTOLIC BLOOD PRESSURE: 70 MMHG | HEART RATE: 66 BPM

## 2022-10-11 PROCEDURE — 3331090002 HH PPS REVENUE DEBIT

## 2022-10-11 PROCEDURE — G0152 HHCP-SERV OF OT,EA 15 MIN: HCPCS

## 2022-10-11 PROCEDURE — 3331090001 HH PPS REVENUE CREDIT

## 2022-10-11 PROCEDURE — G0157 HHC PT ASSISTANT EA 15: HCPCS

## 2022-10-11 NOTE — HOME HEALTH
SUBJECTIVE: Pt expressing increased anxiety and worry to \"do good\" in therapy and to rememeber everything. CAREGIVER INVOLVEMENT/ASSISTANCE NEEDED FOR: Pt daughter, son and grandson assist with ADLs, IADLs and functional mobiltiy as needed    HOME HEALTH SUPPLIES BY TYPE AND QUANTITY ORDERED/DELIVERED THIS VISIT INCLUDE: N/A    OBJECTIVE: See interventions    PATIENT RESPONSE TO TREATMENT: Pt responded well to home health occuaptional therapy treatment session with pt receptive to therapy provided. PATIENT LEVEL OF UNDERSTANDING OF EDUCATION PROVIDED: Pt educated on use of assistive devices (reacher, sock aid, long shoe horn, long handled sponge, and dressing stick) to aid in lower body ADLs with her hip precautions. Pt able to teach back use of all adaptive equipment and demonstrated use correctly for lower body ADL competion. ASSESSMENT OF PROGRESS TOWARD GOALS: Pt making steady progress towards her home health occupational therapy goals. Pt has met her bed mobility goal with pt completeing with supervision following her hip precautions. Pt progressing with her ADLs by increasing her understanding and competance int he use of adaptive equipment (reacher, sock aid, dressing stick, long handled shoe horna and long handled sponge). Pt increased ability to implament adaptive equipment needed for increasing her independence with lower body dressing and lower body bathing. CONTINUED NEED FOR THE FOLLOWING SKILLS: Due to reduced functional activity tolerance, BUE weakness, functional mobility, balance, and ADL function, pt would benefit from OT [facility] [place] services in order to maximize safety and independence in home environment.     PLAN FOR NEXT VISIT: Progress with BUE HEP    THE FOLLOWING DISCHARGE PLANNING WAS DISCUSSED WITH THE PATIENT/CAREGIVER: 2w2 with plan to dc to home environment once [facility] [place] OT goals have been met or has met max potential.

## 2022-10-12 ENCOUNTER — HOME CARE VISIT (OUTPATIENT)
Dept: SCHEDULING | Facility: HOME HEALTH | Age: 87
End: 2022-10-12
Payer: MEDICARE

## 2022-10-12 PROCEDURE — 3331090002 HH PPS REVENUE DEBIT

## 2022-10-12 PROCEDURE — 3331090001 HH PPS REVENUE CREDIT

## 2022-10-12 PROCEDURE — G0157 HHC PT ASSISTANT EA 15: HCPCS

## 2022-10-13 ENCOUNTER — HOME CARE VISIT (OUTPATIENT)
Dept: HOME HEALTH SERVICES | Facility: HOME HEALTH | Age: 87
End: 2022-10-13
Payer: MEDICARE

## 2022-10-13 PROCEDURE — 3331090002 HH PPS REVENUE DEBIT

## 2022-10-13 PROCEDURE — 3331090001 HH PPS REVENUE CREDIT

## 2022-10-14 PROCEDURE — 3331090001 HH PPS REVENUE CREDIT

## 2022-10-14 PROCEDURE — 3331090002 HH PPS REVENUE DEBIT

## 2022-10-15 PROCEDURE — 3331090002 HH PPS REVENUE DEBIT

## 2022-10-15 PROCEDURE — 3331090001 HH PPS REVENUE CREDIT

## 2022-10-16 VITALS
RESPIRATION RATE: 18 BRPM | TEMPERATURE: 98.1 F | DIASTOLIC BLOOD PRESSURE: 70 MMHG | RESPIRATION RATE: 18 BRPM | SYSTOLIC BLOOD PRESSURE: 124 MMHG | HEART RATE: 67 BPM | OXYGEN SATURATION: 98 % | TEMPERATURE: 98.1 F | OXYGEN SATURATION: 98 % | HEART RATE: 76 BPM | SYSTOLIC BLOOD PRESSURE: 121 MMHG | DIASTOLIC BLOOD PRESSURE: 82 MMHG

## 2022-10-16 PROCEDURE — 3331090002 HH PPS REVENUE DEBIT

## 2022-10-16 PROCEDURE — 3331090001 HH PPS REVENUE CREDIT

## 2022-10-17 ENCOUNTER — HOME CARE VISIT (OUTPATIENT)
Dept: SCHEDULING | Facility: HOME HEALTH | Age: 87
End: 2022-10-17
Payer: MEDICARE

## 2022-10-17 PROCEDURE — 3331090002 HH PPS REVENUE DEBIT

## 2022-10-17 PROCEDURE — G0158 HHC OT ASSISTANT EA 15: HCPCS

## 2022-10-17 PROCEDURE — 3331090001 HH PPS REVENUE CREDIT

## 2022-10-17 NOTE — HOME HEALTH
SUBJECTIVE: Patient was sitting up in chair at arrival and reports \"I am feeling really tired right now\". Patient declined any recent falls or medication changes. CAREGIVER INVOLVEMENT/ASSISTANCE NEEDED FOR: Patient lives in a one story home with her family who are assisting patient with all ADLs and meal preparation as needed. Patient has a ramp present to enter/exit home. OBJECTIVE:  See interventions. PATIENT RESPONSE TO TREATMENT:  Patient was fatigued at arrival and requested modification of PT secondary to this. She has no increase in pain with treatment session. PATIENT LEVEL OF UNDERSTANDING OF EDUCATION PROVIDED:  Educated patient on need to use AD during all standing and gait tasks until MD states otherwise to provide stability and balance while decreasing patients fall risk. Patient's daughter in law was educated on removal of rugs in hallway as patients walker got caught up on this and she is at risk for falls. ASSESSMENT OF PROGRESS TOWARD GOALS: Patient was seen today for a PT follow up and was able to perform sit to stands from bedroom chair with UE support and CGA- she performed this task properly today without cues. Patient declined gait training today as she reports she is very faitgued and would like to rest. Reviewed HEP [facility] patient as it was updated as below- she reports understanding and intent to comply. HOME EXERCISE PROGRAM:Patient was instructed on taking a walk at least once per hour to increase time spent in standing. Patient was educated on sitting BLE therex with AROM as follows: ankle pumps, LAQ, knee flexion x10-15 reps each all 3x per day as able. Patient verbalized understanding. THE FOLLOWING DISCHARGE PLANNING WAS DISCUSSED WITH THE PATIENT/CAREGIVER: Plan to DC patient once PT goals are met. PLAN FOR NEXT VISIT: Plan in coming visit to increase gait distance and therex tasks as patient is able.

## 2022-10-17 NOTE — HOME HEALTH
SUBJECTIVE: Patient was sitting up in bedroom chair at arrival and reports she is just finishing up with OT. Patient declined any recent falls or medication changes. CAREGIVER INVOLVEMENT/ASSISTANCE NEEDED FOR: Patient lives in a one story home with her family who are assisting patient with all ADLs and meal preparation as needed. Patient has a ramp present to enter/exit home. OBJECTIVE:  See interventions. PATIENT RESPONSE TO TREATMENT:  Patient was fatigued at arrival as she was finishing with OT but was agreeable to therapy. She has no increase in pain with treatment session. PATIENT LEVEL OF UNDERSTANDING OF EDUCATION PROVIDED:  Educated patient on need to use AD during all standing and gait tasks until MD states otherwise to provide stability and balance while decreasing patients fall risk. Patient's daughter in law was educated on removal of rugs in hallway as patients walker got caught up on this and she is at risk for falls. ASSESSMENT OF PROGRESS TOWARD GOALS: Patient was seen today for a PT follow up and was able to perform initial therapy tasks today without increase in pain but often sitting rest breaks required. Patient performed sit to stands with CGA and BUE support- sequential cues are needed to allow patient to scoot to edge of chair before coming up to stand. During gait training with use of FWW- patient requires education to increase step length and heel strike to aide with LE clearance- education was given to patients family on need to remove throw rugs to decrease risk for falls. HOME EXERCISE PROGRAM:Patient was instructed on taking a walk at least once per hour to increase time spent in standing. Patient was educated that therex will be added in coming visit. Patient verbalized understanding. THE FOLLOWING DISCHARGE PLANNING WAS DISCUSSED WITH THE PATIENT/CAREGIVER: Plan to DC patient once PT goals are met.     PLAN FOR NEXT VISIT: Plan in coming visit to increase gait distance and therex tasks as patient is able.

## 2022-10-18 ENCOUNTER — HOME CARE VISIT (OUTPATIENT)
Dept: SCHEDULING | Facility: HOME HEALTH | Age: 87
End: 2022-10-18
Payer: MEDICARE

## 2022-10-18 PROCEDURE — 3331090001 HH PPS REVENUE CREDIT

## 2022-10-18 PROCEDURE — G0157 HHC PT ASSISTANT EA 15: HCPCS

## 2022-10-18 PROCEDURE — 3331090002 HH PPS REVENUE DEBIT

## 2022-10-19 VITALS
OXYGEN SATURATION: 98 % | HEART RATE: 88 BPM | TEMPERATURE: 98 F | DIASTOLIC BLOOD PRESSURE: 87 MMHG | SYSTOLIC BLOOD PRESSURE: 118 MMHG | RESPIRATION RATE: 17 BRPM

## 2022-10-19 VITALS
SYSTOLIC BLOOD PRESSURE: 121 MMHG | TEMPERATURE: 97.8 F | OXYGEN SATURATION: 97 % | RESPIRATION RATE: 17 BRPM | DIASTOLIC BLOOD PRESSURE: 60 MMHG | HEART RATE: 66 BPM

## 2022-10-19 PROCEDURE — 3331090001 HH PPS REVENUE CREDIT

## 2022-10-19 PROCEDURE — 3331090002 HH PPS REVENUE DEBIT

## 2022-10-19 NOTE — HOME HEALTH
SUBJECTIVE: Patient was sitting up in chair at arrival and reports \"I am going out to get my hair done today\". Patient denies any recent falls or medication changes. CAREGIVER INVOLVEMENT/ASSISTANCE NEEDED FOR: Patient lives in a one story home with her family who are assisting patient with all ADLs and meal preparation as needed. Patient has a ramp present to enter/exit home. OBJECTIVE:  See interventions. PATIENT RESPONSE TO TREATMENT:  Patient reports she as another appointment out of home at arrival and requested modification of PT secondary to this. She has no increase in pain with treatment session. PATIENT LEVEL OF UNDERSTANDING OF EDUCATION PROVIDED:  Educated patient on need to use AD during all standing and gait tasks until MD states otherwise to provide stability and balance while decreasing patients fall risk. Patient's daughter in law was educated on removal of rugs in hallway as patients walker got caught up on this and she is at risk for falls. ASSESSMENT OF PROGRESS TOWARD GOALS: Patient was seen today for a PT follow up and was able to perform an increase in gait distance today with encouragement and requires education to increase step length and heel strike with gait- family has removed rugs in hallway which has made this much more safe for patient as her walker is no longer getting caught on the rugs- she also requires education to look up when she is ambulating and scanning ahead. Therapist notes improved ease and stability with sit to stands today with UE support. HOME EXERCISE PROGRAM:Patient was instructed on taking a walk at least once per hour to increase time spent in standing. Patient was educated on sitting BLE therex with AROM as follows: ankle pumps, LAQ, knee flexion x10-15 reps each all 3x per day as able. Patient verbalized understanding.     THE FOLLOWING DISCHARGE PLANNING WAS DISCUSSED WITH THE PATIENT/CAREGIVER: Plan to DC patient once PT goals are met.    PLAN FOR NEXT VISIT: Plan in coming visit to retest TINETTI.

## 2022-10-20 ENCOUNTER — HOME CARE VISIT (OUTPATIENT)
Dept: HOME HEALTH SERVICES | Facility: HOME HEALTH | Age: 87
End: 2022-10-20
Payer: MEDICARE

## 2022-10-20 ENCOUNTER — HOME CARE VISIT (OUTPATIENT)
Dept: SCHEDULING | Facility: HOME HEALTH | Age: 87
End: 2022-10-20
Payer: MEDICARE

## 2022-10-20 PROCEDURE — 3331090001 HH PPS REVENUE CREDIT

## 2022-10-20 PROCEDURE — G0157 HHC PT ASSISTANT EA 15: HCPCS

## 2022-10-20 PROCEDURE — 3331090002 HH PPS REVENUE DEBIT

## 2022-10-21 ENCOUNTER — HOME CARE VISIT (OUTPATIENT)
Dept: SCHEDULING | Facility: HOME HEALTH | Age: 87
End: 2022-10-21
Payer: MEDICARE

## 2022-10-21 VITALS
SYSTOLIC BLOOD PRESSURE: 132 MMHG | RESPIRATION RATE: 17 BRPM | HEART RATE: 87 BPM | OXYGEN SATURATION: 98 % | TEMPERATURE: 98 F | DIASTOLIC BLOOD PRESSURE: 84 MMHG

## 2022-10-21 PROCEDURE — 3331090002 HH PPS REVENUE DEBIT

## 2022-10-21 PROCEDURE — G0158 HHC OT ASSISTANT EA 15: HCPCS

## 2022-10-21 PROCEDURE — 3331090001 HH PPS REVENUE CREDIT

## 2022-10-21 NOTE — HOME HEALTH
SUBJECTIVE: Patient reports no pain, no falls and no changes to medications. The patient reports she has been performing exercises on HEP regulary. Ger Palm CAREGIVER INVOLVEMENT/ASSISTANCE NEEDED FOR: The pt daughter-in-law and son  helps with all I/ADLS due to her inabilty to do so. Ger Palm HOME HEALTH SUPPLIES BY TYPE AND QUANTITY ORDERED/DELIVERED THIS VISIT INCLUDE: NONE     . OBJECTIVE:  See interventions. .    Patient education provided this visit:  SALDANA ROLE, Edema Mangement, Energy conservation techniques,and Purse-lip breathing techniques. .         Patient level of understanding of education provided: Patient in agreeance to education provided an was able to perform teach-back provided with 85% accuracy. Pt able to demostrate education within 3/3 trails with 90% accuary. .    RESPONSE TO TREATMENT: Pt reported an 7/10 on numeric scale after performing BUE light exercises with 2# weights and ADLS  tasks with use of energy conservation techiques. Pt required one  periods of rest while performing functional tasks    . ASSESSMENT OF PROGRESS TOWARD GOALS: Pt presents decreased activity tolerance while demostrating BUE exercises with 2# weights while seated in recliner chair with SUP and Stimulated ADLS . SALDANA provided  Min verbal cuing provided for proper trunk alignment for energy conservation techniques with BADLS. It is advised that client continues to adhere to HEP addressing BUE exercies for continued strength and endurance. Patient RPE score on modfied jonah scale was 4/10 on this date.      .  CONTINUED NEED FOR THE FOLLOWING SKILLS HH OT is medically necessary to address pain,  decreased functional strength, impaired bed mobility to perform ADL tasks, decreased independence and safety with functional transfers, decreased independence and safety performing ADL/IADL tasks, decreased activity and stannding tolerance, decreased functional endurance, and impaired balance in order to improve functional independence, obtain set goals, reduce risk of falls, reduce pain, improve quality of life, and return to PLOF. Hali Alfaro PLAN FOR NEXT VISIT: Excell Serum will address functional transfers and BUE gross exercises    .     THE FOLLOWING DISCHARGE PLANNING WAS DISCUSSED WITH THE PATIENT/CAREGIVER: [place] d/c 2w3

## 2022-10-22 PROCEDURE — 3331090001 HH PPS REVENUE CREDIT

## 2022-10-22 PROCEDURE — 3331090002 HH PPS REVENUE DEBIT

## 2022-10-23 PROCEDURE — 3331090001 HH PPS REVENUE CREDIT

## 2022-10-23 PROCEDURE — 3331090002 HH PPS REVENUE DEBIT

## 2022-10-24 ENCOUNTER — OFFICE VISIT (OUTPATIENT)
Dept: ORTHOPEDIC SURGERY | Age: 87
End: 2022-10-24
Payer: MEDICARE

## 2022-10-24 VITALS
RESPIRATION RATE: 18 BRPM | HEART RATE: 90 BPM | TEMPERATURE: 98.6 F | DIASTOLIC BLOOD PRESSURE: 60 MMHG | OXYGEN SATURATION: 98 % | SYSTOLIC BLOOD PRESSURE: 119 MMHG

## 2022-10-24 VITALS — TEMPERATURE: 96.8 F

## 2022-10-24 DIAGNOSIS — S72.001A DISPLACED FRACTURE OF RIGHT FEMORAL NECK (HCC): ICD-10-CM

## 2022-10-24 DIAGNOSIS — M79.641 RIGHT HAND PAIN: ICD-10-CM

## 2022-10-24 DIAGNOSIS — S62.336D CLOSED DISPLACED FRACTURE OF NECK OF FIFTH METACARPAL BONE OF RIGHT HAND WITH ROUTINE HEALING, SUBSEQUENT ENCOUNTER: ICD-10-CM

## 2022-10-24 DIAGNOSIS — Z96.641 HISTORY OF RIGHT HIP HEMIARTHROPLASTY: Primary | ICD-10-CM

## 2022-10-24 DIAGNOSIS — M54.2 NECK PAIN: ICD-10-CM

## 2022-10-24 PROCEDURE — 3331090001 HH PPS REVENUE CREDIT

## 2022-10-24 PROCEDURE — 3331090002 HH PPS REVENUE DEBIT

## 2022-10-24 PROCEDURE — 73130 X-RAY EXAM OF HAND: CPT | Performed by: ORTHOPAEDIC SURGERY

## 2022-10-24 PROCEDURE — 99024 POSTOP FOLLOW-UP VISIT: CPT | Performed by: ORTHOPAEDIC SURGERY

## 2022-10-24 PROCEDURE — 73502 X-RAY EXAM HIP UNI 2-3 VIEWS: CPT | Performed by: ORTHOPAEDIC SURGERY

## 2022-10-24 NOTE — PROGRESS NOTES
Patient: Kings Bunn                MRN: [id number]       SSN: [id number]  YOB: 1924        AGE: 80 y.o. SEX: female  There is no height or weight on file to calculate BMI. PCP: Eric Parson  10/24/22    Chief Complaint: Status post right hip hemiarthroplasty on September 20, 2022, status post right fifth metacarpal fracture, neck pain    ASSESSMENT & PLAN  Diagnosis: 80 y.o. female  with right cemented hip hemiarthroplasty following femoral neck fracture. She has been able to walk with a walker short distances around the house. Her pain is well controlled. She is also still having some soreness in the right hand and some tenderness at the fracture site. We will continue her brace wear. She has had neck pain since her fall so I will also order physical therapy as well as a consult with our  nonoperative spine colleagues. I will see her in 2 months with repeat x-rays of the right hand as well as right hip. ICD-10-CM ICD-9-CM    1. History of right hip hemiarthroplasty  Z96.641 V43.64 AMB POC X-RAY RADEX HIP UNI WITH PELVIS 2-3 VIEWS      2. Neck pain  M54.2 723.1 REFERRAL TO ORTHOPEDICS      REFERRAL TO HOME HEALTH      3. Displaced fracture of right femoral neck (HCC)  S72.001A 820.8 AMB POC XRAY, HAND; 3+ VIEWS      4. Closed displaced fracture of neck of fifth metacarpal bone of right hand with routine healing, subsequent encounter  S62.336D V54.19 AMB SUPPLY ORDER      5. Right hand pain  M79.641 729.5 AMB POC XRAY, HAND; 3+ VIEWS          IMAGING:  Imaging read by myself and interpreted as follows:  3 View Xray of the right hip including AP pelvis and AP and lateral of the hip show a cemented well-positioned right hip Primo arthroplasty without signs of loosening or lysis.     Three-view x-rays of the right hand including AP lateral and oblique demonstrate acceptable position of the right fifth metatarsal fracture slightly flexed with callus formation in the area.    HPI: Telly Marshall is a 80 y.o. female patient who is an established patient here for follow-up from a right hip hemiarthroplasty performed on September 20, 2022. This was a cemented hemiarthroplasty. She has been able to walk short distances with a walker around the house. Prior to her surgery she was walking with a wheeled walker. She reports her pain to be well controlled at this time and the hip and hand but her neck is giving her the most problem. Tobacco Use: Low Risk     Smoking Tobacco Use: Never    Smokeless Tobacco Use: Never         PHYSICAL EXAMINATION:  Visit Vitals  Temp 96.8 °F (36 °C)     There is no height or weight on file to calculate BMI. GENERAL: Alert and oriented x3, in no acute distress. HEENT: Normocephalic, atraumatic. MSK: Right Hip Exam     Tenderness   The patient is experiencing tenderness in the lateral.    Range of Motion   Flexion:  110   External rotation:  40   Internal rotation:  20     Muscle Strength   Abduction: 5/5   Adduction: 5/5   Flexion: 5/5     Other   Erythema: absent  Sensation: normal  Pulse: present    Comments:  Incision is well-healed. Examination the right hand reveals mild erythema in the area of the neck of the fifth metacarpal.  There is also some tenderness in this location. Past Medical History:   Diagnosis Date    CAD (coronary artery disease) 2008    Fibromyalgia     Heart failure ([address]     Pt. states sge gas 5 heart blocks. Hypercholesterolemia     Hypertension     Menopause     Thyroid disease        Family History   Problem Relation Age of Onset    Breast Cancer Mother [de-identified]       Current Outpatient Medications   Medication Sig Dispense Refill    aspirin 81 mg chewable tablet Take 1 Tablet by mouth two (2) times a day for 30 days. 60 Tablet 0    bisacodyL (DULCOLAX) 5 mg EC tablet Take 2 Tablets by mouth every forty-eight (48) hours as needed for Constipation.  10 Tablet 0    ferrous sulfate 325 mg (65 mg iron) tablet Take 1 Tablet by mouth daily (with breakfast) for 30 days. 30 Tablet 0    polyethylene glycol (MIRALAX) 17 gram packet Take 1 Packet by mouth daily. (Patient taking differently: Take 1 Packet by mouth daily. dissolve in 8 oz water ) 5 Packet 0    OTHER Incentive Spirometer - Use as instructed 1 Each 0    OTHER CBC, BMP, and Mg in 4 days. Results to PCP immediately. 1 Each 0    senna-docusate (PERICOLACE) 8.6-50 mg per tablet Take 1 Tablet by mouth two (2) times a day. 15 Tablet 0    atorvastatin (LIPITOR) 40 mg tablet Take 40 mg by mouth daily. metoprolol succinate (TOPROL-XL) 25 mg XL tablet TAKE 1 TABLET BY MOUTH EVERY DAY 90 Tablet 3    amLODIPine (NORVASC) 10 mg tablet TAKE 1 TABLET BY MOUTH EVERY DAY 90 Tablet 3    ascorbic acid, vitamin C, (VITAMIN C) 500 mg tablet Take 500 mg by mouth daily. isosorbide mononitrate ER (IMDUR) 60 mg CR tablet TAKE 1 TABLET BY MOUTH  EVERY MORNING 90 Tab 3    nitroglycerin (NITROSTAT) 0.4 mg SL tablet by SubLINGual route every five (5) minutes as needed for Chest Pain.      levothyroxine (SYNTHROID) 75 mcg tablet Take 75 mcg by mouth Daily (before breakfast).           Allergies   Allergen Reactions    Codeine Other (comments)     dizziness    Dapiprazole Vertigo    Dapsone Other (comments)    Elavil Vertigo    Voltaren [Diclofenac Sodium] Vertigo       Past Surgical History:   Procedure Laterality Date    HX CHOLECYSTECTOMY      HX HYSTERECTOMY         Social History     Socioeconomic History    Marital status:      Spouse name: Not on file    Number of children: Not on file    Years of education: Not on file    Highest education level: Not on file   Occupational History    Not on file   Tobacco Use    Smoking status: Never    Smokeless tobacco: Never   Substance and Sexual Activity    Alcohol use: No    Drug use: No    Sexual activity: Not on file   Other Topics Concern    Not on file   Social History Narrative    Not on file     Social Determinants of Health Financial Resource Strain: Not on file   Food Insecurity: Not on file   Transportation Needs: Not on file   Physical Activity: Not on file   Stress: Not on file   Social Connections: Not on file   Intimate Partner Violence: Not on file   Housing Stability: Not on file       REVIEW OF SYSTEMS:      No changes from previous review of systems unless noted. Prescription medication management discussed with patient. Electronically signed by: Antoinette Wright DO    Note: This note was completed using voice recognition software.   Any typographical/name errors or mistakes are unintentional.

## 2022-10-24 NOTE — HOME HEALTH
SUBJECTIVE: Patient was sitting up in chair at arrival and reports \"I have been doing my exercises\". Patient denies any recent falls or medication changes. CAREGIVER INVOLVEMENT/ASSISTANCE NEEDED FOR: Patient lives in a one story home with her family who are assisting patient with all ADLs and meal preparation as needed. Patient has a ramp present to enter/exit home. OBJECTIVE:  See interventions. PATIENT RESPONSE TO TREATMENT:  Patient reports she as another appointment out of home at arrival and requested modification of PT secondary to this. She has no increase in pain with treatment session. PATIENT LEVEL OF UNDERSTANDING OF EDUCATION PROVIDED:  Educated patient on need to use AD during all standing and gait tasks until MD states otherwise to provide stability and balance while decreasing patients fall risk. Patient's daughter in law was educated on removal of rugs in hallway as patients walker got caught up on this and she is at risk for falls. ASSESSMENT OF PROGRESS TOWARD GOALS: Patient was seen today for a PT follow up and was able to perform TINETTI with improved score of 14/18 from 11/28 which shows an improvement in balance and a decrease in fall risk potential. Patient also performed gait in home with continued education needed to increase postural awareness and step length. During standing strenghthening tasks patient requires demonstrations and cues to perform each properly. No increase in pain is present with treatment session. HOME EXERCISE PROGRAM:Patient was instructed on taking a walk at least once per hour to increase time spent in standing. Patient was educated on sitting BLE therex with AROM as follows: ankle pumps, LAQ, knee flexion x10-15 reps each all 3x per day as able. Patient verbalized understanding. THE FOLLOWING DISCHARGE PLANNING WAS DISCUSSED WITH THE PATIENT/CAREGIVER: Plan to DC patient once PT goals are met.     PLAN FOR NEXT VISIT: Plan in coming visit to retest TINETTI.

## 2022-10-25 ENCOUNTER — HOME CARE VISIT (OUTPATIENT)
Dept: SCHEDULING | Facility: HOME HEALTH | Age: 87
End: 2022-10-25
Payer: MEDICARE

## 2022-10-25 PROCEDURE — G0157 HHC PT ASSISTANT EA 15: HCPCS

## 2022-10-25 PROCEDURE — 3331090001 HH PPS REVENUE CREDIT

## 2022-10-25 PROCEDURE — 3331090002 HH PPS REVENUE DEBIT

## 2022-10-26 ENCOUNTER — HOME CARE VISIT (OUTPATIENT)
Dept: SCHEDULING | Facility: HOME HEALTH | Age: 87
End: 2022-10-26
Payer: MEDICARE

## 2022-10-26 VITALS
SYSTOLIC BLOOD PRESSURE: 132 MMHG | DIASTOLIC BLOOD PRESSURE: 87 MMHG | HEART RATE: 87 BPM | RESPIRATION RATE: 14 BRPM | TEMPERATURE: 98 F | OXYGEN SATURATION: 98 %

## 2022-10-26 VITALS
TEMPERATURE: 97.3 F | SYSTOLIC BLOOD PRESSURE: 122 MMHG | RESPIRATION RATE: 18 BRPM | HEART RATE: 65 BPM | OXYGEN SATURATION: 98 % | DIASTOLIC BLOOD PRESSURE: 60 MMHG

## 2022-10-26 PROCEDURE — 3331090001 HH PPS REVENUE CREDIT

## 2022-10-26 PROCEDURE — G0158 HHC OT ASSISTANT EA 15: HCPCS

## 2022-10-26 PROCEDURE — 3331090002 HH PPS REVENUE DEBIT

## 2022-10-26 NOTE — HOME HEALTH
SUBJECTIVE: Patient reports pain in her neck and stats on the numeric pain scale the pain is 6/10. Patient reports this pain has been reoccuring for the duration of 2-3 days. Observation of an Edema in patients right foot. Ann Morales CAREGIVER INVOLVEMENT/ASSISTANCE NEEDED FOR: The pt daughter-in-law and son  helps with all I/ADLS due to her inabilty to do so. Ann Morales HOME HEALTH SUPPLIES BY TYPE AND QUANTITY ORDERED/DELIVERED THIS VISIT INCLUDE: NONE     . OBJECTIVE:  See interventions. .    Patient education provided this visit:  SALDANA ROLE, Edema Mangement and pain mangement to reduce pain in her neck and edema in left foot. Therapist instructed/educated client on edema management( Using a pillow to elevate L LE  above the heart, Light massage on leg with lotion; working from hindfoot to the patella, Wearing compressions socks throughout the day and take them off at night, and Leg exercises (ankle pumps 3 times a day) to provide ROM and circulation back to the heart)         . Patient level of understanding of education provided: Patient in agreeance to education provided an was able to perform teach-back provided with 95% accuracy. Pt able to demostrate education within 3/3 trails with 90% accuary. .  RESPONSE TO TREATMENT: Pt reported an 7/10 on numeric scale after performing BUE light exercises with 2# weightstasks with use of energy conservation techiques. Pt required two  periods of rest while performing functional tasks. .     ASSESSMENT OF PROGRESS TOWARD GOALS: Pt presents increaed activity tolerance while demostrating BUE exercises with 2# weights while seated in recliner chair with SUP and extended education on edema mangement to reduce swellen in in her RLE (Hindfoot). SALDANA provided MMT on Patient BUE patient grade on MMT was 4/5. It is advised that client continues to adhere to HEP addressing BUE exercies for continued strength and endurance.  It is also recommended that patient continues to comply with energy conservation and ededma mangement to prevent fatigue and edema and to continue to maximize independence with fuctional tasks in her sabine-setting,  Patient RPE score on modfied jonah scale was 5/10 on this date. .    CONTINUED NEED FOR THE FOLLOWING SKILLS HH OT is medically necessary to address pain,  decreased functional strength, impaired bed mobility to perform ADL tasks, decreased independence and safety with functional transfers, decreased independence and safety performing ADL/IADL tasks, decreased activity and stannding tolerance, decreased functional endurance, and impaired balance in order to improve functional independence, obtain set goals, reduce risk of falls, reduce pain, improve quality of life, and return to PLOF. Melody Lemus PLAN FOR NEXT VISIT:OT Discharge          .   THE FOLLOWING DISCHARGE PLANNING WAS DISCUSSED WITH THE PATIENT/CAREGIVER: Tenative d/c 2w3

## 2022-10-27 ENCOUNTER — HOME CARE VISIT (OUTPATIENT)
Dept: SCHEDULING | Facility: HOME HEALTH | Age: 87
End: 2022-10-27
Payer: MEDICARE

## 2022-10-27 ENCOUNTER — HOME CARE VISIT (OUTPATIENT)
Dept: HOME HEALTH SERVICES | Facility: HOME HEALTH | Age: 87
End: 2022-10-27
Payer: MEDICARE

## 2022-10-27 VITALS
OXYGEN SATURATION: 98 % | SYSTOLIC BLOOD PRESSURE: 120 MMHG | DIASTOLIC BLOOD PRESSURE: 80 MMHG | RESPIRATION RATE: 18 BRPM | HEART RATE: 78 BPM | TEMPERATURE: 97.8 F

## 2022-10-27 PROCEDURE — 3331090002 HH PPS REVENUE DEBIT

## 2022-10-27 PROCEDURE — G0151 HHCP-SERV OF PT,EA 15 MIN: HCPCS

## 2022-10-27 PROCEDURE — 3331090001 HH PPS REVENUE CREDIT

## 2022-10-27 NOTE — HOME HEALTH
SUBJECTIVE: Patient was sitting up in her chair at arrival and states she followed back up with her PCP to address concerns she is having with her neck. Patient denies any recent falls or medication changes. CAREGIVER INVOLVEMENT/ASSISTANCE NEEDED FOR: Patient lives in a one story home with her family who are assisting patient with all ADLs and meal preparation as needed. Patient has a ramp present to enter/exit home. OBJECTIVE:  See interventions. PATIENT RESPONSE TO TREATMENT:  Patient reports she as another appointment out of home at arrival and requested modification of PT secondary to this. She has no increase in pain with treatment session. PATIENT LEVEL OF UNDERSTANDING OF EDUCATION PROVIDED:  Educated patient on need to use AD during all standing and gait tasks until MD states otherwise to provide stability and balance while decreasing patients fall risk. Patient's daughter in law was educated on removal of rugs in hallway as patients walker got caught up on this and she is at risk for falls. ASSESSMENT OF PROGRESS TOWARD GOALS: Patient was seen today for a PT follow up and was able to perform gait training in home to 90 feet then requries a sitting rest break for fatigue- she does require education to increase heel strike B to aide with LE clearance. Patient also is progressing with BLE strength gains as she performed therex with cues needed to increase ROM and erect postural awareness- she reports much fatigue with task. Patient will have reasessment in coming visit with PT- she is agreeaable. HOME EXERCISE PROGRAM:Patient was instructed on taking a walk at least once per hour to increase time spent in standing. Patient was educated on sitting BLE therex with AROM as follows: ankle pumps, LAQ, knee flexion x10-15 reps each all 3x per day as able. Patient verbalized understanding.     THE FOLLOWING DISCHARGE PLANNING WAS DISCUSSED WITH THE PATIENT/CAREGIVER: Plan to DC patient once PT goals are met. PLAN FOR NEXT VISIT: Plan to perform reasessment visit with Luda Durant PT to address neck concerns.

## 2022-10-27 NOTE — HOME HEALTH
SUBJECTIVE \"My neck is doing fine, I don't feel anymore pain\", \"It's my hand that is still bothering me that is why i am using this brace that the doctor gave me\"   CAREGIVER ASSISTANCE:patient's son adn daughter -in-law are available to assist as needed  MEDICATIONS REVIEWED AND UPDATED: no changes in medications at this time  WOUND none   GAIT TRAINING instructed with decluttering space to have a safe pathway for ambulation using RW   THERAPEUTIC EXERCISES PT faciliated ROM exercises on neck x 5-10 reps each for purpose of reducing risk for muscle tightness. Educated with self stretching technique for bilateral trapezius muscle x 10 seconds hold x 5 reps to improve ROM on neck and decreasing pain.  Reviewed bilateral LE exercises x 10 reps   BALANCE Tinetti 16/28 from 11/28   PATIENT/CAREGIVER EDUCATION THIS VISIT: fall prevention, safety, need for assistance as needed for transfers and gait  ASSESSMENT: patient was able to tolerate PT session this visit and was able to show improvement with bed mobility, transfers and gait as evidenced by being able to demonstrate independence with bed mobility, supervision with transfers and  supervision with gait using RW  Patient level of understanding of education provided: Patient was able to partially teach back neck ROM   Patient response to procedure performed:  Patient needed visual cues for self stretching technique on nec   DISCHARGE PLANNING DISCUSSED: dc to self and family under MD supervision when all goals are met

## 2022-10-28 ENCOUNTER — HOME CARE VISIT (OUTPATIENT)
Dept: SCHEDULING | Facility: HOME HEALTH | Age: 87
End: 2022-10-28
Payer: MEDICARE

## 2022-10-28 VITALS
DIASTOLIC BLOOD PRESSURE: 87 MMHG | HEART RATE: 88 BPM | TEMPERATURE: 98 F | RESPIRATION RATE: 17 BRPM | SYSTOLIC BLOOD PRESSURE: 132 MMHG | OXYGEN SATURATION: 98 %

## 2022-10-28 PROCEDURE — 3331090002 HH PPS REVENUE DEBIT

## 2022-10-28 PROCEDURE — G0152 HHCP-SERV OF OT,EA 15 MIN: HCPCS

## 2022-10-28 PROCEDURE — 3331090001 HH PPS REVENUE CREDIT

## 2022-10-28 NOTE — Clinical Note
was seen by skilled OT for 6 visits s/p recovery from diagnosis of Encounter for Fracture of unspecified part of neck of right femur, subsequent encounter for closed fracture with routine healing [S72.001D]  Skilled OT goals were to maximize safety and participation with self care, balance retraining and functional mobility in home setting. Patient  has been educated on energy conservation strategies to reduce SOB and level of exertion with I/ADL tasks. Education has also been provided to the pt for Purse-lip breathing techniques to prevent exertion when performing daily tasks I/ADLS. Patient has met goal on OT POC addressing functional mobilty and transfers. Pt able to initiate sit <> stand transfers whileperform functional transfers to toilet, bed and recler chair with independence at the [facility] with distant S use of proper body mechanics to include proper hand placement. Pt able to retrieve house hold objects safely in different locations (high and low) with SUP with increased safety. Patient has met goal on OT POC addressing ADL retraining tasks. Pt able to perform teach back correct usage of adaptive equipment (sock aid, reacher, long handled shoe horn, dressing stick, long handled sponge, etc.) to increase participation with ADLs with MOD I, Pt able to perform stimulated UB/LB dressing and bathing with washcloth  with MOD I. Patient has met goal on OT POC addressing BUE strength and endurance. She is currently an 4/5 on MMT, this is an advancement as on intial OT eval , [address] was a 4-/5 on MMT. Therapist suggests continued use of HEP for continued BUE strength and endurance to perform house-hold tasks in home-setting. Patient has reached maximal potential wish skilled OT at this time. No further skilled OT is indicated at this time. MD office notified.  Thank you for your Referral!!!

## 2022-10-29 ENCOUNTER — HOME CARE VISIT (OUTPATIENT)
Dept: SCHEDULING | Facility: HOME HEALTH | Age: 87
End: 2022-10-29
Payer: MEDICARE

## 2022-10-29 PROCEDURE — 3331090002 HH PPS REVENUE DEBIT

## 2022-10-29 PROCEDURE — 3331090001 HH PPS REVENUE CREDIT

## 2022-10-29 NOTE — HOME HEALTH
aregiver involvement: Ms. Ramiro Armendariz currently lives in an [place] home with her son and daughter-marlen  . The patients family   provides daily emotional support and assistance with self care and mobility. Medications reconciled and all medications are available in the home this visit. The following education was provided regarding medications, medication interactions, and look a like medications: Continue as directed by MD.  Medications  are effective at this time. Home health supplies by type and quantity ordered/delivered this visit include: n/a    Patient education provided this visit:  Educated on importance of performing BUE exercise daily for continued strength and endurance. Educated client on the of continue use of energy conservation and purse-lip breathing to prevent exertion when engaging in daily tasks living . Progress toward goals: Patient has met goal on OT POC addressing functional mobilty and transfers. Pt able to initiate sit <> stand transfers whileperform functional transfers to toilet, bed and recler chair with independence at the [facility] with distant S use of proper body mechanics to include proper hand placement. Pt able to retrieve house hold objects safely in different locations (high and low) with SUP with increased safety. Patient has met goal on OT POC addressing ADL retraining tasks. Pt able to perform teach back correct usage of adaptive equipment (sock aid, reacher, long handled shoe horn, dressing stick, long handled sponge, etc.) to increase participation with ADLs with MOD I, Pt able to perform stimulated UB/LB dressing and bathing with washcloth  with MOD I. Patient has met goal on OT POC addressing BUE strength and endurance. She is currently an 4/5 on MMT, this is an advancement as on intial OT eval , Ms. Ramiro Armendariz was a 4-/5 on MMT. Home exercise program: Continue with BUE HEP addressing AROM.   Pt is to perform HEP that consist of AROM exercises such as Straight arm swings, Shoulder Shrugs, Shoulder rotation backwards and forwards, Flexion, extension)  to increase BUE strength,endurance,ROM and flexion to perform adls and iadls. HEP is to be performed 2-3x a day with rest breaks as needed, Completing each exercises 15 times each daily. while seated    Continued need for the following skills: Nursing and Physical Therapy    The following discharge planning was discussed with the pt/caregiver: DC OT. Pt has reached maximal potential with self care and functional mobility in her home setting.   Caregiver/spouse

## 2022-10-30 PROCEDURE — 3331090002 HH PPS REVENUE DEBIT

## 2022-10-30 PROCEDURE — 3331090001 HH PPS REVENUE CREDIT

## 2022-10-31 ENCOUNTER — HOME CARE VISIT (OUTPATIENT)
Dept: HOME HEALTH SERVICES | Facility: HOME HEALTH | Age: 87
End: 2022-10-31
Payer: MEDICARE

## 2022-10-31 PROCEDURE — 3331090001 HH PPS REVENUE CREDIT

## 2022-10-31 PROCEDURE — 3331090002 HH PPS REVENUE DEBIT

## 2022-11-01 ENCOUNTER — HOME CARE VISIT (OUTPATIENT)
Dept: SCHEDULING | Facility: HOME HEALTH | Age: 87
End: 2022-11-01
Payer: MEDICARE

## 2022-11-01 PROCEDURE — G0157 HHC PT ASSISTANT EA 15: HCPCS

## 2022-11-01 PROCEDURE — 3331090001 HH PPS REVENUE CREDIT

## 2022-11-01 PROCEDURE — 3331090002 HH PPS REVENUE DEBIT

## 2022-11-02 VITALS
OXYGEN SATURATION: 97 % | RESPIRATION RATE: 18 BRPM | HEART RATE: 76 BPM | TEMPERATURE: 97.6 F | DIASTOLIC BLOOD PRESSURE: 59 MMHG | SYSTOLIC BLOOD PRESSURE: 120 MMHG

## 2022-11-02 PROCEDURE — 3331090001 HH PPS REVENUE CREDIT

## 2022-11-02 PROCEDURE — 3331090002 HH PPS REVENUE DEBIT

## 2022-11-02 NOTE — HOME HEALTH
.Assessment and Summary of Care:  Patient's current functional status before discharge is as follows  Strength: Please assess at DC.  ROM:  BLEs WNLs- patients R hand is in a brace at present time secondary to past fall but reports no pain today in this area as well as neck which was also previously an issue for patient. Bed Mobility: Patient is (I) with bed mobility  Transfers: Patient is transferring from all household surfaces with SBA/supervision and BUE support. Patient is also performing car transfer as seen today with (I). Gait/WC mobility: Patient utilizes W/C as in today from bedroom to car with [facility]. Patient is ambulating indoors with use of FWW x about 150 feet to fatigue, rugs have been removed in hallways and she requires cues to increase heel strike and step length. Stairs: N/A patient has ramp that she again uses with W/C  Special Tests: Was unable as patient had limited time as she was leaving for a hair appt. Recommendations: Plan to DC patient from home health PT in coming visit to HEP.

## 2022-11-03 ENCOUNTER — HOME CARE VISIT (OUTPATIENT)
Dept: SCHEDULING | Facility: HOME HEALTH | Age: 87
End: 2022-11-03
Payer: MEDICARE

## 2022-11-03 VITALS
RESPIRATION RATE: 18 BRPM | TEMPERATURE: 97.6 F | OXYGEN SATURATION: 97 % | HEART RATE: 77 BPM | SYSTOLIC BLOOD PRESSURE: 120 MMHG | DIASTOLIC BLOOD PRESSURE: 70 MMHG

## 2022-11-03 PROCEDURE — 3331090001 HH PPS REVENUE CREDIT

## 2022-11-03 PROCEDURE — 3331090002 HH PPS REVENUE DEBIT

## 2022-11-03 PROCEDURE — G0151 HHCP-SERV OF PT,EA 15 MIN: HCPCS

## 2022-11-03 NOTE — HOME HEALTH
SUBJECTIVE no complaint noted. CAREGIVER ASSISTANCE:Patient's son and daughter-in-law lives with patient and will be available to assist as needed   MEDICATIONS REVIEWED AND UPDATED: no changes in medications at this time  WOUND none  ROM wfl   BED MOBILITY independent with bed mobility   TRANSFERS independent with bed, chair and toilet transfers using RW, needs supervision with car transfers   GAIT TRAINING Mod I with gait using RW inside the house and will need supervision for MD appointments   STAIRS has a ramp to maneuver   THERAPEUTIC EXERCISES reviewed HEP to be done daily x 1-2 reps of 10-15 reps. Patient was able to demonstrate HEP and was provided with a list of HEP  BALANCE Tinetti 22/28 from 11/28   PATIENT/CAREGIVER EDUCATION THIS VISIT: fall prevention, safety, need for assistance as needed for transfers and gait  ASSESSMENT: patient provided with skilled PT intervention consisting of graded therapeutic exercises, gait training, balance training, safe transfers training, caregiver education and Home exercise program education. Patient at time of discharge was able to demonstrate independence with bed mobility, transfers and supervision gait with RW. pt. also noted that she is doing HEP 1x a day.   PLAN DC at this time due to goals are met   DISCHARGE PLANNING DISCUSSED: dc to self and family under MD supervision

## 2022-11-15 ENCOUNTER — OFFICE VISIT (OUTPATIENT)
Dept: CARDIOLOGY CLINIC | Age: 87
End: 2022-11-15
Payer: MEDICARE

## 2022-11-15 VITALS — OXYGEN SATURATION: 98 % | WEIGHT: 115 LBS | BODY MASS INDEX: 21.16 KG/M2 | HEIGHT: 62 IN

## 2022-11-15 DIAGNOSIS — R55 SYNCOPE AND COLLAPSE: ICD-10-CM

## 2022-11-15 DIAGNOSIS — E78.00 HYPERCHOLESTEREMIA: ICD-10-CM

## 2022-11-15 DIAGNOSIS — I25.10 CORONARY ARTERY DISEASE INVOLVING NATIVE CORONARY ARTERY OF NATIVE HEART WITHOUT ANGINA PECTORIS: Primary | ICD-10-CM

## 2022-11-15 PROCEDURE — 1123F ACP DISCUSS/DSCN MKR DOCD: CPT | Performed by: INTERNAL MEDICINE

## 2022-11-15 PROCEDURE — 1101F PT FALLS ASSESS-DOCD LE1/YR: CPT | Performed by: INTERNAL MEDICINE

## 2022-11-15 PROCEDURE — 99214 OFFICE O/P EST MOD 30 MIN: CPT | Performed by: INTERNAL MEDICINE

## 2022-11-15 PROCEDURE — G8510 SCR DEP NEG, NO PLAN REQD: HCPCS | Performed by: INTERNAL MEDICINE

## 2022-11-15 PROCEDURE — 1090F PRES/ABSN URINE INCON ASSESS: CPT | Performed by: INTERNAL MEDICINE

## 2022-11-15 PROCEDURE — G8427 DOCREV CUR MEDS BY ELIG CLIN: HCPCS | Performed by: INTERNAL MEDICINE

## 2022-11-15 PROCEDURE — G8536 NO DOC ELDER MAL SCRN: HCPCS | Performed by: INTERNAL MEDICINE

## 2022-11-15 PROCEDURE — 93000 ELECTROCARDIOGRAM COMPLETE: CPT | Performed by: INTERNAL MEDICINE

## 2022-11-15 PROCEDURE — G8420 CALC BMI NORM PARAMETERS: HCPCS | Performed by: INTERNAL MEDICINE

## 2022-11-15 NOTE — PROGRESS NOTES
HISTORY OF PRESENT ILLNESS  Telly Marshall is a 80 y.o. female. ASSESSMENT and PLAN    Ms. Jordana Gutierrez has history of CAD. Because of abnormal testing back in 2009, she underwent coronary angiography at the [facility] showing proximal LAD disease, proximal circumflex/OM disease, and proximal dominant RCA disease. At that time, options for percutaneous revascularization as well as surgical revascularization were given; she declined and chose to go with medical therapy. For the last 11 years, she has done quite well. She has normal blood pressure readings at home in 2021. Her home cuff correlates with our reading in December 2021. In September 2022, she had a hip fracture. She was hospitalized and had surgical repair. Her echocardiogram during that hospitalization showed hyperdynamic LV function with EF greater than 70%. CAD:   Clinically stable. BP:   Well controlled at 126/62. Rhythm:   Stable sinus rhythm at 97 bpm.  CHF:    There is no evidence of decompensated CHF noted. Weight:    Her weight today is 115 pounds. Her baseline weight is 135 pounds. I encouraged her to try to gain about 10 pounds over the next 12 months. Cholesterol:   Target LDL <70. Her Lipitor was discontinued in 2020. Anti-platelet:   Remains on ASA. I will see her back in 6 months. Thank you. Encounter Diagnoses   Name Primary? Coronary artery disease involving native coronary artery of native heart without angina pectoris Yes    Hypercholesteremia     Syncope and collapse      current treatment plan is effective, no change in therapy  lab results and schedule of future lab studies reviewed with patient  reviewed diet, exercise and weight control  cardiovascular risk and specific lipid/LDL goals reviewed  use of aspirin to prevent MI and TIA's discussed    Follow-up  Pertinent negatives include no chest pain and no shortness of breath.    Leg Swelling  Pertinent negatives include no chest pain and no shortness of breath. Today, Ms. Idris Ford has no complaints of chest pains, increased shortness of breath or decreased exercise capacity. In September 2022, she broke her hip. She had surgical repair without complication. She is slowly recovering. She uses a walker to ambulate. Unfortunately, over the last 6 months, she has lost 20 pounds. We did discuss this. She was accompanied by her family. All questions were answered. Review of Systems   Respiratory:  Negative for shortness of breath. Cardiovascular:  Negative for chest pain, palpitations, orthopnea, claudication, leg swelling and PND. All other systems reviewed and are negative. Physical Exam  Vitals and nursing note reviewed. Constitutional:       Appearance: Normal appearance. HENT:      Head: Normocephalic. Eyes:      Conjunctiva/sclera: Conjunctivae normal.   Cardiovascular:      Rate and Rhythm: Normal rate and regular rhythm. Pulmonary:      Breath sounds: Normal breath sounds. Abdominal:      Palpations: Abdomen is soft. Musculoskeletal:         General: No swelling. Cervical back: No rigidity. Skin:     General: Skin is warm and dry. Neurological:      General: No focal deficit present. Mental Status: She is alert and oriented to person, place, and time. Psychiatric:         Mood and Affect: Mood normal.         Behavior: Behavior normal.       PCP: Edmundo Berg NP    Past Medical History:   Diagnosis Date    CAD (coronary artery disease) 2008    Fibromyalgia     Heart failure ([facility] [address]     Pt. states sge gas 5 heart blocks.     Hypercholesterolemia     Hypertension     Menopause     Thyroid disease        Past Surgical History:   Procedure Laterality Date    HX CHOLECYSTECTOMY      HX HYSTERECTOMY         Current Outpatient Medications   Medication Sig Dispense Refill    metoprolol succinate (TOPROL-XL) 25 mg XL tablet TAKE 1 TABLET BY MOUTH EVERY DAY 90 Tablet 3    amLODIPine (NORVASC) 10 mg tablet TAKE 1 TABLET BY MOUTH EVERY DAY 90 Tablet 3    ascorbic acid, vitamin C, (VITAMIN C) 500 mg tablet Take 500 mg by mouth daily. isosorbide mononitrate ER (IMDUR) 60 mg CR tablet TAKE 1 TABLET BY MOUTH  EVERY MORNING 90 Tab 3    levothyroxine (SYNTHROID) 75 mcg tablet Take 75 mcg by mouth Daily (before breakfast). bisacodyL (DULCOLAX) 5 mg EC tablet Take 2 Tablets by mouth every forty-eight (48) hours as needed for Constipation. 10 Tablet 0    polyethylene glycol (MIRALAX) 17 gram packet Take 1 Packet by mouth daily. (Patient taking differently: Take 1 Packet by mouth daily. dissolve in 8 oz water ) 5 Packet 0    OTHER Incentive Spirometer - Use as instructed 1 Each 0    OTHER CBC, BMP, and Mg in 4 days. Results to PCP immediately. 1 Each 0    senna-docusate (PERICOLACE) 8.6-50 mg per tablet Take 1 Tablet by mouth two (2) times a day. 15 Tablet 0    atorvastatin (LIPITOR) 40 mg tablet Take 40 mg by mouth daily. nitroglycerin (NITROSTAT) 0.4 mg SL tablet by SubLINGual route every five (5) minutes as needed for Chest Pain. The patient has a family history of    Social History     Tobacco Use    Smoking status: Never    Smokeless tobacco: Never   Substance Use Topics    Alcohol use: No    Drug use: No       Lab Results   Component Value Date/Time    Cholesterol, total 165 08/20/2019 09:43 AM    HDL Cholesterol 47 08/20/2019 09:43 AM    LDL, calculated 86.8 08/20/2019 09:43 AM    Triglyceride 156 (H) 08/20/2019 09:43 AM    CHOL/HDL Ratio 3.5 08/20/2019 09:43 AM        BP Readings from Last 3 Encounters:   11/03/22 120/70   11/01/22 (!) 120/59   10/28/22 132/87        Pulse Readings from Last 3 Encounters:   11/03/22 77   11/01/22 76   10/28/22 88       Wt Readings from Last 3 Encounters:   11/15/22 52.2 kg (115 lb)   09/22/22 62.4 kg (137 lb 9.6 oz)   09/18/22 60.8 kg (134 lb)         EKG: unchanged from previous tracings, normal sinus rhythm, Q waves in lead III.

## 2022-11-23 RX ORDER — AMLODIPINE BESYLATE 10 MG/1
TABLET ORAL
Qty: 90 TABLET | Refills: 3 | Status: SHIPPED | OUTPATIENT
Start: 2022-11-23

## 2022-12-19 ENCOUNTER — OFFICE VISIT (OUTPATIENT)
Dept: ORTHOPEDIC SURGERY | Age: 87
End: 2022-12-19
Payer: MEDICARE

## 2022-12-19 VITALS
BODY MASS INDEX: 20.8 KG/M2 | HEIGHT: 62 IN | HEART RATE: 88 BPM | OXYGEN SATURATION: 98 % | WEIGHT: 113 LBS | RESPIRATION RATE: 18 BRPM

## 2022-12-19 DIAGNOSIS — S72.001A DISPLACED FRACTURE OF RIGHT FEMORAL NECK (HCC): ICD-10-CM

## 2022-12-19 DIAGNOSIS — S62.336D CLOSED DISPLACED FRACTURE OF NECK OF FIFTH METACARPAL BONE OF RIGHT HAND WITH ROUTINE HEALING, SUBSEQUENT ENCOUNTER: ICD-10-CM

## 2022-12-19 DIAGNOSIS — M79.641 RIGHT HAND PAIN: ICD-10-CM

## 2022-12-19 DIAGNOSIS — Z96.641 HISTORY OF RIGHT HIP HEMIARTHROPLASTY: Primary | ICD-10-CM

## 2022-12-19 PROCEDURE — 99024 POSTOP FOLLOW-UP VISIT: CPT | Performed by: ORTHOPAEDIC SURGERY

## 2022-12-19 PROCEDURE — 73130 X-RAY EXAM OF HAND: CPT | Performed by: ORTHOPAEDIC SURGERY

## 2022-12-19 PROCEDURE — 73502 X-RAY EXAM HIP UNI 2-3 VIEWS: CPT | Performed by: ORTHOPAEDIC SURGERY

## 2022-12-19 NOTE — PROGRESS NOTES
Patient: Phyllis Blanco                MRN: [id number]       SSN: [id number]  YOB: 1924        AGE: 80 y.o. SEX: female  Body mass index is 20.67 kg/m². PCP: Eric Isabel  12/19/22    Chief Complaint: Hip Pain (Right hip pain) and Hand Pain (Right hand pain)        ICD-10-CM ICD-9-CM    1. History of right hip hemiarthroplasty  Z96.641 V43.64 AMB POC X-RAY RADEX HIP UNI WITH PELVIS 2-3 VIEWS      2. Displaced fracture of right femoral neck (HCC)  S72.001A 820.8 AMB POC X-RAY RADEX HIP UNI WITH PELVIS 2-3 VIEWS      3. Right hand pain  M79.641 729.5 AMB POC XRAY, HAND; 3+ VIEWS      4. Closed displaced fracture of neck of fifth metacarpal bone of right hand with routine healing, subsequent encounter  S62.336D V54.19 REFERRAL TO OCCUPATIONAL THERAPY          HPI: Phyllis Blanco is a 80 y.o. female patient with Hip Pain (Right hip pain) and Hand Pain (Right hand pain)  She is 3 months status post right hip hemiarthroplasty with cemented component in September 20, 2022. She is walking well with her walker. She not having any pain in her hand but it is sore. Tobacco Use: Low Risk     Smoking Tobacco Use: Never    Smokeless Tobacco Use: Never    Passive Exposure: Not on file         PHYSICAL EXAMINATION:  Visit Vitals  Pulse 88   Resp 18   Ht 5' 2\" (1.575 m)   Wt 113 lb (51.3 kg)   SpO2 98%   BMI 20.67 kg/m²     Body mass index is 20.67 kg/m². GENERAL: Alert and oriented x3, in no acute distress. HEENT: Normocephalic, atraumatic.     MSK: Right Hip Exam     Tenderness   The patient is experiencing tenderness in the lateral.    Range of Motion   Flexion:  90   External rotation:  30   Internal rotation:  10     Muscle Strength   Abduction: 5/5   Adduction: 5/5   Flexion: 5/5     Other   Erythema: absent  Sensation: normal  Pulse: present    Comments:  Incision well-healed without erythema or tenderness palpation    Able to get up from the chair and walk with her walker without hesitancy or pain      Right Hand Exam     Comments:  Right hand has no erythema at the site of the previous fracture at the neck of the fifth metacarpal.  There is no tenderness palpation in this area. The joint is stiff and range of motion from full extension to about 30 degrees of flexion. IMAGING:  Imaging read by myself and interpreted as follows:  X-ray of the right hip including AP pelvis and AP and lateral of the right hip show well positioned right hip hemiarthroplasty with cemented femoral stem. There is no evidence of lucency or fracture. 3 view x-ray of the right hand including AP lateral and oblique views demonstrate consolidation of the neck of the fifth metacarpal fracture with reasonable alignment. ASSESSMENT & PLAN  Diagnosis: 80 y.o. female 3-month status post right hip hemiarthroplasty with cemented femoral component doing well. She also had a neck of the fifth metacarpal fracture at that time that is now healed. It is stiff so I will add hand therapy for. See her as needed going forward. Past Medical History:   Diagnosis Date    CAD (coronary artery disease) 2008    Fibromyalgia     Heart failure ([facility] [address]     Pt. states sge gas 5 heart blocks. Hypercholesterolemia     Hypertension     Menopause     Thyroid disease        Family History   Problem Relation Age of Onset    Breast Cancer Mother [de-identified]       Current Outpatient Medications   Medication Sig Dispense Refill    amLODIPine (NORVASC) 10 mg tablet TAKE 1 TABLET BY MOUTH EVERY DAY 90 Tablet 3    bisacodyL (DULCOLAX) 5 mg EC tablet Take 2 Tablets by mouth every forty-eight (48) hours as needed for Constipation. 10 Tablet 0    polyethylene glycol (MIRALAX) 17 gram packet Take 1 Packet by mouth daily. (Patient taking differently: Take 1 Packet by mouth daily. dissolve in 8 oz water ) 5 Packet 0    OTHER Incentive Spirometer - Use as instructed 1 Each 0    OTHER CBC, BMP, and Mg in 4 days. Results to PCP immediately. 1 Each 0    senna-docusate (PERICOLACE) 8.6-50 mg per tablet Take 1 Tablet by mouth two (2) times a day. 15 Tablet 0    atorvastatin (LIPITOR) 40 mg tablet Take 40 mg by mouth daily. metoprolol succinate (TOPROL-XL) 25 mg XL tablet TAKE 1 TABLET BY MOUTH EVERY DAY 90 Tablet 3    ascorbic acid, vitamin C, (VITAMIN C) 500 mg tablet Take 500 mg by mouth daily. isosorbide mononitrate ER (IMDUR) 60 mg CR tablet TAKE 1 TABLET BY MOUTH  EVERY MORNING 90 Tab 3    nitroglycerin (NITROSTAT) 0.4 mg SL tablet by SubLINGual route every five (5) minutes as needed for Chest Pain.      levothyroxine (SYNTHROID) 75 mcg tablet Take 75 mcg by mouth Daily (before breakfast). Allergies   Allergen Reactions    Codeine Other (comments)     dizziness    Dapiprazole Vertigo    Dapsone Other (comments)    Elavil Vertigo    Voltaren [Diclofenac Sodium] Vertigo       Past Surgical History:   Procedure Laterality Date    HX CHOLECYSTECTOMY      HX HYSTERECTOMY         Social History     Socioeconomic History    Marital status:      Spouse name: Not on file    Number of children: Not on file    Years of education: Not on file    Highest education level: Not on file   Occupational History    Not on file   Tobacco Use    Smoking status: Never    Smokeless tobacco: Never   Substance and Sexual Activity    Alcohol use: No    Drug use: No    Sexual activity: Not on file   Other Topics Concern    Not on file   Social History Narrative    Not on file     Social Determinants of Health     Financial Resource Strain: Not on file   Food Insecurity: Not on file   Transportation Needs: Not on file   Physical Activity: Not on file   Stress: Not on file   Social Connections: Not on file   Intimate Partner Violence: Not on file   Housing Stability: Not on file       REVIEW OF SYSTEMS:      No changes from previous review of systems unless noted. Prescription medication management discussed with patient.      Electronically signed by: Layo Sullivan DO    Note: This note was completed using voice recognition software.   Any typographical/name errors or mistakes are unintentional.

## 2022-12-23 ENCOUNTER — TELEPHONE (OUTPATIENT)
Dept: PHYSICAL THERAPY | Age: 87
End: 2022-12-23

## 2022-12-26 NOTE — MR AVS SNAPSHOT
Visit Information Date & Time Provider Department Dept. Phone Encounter #  
 2/1/2017  9:00 AM Kentrell Silva MD [facility] [address] [id number] Upcoming Health Maintenance Date Due DTaP/Tdap/Td series (1 - Tdap) 2/17/1945 ZOSTER VACCINE AGE 60> 2/17/1984 GLAUCOMA SCREENING Q2Y 2/17/1989 OSTEOPOROSIS SCREENING (DEXA) 2/17/1989 Pneumococcal 65+ Low/Medium Risk (1 of 2 - PCV13) 2/17/1989 MEDICARE YEARLY EXAM 2/17/1989 INFLUENZA AGE 9 TO ADULT 8/1/2016 Allergies as of 2/1/2017  Review Complete On: 2/1/2017 By: Kentrell Silva MD  
  
 Severity Noted Reaction Type Reactions Codeine Medium 11/03/2016    Hives Dapiprazole  11/03/2016    Vertigo Elavil  11/03/2016    Vertigo Voltaren [Diclofenac Sodium]  11/03/2016    Vertigo Current Immunizations  Never Reviewed No immunizations on file. Not reviewed this visit You Were Diagnosed With   
  
 Codes Comments Coronary artery disease involving native coronary artery of native heart without angina pectoris    -  Primary ICD-10-CM: I25.10 ICD-9-CM: 414.01   
 POP (dyspnea on exertion)     ICD-10-CM: R06.09 
ICD-9-CM: 786.09 Vitals BP Pulse Height(growth percentile) Weight(growth percentile) SpO2 BMI  
 158/72 60 5' 2\" (1.575 m) 142 lb (64.4 kg) 98% 25.97 kg/m2 Smoking Status Never Smoker Vitals History BMI and BSA Data Body Mass Index Body Surface Area  
 25.97 kg/m 2 1.68 m 2 Preferred Pharmacy Pharmacy Name Phone [id number] [address] [facility] [phone] Your Updated Medication List  
  
   
This list is accurate as of: 2/1/17  9:54 AM.  Always use your most recent med list. amLODIPine 5 mg tablet Commonly known as:  Liv Dennis Take 5 mg by mouth daily. aspirin delayed-release 81 mg tablet Take  by mouth daily.   
  
 ATENOLOL PO  
 Take 50 mg by mouth daily. CALCIUM + D PO Take 1,200 mg by mouth daily. CeleBREX 200 mg capsule Generic drug:  celecoxib Take 200 mg by mouth two (2) times a day. I-CAPS PO Take  by mouth. nitroglycerin 0.4 mg SL tablet Commonly known as:  NITROSTAT  
by SubLINGual route every five (5) minutes as needed for Chest Pain. psyllium Powd Commonly known as:  METAMUCIL Take 1 Packet by mouth. SAVELLA 50 mg tablet Generic drug:  Milnacipran Take 100 mg by mouth daily. simvastatin 20 mg tablet Commonly known as:  ZOCOR Take 20 mg by mouth nightly. STOOL SOFTENER PO Take  by mouth. SYNTHROID 75 mcg tablet Generic drug:  levothyroxine Take 75 mcg by mouth Daily (before breakfast). TYLENOL 325 mg tablet Generic drug:  acetaminophen Take  by mouth every four (4) hours as needed for Pain. We Performed the Following AMB POC EKG ROUTINE W/ 12 LEADS, INTER & REP [40711 CPT(R)] Introducing [facility]! Carmen Nino introduces [employer] patient portal. Now you can access parts of your medical record, email your doctor's office, and request medication refills online. 1. In your internet browser, go to [email or web address] 2. Click on the First Time User? Click Here link in the Sign In box. You will see the New Member Sign Up page. 3. Enter your [employer] Access Code exactly as it appears below. You will not need to use this code after youve completed the sign-up process. If you do not sign up before the expiration date, you must request a new code. · [employer] Access Code: [email or web address] Expires: 2/1/2017 10:27 AM 
 
4. Enter the last four digits of your Social Security Number (xxxx) and Date of Birth (mm/dd/yyyy) as indicated and click Submit. You will be taken to the next sign-up page. 5. Create a [employer] ID.  This will be your [employer] login ID and cannot be changed, so think of one that is secure and easy to remember. 6. Create a [employer] password. You can change your password at any time. 7. Enter your Password Reset Question and Answer. This can be used at a later time if you forget your password. 8. Enter your e-mail address. You will receive e-mail notification when new information is available in [address] 9. Click Sign Up. You can now view and download portions of your medical record. 10. Click the Download Summary menu link to download a portable copy of your medical information. If you have questions, please visit the Frequently Asked Questions section of the [employer] website. Remember, [employer] is NOT to be used for urgent needs. For medical emergencies, dial 911. Now available from your iPhone and Android! Please provide this summary of care documentation to your next provider. Your primary care clinician is listed as Marlene Baltazar. If you have any questions after today's visit, please call [phone]. show

## 2023-01-05 ENCOUNTER — HOSPITAL ENCOUNTER (OUTPATIENT)
Dept: PHYSICAL THERAPY | Age: 88
Discharge: HOME | End: 2023-01-05
Attending: ORTHOPAEDIC SURGERY
Payer: MEDICARE

## 2023-01-05 PROCEDURE — 97165 OT EVAL LOW COMPLEX 30 MIN: CPT

## 2023-01-05 PROCEDURE — 97535 SELF CARE MNGMENT TRAINING: CPT

## 2023-01-05 PROCEDURE — 97110 THERAPEUTIC EXERCISES: CPT

## 2023-01-05 NOTE — PROGRESS NOTES
[facility]  [address]  [address] [place] [address] Drive, [address]  [phone] fax    Plan of Care/Statement of Necessity for Occupational Therapy Services    Patient name: Viktoriya Paredes Start of Care: 2023   Referral source: Mikey Crigler, DO : 1924    Medical Diagnosis: Pain in right hand [M79.641]  [place]or: [place] MED[place] / Plan: [place] MEDICARE PART A & B / Product Type: Medicare /  Onset Date:2022    Treatment Diagnosis: right hand pain   Prior Hospitalization: see medical history Provider#: [id number]   Medications: Verified on Patient summary List    Comorbidities: Arthritis, heart disease, HTN   Prior Level of Function: (I) with self care, (I )with making the bed, simple meal prep           The Plan of Care and following information is based on the information from the initial evaluation. Assessment/ key information: Patient is a right hand dominant 80 y.o. female with a chief complaint  of right hand pain secondary to falling from ground level on 2022 in which she sustained a hip fx and right hand fifth metacarpal neck fx. She was casted for 6 weeks. Pt presents to skilled OT today rating her pain level 5/10 in the right hand that is worse without wearing the brace and using it. Her granddaughter reports that the patient will take tylenol as needed. There is no pain with palpation and recent imaging revealed a healed fifth metacarpal neck fx. Her small finger AROM is limited to 124 deg BUSH and her right wrist AROM is limited to 42 deg flexion and 54 deg extension. She reports min A with some self care and simple meal prep tasks. Patient received an initial evaluation today followed by education as to diagnosis, precautions and treatment plan. Patient was provided with a basic home exercise program including modified tendon glides and wrist AROM.   Skilled OT services are necessary to address aforementioned deficits to improve quality of life and return to increased (I) with self care tasks. Evaluation Complexity: History LOW Complexity : Brief history review  Examination LOW Complexity : 1-3 performance deficits relating to physical, cognitive , or psychosocial skils that result in activity limitations and / or participation restrictions  Clinical Decision Making MEDIUM Complexity : Patient may present with comorbidities that affect occupational performnce. Miniml to moderate modification of tasks or assistance (eg, physical or verbal ) with assesment(s) is necessary to enable patient to complete evaluation   Overall Complexity Rating: LOW     Patient would benefit from OT/Hand therapy services for the following problems:  Problem List: Pain effecting function, Decreased range of motion, Decreased strength, Edema effecting function, Decreased coordination/prehension, Decreased ADL/functional abilities , Decreased activity tolerance, and Decreased flexibility/joint mobility  Treatment Plan may include any combination of the following: Therapeutic exercise, Therapeutic activities, Neuromuscular re-education, Physical agent/modality, Manual therapy, Patient education, and ADLs/IADLs  Patient / Family readiness to learn indicated by: asking questions, trying to perform skills, and interest  Persons(s) to be included in education:   patient (P) and family support person (FSP);list granddaughter Paloma King  Barriers to Learning/Limitations: yes;  cognitive  Patient Goal (s): improve range of motion and decrease pain  Patient Self Reported Health Status: fair  Rehabilitation Potential: good  Short Term Goals: To be accomplished in 2  weeks:  Goal:* Patient will be compliant with initial home exercise program to take an active role in their rehabilitation process. Status at Eval: Patient was provided with a basic home exercise program including modified tendon glides and wrist AROM.       Goal:* Patient will demonstrate a good understanding of their condition and strategies for self-management. Status at Eval: pt educated on prognosis, diagnosis, treatment plan, wrist brace wear, activity modifications    Long Term Goals: To be accomplished in 4 weeks:   Goal:*Patient will regain 200 degrees total arc of motion of the right small finger to enable grasp of cylindrical objects such as a glass, handle or toothbrush. Status at Eval: right small finger 124 deg BUSH    Goal:* Patient will have 60 degrees of right wrist extension in order to increase ease with ADL's such as bathing, eating and dressing and to eventually bear weight thru the right upper extremity as in pushing up from a chair. Status at Eval: 54 deg     Goal:* Patient will have 50 degrees of right wrist flexion in order to perform hygiene tasks and /or work with tools such as a screwdriver. Status at Eval: 42 deg    Goal:* Patient will show a 20 point improvement on FOTO functional status measure to improve overall functional performance. Status at Eval: 26    Frequency / Duration: Patient to be seen 2 times per week for 4 weeks:  Patient/ Caregiver education and instruction: Diagnosis, prognosis, self care, activity modification, and exercises  [x]  Plan of care has been reviewed with SALDANA    Certification Period: 1/5/2023 - 2/3/2023    Gwyn Quinteros OT 1/5/2023 1:10 PM    ________________________________________________________________________    I certify that the above Therapy Services are being furnished while the patient is under my care. I agree with the treatment plan and certify that this therapy is necessary.     [de-identified] Signature:____________Date:_________TIME:________     Lona Edwards DO  ** Signature, Date and Time must be completed for valid certification **    Please sign and return to [facility] [address] [place] [address]  [address]  [place], [facility] [address]  [phone] fax

## 2023-01-05 NOTE — PROGRESS NOTES
Hand Therapy Evaluation and Daily Note    Patient Name: Darius Francisco  OURY:1623  : 1924  Age: 80 y.o.y/o  [x]  Patient  Verified  Payor: [place] MED[place] / Plan: [place] MEDICARE PART A & B / Product Type: Medicare /    Referring Provider: DO Alonzo Barlow MD Visit:  as needed  Onset Date:  2022  Surgical Date: na  Surgical Procedure: na    In time:12:15 PM  Out time:1:00 PM  Total Treatment Time (min): 45  Total Timed Codes (min): 23  1:1 Treatment Time ( [address] only): 45   Visit #: 1 of 8    Treatment Area: Pain in right hand [M79.641]    Precautions:    Hand Dominance: right handed   Hand Involved: right    Total Evaluation Time:  22    History of Present Condition:  Patient is a right hand dominant 80 y.o. female with a chief complaint  of right hand pain secondary to falling from ground level on 2022 in which she sustained a hip fx and right hand fifth metacarpal neck fx. She was casted for 6 weeks. Pain Rating:   Current: (0-no pain 10-debilitating pain) moderate 5/10   At best: (0-no pain 10-debilitating pain) moderate  At worst: (0-no pain 10-debilitating pain) severe 10/10  Location: right hand  Type:  moderate   Better with: tylenol, wrist/thumb brace  Worse with: use    Medications/Allergies/Past Medical History:  See chart; reviewed with patient. 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 There is no evidence of lucency or fracture. 3 view x-ray of the right hand including AP lateral and oblique views demonstrate consolidation of the neck of the fifth metacarpal fracture with reasonable alignment.        Prior Level of Function: (I) with self care, (I )with making the bed, simple meal prep    Current Level of Function:  Min A with meal prep, dressing, bathing    Social History: Pt lives with son and daughter in law - grand daughter is caregiver     Occupation/Job Requirements: retired    Observation: thumb and wrist brace donned  Scar/incision:   na  Location:  right hand      Palpation:  na    Range of Motion:   Hand ROM 1/5/2023 right   2nd 3rd 4th 5th Thumb   MP    54    PIP    50    DIP    20    PABD        RABD        BUSH    124      Wrist Active/Passive ROM  Wrist 1/5/2023  Right/Left       Flex 42/52       Ext 54/60       UD        RD                  Strength:  NT    Sensation:    intact    Edema: mild    Special Tests:     ADLs  Feeding:        []MaxA   []ModA   []Sheila   [] CGA   []SBA   []Chris   [x]Independent  UE Dressing:       []MaxA   []ModA   []Sheila   [] CGA   []SBA   []Chris   [x]Independent  LE Dressing:       []MaxA   []ModA   [x]Sheila   [] CGA   []SBA   []Chris   []Independent  Grooming:       []MaxA   []ModA   [x]Sheila   [] CGA   []SBA   []Chris   []Independent  Toileting:       []MaxA   []ModA   []Sheila   [] CGA   []SBA   []Chris   [x]Independent  Bathing:       []MaxA   []ModA   []Sheila   [] CGA   []SBA   []Chris   [x]Independent  Light Meal Prep:    []MaxA   []ModA   [x]Sheila   [] CGA   []SBA   []Chris   []Independent  Household/Other: []MaxA   []ModA   [x]Sheila   [] CGA   []SBA   []Chris   []Independent  Adaptive Equip:     []MaxA   []ModA   []Sheila   [] CGA   []SBA   []Chris   []Independent  Driving:       [x]MaxA   []ModA   []Sheila   [] CGA   []SBA   []Chris   []Independent      Todays Treatment:  Patient received an initial evaluation today followed by education as to diagnosis, precautions and treatment plan. Patient was provided with a basic home exercise program including modified tendon glides and wrist AROM.       OBJECTIVE  Modality rationale: decrease pain and increase tissue extensibility to improve the patients ability to move right wrist and thumb for functional tasks   Min Type Additional Details    [] Estim:  []Unatt []IFC  []Premod                        []Other:  []w/ice   []w/heat  Position:  Location:    [] Estim: []Att    []TENS instruct  []NMES                    []Other:  []w/US   []w/ice   []w/heat  Position:  Location:    []  Traction: [] Cervical       []Lumbar                       [] Prone          []Supine                       []Intermittent   []Continuous Lbs:  [] before manual  [] after manual    []  Ultrasound: []Continuous   [] Pulsed                           []1MHz   []3MHz W/cm2:  Location:    []  Iontophoresis with dexamethasone         Location: [] Take home patch   [] In clinic   5 concurrent with self care []  Ice     [x]  Heat MHP  []  Ice massage  []  Laser   []  Paraffin Position: seated, resting  Location: right hand    []  Laser with stim  []  Other:  Position:  Location:    []  Vasopneumatic Device Pressure:       [] lo [] med [] hi   Temperature: [] lo [] med [] hi       [x] Skin assessment post-treatment:  [x]intact [x]redness- no adverse reaction    10 min Therapeutic Exercise:  [x] See flow sheet :   Rationale: increase ROM to improve the patients ability to move right wrist and thumb for grooming, bathing and dressing tasks.      13 min Self Care/Home Management: prognosis, diagnosis, treatment plan, wrist brace wear, activity modifications   Rationale:  education   to improve the patients ability to reduce symptoms and improve functional use of dominant right hand     With   [] TE   [] TA   [] neuro   [] other: Patient Education: [x] Review HEP    [] Progressed/Changed HEP based on:   [] positioning   [] body mechanics   [] transfers   [] heat/ice application   [] Splint wear/care   [] Sensory re-education   [] scar management      [] other:      Pain Level (0-10 scale) post treatment: 5/10    Patient will continue to benefit from skilled OT services to modify and progress therapeutic interventions, address ROM deficits, address strength deficits, analyze and address soft tissue restrictions, and instruct in home and community integration to attain goals. Assessment: Pt presents to skilled OT today rating her pain level 5/10 in the right hand that is worse without wearing the brace and using it. Her granddaughter reports that the patient will take tylenol as needed. There is no pain with palpation and recent imaging revealed a healed fifth metacarpal neck fx. Her small finger AROM is limited to 124 deg BUSH and her right wrist AROM is limited to 42 deg flexion and 54 deg extension. She reports min A with some self care and simple meal prep tasks. Evaluation Complexity: History LOW Complexity : Brief history review  Examination LOW Complexity : 1-3 performance deficits relating to physical, cognitive , or psychosocial skils that result in activity limitations and / or participation restrictions  Clinical Decision Making MEDIUM Complexity : Patient may present with comorbidities that affect occupational performnce.  Miniml to moderate modification of tasks or assistance (eg, physical or verbal ) with assesment(s) is necessary to enable patient to complete evaluation   Overall Complexity Rating: LOW     Patient would benefit from OT/Hand therapy services for the following problems:  Problem List: Pain effecting function, Decreased range of motion, Decreased strength, Edema effecting function, Decreased coordination/prehension, Decreased ADL/functional abilities , Decreased activity tolerance, and Decreased flexibility/joint mobility  Treatment Plan may include any combination of the following: Therapeutic exercise, Therapeutic activities, Neuromuscular re-education, Physical agent/modality, Manual therapy, Patient education, and ADLs/IADLs  Patient / Family readiness to learn indicated by: asking questions, trying to perform skills, and interest  Persons(s) to be included in education:   patient (P) and family support person (FSP);list granddaughter Anita Gordon  Barriers to Learning/Limitations: yes;  cognitive  Patient Goal (s): improve range of motion and decrease pain  Patient Self Reported Health Status: fair  Rehabilitation Potential: good  Short Term Goals: To be accomplished in 2  weeks:  Goal:* Patient will be compliant with initial home exercise program to take an active role in their rehabilitation process. Status at Eval: Patient was provided with a basic home exercise program including modified tendon glides and wrist AROM. Goal:* Patient will demonstrate a good understanding of their condition and strategies for self-management. Status at Eval: pt educated on prognosis, diagnosis, treatment plan, wrist brace wear, activity modifications    Long Term Goals: To be accomplished in 4 weeks:   Goal:*Patient will regain 200 degrees total arc of motion of the right small finger to enable grasp of cylindrical objects such as a glass, handle or toothbrush. Status at Eval: right small finger 124 deg BUSH    Goal:* Patient will have 60 degrees of right wrist extension in order to increase ease with ADL's such as bathing, eating and dressing and to eventually bear weight thru the right upper extremity as in pushing up from a chair. Status at Eval: 54 deg     Goal:* Patient will have 50 degrees of right wrist flexion in order to perform hygiene tasks and /or work with tools such as a screwdriver. Status at Eval: 42 deg    Goal:* Patient will show a 20 point improvement on FOTO functional status measure to improve overall functional performance.   Status at Eval: 26    Frequency / Duration: Patient to be seen 2 times per week for 4 weeks:  Patient/ Caregiver education and instruction: Diagnosis, prognosis, self care, activity modification, and exercises      Certification Period: 1/5/2023 - 2/3/2023    [place] OT,  1/5/2023 12:20 PM

## 2023-01-06 ENCOUNTER — TELEPHONE (OUTPATIENT)
Dept: PHYSICAL THERAPY | Age: 88
End: 2023-01-06

## 2023-01-11 ENCOUNTER — HOSPITAL ENCOUNTER (OUTPATIENT)
Dept: PHYSICAL THERAPY | Age: 88
Discharge: HOME | End: 2023-01-11
Attending: ORTHOPAEDIC SURGERY
Payer: MEDICARE

## 2023-01-11 PROCEDURE — 97110 THERAPEUTIC EXERCISES: CPT

## 2023-01-11 PROCEDURE — 97530 THERAPEUTIC ACTIVITIES: CPT

## 2023-01-11 NOTE — PROGRESS NOTES
OT DAILY TREATMENT NOTE     Patient Name: Leonila Correa  Date:2023  : 1924  [x]  Patient  Verified  Payor: Mahesh Garduno / Plan: [place] MEDICARE PART A & B / Product Type: Medicare /    In time:3:34  Out time:4:02  Total Treatment Time (min): 28  Visit #: 2 of 8    Medicare/BCBS Only   Total Timed Codes (min):  23 1:1 Treatment Time:  28     Treatment Area: Pain in right hand [M79.641]    SUBJECTIVE  Pain Level (0-10 scale): 0/10  Any medication changes, allergies to medications, adverse drug reactions, diagnosis change, or new procedure performed?: [x] No    [] Yes (see summary sheet for update)  Subjective functional status/changes:   [] No changes reported    \"No pain\"      OBJECTIVE    Modality rationale: decrease pain and increase tissue extensibility to improve the patients ability to move right wrist and thumb for functional tasks   Min Type Additional Details      [] Estim:  []Unatt       []IFC  []Premod                        []Other:  []w/ice   []w/heat  Position:  Location:      [] Estim: []Att    []TENS instruct  []NMES                    []Other:  []w/US   []w/ice   []w/heat  Position:  Location:      []  Traction: [] Cervical       []Lumbar                       [] Prone          []Supine                       []Intermittent   []Continuous Lbs:  [] before manual  [] after manual      []  Ultrasound: []Continuous   [] Pulsed                           []1MHz   []3MHz W/cm2:  Location:      []  Iontophoresis with dexamethasone         Location: [] Take home patch   [] In clinic    5 []  Ice     [x]  Heat MHP  []  Ice massage  []  Laser   []  Paraffin Position: seated, resting  Location: right hand      []  Laser with stim  []  Other:  Position:  Location:      []  Vasopneumatic Device Pressure:       [] lo [] med [] hi   Temperature: [] lo [] med [] hi       [x] Skin assessment post-treatment:  [x]intact [x]redness- no adverse reaction      13 min Therapeutic Exercise:  [] See flow sheet :   Rationale: increase ROM to improve the patients ability to move wrist and  for functional daily tasks. Right hand: Towel scrunches/squeeze   [employer]   Wrist AROM HEP   Blocking exercises   Wrist maze       10 min Therapeutic Activity:  []  See flow sheet :   Rationale: increase ROM and improve coordination  to improve the patients ability to manipulate small items. Right hand:    Opposition with marbles - SF only   Abd/add with marbles   Small dexterity balls tabletop       With   [] TE   [] TA   [] neuro   [] other: Patient Education: [x] Review HEP    [x] Progressed/Changed HEP based on: mod cues for proper positioning with ROM and PROM HEP. [] positioning   [] body mechanics   [] transfers   [] heat/ice application   [] Splint wear/care   [] Sensory re-education   [] scar management      [] other:            Other Objective/Functional Measures:     Pt tolerated all digit ROM/PROM well      Pain Level (0-10 scale) post treatment: 0/10    ASSESSMENT/Changes in Function: heavy cueing and encouragement to move SF for ROM exercises, no pain with all SF and wrist ROM exercises. Pt required more hands on assistance this session due to hearing deficit. Patient will continue to benefit from skilled OT services to modify and progress therapeutic interventions, address ROM deficits, address strength deficits, analyze and address soft tissue restrictions, analyze and cue movement patterns, and analyze and modify body mechanics/ergonomics to attain remaining goals. [x]  See Plan of Care  []  See progress note/recertification  []  See Discharge Summary         Progress towards goals / Updated goals:    Short Term Goals: To be accomplished in 2  weeks:  Goal:* Patient will be compliant with initial home exercise program to take an active role in their rehabilitation process.   Status at al: Patient was provided with a basic home exercise program including modified tendon glides and wrist AROM.       Goal:* Patient will demonstrate a good understanding of their condition and strategies for self-management. Status at Eval: pt educated on prognosis, diagnosis, treatment plan, wrist brace wear, activity modifications     Long Term Goals: To be accomplished in 4 weeks:              Goal:*Patient will regain 200 degrees total arc of motion of the right small finger to enable grasp of cylindrical objects such as a glass, handle or toothbrush. Status at Eval: right small finger 124 deg BUSH     Goal:* Patient will have 60 degrees of right wrist extension in order to increase ease with ADL's such as bathing, eating and dressing and to eventually bear weight thru the right upper extremity as in pushing up from a chair. Status at Eval: 54 deg      Goal:* Patient will have 50 degrees of right wrist flexion in order to perform hygiene tasks and /or work with tools such as a screwdriver. Status at Eval: 42 deg     Goal:* Patient will show a 20 point improvement on FOTO functional status measure to improve overall functional performance.   Status at Eval: 32    PLAN  []  Upgrade activities as tolerated     [x]  Continue plan of care  []  Update interventions per flow sheet       []  Discharge due to:_  []  Other:_      BETTY Bonner 1/11/2023  2:22 PM    Future Appointments   Date Time Provider Olga Aragon   1/11/2023  3:30 PM BETTY Rocha [id number]   5/16/2023  1:20 PM Kassandra Krishnamurthy MD [facility]

## 2023-01-17 ENCOUNTER — HOSPITAL ENCOUNTER (OUTPATIENT)
Dept: PHYSICAL THERAPY | Age: 88
Discharge: HOME | End: 2023-01-17
Attending: ORTHOPAEDIC SURGERY
Payer: MEDICARE

## 2023-01-17 PROCEDURE — 97110 THERAPEUTIC EXERCISES: CPT

## 2023-01-17 PROCEDURE — 97530 THERAPEUTIC ACTIVITIES: CPT

## 2023-01-17 NOTE — PROGRESS NOTES
OT DAILY TREATMENT NOTE     Patient Name: Ronn Francis  Date:2023  : 1924  [x]  Patient  Verified  Payor: Andreia Cook / Plan: [place] MEDICARE PART A & B / Product Type: Medicare /    In time:8:40  Out time:9:08  Total Treatment Time (min): 28  Visit #: 3 of 8    Medicare/BCBS Only   Total Timed Codes (min):  28 1:1 Treatment Time:  28     Treatment Area: Pain in right hand [M79.641]    SUBJECTIVE  Pain Level (0-10 scale): 1-2/10  Any medication changes, allergies to medications, adverse drug reactions, diagnosis change, or new procedure performed?: [x] No    [] Yes (see summary sheet for update)  Subjective functional status/changes:   [] No changes reported    \"My hand feeling numb and tingling today\"  \"I wear my wrist brace at night\"    OBJECTIVE    Modality rationale: decrease pain and increase tissue extensibility to improve the patients ability to move right wrist and thumb for functional tasks   Min Type Additional Details       [] Estim:  []Unatt       []IFC  []Premod                        []Other:  []w/ice   []w/heat  Position:  Location:       [] Estim: []Att    []TENS instruct  []NMES                    []Other:  []w/US   []w/ice   []w/heat  Position:  Location:       []  Traction: [] Cervical       []Lumbar                       [] Prone          []Supine                       []Intermittent   []Continuous Lbs:  [] before manual  [] after manual       []  Ultrasound: []Continuous   [] Pulsed                           []1MHz   []3MHz W/cm2:  Location:       []  Iontophoresis with dexamethasone         Location: [] Take home patch   [] In clinic     5 with sc []  Ice     [x]  Heat MHP  []  Ice massage  []  Laser   []  Paraffin Position: seated, resting  Location: right hand       []  Laser with stim  []  Other:  Position:  Location:       []  Vasopneumatic Device Pressure:       [] lo [] med [] hi   Temperature: [] lo [] med [] hi        [x] Skin assessment post-treatment:  [x]intact [x]redness- no adverse reaction    10 min Therapeutic Exercise:  [] See flow sheet :   Rationale: increase ROM to improve the patients ability to move wrist and  for functional daily tasks. Right hand: Towel scrunches   [employer]   SF - blocking   Yellow digiflex- individual and full - attempted - increased pain - discontinued exercise      10 min Therapeutic Activity:  []  See flow sheet :   Rationale: increase ROM and improve coordination  to improve the patients ability to manipulate small items. Right hand:     Opposition with marbles - SF only   Small dexterity balls tabletop        8 min Self Care/Home Management: postioning , brace wear. Rationale:  patient education    to improve the patients ability to self-manage symptoms and improve functional use of right hand for daily tasks. With   [] TE   [] TA   [] neuro   [] other: Patient Education: [x] Review HEP    [x] Progressed/Changed HEP based on: min cues for proper positioning with tendon glides and blocking exercises. [] positioning   [] body mechanics   [] transfers   [] heat/ice application   [] Splint wear/care   [] Sensory re-education   [] scar management      [] other:            Other Objective/Functional Measures:     Unable to perform gentle strengthening with digiflex at this time - individual or full . Pt unable to make a composite fist.   Mod difficulty securing items in palm     Pain Level (0-10 scale) post treatment: 1-2/10    ASSESSMENT/Changes in Function: ongoing difficulty securing items in right hand due to deficits in SF ROM, pt tolerated all digit ROM well, pt unable to tolerate gentle hand strengthening this tx session.     Patient will continue to benefit from skilled OT services to modify and progress therapeutic interventions, address ROM deficits, address strength deficits, analyze and address soft tissue restrictions, analyze and cue movement patterns, and analyze and modify body mechanics/ergonomics to attain remaining goals. [x]  See Plan of Care  []  See progress note/recertification  []  See Discharge Summary         Progress towards goals / Updated goals:    Short Term Goals: To be accomplished in 2  weeks:  Goal:* Patient will be compliant with initial home exercise program to take an active role in their rehabilitation process. Status at Eval: Patient was provided with a basic home exercise program including modified tendon glides and wrist AROM. Goal:* Patient will demonstrate a good understanding of their condition and strategies for self-management. Status at Eval: pt educated on prognosis, diagnosis, treatment plan, wrist brace wear, activity modifications  1/17/2023: reviewed sleep positioning and wrist brace wear. Long Term Goals: To be accomplished in 4 weeks:              Goal:*Patient will regain 200 degrees total arc of motion of the right small finger to enable grasp of cylindrical objects such as a glass, handle or toothbrush. Status at Eval: right small finger 124 deg BUSH     Goal:* Patient will have 60 degrees of right wrist extension in order to increase ease with ADL's such as bathing, eating and dressing and to eventually bear weight thru the right upper extremity as in pushing up from a chair. Status at Eval: 54 deg      Goal:* Patient will have 50 degrees of right wrist flexion in order to perform hygiene tasks and /or work with tools such as a screwdriver. Status at Eval: 42 deg     Goal:* Patient will show a 20 point improvement on FOTO functional status measure to improve overall functional performance.   Status at Eval: 32    PLAN  []  Upgrade activities as tolerated     [x]  Continue plan of care  []  Update interventions per flow sheet       []  Discharge due to:_  []  Other:_      BETTY Ji 1/17/2023  8:36 AM    Future Appointments   Date Time Provider Olga Aragon   1/19/2023 10:30 AM BETTY Perez [id number] 1/24[facility]2023  9[facility]   1/26/2023 10:00 [facility]   5/16/2023  1:20 PM Lynn Bill MD [facility]

## 2023-01-19 ENCOUNTER — HOSPITAL ENCOUNTER (OUTPATIENT)
Dept: PHYSICAL THERAPY | Age: 88
Discharge: HOME | End: 2023-01-19
Attending: ORTHOPAEDIC SURGERY
Payer: MEDICARE

## 2023-01-19 PROCEDURE — 97110 THERAPEUTIC EXERCISES: CPT

## 2023-01-19 PROCEDURE — 97530 THERAPEUTIC ACTIVITIES: CPT

## 2023-01-19 NOTE — PROGRESS NOTES
OT DAILY TREATMENT NOTE     Patient Name: Maribell Jackson  Date:2023  : 1924  [x]  Patient  Verified  Payor: [place] MED[place]ARE / Plan: [place] MEDICARE PART A & B / Product Type: Medicare /    In time:11:35  Out time:12:01  Total Treatment Time (min): 26  Visit #: 4 of 8    Medicare/BCBS Only   Total Timed Codes (min):  26 1:1 Treatment Time:       Treatment Area: Pain in right hand [M79.641]    SUBJECTIVE  Pain Level (0-10 scale): 5/10  Any medication changes, allergies to medications, adverse drug reactions, diagnosis change, or new procedure performed?: [x] No    [] Yes (see summary sheet for update)  Subjective functional status/changes:   [] No changes reported    \"I wear my brace at night and it helps my wrist\"    OBJECTIVE      16 min Therapeutic Exercise:  [] See flow sheet :   Rationale: increase ROM and increase strength to improve the patients ability to move wrist and  for functional daily tasks. Right hand/wrist:     Towel squeeze   SF- digit extension   Composite fist and hold   1/4\" peg removal from soft putty   Ball rolls   Putty HEP- making food items  Prayer stretch      10 min Therapeutic Activity:  []  See flow sheet :   Rationale: increase ROM and improve coordination  to improve the patients ability to manipulate small items. Right hand:      Abd/add with foam pieces   Dexterity balls - tabletop       With   [] TE   [] TA   [] neuro   [] other: Patient Education: [x] Review HEP    [x] Progressed/Changed HEP based on: initiated gentle strengthening HEP with soft yellow putty   [] positioning   [] body mechanics   [] transfers   [] heat/ice application   [] Splint wear/care   [] Sensory re-education   [] scar management      [] other:            Other Objective/Functional Measures:     Pt tolerated all gentle strengthening well   No difficulty manipulating small items.       Pain Level (0-10 scale) post treatment: 5/10    ASSESSMENT/Changes in Function: pt arrived with increased pain in hand and wrist , pt's pain level did not change after completing all ROM and gentle strengthening, pt requires encouragement for participation in new exercises presented, pt declined heat modalities this tx session. Patient will continue to benefit from skilled OT services to address ROM deficits, address strength deficits, analyze and address soft tissue restrictions, analyze and cue movement patterns, and analyze and modify body mechanics/ergonomics to attain remaining goals. [x]  See Plan of Care  []  See progress note/recertification  []  See Discharge Summary         Progress towards goals / Updated goals:    Short Term Goals: To be accomplished in 2  weeks:  Goal:* Patient will be compliant with initial home exercise program to take an active role in their rehabilitation process. Status at Eval: Patient was provided with a basic home exercise program including modified tendon glides and wrist AROM. Goal:* Patient will demonstrate a good understanding of their condition and strategies for self-management. Status at Eval: pt educated on prognosis, diagnosis, treatment plan, wrist brace wear, activity modifications  1/17/2023: reviewed sleep positioning and wrist brace wear. Long Term Goals: To be accomplished in 4 weeks:              Goal:*Patient will regain 200 degrees total arc of motion of the right small finger to enable grasp of cylindrical objects such as a glass, handle or toothbrush. Status at Eval: right small finger 124 deg BUSH     Goal:* Patient will have 60 degrees of right wrist extension in order to increase ease with ADL's such as bathing, eating and dressing and to eventually bear weight thru the right upper extremity as in pushing up from a chair. Status at Eval: 54 deg      Goal:* Patient will have 50 degrees of right wrist flexion in order to perform hygiene tasks and /or work with tools such as a screwdriver.   Status at Eval: 42 deg     Goal:* Patient will show a 20 point improvement on FOTO functional status measure to improve overall functional performance.   Status at Eval: 32       PLAN  []  Upgrade activities as tolerated     [x]  Continue plan of care  []  Update interventions per flow sheet       []  Discharge due to:_  []  Other:_      Ed BETTY Hinds 1/19/2023  11:44 AM    Future Appointments   Date Time Provider Olga Aragon   1/24/2023  9:30 [facility]   1/26[facility]2023 10[facility]00 [facility]   5/16/2023  1:20 PM Dee Hay MD [facility] BS AMB

## 2023-01-24 ENCOUNTER — HOSPITAL ENCOUNTER (OUTPATIENT)
Dept: PHYSICAL THERAPY | Age: 88
Discharge: HOME | End: 2023-01-24
Attending: ORTHOPAEDIC SURGERY
Payer: MEDICARE

## 2023-01-24 PROCEDURE — 97535 SELF CARE MNGMENT TRAINING: CPT

## 2023-01-24 PROCEDURE — 97110 THERAPEUTIC EXERCISES: CPT

## 2023-01-24 NOTE — PROGRESS NOTES
OT DAILY TREATMENT NOTE     Patient Name: Nini Shepard  SSHB:[id number]  : 1924  [x]  Patient  Verified  Payor: [place] MEDICARE / Plan: [place] MEDICARE PART A & B / Product Type: Medicare /    In time:9:35  Out time:10:02  Total Treatment Time (min): 27  Visit #: 5 of 8    Medicare/BCBS Only   Total Timed Codes (min):  27 1:1 Treatment Time:       Treatment Area: Pain in right hand [M79.641]    SUBJECTIVE  Pain Level (0-10 scale): 8/10  Any medication changes, allergies to medications, adverse drug reactions, diagnosis change, or new procedure performed?: [x] No    [] Yes (see summary sheet for update)  Subjective functional status/changes:   [] No changes reported    \"More pain today\"     - grand daughter reports pt hitting hand last night ( bruise and increased pain levels)     OBJECTIVE    Modality rationale: decrease inflammation, decrease pain, and increase tissue extensibility to improve the patients ability to move digits and grasp for functional daily tasks.     Min Type Additional Details    [] Estim:  []Unatt       []IFC  []Premod                        []Other:  []w/ice   []w/heat  Position:  Location:    [] Estim: []Att    []TENS instruct  []NMES                    []Other:  []w/US   []w/ice   []w/heat  Position:  Location:    []  Traction: [] Cervical       []Lumbar                       [] Prone          []Supine                       []Intermittent   []Continuous Lbs:  [] before manual  [] after manual    []  Ultrasound: []Continuous   [] Pulsed                           []1MHz   []3MHz W/cm2:  Location:    []  Iontophoresis with dexamethasone         Location: [] Take home patch   [] In clinic   5 with sc []  Ice     [x]  Heat-MHP  []  Ice massage  []  Laser   []  Paraffin Position: seated/resting  Location: right hand/wrist    []  Laser with stim  []  Other:  Position:  Location:    []  Vasopneumatic Device    []  Right     []  Left  Pre-treatment girth:  Post-treatment girth:  Measured at (location):  Pressure:       [] lo [] med [] hi   Temperature: [] lo [] med [] hi       [x] Skin assessment post-treatment:  []intact []redness- no adverse reaction    []redness - adverse reaction:     17 min Therapeutic Exercise:  [] See flow sheet :   Rationale: increase ROM and increase strength to improve the patients ability to move wrist and  for functional daily tasks. Right hand/wrist:     Towel squeeze   Tendon glides   Ball Rolls   Wrist AROM HEP   Gentle wrist PROM   Tbar rolls on palm   Wrist and SF ROM measurements for progress       10 min Self Care/Home Management: treatment plan, clinic transfer, prognosis, use of heat modalities. Rationale:  patient education   to improve the patients ability to self-manage symptoms and improve functional use of right hand for daily tasks. With   [] TE   [] TA   [] neuro   [] other: Patient Education: [x] Review HEP    [x] Progressed/Changed HEP based on: min cues for positioning with tendon glide HEP.    [] positioning   [] body mechanics   [] transfers   [] heat/ice application   [] Splint wear/care   [] Sensory re-education   [] scar management      [] other:            Other Objective/Functional Measures:     Range of Motion:   Hand ROM 1/5/2023 right    2nd 3rd 4th 5th Thumb   MP       54     PIP       50     DIP       20     PABD             RABD             BUSH       124        Range of Motion:   Hand ROM 1/24/2023 right     2nd 3rd 4th 5th Thumb   MP       58 (+4)     PIP       56 (+6)     DIP       22 (+2)     PABD            RABD            BUSH       136 (+12)         Wrist Active/Passive ROM  Wrist 1/5/2023  Right/Left  1/24/2023  Right         Flex 42/52  49 (+7)         Ext 54/60 60 (+6)          UD             RD                                 Pain Level (0-10 scale) post treatment: 8/10    ASSESSMENT/Changes in Function: pt arrived with a bruise on dorsal aspect of right hand and reported high pain levels, improving wrist ROM, improving SF ROM, ongoing difficulty with gripping tasks due to stiffness and increased pain levels, assess remaining goals next visit in preparation for clinic transfer. Patient will continue to benefit from skilled OT services to modify and progress therapeutic interventions, address ROM deficits, address strength deficits, analyze and address soft tissue restrictions, analyze and cue movement patterns, and analyze and modify body mechanics/ergonomics to attain remaining goals. [x]  See Plan of Care  []  See progress note/recertification  []  See Discharge Summary         Progress towards goals / Updated goals:    Short Term Goals: To be accomplished in 2  weeks:  Goal:* Patient will be compliant with initial home exercise program to take an active role in their rehabilitation process. Status at [facility]: Patient was provided with a basic home exercise program including modified tendon glides and wrist AROM. Goal:* Patient will demonstrate a good understanding of their condition and strategies for self-management. Status at [facility]: pt educated on prognosis, diagnosis, treatment plan, wrist brace wear, activity modifications  1/17/2023: reviewed sleep positioning and wrist brace wear. Long Term Goals: To be accomplished in 4 weeks:              Goal:*Patient will regain 200 degrees total arc of motion of the right small finger to enable grasp of cylindrical objects such as a glass, handle or toothbrush. Status at [facility]: right small finger 124 deg BUSH  1/24/2023: 136 deg (+12), improving. Goal:* Patient will have 60 degrees of right wrist extension in order to increase ease with ADL's such as bathing, eating and dressing and to eventually bear weight thru the right upper extremity as in pushing up from a chair. Status at [facility]: 54 deg  1/24/2023: 60 deg (+6) improving.        Goal:* Patient will have 50 degrees of right wrist flexion in order to perform hygiene tasks and /or work with tools such as a screwdriver. Status at Eval: 42 deg  1/24/2023: 49 deg (+7), improving. Goal:* Patient will show a 20 point improvement on FOTO functional status measure to improve overall functional performance.   Status at Eval: 32          PLAN  []  Upgrade activities as tolerated     [x]  Continue plan of care  []  Update interventions per flow sheet       []  Discharge due to:_  []  Other:_      BETTY Curtis 1/24/2023  7:44 AM    Future Appointments   Date Time Provider Olga Margo   1/24/2023  9:30 AM BETTY Bangura [facility]   1/26/2023 10:00 AM BETTY Bangura [facility]   5/16/2023  1:20 PM Matty Decker MD [facility] BS AMB

## 2023-01-26 ENCOUNTER — HOSPITAL ENCOUNTER (OUTPATIENT)
Dept: PHYSICAL THERAPY | Age: 88
Discharge: HOME | End: 2023-01-26
Attending: ORTHOPAEDIC SURGERY
Payer: MEDICARE

## 2023-01-26 PROCEDURE — 97535 SELF CARE MNGMENT TRAINING: CPT

## 2023-01-26 PROCEDURE — 97110 THERAPEUTIC EXERCISES: CPT

## 2023-01-26 NOTE — PROGRESS NOTES
OT DAILY TREATMENT NOTE     Patient Name: Xiao Su  AIWV:  : 1924  [x]  Patient  Verified  Payor: [place] MEDICARE / Plan: [place] MEDICARE PART A & B / Product Type: Medicare /    In time:10:00  Out time:10:36  Total Treatment Time (min): 36  Visit #: 6 of 8    Medicare/BCBS Only   Total Timed Codes (min):  36 1:1 Treatment Time:  36     Treatment Area: Pain in right hand [M79.641]    SUBJECTIVE  Pain Level (0-10 scale): 8/10  Any medication changes, allergies to medications, adverse drug reactions, diagnosis change, or new procedure performed?: [x] No    [] Yes (see summary sheet for update)  Subjective functional status/changes:   [] No changes reported    \"My hand has been hurting since yesterday and is stiff\"  \"I want to be able to  and hang things\"  \"I want to be able to write\"     OBJECTIVE           Modality rationale: decrease inflammation, decrease pain, and increase tissue extensibility to improve the patients ability to move digits and grasp for functional daily tasks.     Min Type Additional Details      [] Estim:  []Unatt       []IFC  []Premod                        []Other:  []w/ice   []w/heat  Position:  Location:      [] Estim: []Att    []TENS instruct  []NMES                    []Other:  []w/US   []w/ice   []w/heat  Position:  Location:      []  Traction: [] Cervical       []Lumbar                       [] Prone          []Supine                       []Intermittent   []Continuous Lbs:  [] before manual  [] after manual      []  Ultrasound: []Continuous   [] Pulsed                           []1MHz   []3MHz W/cm2:  Location:      []  Iontophoresis with dexamethasone         Location: [] Take home patch   [] In clinic    10 with sc []  Ice     [x]  Heat-MHP  []  Ice massage  []  Laser   []  Paraffin Position: seated/resting  Location: right hand/wrist      []  Laser with stim  []  Other:  Position:  Location:      []  Vasopneumatic Device    []  Right     [] Left  Pre-treatment girth:  Post-treatment girth:  Measured at (location):  Pressure:       [] lo [] med [] hi   Temperature: [] lo [] med [] hi       [x] Skin assessment post-treatment:  [x]intact []redness- no adverse reaction    []redness - adverse reaction:      21 min Therapeutic Exercise:  [] See flow sheet :   Rationale: increase ROM and increase strength to improve the patients ability to move SF and  for functional daily tasks. Right hand: Towel squeeze   Tendon glides   Wrist AROM HEP   Peg removal from soft yellow putty   Putty HEP - making food items ( meatball, hot dog, pinches)       15 min Self Care/Home Management: FOTO, witting with built up , copper fit gloves, clinic transfer. Rationale:  patient education   to improve the patients ability to self-manage symptoms and improve functional use of right hand for daily tasks. With   [] TE   [] TA   [] neuro   [] other: Patient Education: [x] Review HEP    [x] Progressed/Changed HEP based on: min cues for proper positioning with digit ROM and putty HEP. [] positioning   [] body mechanics   [] transfers   [] heat/ice application   [] Splint wear/care   [] Sensory re-education   [] scar management      [] other:            Other Objective/Functional Measures:     Pt tolerated all wrist and digit ROM exercises well   Pt unable to make a composite fist for tendon glide stretches   Modified putty HEP due to discomfort with strengthening exercise    Pain Level (0-10 scale) post treatment: 5/10    ASSESSMENT/Changes in Function: decreased pain with heat modality, pt tolerated all ROM exercises without increased pain, pt was educated on importance of heat modalities at home and educated on benefits of wearing copperfit gloves, pt demonstrated Min difficulty writing name on paper with built up , pt was educated on use of a built up  to improve legibility. Pt will continue OT services [facility] at the [facility] in motion clinic. Patient will continue to benefit from skilled OT services to modify and progress therapeutic interventions, address ROM deficits, address strength deficits, analyze and address soft tissue restrictions, analyze and cue movement patterns, and analyze and modify body mechanics/ergonomics to attain remaining goals. [x]  See Plan of Care  []  See progress note/recertification  []  See Discharge Summary         Progress towards goals / Updated goals:    Short Term Goals: To be accomplished in 2  weeks:  Goal:* Patient will be compliant with initial home exercise program to take an active role in their rehabilitation process. Status at Eval: Patient was provided with a basic home exercise program including modified tendon glides and wrist AROM. Current status: 1x a day for HEP participation for ROM and strengthening. Goal not met. Goal:* Patient will demonstrate a good understanding of their condition and strategies for self-management. Status at [facility]: pt educated on prognosis, diagnosis, treatment plan, wrist brace wear, activity modifications  1/17/2023: reviewed sleep positioning and wrist brace wear. Current status: wrap hands, wear brace, no using heat. Patient demonstrates a fair understanding of their condition and strategies for self-management. Goal not met. Long Term Goals: To be accomplished in 4 weeks:              Goal:*Patient will regain 200 degrees total arc of motion of the right small finger to enable grasp of cylindrical objects such as a glass, handle or toothbrush. Status at [facility]: right small finger 124 deg BUSH  Current status: 1/24/2023: 136 deg (+12), improving. Goal not met. Goal:* Patient will have 60 degrees of right wrist extension in order to increase ease with ADL's such as bathing, eating and dressing and to eventually bear weight thru the right upper extremity as in pushing up from a chair.   Status at [facility]: 54 deg  Current status: 1/24/2023: 60 deg (+6) improving. Goal met. Goal:* Patient will have 50 degrees of right wrist flexion in order to perform hygiene tasks and /or work with tools such as a screwdriver. Status at Eval: 42 deg  1/24/2023: 49 deg (+7), improving. Current status: 1/24/2023: 49 deg (+7), improving. Goal not met. Goal:* Patient will show a 20 point improvement on FOTO functional status measure to improve overall functional performance. Status at Eval: 26  Current status: 43  (+17) goal met.         PLAN  []  Upgrade activities as tolerated     [x]  Continue plan of care  []  Update interventions per flow sheet       []  Discharge due to:_  []  Other:_      BETTY Chin 1/26/2023  9:55 AM    Future Appointments   Date Time Provider Olga Aragon   1/26/2023 10:00 AM BETTY Ngo [facility]   1/31/2023 10:00 AM Chapin Day OT [facility]   5/16/2023  1:20 PM Mahin Vamsi Nash MD [facility] BS AMB

## 2023-01-26 NOTE — PROGRESS NOTES
[facility]  [address] [place] [address],[address] [place] [address], [facility] [address]  [phone] fax      Patient Name: Phyllis Blanco  : 1924      Occupational Therapy Discharge Instructions        Continue with home exercise program for 2 times a day for 4 weeks then decrease to 1-2 times a day as needed/patient discretion. Continue with    [] Ice  as needed 2 times per day ( 10 minutes each)     [x] Heat           Follow up with MD:     [] Upon completion of therapy     [x] As needed      Recommendations:    [x]   Return to activity with home program                    []  Return to activity with the following modifications :                              []  Practice Hand coordination activities                                      []  Wear Splint as prescribed:                    [] Return to/Join local gym        Additional comments:    Keep doing exercises at home  Wear copperfit gloves during the day   *Patient will be transferred to another in motion clinic to complete OT    Please call with any questions or concerns. Thank you for your participation.          BETTY Dang COTA/L  2023  10:22 AM

## 2023-01-31 ENCOUNTER — HOSPITAL ENCOUNTER (OUTPATIENT)
Dept: PHYSICAL THERAPY | Age: 88
Discharge: HOME | End: 2023-01-31
Attending: ORTHOPAEDIC SURGERY
Payer: MEDICARE

## 2023-01-31 PROCEDURE — 97535 SELF CARE MNGMENT TRAINING: CPT

## 2023-01-31 PROCEDURE — 97545 WORK HARDENING: CPT

## 2023-01-31 PROCEDURE — 97110 THERAPEUTIC EXERCISES: CPT

## 2023-01-31 NOTE — PROGRESS NOTES
OT DAILY TREATMENT NOTE     Patient Name: Say Franks  PQXA:  : 1924  [x]  Patient  Verified  Payor: [place] MEDICARE / Plan: [place] MEDICARE PART A & B / Product Type: Medicare /    In time:10:00  Out time:10:33  Total Treatment Time (min): 33  Visit #: 6 of 8    Medicare/BCBS Only   Total Timed Codes (min):  36 1:1 Treatment Time:  36     Treatment Area: Pain in right hand [M79.641]    SUBJECTIVE  Pain Level (0-10 scale): 5/10  Any medication changes, allergies to medications, adverse drug reactions, diagnosis change, or new procedure performed?: [x] No    [] Yes (see summary sheet for update)  Subjective functional status/changes:   [] No changes reported  \"That is hard. \"       OBJECTIVE           Modality rationale: decrease inflammation, decrease pain, and increase tissue extensibility to improve the patients ability to move digits and grasp for functional daily tasks.     Min Type Additional Details      [] Estim:  []Unatt       []IFC  []Premod                        []Other:  []w/ice   []w/heat  Position:  Location:      [] Estim: []Att    []TENS instruct  []NMES                    []Other:  []w/US   []w/ice   []w/heat  Position:  Location:      []  Traction: [] Cervical       []Lumbar                       [] Prone          []Supine                       []Intermittent   []Continuous Lbs:  [] before manual  [] after manual      []  Ultrasound: []Continuous   [] Pulsed                           []1MHz   []3MHz W/cm2:  Location:      []  Iontophoresis with dexamethasone         Location: [] Take home patch   [] In clinic    8 with sc []  Ice     [x]  Heat-MHP  []  Ice massage  []  Laser   []  Paraffin Position: seated/resting  Location: right hand/wrist      []  Laser with stim  []  Other:  Position:  Location:      []  Vasopneumatic Device    []  Right     []  Left  Pre-treatment girth:  Post-treatment girth:  Measured at (location):  Pressure:       [] lo [] med [] hi   Temperature: [] lo [] med [] hi       [x] Skin assessment post-treatment:  [x]intact []redness- no adverse reaction    []redness - adverse reaction:      14 min Therapeutic Exercise:  [] See flow sheet :   Rationale: increase ROM and increase strength to improve the patients ability to move SF and  for functional daily tasks. Right hand:    Tendon glides   Wrist AROM HEP   Peg removal from soft yellow putty       8 min Self Care/Home Management: writing with built up pen   Rationale:  patient education   to improve the patients ability to self-manage symptoms and improve functional use of right hand for daily tasks. Handwriting with built up   Education on built up  for utensil use. With   [] TE   [] TA   [] neuro   [] other: Patient Education: [x] Review HEP    [x] Progressed/Changed HEP based on: min cues for proper positioning with digit ROM and putty HEP. [] positioning   [] body mechanics   [] transfers   [] heat/ice application   [] Splint wear/care   [] Sensory re-education   [] scar management      [] other:            Other Objective/Functional Measures:   Pt with pain along radial side with clinic tasks  Pt preferred thin blue built up  over red      Pain Level (0-10 scale) post treatment: 5/10    ASSESSMENT/Changes in Function:   Pt still with moderate/high levels of pain pre and pos tx. Patient will continue to benefit from skilled OT services to modify and progress therapeutic interventions, address ROM deficits, address strength deficits, analyze and address soft tissue restrictions, analyze and cue movement patterns, and analyze and modify body mechanics/ergonomics to attain remaining goals. [x]  See Plan of Care  []  See progress note/recertification  []  See Discharge Summary         Progress towards goals / Updated goals:    Short Term Goals:  To be accomplished in 2  weeks:  Goal:* Patient will be compliant with initial home exercise program to take an active role in their rehabilitation process. Status at Eval: Patient was provided with a basic home exercise program including modified tendon glides and wrist AROM. Current status: 1x a day for HEP participation for ROM and strengthening. Goal not met. Goal:* Patient will demonstrate a good understanding of their condition and strategies for self-management. Status at Eval: pt educated on prognosis, diagnosis, treatment plan, wrist brace wear, activity modifications  1/17/2023: reviewed sleep positioning and wrist brace wear. Current status: wrap hands, wear brace, no using heat. Patient demonstrates a fair understanding of their condition and strategies for self-management. Goal not met. Long Term Goals: To be accomplished in 4 weeks:              Goal:*Patient will regain 200 degrees total arc of motion of the right small finger to enable grasp of cylindrical objects such as a glass, handle or toothbrush. Status at [facility]: right small finger 124 deg BUSH  Current status: 1/24/2023: 136 deg (+12), improving. Goal not met. Goal:* Patient will have 60 degrees of right wrist extension in order to increase ease with ADL's such as bathing, eating and dressing and to eventually bear weight thru the right upper extremity as in pushing up from a chair. Status at Eval: 54 deg  Current status: 1/24/2023: 60 deg (+6) improving. Goal met. Goal:* Patient will have 50 degrees of right wrist flexion in order to perform hygiene tasks and /or work with tools such as a screwdriver. Status at Eval: 42 deg  1/24/2023: 49 deg (+7), improving. Current status: 1/24/2023: 49 deg (+7), improving. Goal not met. Goal:* Patient will show a 20 point improvement on FOTO functional status measure to improve overall functional performance. Status at Eval: 26  Current status: 43  (+17) goal met.         PLAN  []  Upgrade activities as tolerated     [x]  Continue plan of care  []  Update interventions per flow sheet       [] Discharge due to:_  []  Other:_      Primo Cabrera OT 1/31/2023  9:55 AM    No future appointments.

## 2023-02-03 ENCOUNTER — HOSPITAL ENCOUNTER (OUTPATIENT)
Dept: PHYSICAL THERAPY | Age: 88
Discharge: HOME | End: 2023-02-03
Attending: ORTHOPAEDIC SURGERY
Payer: MEDICARE

## 2023-02-03 PROCEDURE — 97535 SELF CARE MNGMENT TRAINING: CPT

## 2023-02-03 PROCEDURE — 97110 THERAPEUTIC EXERCISES: CPT

## 2023-02-03 NOTE — THERAPY RECERTIFICATION
[facility] [employer]  [address] [place] [address]  [phone] fax    Continued Plan of Care/ Re-certification for Occupational Therapy Services    Patient name: Holden Alaniz Start of Care: 2023   Referral source: Betzy Carlos DO : 1924               Medical Diagnosis: Pain in right hand [M79.641]  Payor: Sharlyne Schaumann / Plan: [place] MEDICARE PART A & B / Product Type: Medicare /  Onset Date:2022               Treatment Diagnosis: right hand pain   Prior Hospitalization: see medical history Provider#: [id number]   Medications: Verified on Patient summary List    Comorbidities: Arthritis, heart disease, HTN   Prior Level of Function: (I) with self care, (I )with making the bed, simple meal prep  Visits from Start of Care: 8    Missed Visits: 8    The Plan of Care and following information is based on the patient's current status:    Range of Motion:   Hand ROM 2023 right    5th   MP 54   PIP 50   DIP 20   PABD     RABD     BUSH 124      Range of Motion:   Hand ROM 2023 right     5th   MP 58 (+4)   PIP 56 (+6)   DIP 22 (+2)   PABD     RABD     BUSH 136 (+12)      Wrist Active/Passive ROM  Wrist 2023  Right/Left  2023  Right   Flex 42/52  49 (+7)   Ext 54/60 60 (+6)    UD       RD                              Progress towards goals / Updated goals:  Short Term Goals: To be accomplished in 2  weeks:  Goal:* Patient will be compliant with initial home exercise program to take an active role in their rehabilitation process. Status at Eval: Patient was provided with a basic home exercise program including modified tendon glides and wrist AROM. Current status: 1x a day for HEP participation for ROM and strengthening. Goal not met. Goal:* Patient will demonstrate a good understanding of their condition and strategies for self-management.   Status at Eval: pt educated on prognosis, diagnosis, treatment plan, wrist brace wear, activity modifications  1/17/2023: reviewed sleep positioning and wrist brace wear. Current status: wrap hands, wear brace, no using heat. Patient demonstrates a fair understanding of their condition and strategies for self-management. Goal not met. Long Term Goals: To be accomplished in 4 weeks:              Goal:*Patient will regain 200 degrees total arc of motion of the right small finger to enable grasp of cylindrical objects such as a glass, handle or toothbrush. Status at Eval: right small finger 124 deg BUSH  Current status: 1/24/2023: 136 deg (+12), improving. Goal not met. Goal:* Patient will have 60 degrees of right wrist extension in order to increase ease with ADL's such as bathing, eating and dressing and to eventually bear weight thru the right upper extremity as in pushing up from a chair. Status at Eval: 54 deg  Current status: 1/24/2023: 60 deg (+6) improving. Goal met. Goal:* Patient will have 50 degrees of right wrist flexion in order to perform hygiene tasks and /or work with tools such as a screwdriver. Status at Eval: 42 deg  1/24/2023: 49 deg (+7), improving. Current status: 1/24/2023: 49 deg (+7), improving. Goal not met. Goal:* Patient will show a 20 point improvement on FOTO functional status measure to improve overall functional performance.   Status at Eval: 26  Current status: 43  (+17) goal met    Key functional changes: Slow progress towards goals at this time, improving understanding of diagnosis/prognosis, administered compression for SF as trial, guarding of SF present with cueing to incorporate SF into tasks required         Problems/ barriers to goal attainment: Craig, Cognitive    Treatment Plan: Therapeutic exercise, Therapeutic activities, Physical agent/modality, Manual therapy, Patient education, and ADLs/IADLs      Patient Goal (s) has been updated and includes: less pain     Goals for this certification period to be accomplished in 4 weeks:  Short Term Goals: To be accomplished in 2  weeks:  Goal:* Patient will be compliant with initial home exercise program to take an active role in their rehabilitation process. Status at PN (2/3/2023): 1x a day for HEP participation for ROM and strengthening. Goal not met. Goal:* Patient will demonstrate a good understanding of their condition and strategies for self-management. Status at PN (2/3/2023): wrap hands, wear brace, no using heat. Patient demonstrates a fair understanding of their condition and strategies for self-management. Goal not met. Long Term Goals: To be accomplished in 4 weeks:              Goal:*Patient will regain 200 degrees total arc of motion of the right small finger to enable grasp of cylindrical objects such as a glass, handle or toothbrush. Status at PN (2/3/2023):  136 deg (+12), improving. Goal not met. Goal:* Patient will have 50 degrees of right wrist flexion in order to perform hygiene tasks and /or work with tools such as a screwdriver. Status at PN (2/3/2023): 1/24/2023: 49 deg (+7), improving. Goal not met. Frequency / Duration: Patient to be seen 2 times per week for 4 weeks:    Assessment / Recommendations:Patient will benefit from continued skilled occupational therapy to increase upper extremity function and functional independence. Certification Period: 2/4/23-3/3/23    CHARANJIT Lorenzo  2/3/2023 9:09 AM    ________________________________________________________________________  I certify that the above Therapy Services are being furnished while the patient is under my care. I agree with the treatment plan and certify that this therapy is necessary.       [de-identified] Signature:____________Date:_________TIME:________     Asa Larkin,   ** Signature, Date and Time must be completed for valid certification **      Please sign and return to [facility] [employer] [facility]  [address] [place] [address] [facility]            [phone] fax

## 2023-02-03 NOTE — PROGRESS NOTES
OT DAILY TREATMENT NOTE 10-18    Patient Name: Andrea Diggs  IGPP:[id number]  : 1924  [x]  Patient  Verified  Payor: [place] MEDICARE / Plan: VA MEDICARE PART A & B / Product Type: Medicare /    In time:100  Out time:130  Total Treatment Time (min): 30  Visit #: 7 of 8    Medicare/BCBS Only   Total Timed Codes (min):  30 1:1 Treatment Time:  30     Treatment Area: Pain in right hand [M79.641]    SUBJECTIVE  Pain Level (0-10 scale): 5/10  Any medication changes, allergies to medications, adverse drug reactions, diagnosis change, or new procedure performed?: [x] No    [] Yes (see summary sheet for update)  Subjective functional status/changes:   [] No changes reported  It feels good right now (end of session)    OBJECTIVE                Modality rationale: decrease inflammation, decrease pain, and increase tissue extensibility to improve the patients ability to move digits and grasp for functional daily tasks.     Min Type Additional Details       [] Estim:  []Unatt       []IFC  []Premod                        []Other:  []w/ice   []w/heat  Position:  Location:       [] Estim: []Att    []TENS instruct  []NMES                    []Other:  []w/US   []w/ice   []w/heat  Position:  Location:       []  Traction: [] Cervical       []Lumbar                       [] Prone          []Supine                       []Intermittent   []Continuous Lbs:  [] before manual  [] after manual       []  Ultrasound: []Continuous   [] Pulsed                           []1MHz   []3MHz W/cm2:  Location:       []  Iontophoresis with dexamethasone         Location: [] Take home patch   [] In clinic     8 with sc []  Ice     [x]  Heat-MHP  []  Ice massage  []  Laser   []  Paraffin Position: seated/resting  Location: right hand/wrist       []  Laser with stim  []  Other:  Position:  Location:       []  Vasopneumatic Device    []  Right     []  Left  Pre-treatment girth:  Post-treatment girth:  Measured at (location):  Pressure:       [] lo [] med [] hi   Temperature: [] lo [] med [] hi        [x] Skin assessment post-treatment:  [x]intact []redness- no adverse reaction  []redness - adverse reaction:     22 min Therapeutic Exercise:  [] See flow sheet :   Rationale: increase ROM and increase strength to improve the patients ability to      Recheck for PN  Towel Scrunches- SF Only  Dexterity Balls  Soft Theraputty: Manipulated with Right hand to reveal/remove 1/4 in pegs 10/10  PROM SF    8 min Self Care/Home Management:    Rationale: increase ROM, increase strength, and improve coordination  to improve the patients ability to perform functional home and self care tasks     Recheck for PN  FOTO  Pt ed on edema Management- administered SF compression       With   [] TE   [] TA   [] neuro   [] other: Patient Education: [x] Review HEP    [] Progressed/Changed HEP based on:   [] positioning   [] body mechanics   [] transfers   [] heat/ice application   [] Splint wear/care   [] Sensory re-education   [] scar management      [] other:            Other Objective/Functional Measures:     Range of Motion:   Hand ROM 1/5/2023 right    5th   MP 54   PIP 50   DIP 20   PABD     RABD     BUSH 124      Range of Motion:   Hand ROM 1/24/2023 right     5th   MP 58 (+4)   PIP 56 (+6)   DIP 22 (+2)   PABD     RABD     BUSH 136 (+12)      Wrist Active/Passive ROM  Wrist 1/5/2023  Right/Left  1/24/2023  Right   Flex 42/52  49 (+7)   Ext 54/60 60 (+6)    UD       RD                                Pain Level (0-10 scale) post treatment: 0/10    ASSESSMENT/Changes in Function: Slow progress towards goals at this time, improving understanding of diagnosis/prognosis, administered compression for SF as trial, guarding of SF present with cueing to incorporate SF into tasks required    Patient will continue to benefit from skilled OT services to modify and progress therapeutic interventions, address ROM deficits, address strength deficits, and instruct in home and community integration to attain remaining goals. []  See Plan of Care  [x]  See progress note/recertification  []  See Discharge Summary         Progress towards goals / Updated goals:  Short Term Goals: To be accomplished in 2  weeks:  Goal:* Patient will be compliant with initial home exercise program to take an active role in their rehabilitation process. Status at Eval: Patient was provided with a basic home exercise program including modified tendon glides and wrist AROM. Current status: 1x a day for HEP participation for ROM and strengthening. Goal not met. Goal:* Patient will demonstrate a good understanding of their condition and strategies for self-management. Status at Eval: pt educated on prognosis, diagnosis, treatment plan, wrist brace wear, activity modifications  1/17/2023: reviewed sleep positioning and wrist brace wear. Current status: wrap hands, wear brace, no using heat. Patient demonstrates a fair understanding of their condition and strategies for self-management. Goal not met. Long Term Goals: To be accomplished in 4 weeks:              Goal:*Patient will regain 200 degrees total arc of motion of the right small finger to enable grasp of cylindrical objects such as a glass, handle or toothbrush. Status at [facility]: right small finger 124 deg BUSH  Current status: 1/24/2023: 136 deg (+12), improving. Goal not met. Goal:* Patient will have 60 degrees of right wrist extension in order to increase ease with ADL's such as bathing, eating and dressing and to eventually bear weight thru the right upper extremity as in pushing up from a chair. Status at Eval: 54 deg  Current status: 1/24/2023: 60 deg (+6) improving. Goal met. Goal:* Patient will have 50 degrees of right wrist flexion in order to perform hygiene tasks and /or work with tools such as a screwdriver. Status at Eval: 42 deg  1/24/2023: 49 deg (+7), improving. Current status: 1/24/2023: 49 deg (+7), improving.  Goal not met.      Goal:* Patient will show a 20 point improvement on FOTO functional status measure to improve overall functional performance. Status at Eval: 26  Current status: 43  (+17) goal met.         PLAN  []  Upgrade activities as tolerated     [x]  Continue plan of care  []  Update interventions per flow sheet       []  Discharge due to:_  []  Other:_      CHARANJIT Hernandez  2/3/2023  9:02 AM        Future Appointments   Date Time Provider Olga Aragon   2/3/2023  1:00 PM Oly Bella [id number] [facility]   2/7/2023  9:30 AM Nadeem Brooke OT [id number] [facility]   2/9/2023  9:00 AM Oly Bella [id number] [facility]

## 2023-02-07 ENCOUNTER — HOSPITAL ENCOUNTER (OUTPATIENT)
Dept: PHYSICAL THERAPY | Age: 88
Discharge: HOME | End: 2023-02-07
Attending: ORTHOPAEDIC SURGERY
Payer: MEDICARE

## 2023-02-07 PROCEDURE — 97530 THERAPEUTIC ACTIVITIES: CPT

## 2023-02-07 PROCEDURE — 97110 THERAPEUTIC EXERCISES: CPT

## 2023-02-09 ENCOUNTER — HOSPITAL ENCOUNTER (OUTPATIENT)
Dept: PHYSICAL THERAPY | Age: 88
Discharge: HOME | End: 2023-02-09
Attending: ORTHOPAEDIC SURGERY
Payer: MEDICARE

## 2023-02-09 PROCEDURE — 97110 THERAPEUTIC EXERCISES: CPT

## 2023-02-09 PROCEDURE — 97530 THERAPEUTIC ACTIVITIES: CPT

## 2023-02-09 NOTE — PROGRESS NOTES
OT DAILY TREATMENT NOTE     Patient Name: Felicitas Fallon  WHDI:[id number]  : 1924  [x]  Patient  Verified  Payor: Juan Keene / Plan: VA MEDICARE PART A & B / Product Type: Medicare /    In time:900  Out time:932  Total Treatment Time (min): 32  Visit #: 2 of 8    Medicare/BCBS Only   Total Timed Codes (min):  24 1:1 Treatment Time:  32     Treatment Area: Pain in right hand [M79.641]    SUBJECTIVE  Pain Level (0-10 scale): 0/10  Any medication changes, allergies to medications, adverse drug reactions, diagnosis change, or new procedure performed?: [x] No    [] Yes (see summary sheet for update)  Subjective functional status/changes:   [] No changes reported  I think my finger looks weird.  (In response to edema finger sleeve)    OBJECTIVE    Modality rationale: decrease pain to improve the patients ability to form a composite fist.   Min Type Additional Details    [] Estim:  []Unatt       []IFC  []Premod                        []Other:  []w/ice   []w/heat  Position:  Location:    [] Estim: []Att    []TENS instruct  []NMES                    []Other:  []w/US   []w/ice   []w/heat  Position:  Location:    []  Traction: [] Cervical       []Lumbar                       [] Prone          []Supine                       []Intermittent   []Continuous Lbs:  [] before manual  [] after manual    []  Ultrasound: []Continuous   [] Pulsed                           []1MHz   []3MHz W/cm2:  Location:    []  Iontophoresis with dexamethasone         Location: [] Take home patch   [] In clinic   8 []  Ice     [x]  heat  []  Ice massage  []  Laser   []  Paraffin Position: sitting  Location: right hand    []  Laser with stim  []  Other:  Position:  Location:    []  Vasopneumatic Device    []  Right     []  Left  Pre-treatment girth:  Post-treatment girth:  Measured at (location):  Pressure:       [] lo [] med [] hi   Temperature: [] lo [] med [] hi       [x] Skin assessment post-treatment:  [x]intact []redness- no adverse reaction    []redness - adverse reaction:     8 min Therapeutic Exercise:  [] See flow sheet :   Rationale: increase strength to improve the patients ability to pinch. Right hand  -Theraputty: manipulated soft putty with right hand to reveal 10/10, 1/4 inch pegs. 16 min Therapeutic Activity:  []  See flow sheet :   Rationale: improve coordination  to improve the patients ability to manipulate objects in hand. Right hand  -Dexterity balls: small x10, clockwise and counterclockwise  -Perfection: palm to digit translation upon placement    Patient education on edema management and when to remove compression     With   [] TE   [] TA   [] neuro   [] other: Patient Education: [x] Review HEP    [] Progressed/Changed HEP based on:   [] positioning   [] body mechanics   [] transfers   [] heat/ice application   [] Splint wear/care   [] Sensory re-education   [] scar management      [] other:            Other Objective/Functional Measures:   -Patient required cuing during putty exercise, not to use the left hand as an assist.   -Patient demonstrated frequent dropping of perfection pieces while trying to translate to digits. Pain Level (0-10 scale) post treatment: 0/10    ASSESSMENT/Changes in Function:   -Consistent pain reported at 0/10 post treatment session.   - Lack of insight into deficits noted     Patient will continue to benefit from skilled OT services to modify and progress therapeutic interventions, address functional mobility deficits, address ROM deficits, and analyze and cue movement patterns to attain remaining goals. []  See Plan of Care  []  See progress note/recertification  []  See Discharge Summary         Progress towards goals / Updated goals:  Short Term Goals: To be accomplished in 2  weeks:  Goal:* Patient will be compliant with initial home exercise program to take an active role in their rehabilitation process.   Status at PN (2/3/2023): 1x a day for HEP participation for ROM and strengthening. Goal not met. Goal:* Patient will demonstrate a good understanding of their condition and strategies for self-management. Status at PN (2/3/2023): wrap hands, wear brace, no using heat. Patient demonstrates a fair understanding of their condition and strategies for self-management. Goal not met. Current Status (2/9/2023): patient leaves compression sleeve on for days at a time, causing inflammation at the distal end of the SF. Long Term Goals: To be accomplished in 4 weeks:              Goal:*Patient will regain 200 degrees total arc of motion of the right small finger to enable grasp of cylindrical objects such as a glass, handle or toothbrush. Status at PN (2/3/2023):  136 deg (+12), improving. Goal not met. Goal:* Patient will have 50 degrees of right wrist flexion in order to perform hygiene tasks and /or work with tools such as a screwdriver. Status at PN (2/3/2023): 1/24/2023: 49 deg (+7), improving. Goal not met. PLAN  []  Upgrade activities as tolerated     [x]  Continue plan of care  []  Update interventions per flow sheet       []  Discharge due to:_  []  Other:_      JAZZ Hanson 2/9/2023  7:55 AM  I was present during the entire treatment, directing and participating in the treatment. Ervin Dillon. SHANA Chamorro/L    No future appointments.

## 2023-02-14 ENCOUNTER — HOSPITAL ENCOUNTER (OUTPATIENT)
Facility: HOSPITAL | Age: 88
Setting detail: RECURRING SERIES
Discharge: HOME | End: 2023-02-17
Payer: MEDICARE

## 2023-02-14 NOTE — THERAPY RECERTIFICATION
PHYSICAL / OCCUPATIONAL THERAPY - DAILY TREATMENT NOTE (updated )    Patient Name: Natalie Dailey    Date: 2023    : 1924  Insurance: Payor: MEDICARE / Plan: MEDICARE PART A AND B / Product Type: *No Product type* /      Patient  verified Yes     Visit #   Current / Total 3 8   Time   In / Out 1103 1131   Pain   In / Out 0 0   Subjective Functional Status/Changes: Just my right? Changes to:  Meds, Allergies, Med Hx, Sx Hx? If yes, update Summary List no       TREATMENT AREA =  Pain in right hand [M79.641]    OBJECTIVE    Modalities Rationale:     decrease inflammation, decrease pain, and increase tissue extensibility to improve patient's ability to progress to PLOF and address remaining functional goals. min [] Estim Unattended, type/location:                                      []  w/ice    []  w/heat    min [] Estim Attended, type/location:                                     []  w/US     []  w/ice    []  w/heat    []  TENS insruct      min []  Mechanical Traction: type/lbs                   []  pro   []  sup   []  int   []  cont    []  before manual    []  after manual    min []  Ultrasound, settings/location:      min []  Iontophoresis w/ dexamethasone, location:                                               []  take home patch       []  in clinic   5 min  unbilled []  Ice     [x]  Heat    location/position:     min []  Paraffin,  details:     min []  Vasopneumatic Device, press/temp:     min []  Gar Relic / Floydene Barges: If using vaso (only need to measure limb vaso being performed on)      pre-treatment girth :       post-treatment girth :       measured at (landmark location) :      min []  Other:    Skin assessment post-treatment (if applicable):    [x]  intact    []  redness- no adverse reaction                 []redness - adverse reaction:         Therapeutic Procedures:   Tx Min Billable or 1:1 Min (if diff from Tx Min) Procedure, Rationale, Specifics   12  03129 Therapeutic Exercise (timed):  increase ROM, strength, coordination, balance, and proprioception to improve patient's ability to progress to PLOF and address remaining functional goals. (see flow sheet as applicable)     Details if applicable:      Med Soft Theraputty: Manipulated with Right hand to reveal/remove 1/4 in pegs 10/10     Dexterity Balls     11  67750 Therapeutic Activity (timed):  use of dynamic activities replicating functional movements to increase ROM, strength, coordination, balance, and proprioception in order to improve patient's ability to progress to PLOF and address remaining functional goals. (see flow sheet as applicable)     Details if applicable:      Grooved Pegs: Translated palm <> digit for placement/removal           Details if applicable:            Details if applicable:            Details if applicable:     21  [facility] Totals Reminder: bill using total billable min of TIMED therapeutic procedures (example: do not include dry needle or estim unattended, both untimed codes, in totals to left)  8-22 min = 1 unit; 23-37 min = 2 units; 38-52 min = 3 units; 53-67 min = 4 units; 68-82 min = 5 units   Total Total     [x]  Patient Education billed concurrently with other procedures   [x] Review HEP    [] Progressed/Changed HEP, detail:    [] Other detail:       Objective Information/Functional Measures/Assessment    Cueing to use Right hand only for theraputty tasks     Frequent Drops with Grooved Pegs during translation tasks     Patient will continue to benefit from skilled PT / OT services to modify and progress therapeutic interventions, analyze and address ROM deficits, analyze and address strength deficits, and instruct in home and community integration to address functional deficits and attain remaining goals. Progress toward goals / Updated goals:  []  See Progress Note/Recertification  Short Term Goals:  To be accomplished in 2  weeks:  Goal:* Patient will be compliant with initial home exercise program to take an active role in their rehabilitation process. Status at PN (2/3/2023): 1x a day for HEP participation for ROM and strengthening. Goal not met. Goal:* Patient will demonstrate a good understanding of their condition and strategies for self-management. Status at PN (2/3/2023): wrap hands, wear brace, no using heat. Patient demonstrates a fair understanding of their condition and strategies for self-management. Goal not met. Current Status (2/9/2023): patient leaves compression sleeve on for days at a time, causing inflammation at the distal end of the SF. Long Term Goals: To be accomplished in 4 weeks:              Goal:*Patient will regain 200 degrees total arc of motion of the right small finger to enable grasp of cylindrical objects such as a glass, handle or toothbrush. Status at PN (2/3/2023):  136 deg (+12), improving. Goal not met. Goal:* Patient will have 50 degrees of right wrist flexion in order to perform hygiene tasks and /or work with tools such as a screwdriver. Status at PN (2/3/2023): 1/24/2023: 49 deg (+7), improving. Goal not met.        PLAN  Yes  Continue plan of care  [x]  Upgrade activities as tolerated  []  Discharge due to :  []  Other:    ROSA Adorno COTA/L    2/14/2023    11:00 AM    Future Appointments   Date Time Provider Maricruz Hernandez   2/16/2023  9:00 AM Snehal Velasquez [facility]   2/21/2023 10:30 AM Lethaniel Kayser, OTA [facility]   2/23/2023  9:30 AM Lethaniel Kayser, OTA [facility]   2/28/2023  9:30 AM Lethaniel Kayser, OTA [facility]   3/2/2023  9:30 AM KARLA Velasquez [facility]   3/7/2023  9:30 AM KARLA Velasquez [facility]   3/9/2023  9:30 AM KARLA Velasquez [facility]   5/16/2023  1:20 PM Don Osorio MD [facility] BS AMB

## 2023-02-16 ENCOUNTER — HOSPITAL ENCOUNTER (OUTPATIENT)
Facility: HOSPITAL | Age: 88
Setting detail: RECURRING SERIES
Discharge: HOME | End: 2023-02-19
Payer: MEDICARE

## 2023-02-16 ENCOUNTER — TRANSCRIBE ORDERS (OUTPATIENT)
Facility: HOSPITAL | Age: 88
End: 2023-02-16

## 2023-02-16 DIAGNOSIS — E03.9 HYPOTHYROIDISM, ADULT: Primary | ICD-10-CM

## 2023-02-16 PROCEDURE — 97018 PARAFFIN BATH THERAPY: CPT

## 2023-02-16 PROCEDURE — 97110 THERAPEUTIC EXERCISES: CPT

## 2023-02-16 NOTE — PROGRESS NOTES
OCCUPATIONAL THERAPY - DAILY TREATMENT NOTE (updated )    Patient Name: Alison Palm    Date: 2023    : 1924  Insurance: Payor: MEDICARE / Plan: MEDICARE PART A AND B / Product Type: *No Product type* /      Patient  verified Yes     Visit #   Current / Total 4 8   Time   In / Out 905 930   Pain   In / Out 0/10 0/10   Subjective Functional Status/Changes: I think I hit my hand really hard, it has been hurting today. Changes to:  Meds, Allergies, Med Hx, Sx Hx? If yes, update Summary List no       TREATMENT AREA =  Pain in right hand [M79.641]    OBJECTIVE    Modalities Rationale: decrease pain to improve the patients ability to manipulate everyday objects. min [] Estim, type/location:                                      []  att     []  unatt     []  w/US     []  w/ice    []  w/heat    min []  Ultrasound:  Location: Duty Cycle:  Frequency:  W/cm2    min []  Iontophoresis w/ dexamethasone, location:                                               []  take home patch       []  in clinic   8 min []  Ice     [x]  Heat    location/position: Right hand     min []  Paraffin:  location     min []  Vasopneumatic Device, press/temp:     min []  Other:    [x] Skin assessment post-treatment (if applicable):    [x]  intact    []  redness- no adverse reaction     []redness - adverse reaction:        Therapeutic Procedures: Tx Min Billable or 1:1 Min (if diff from Tx Min) Procedure, Rationale, Specifics   9  Therapeutic Exercise:  Rationale:  Increase ROM and Improve coordination to  and pinch.     -Theraputty: manipulated medium/soft putty with the right hand to reveal 10/10, 1/4 inch pegs in sitting. 8  Therapeutic Activity:   Rationale:  Improve coordination to manipulate objects in hand.      -Perfection: engaged in activity, placing pieces one by one.            16  [employer] Totals Reminder: bill using total billable min of TIMED therapeutic procedures (example: do not include dry needle or estim unattended, both untimed codes, in totals to left)  8-22 min = 1 unit; 23-37 min = 2 units; 38-52 min = 3 units; 53-67 min = 4 units; 68-82 min = 5 units   Total Total     [x]  Patient Education billed concurrently with other procedures   [x] Review HEP    [] Progressed/Changed HEP, detail:    [] Other detail:       Objective Information/Functional Measures/Assessment  -Patient required verbal cues during putty exercise to only use the right hand.   -Patient showed difficulty in following 1-2 step directions. Patient will continue to benefit from skilled  OT services to modify and progress therapeutic interventions, analyze and address functional mobility deficits, and analyze and address ROM deficits to address functional deficits and attain remaining goals. Progress toward goals / Updated goals:  []  See Progress Note/Recertification    Short Term Goals: To be accomplished in 2  weeks:  Goal:* Patient will be compliant with initial home exercise program to take an active role in their rehabilitation process. Status at PN (2/3/2023): 1x a day for HEP participation for ROM and strengthening. Goal not met. Goal:* Patient will demonstrate a good understanding of their condition and strategies for self-management. Status at PN (2/3/2023): wrap hands, wear brace, no using heat. Patient demonstrates a fair understanding of their condition and strategies for self-management. Goal not met. Current Status (2/9/2023): patient leaves compression sleeve on for days at a time, causing inflammation at the distal end of the SF. Long Term Goals: To be accomplished in 4 weeks:              Goal:*Patient will regain 200 degrees total arc of motion of the right small finger to enable grasp of cylindrical objects such as a glass, handle or toothbrush. Status at PN (2/3/2023):  136 deg (+12), improving. Goal not met.           Goal:* Patient will have 50 degrees of right wrist flexion in order to perform hygiene tasks and /or work with tools such as a screwdriver. Status at PN (2/3/2023): 1/24/2023: 49 deg (+7), improving. Goal not met. PLAN  Yes  Continue plan of care  [x]  Upgrade activities as tolerated  []  Discharge due to :  []  Other:    SUNNY Barrios   2/16/2023    9:04 AM  I was present during the entire treatment, directing and participating in the treatment. Bernadette Manrique.  Bandar Acharya    Future Appointments   Date Time Provider Maricruz Hernandez   2/21/2023 10:30 AM Juan M Crate [facility]   2/23/2023  9:30 AM ROSA Woodruff [facility]   2/28/2023  9:30 AM ROSA Woodruff [facility]   3/2/2023  9:30 AM Rigoberto Pecos, OT [facility]   3/7/2023  9:30 AM Rigoberto Pecos, OT [facility]   3/9/2023  9:30 AM Chantal Amador [facility]   5/16/2023  1:20 PM Joce Saini MD [facility] BS AMB

## 2023-02-21 ENCOUNTER — HOSPITAL ENCOUNTER (OUTPATIENT)
Facility: HOSPITAL | Age: 88
Setting detail: RECURRING SERIES
Discharge: HOME | End: 2023-02-24
Payer: MEDICARE

## 2023-02-21 PROCEDURE — 97530 THERAPEUTIC ACTIVITIES: CPT

## 2023-02-21 NOTE — THERAPY RECERTIFICATION
OCCUPATIONAL THERAPY - DAILY TREATMENT NOTE    Patient Name: Mercedes Delgado    Date: 2023    : 1924  Insurance: Payor: MEDICARE / Plan: MEDICARE PART A AND B / Product Type: *No Product type* /      Patient  verified Yes     Visit #   Current / Total 5 8   Time   In / Out 1033 1101   Pain   In / Out 5 0   Subjective Functional Status/Changes: I just feel like it should be better   Changes to:  Meds, Allergies, Med Hx, Sx Hx? If yes, update Summary List no       TREATMENT AREA =  Pain in right hand [M79.641]    OBJECTIVE    Modalities Rationale:   decrease inflammation, decrease pain, and increase tissue extensibility to improve the patients ability to move digits    min [] Estim, type/location:                                      []  att     []  unatt     []  w/US     []  w/ice    []  w/heat    min []  Ultrasound: Duty Cycle:  Frequency:  W/cm2    min []  Iontophoresis w/ dexamethasone, location:                                               []  take home patch       []  in clinic   5 min []  Ice     [x]  Heat    location/position:     min []  Paraffin:  location     min []  Vasopneumatic Device, press/temp:     min []  Other:    [x] Skin assessment post-treatment (if applicable):    []  intact    []  redness- no adverse reaction     []redness - adverse reaction:        Therapeutic Procedures:   Tx Min Billable or 1:1 Min (if diff from Tx Min) Procedure, Rationale, Specifics   91 [id number] Therapeutic Activity (timed):  use of dynamic activities replicating functional movements to improve ability to manipulate small items  (see flow sheet as applicable)    Mini Massager   Perfection : untimed                                 23  [facility] Totals Reminder: bill using total billable min of TIMED therapeutic procedures (example: do not include dry needle or estim unattended, both untimed codes, in totals to left)  8-22 min = 1 unit; 23-37 min = 2 units; 38-52 min = 3 units; 53-67 min = 4 units; 68-82 min = 5 units   Total Total         Billed concurrently with other treatments Patient Education:  Reviewed HEP     Other Objective/Functional Measures:    Improved sensation reported after mini massager        ASSESSMENT  Assessment/Changes in Function:     Functional use slowing improving      []  See Progress Note/Recertification   Patient will continue to benefit from skilled OT services to modify and progress therapeutic interventions, analyze and address ROM deficits, analyze and address strength deficits, and instruct in home and community integration  to attain remaining goals. Progress toward goals / Updated goals:    Short Term Goals: To be accomplished in 2  weeks:  Goal:* Patient will be compliant with initial home exercise program to take an active role in their rehabilitation process. Status at PN (2/3/2023): 1x a day for HEP participation for ROM and strengthening. Goal not met. Goal:* Patient will demonstrate a good understanding of their condition and strategies for self-management. Status at PN (2/3/2023): wrap hands, wear brace, no using heat. Patient demonstrates a fair understanding of their condition and strategies for self-management. Goal not met. Current Status (2/9/2023): patient leaves compression sleeve on for days at a time, causing inflammation at the distal end of the SF. Long Term Goals: To be accomplished in 4 weeks:              Goal:*Patient will regain 200 degrees total arc of motion of the right small finger to enable grasp of cylindrical objects such as a glass, handle or toothbrush. Status at PN (2/3/2023):  136 deg (+12), improving. Goal not met. Goal:* Patient will have 50 degrees of right wrist flexion in order to perform hygiene tasks and /or work with tools such as a screwdriver. Status at PN (2/3/2023): 1/24/2023: 49 deg (+7), improving. Goal not met.       PLAN  []  Upgrade activities as tolerated    []  Discharge due to :    []  Other:      Therapist: ROSA Torres, FREITAS/L     Date: 2/21/2023 Time: 10:25 AM     Future Appointments   Date Time Provider Maricruz Shorti   2/21/2023 10:30 AM ROSA Kelly [facility]   2/23/2023  9:30 AM ROSA Kelly [facility]   2/23/2023  1:00 PM [facility]   2/28/2023  9:30 AM ROSA Kelly [facility]   3/2/2023  9:30 AM Walter Dakin, OT [facility]   3/7/2023  9:30 AM Walter Dakin, OT [facility]   3/9/2023  9:30 AM Walter Dakin, OT [facility]   5/16/2023  1:20 PM Imelda Sinha MD [facility] BS AMB

## 2023-02-23 ENCOUNTER — HOSPITAL ENCOUNTER (OUTPATIENT)
Facility: HOSPITAL | Age: 88
Discharge: HOME | End: 2023-02-23
Payer: MEDICARE

## 2023-02-23 ENCOUNTER — HOSPITAL ENCOUNTER (OUTPATIENT)
Facility: HOSPITAL | Age: 88
Setting detail: RECURRING SERIES
Discharge: HOME | End: 2023-02-26
Payer: MEDICARE

## 2023-02-23 DIAGNOSIS — E03.9 HYPOTHYROIDISM, ADULT: ICD-10-CM

## 2023-02-23 PROCEDURE — 97530 THERAPEUTIC ACTIVITIES: CPT

## 2023-02-23 PROCEDURE — 97110 THERAPEUTIC EXERCISES: CPT

## 2023-02-23 PROCEDURE — 76536 US EXAM OF HEAD AND NECK: CPT

## 2023-02-23 NOTE — THERAPY RECERTIFICATION
OCCUPATIONAL THERAPY - DAILY TREATMENT NOTE    Patient Name: Felicitas Dale    Date: 2023    : 1924  Insurance: Payor: MEDICARE / Plan: MEDICARE PART A AND B / Product Type: *No Product type* /      Patient  verified Yes     Visit #   Current / Total 6 8   Time   In / Out 933 1002   Pain   In / Out 6 4   Subjective Functional Status/Changes: My fingers have felt so good since the last time    Changes to:  Meds, Allergies, Med Hx, Sx Hx? If yes, update Summary List no       TREATMENT AREA =  Pain in right hand [M79.641]    OBJECTIVE    Modalities Rationale:   decrease inflammation, decrease pain, and increase tissue extensibility to improve the patients ability to move digits    min [] Estim, type/location:                                      []  att     []  unatt     []  w/US     []  w/ice    []  w/heat    min []  Ultrasound: Duty Cycle:  Frequency:  W/cm2    min []  Iontophoresis w/ dexamethasone, location:                                               []  take home patch       []  in clinic   5 min []  Ice     [x]  Heat    location/position:  B Hands    min []  Paraffin:  location     min []  Vasopneumatic Device, press/temp:     min []  Other:    [x] Skin assessment post-treatment (if applicable):    [x]  intact    []  redness- no adverse reaction     []redness - adverse reaction:        Therapeutic Procedures:   Tx Min Billable or 1:1 Min (if diff from Tx Min) Procedure, Rationale, Specifics   15  [id number] Therapeutic Exercise (timed):  increase ROM, strength, coordination, and proprioception to , move wrist (see flow sheet as applicable)    Med Soft theraputty: Manipulated with Right hand to reveal/remove   / I pegs 10/10      9  49275 Therapeutic Activity (timed):  use of dynamic activities replicating functional movements to improve functional use, sensation  (see flow sheet as applicable)    Mini Massager  Pt education on diagnosis                            24  [facility] Totals Reminder: bill using total billable min of TIMED therapeutic procedures (example: do not include dry needle or estim unattended, both untimed codes, in totals to left)  8-22 min = 1 unit; 23-37 min = 2 units; 38-52 min = 3 units; 53-67 min = 4 units; 68-82 min = 5 units   Total Total         Billed concurrently with other treatments Patient Education:  Reviewed HEP     Other Objective/Functional Measures:    Increased time required for peg removal from putty        ASSESSMENT  Assessment/Changes in Function:     Ongoing good responses to mini massager, decreased pain with heat and movement , lack of insight into deficits      []  See Progress Note/Recertification   Patient will continue to benefit from skilled OT services to modify and progress therapeutic interventions, analyze and address ROM deficits, analyze and address strength deficits, and instruct in home and community integration  to attain remaining goals. Progress toward goals / Updated goals:    Short Term Goals: To be accomplished in 2  weeks:  Goal:* Patient will be compliant with initial home exercise program to take an active role in their rehabilitation process. Status at PN (2/3/2023): 1x a day for HEP participation for ROM and strengthening. Goal not met. Goal:* Patient will demonstrate a good understanding of their condition and strategies for self-management. Status at PN (2/3/2023): wrap hands, wear brace, no using heat. Patient demonstrates a fair understanding of their condition and strategies for self-management. Goal not met. Current Status (2/9/2023): patient leaves compression sleeve on for days at a time, causing inflammation at the distal end of the SF. Current Status (2/23/2023): lack of insight into deficits      Long Term Goals:  To be accomplished in 4 weeks:              Goal:*Patient will regain 200 degrees total arc of motion of the right small finger to enable grasp of cylindrical objects such as a glass, handle or toothbrush. Status at PN (2/3/2023):  136 deg (+12), improving. Goal not met. Goal:* Patient will have 50 degrees of right wrist flexion in order to perform hygiene tasks and /or work with tools such as a screwdriver. Status at PN (2/3/2023): 1/24/2023: 49 deg (+7), improving. Goal not met.       PLAN  [x]  Upgrade activities as tolerated    []  Discharge due to :    []  Other:      Therapist: ROSA Winters COTA/L     Date: 2/23/2023 Time: 10:17 AM     Future Appointments   Date Time Provider Maricruz Mary   2/23/2023  1:00 PM [facility]   2/28/2023  9:30 AM ROSA Faulkner [facility]   3/2/2023  9:30 AM Jose Sullivan OT [facility]   3/7/2023  9:30 AM Jose Sullivan OT [facility]   3/9/2023  9:30 AM Jose Sullivan OT [facility]   5/16/2023  1:20 PM Jamal Arnett MD [facility] AMB

## 2023-02-28 ENCOUNTER — HOSPITAL ENCOUNTER (OUTPATIENT)
Facility: HOSPITAL | Age: 88
Setting detail: RECURRING SERIES
Discharge: HOME | End: 2023-03-03
Payer: MEDICARE

## 2023-02-28 PROCEDURE — 97110 THERAPEUTIC EXERCISES: CPT

## 2023-02-28 PROCEDURE — 97530 THERAPEUTIC ACTIVITIES: CPT

## 2023-02-28 NOTE — PROGRESS NOTES
OCCUPATIONAL THERAPY - DAILY TREATMENT NOTE    Patient Name: Leopoldo Eng    Date: 2023    : 1924  Insurance: Payor: MEDICARE / Plan: MEDICARE PART A AND B / Product Type: *No Product type* /      Patient  verified YES   Visit #   Current / Total 7 8   Time   In / Out 933 1003   Pain   In / Out 5 5   Subjective Functional Status/Changes: My finger was hurting this morning. I just can't do anything. Changes to:  Meds, Allergies, Med Hx, Sx Hx? If yes, update Summary List no       TREATMENT AREA =  Pain in right hand [M79.641]    OBJECTIVE    Therapeutic Procedures: Tx Min Billable or 1:1 Min (if diff from Tx Min) Procedure, Rationale, Specifics   12  39168 Therapeutic Exercise (timed):  increase ROM, strength, coordination, and proprioception to pinch. (see flow sheet as applicable)    -Theraputty: manipulated medium putty with right hand to reveal 10/10, 1/4 inch pegs. 18  67521 Therapeutic Activity (timed):  use of dynamic activities replicating functional movements to isolate the digits. (see flow sheet as applicable)    -Tumbling tower: engaged in activity with the right hand. - Mini massage to all digits on the right hand. 27  [address] [place] [address] Way Reminder: bill using total billable min of TIMED therapeutic procedures (example: do not include dry needle or estim unattended, both untimed codes, in totals to left)  8-22 min = 1 unit; 23-37 min = 2 units; 38-52 min = 3 units; 53-67 min = 4 units; 68-82 min = 5 units   Total Total         Billed concurrently with other treatments Patient Education:  Reviewed HEP     Other Objective/Functional Measures:    -Good response to upgraded resistive putty.  -Patient wants to make next session the last one. ASSESSMENT  Assessment/Changes in Function:     -Decreased numbness post mini massage.        []  See Progress Note/Re certification     Patient will continue to benefit from skilled OT services to modify and progress therapeutic interventions, analyze and address functional mobility deficits, and analyze and address strength deficits  to attain remaining goals. Progress toward goals / Updated goals:    Short Term Goals: To be accomplished in 2  weeks:  Goal:* Patient will be compliant with initial home exercise program to take an active role in their rehabilitation process. Status at PN (2/3/2023): 1x a day for HEP participation for ROM and strengthening. Goal not met. Goal:* Patient will demonstrate a good understanding of their condition and strategies for self-management. Status at PN (2/3/2023): wrap hands, wear brace, no using heat. Patient demonstrates a fair understanding of their condition and strategies for self-management. Goal not met. Current Status (2/9/2023): patient leaves compression sleeve on for days at a time, causing inflammation at the distal end of the SF. Current Status (2/23/2023): lack of insight into deficits   Current Status (2/28/2023): lack of insight into deficits and the purpose of treatments. Long Term Goals: To be accomplished in 4 weeks:              Goal:*Patient will regain 200 degrees total arc of motion of the right small finger to enable grasp of cylindrical objects such as a glass, handle or toothbrush. Status at PN (2/3/2023):  136 deg (+12), improving. Goal not met. Goal:* Patient will have 50 degrees of right wrist flexion in order to perform hygiene tasks and /or work with tools such as a screwdriver. Status at PN (2/3/2023): 1/24/2023: 49 deg (+7), improving. Goal not met. PLAN  [x]  Continue Plan of Care- Plan to DC next visit   []  Upgrade activities as tolerated    []  Discharge due to :    []  Other:        Therapist: SUNNY Black    Date: 2/28/2023 Time: 8:50 AM   I was present during the entire treatment, directing and participating in the treatment. Evin Cotton.  [facility]    Future Appointments   Date Time Provider Maricruz Hernandez   3/2/2023 9:30 AM Marquita Fernandes [facility]   3/7/2023  9:30 AM Marquita Fernandes [facility]   3/9/2023  9:30 AM Marquita Fernandes [facility]   5/16/2023  1:20 PM Gala Ortega MD [facility] BS AMB

## 2023-03-02 ENCOUNTER — HOSPITAL ENCOUNTER (OUTPATIENT)
Facility: HOSPITAL | Age: 88
Setting detail: RECURRING SERIES
Discharge: HOME | End: 2023-03-05
Payer: MEDICARE

## 2023-03-02 PROCEDURE — 97530 THERAPEUTIC ACTIVITIES: CPT

## 2023-03-02 PROCEDURE — 97110 THERAPEUTIC EXERCISES: CPT

## 2023-03-02 NOTE — PROGRESS NOTES
OCCUPATIONAL THERAPY - DAILY TREATMENT NOTE    Patient Name: Lalita Weiner    Date: 3/2/2023    : 1924  Insurance: Payor: MEDICARE / Plan: MEDICARE PART A AND B / Product Type: *No Product type* /      Patient  verified YES   Visit #   Current / Total 8 8   Time   In / Out 930 10:03  33 mins   Pain   In / Out 5 5   Subjective Functional Status/Changes: \"I am done? \"   Changes to:  Meds, Allergies, Med Hx, Sx Hx? If yes, update Summary List no       TREATMENT AREA =  Pain in right hand [M79.641]    OBJECTIVE    Therapeutic Procedures: Tx Min Billable or 1:1 Min (if diff from Tx Min) Procedure, Rationale, Specifics   25  98221 Therapeutic Exercise (timed):  increase ROM, strength, coordination, and proprioception to pinch. (see flow sheet as applicable)  - recheck for DC   -Theraputty: manipulated medium putty with right hand to reveal 10/10, 1/4 inch pegs. 8  31548 Therapeutic Activity (timed):  use of dynamic activities replicating functional movements to isolate the digits.  (see flow sheet as applicable)    - Mini massage to all digits on the right hand.  - FOTO     33  [facility] Totals Reminder: bill using total billable min of TIMED therapeutic procedures (example: do not include dry needle or estim unattended, both untimed codes, in totals to left)  8-22 min = 1 unit; 23-37 min = 2 units; 38-52 min = 3 units; 53-67 min = 4 units; 68-82 min = 5 units   Total Total         Billed concurrently with other treatments Patient Education:  Reviewed HEP     Other Objective/Functional Measures:    Range of Motion:   Hand ROM 2023 right     5th  23 5th  3/2/23   MP 58 (+4) 60 (+2)   PIP 56 (+6) 62 (+6)   DIP 22 (+2) 23 (+1)   PABD      RABD      LONG 136 (+12) 145 (+9)      Wrist Active/Passive ROM  Wrist 2023  Right/Left  2023  Right 3/2/23  Right   Flex 42/52  49 (+7) 66 (+7)   Ext 54/60 60 (+6)  64 (+4)   UD        RD                        ASSESSMENT  Assessment/Changes in Function: Pth with increased LONG of the 5th digit (+9) and increased AROM of the wrist with flexion (+7), and extension (+4). Discussed with caregiver on discharge, caregiver agreeed. Pt provided with upgraded putty for continued hand strengthening at home. Pt provided with d/c instructions. Pt to be discharged at this time. Thank you for your referral.        []  See Progress Note/Re certification     Patient will continue to benefit from skilled OT services to modify and progress therapeutic interventions, analyze and address functional mobility deficits, and analyze and address strength deficits  to attain remaining goals. Progress toward goals / Updated goals:    Short Term Goals: To be accomplished in 2  weeks:  Goal:* Patient will be compliant with initial home exercise program to take an active role in their rehabilitation process. Status at PN (2/3/2023): 1x a day for HEP participation for ROM and strengthening. Goal not met. Status at D/C (3/2/23): goal not met. Goal:* Patient will demonstrate a good understanding of their condition and strategies for self-management. Status at PN (2/3/2023): wrap hands, wear brace, no using heat. Patient demonstrates a fair understanding of their condition and strategies for self-management. Goal not met. Current Status (2/9/2023): patient leaves compression sleeve on for days at a time, causing inflammation at the distal end of the SF. Current Status (2/23/2023): lack of insight into deficits   Current Status (3/2/2023): lack of insight into deficits and the purpose of treatments. Status at D/C (3/2/23): lack of insight into deficits and the purpose of treatments. Long Term Goals: To be accomplished in 4 weeks:              Goal:*Patient will regain 200 degrees total arc of motion of the right small finger to enable grasp of cylindrical objects such as a glass, handle or toothbrush. Status at PN (2/3/2023):  136 deg (+12), improving. Goal not met. Status at D/C (3/2/23): 145 (+9), progressing goal not met. Goal:* Patient will have 50 degrees of right wrist flexion in order to perform hygiene tasks and /or work with tools such as a screwdriver. Status at PN (2/3/2023): 1/24/2023: 49 deg (+7), improving. Goal not met. Status at D/C (3/2/23): 66 degrees (+7) goal met.       PLAN  [x]  Continue Plan of Care- Plan to DC next visit   []  Upgrade activities as tolerated    []  Discharge due to :    []  Other:        Therapist: Mark Toney OT    Date: 3/2/2023 Time: 9:32 AM     Future Appointments   Date Time Provider Maricruz Hernandez   3/7/2023  9:30 AM Mark Toney [address] [facility]   3/9/2023  9:30 AM [facility]   5/16/2023  1:20 PM Chandana Gonzalez MD [id number] [address] BS AMB

## 2023-03-02 NOTE — PROGRESS NOTES
Occupational Therapy Discharge Instructions      [facility] - [place] [employer]  [address] [place] [address]  [phone] fax    Patient: Delores Patino  : 1924      Continue Home Exercise Program 1 times per day for 4 weeks, then decrease to 3 times per week      Continue with    [] Ice  as needed 2 times per day     [x] Heat           Follow up with MD:     [] Upon completion of therapy     [] As needed      Recommendations:     [x]   Return to activity with home program    []   Return to activity with the following modifications:       []Post Rehab Program    []Join Independent aquatic program     []Return to/join local gym        Additional Comments: You did great! It was amauri working with you!           Snehal Velasquez 3/2/2023 9:49 AM

## 2023-03-02 NOTE — PROGRESS NOTES
[facility] - [address]  [phone] fax    Occupational Therapy Discharge Summary    Patient name: Simona Santos Start of Care: 23   Referral source: Thareji VargheseDO : 1924   Medical/Treatment Diagnosis: Pain in right hand [M79.641]  Payor: MEDICARE / Plan: MEDICARE PART A AND B / Product Type: *No Product type* /  Onset Date:22     Prior Hospitalization: see medical history Provider#: [id number]   Medications: Verified on Patient Summary List    Comorbidities: Arthritis, heart disease, HTN   Prior Level of Function: (I) with self care, (I )with making the bed, simple meal prep  Visits from Start of Care: 16    Missed Visits: 0  Reporting Period : 2/3/23 to 3/2/23    Summary of Care:  Goal:* Patient will be compliant with initial home exercise program to take an active role in their rehabilitation process. Status at D/C (3/2/23): goal not met. Goal:* Patient will demonstrate a good understanding of their condition and strategies for self-management. Status at D/C (3/2/23): lack of insight into deficits and the purpose of treatments. Goal:*Patient will regain 200 degrees total arc of motion of the right small finger to enable grasp of cylindrical objects such as a glass, handle or toothbrush. Status at D/C (3/2/23): 145 (+9), progressing goal not met. Goal:* Patient will have 50 degrees of right wrist flexion in order to perform hygiene tasks and /or work with tools such as a screwdriver. Status at D/C (3/2/23): 66 degrees (+7) goal met. ASSESSMENT/Changes in Function: Pth with increased LONG of the 5th digit (+9) and increased AROM of the wrist with flexion (+7), and extension (+4). Discussed with caregiver on discharge, caregiver agreeed. Pt provided with upgraded putty for continued hand strengthening at home. Pt provided with d/c instructions. Pt to be discharged at this time. Thank you for your referral.     ASSESSMENT/RECOMMENDATIONS:  [x]Discontinue therapy: [x]Patient has reached or is progressing toward set goals      []Patient is non-compliant or has abdicated      []Due to lack of appreciable progress towards set goals    Subha Gallegos OT 3/2/2023 11:55 AM

## 2023-03-07 ENCOUNTER — APPOINTMENT (OUTPATIENT)
Facility: HOSPITAL | Age: 88
End: 2023-03-07
Payer: MEDICARE

## 2023-03-09 ENCOUNTER — APPOINTMENT (OUTPATIENT)
Facility: HOSPITAL | Age: 88
End: 2023-03-09
Payer: MEDICARE

## 2023-05-16 ENCOUNTER — OFFICE VISIT (OUTPATIENT)
Age: 88
End: 2023-05-16
Payer: MEDICARE

## 2023-05-16 VITALS
HEART RATE: 89 BPM | OXYGEN SATURATION: 99 % | SYSTOLIC BLOOD PRESSURE: 110 MMHG | DIASTOLIC BLOOD PRESSURE: 66 MMHG | BODY MASS INDEX: 18.4 KG/M2 | HEIGHT: 62 IN | WEIGHT: 100 LBS

## 2023-05-16 DIAGNOSIS — I25.10 ATHEROSCLEROSIS OF NATIVE CORONARY ARTERY WITHOUT ANGINA PECTORIS, UNSPECIFIED WHETHER NATIVE OR TRANSPLANTED HEART: Primary | ICD-10-CM

## 2023-05-16 DIAGNOSIS — E78.00 PURE HYPERCHOLESTEROLEMIA, UNSPECIFIED: ICD-10-CM

## 2023-05-16 DIAGNOSIS — R55 SYNCOPE AND COLLAPSE: ICD-10-CM

## 2023-05-16 PROCEDURE — G8427 DOCREV CUR MEDS BY ELIG CLIN: HCPCS | Performed by: INTERNAL MEDICINE

## 2023-05-16 PROCEDURE — G8419 CALC BMI OUT NRM PARAM NOF/U: HCPCS | Performed by: INTERNAL MEDICINE

## 2023-05-16 PROCEDURE — 1123F ACP DISCUSS/DSCN MKR DOCD: CPT | Performed by: INTERNAL MEDICINE

## 2023-05-16 PROCEDURE — 1090F PRES/ABSN URINE INCON ASSESS: CPT | Performed by: INTERNAL MEDICINE

## 2023-05-16 PROCEDURE — 1036F TOBACCO NON-USER: CPT | Performed by: INTERNAL MEDICINE

## 2023-05-16 PROCEDURE — 93000 ELECTROCARDIOGRAM COMPLETE: CPT | Performed by: INTERNAL MEDICINE

## 2023-05-16 PROCEDURE — 99214 OFFICE O/P EST MOD 30 MIN: CPT | Performed by: INTERNAL MEDICINE

## 2023-05-16 RX ORDER — TAMSULOSIN HYDROCHLORIDE 0.4 MG/1
0.4 CAPSULE ORAL DAILY
COMMUNITY

## 2023-05-16 RX ORDER — ASPIRIN 81 MG/1
81 TABLET ORAL DAILY
COMMUNITY

## 2023-05-16 RX ORDER — LANOLIN ALCOHOL/MO/W.PET/CERES
400 CREAM (GRAM) TOPICAL DAILY
COMMUNITY

## 2023-05-16 RX ORDER — MULTIVITAMIN WITH IRON
100 TABLET ORAL DAILY
COMMUNITY

## 2023-05-16 ASSESSMENT — PATIENT HEALTH QUESTIONNAIRE - PHQ9
SUM OF ALL RESPONSES TO PHQ QUESTIONS 1-9: 0
2. FEELING DOWN, DEPRESSED OR HOPELESS: 0
1. LITTLE INTEREST OR PLEASURE IN DOING THINGS: 0
SUM OF ALL RESPONSES TO PHQ QUESTIONS 1-9: 0
SUM OF ALL RESPONSES TO PHQ9 QUESTIONS 1 & 2: 0

## 2023-05-16 NOTE — PROGRESS NOTES
Claribel Pepperignacia presents today for   Chief Complaint   Patient presents with    Follow-up     6 month f/u     Edema     Bilateral edema on/off       [place] [employer] [address] preferred language for health care discussion is english/other. Is someone accompanying this pt? yes    Is the patient using any DME equipment during OV? yes    Depression Screening:  Depression: Not at risk    PHQ-2 Score: 0        Learning Assessment:  Who is the primary learner? Patient    What is the preferred language for health care of the primary learner? ENGLISH    How does the primary learner prefer to learn new concepts? DEMONSTRATION    Answered By patient    Relationship to Learner SELF           Pt currently taking Anticoagulant therapy? no    Pt currently taking Antiplatelet therapy ? no      Coordination of Care:  1. Have you been to the ER, urgent care clinic since your last visit? Hospitalized since your last visit? no    2. Have you seen or consulted any other health care providers outside of the [address] [place] [address] since your last visit? Include any pap smears or colon screening.  no

## 2023-05-16 NOTE — PROGRESS NOTES
HISTORY OF PRESENT ILLNESS  Julius GOMEZ 59 [facility]  80 y.o. female     Chief Complaint   Patient presents with    Follow-up     6 month f/u     Edema     Bilateral edema on/off       ASSESSMENT and PLAN    The primary encounter diagnosis was Atherosclerosis of native coronary artery without angina pectoris, unspecified whether native or transplanted heart. Diagnoses of Pure hypercholesterolemia, unspecified and Syncope and collapse were also pertinent to this visit. Ms. Navneet Mazariegos has history of CAD. Because of abnormal testing back in 2009, she underwent coronary angiography at the [facility] showing proximal LAD disease, proximal circumflex/OM disease, and proximal dominant RCA disease. At that time, options for percutaneous revascularization as well as surgical revascularization were given; she declined and chose to go with medical therapy. Since 2009, she has done quite well. Her home cuff correlates with our reading in December 2021. In September 2022, she had a hip fracture. She was hospitalized and had surgical repair. Her echocardiogram during that hospitalization showed hyperdynamic LV function with EF greater than 70%. CAD:    Clinically stable. Continue medical therapy. BP:    Well controlled at 166. Rhythm:    Stable sinus at 89 bpm.  CHF:    There is no evidence of decompensated CHF noted. Weight:     Her weight today is 100 pounds. Back in 2020, she weighed 135 pounds. In November 2022, she weighed 115 pounds. I encouraged her to try and regain her weight back to 115 pounds. I advised her to eat a bowl of ice cream every night and drink milk with meals. Cholesterol:   Target LDL <70. Her Lipitor was discontinued in 2020. Anti-platelet:   Remains on ASA. I will see her back in 6 months. Thank you. Orders Placed This Encounter   Procedures    EKG 12 Lead     Order Specific Question:   Reason for Exam?     Answer: Other        Edema    Today, Ms. Tommy Cortés has no

## 2023-05-31 RX ORDER — METOPROLOL SUCCINATE 25 MG/1
TABLET, EXTENDED RELEASE ORAL
Qty: 90 TABLET | Refills: 3 | Status: SHIPPED | OUTPATIENT
Start: 2023-05-31

## 2023-06-04 NOTE — PROGRESS NOTES
OT DAILY TREATMENT NOTE 10-18    Patient Name: Hossein Officer  JLOU:[id number]  : 1924  [x]  Patient  Verified  Payor: [place] MED[place]ARE / Plan: [place] MEDICARE PART A & B / Product Type: Medicare /    In time:9:30  Out time:9:59  Total Treatment Time (min): 29  Visit #: 1 of 8    Medicare/BCBS Only   Total Timed Codes (min): 29 1:1 Treatment Time:  29     Treatment Area: Pain in right hand [M79.641]    SUBJECTIVE  Pain Level (0-10 scale): 510  Any medication changes, allergies to medications, adverse drug reactions, diagnosis change, or new procedure performed?: [x] No    [] Yes (see summary sheet for update)  Subjective functional status/changes:   [] No changes reported  \"I got it wet twice and had to get rid of it. \"    OBJECTIVE                Modality rationale: decrease inflammation, decrease pain, and increase tissue extensibility to improve the patients ability to move digits and grasp for functional daily tasks.     Min Type Additional Details       [] Estim:  []Unatt       []IFC  []Premod                        []Other:  []w/ice   []w/heat  Position:  Location:       [] Estim: []Att    []TENS instruct  []NMES                    []Other:  []w/US   []w/ice   []w/heat  Position:  Location:       []  Traction: [] Cervical       []Lumbar                       [] Prone          []Supine                       []Intermittent   []Continuous Lbs:  [] before manual  [] after manual       []  Ultrasound: []Continuous   [] Pulsed                           []1MHz   []3MHz W/cm2:  Location:       []  Iontophoresis with dexamethasone         Location: [] Take home patch   [] In clinic     5 with sc []  Ice     [x]  Heat-MHP  []  Ice massage  []  Laser   []  Paraffin Position: seated/resting  Location: right hand/wrist       []  Laser with stim  []  Other:  Position:  Location:       []  Vasopneumatic Device    []  Right     []  Left  Pre-treatment girth:  Post-treatment girth:  Measured at (location):  Pressure: [] lo [] med [] hi   Temperature: [] lo [] med [] hi        [x] Skin assessment post-treatment:  [x]intact []redness- no adverse reaction  []redness - adverse reaction:     16 min Therapeutic Exercise:  [] See flow sheet :   Rationale: increase ROM and increase strength to improve the patients ability to      Towel Scrunches- SF Only x5  Soft Theraputty: Manipulated with Right hand to reveal/remove 1/4 in pegs 5/5   Medium soft theraputty: 6x 1/4\" pegs  PROM SF  Abduction/adduction with yellow foam x10    8 min Therapeutic activity:  [] See flow sheet :   Rationale: increase ROM and increase strength to improve the patients ability to pinch and manipulate small/medium items    Perfection opposition to SF; 10 pieces        With   [] TE   [] TA   [] neuro   [] other: Patient Education: [x] Review HEP    [] Progressed/Changed HEP based on:   [] positioning   [] body mechanics   [] transfers   [] heat/ice application   [] Splint wear/care   [] Sensory re-education   [] scar management      [] other:            Other Objective/Functional Measures: Pt able to demonstrate 100% accuracy with abduction/adduction with yellow wedge. Pain Level (0-10 scale) post treatment: 0/10    ASSESSMENT/Changes in Function: Pt no pain post-tx. Pt tolerated increased putty resistance without difficulty    Patient will continue to benefit from skilled OT services to modify and progress therapeutic interventions, address ROM deficits, address strength deficits, and instruct in home and community integration to attain remaining goals. []  See Plan of Care  [x]  See progress note/recertification  []  See Discharge Summary         Progress towards goals / Updated goals:  Short Term Goals: To be accomplished in 2  weeks:  Goal:* Patient will be compliant with initial home exercise program to take an active role in their rehabilitation process. Status at PN (2/3/2023): 1x a day for HEP participation for ROM and strengthening.  Goal not met. Goal:* Patient will demonstrate a good understanding of their condition and strategies for self-management. Status at PN (2/3/2023): wrap hands, wear brace, no using heat. Patient demonstrates a fair understanding of their condition and strategies for self-management. Goal not met. Long Term Goals: To be accomplished in 4 weeks:              Goal:*Patient will regain 200 degrees total arc of motion of the right small finger to enable grasp of cylindrical objects such as a glass, handle or toothbrush. Status at PN (2/3/2023):  136 deg (+12), improving. Goal not met. Goal:* Patient will have 50 degrees of right wrist flexion in order to perform hygiene tasks and /or work with tools such as a screwdriver. Status at PN (2/3/2023): 1/24/2023: 49 deg (+7), improving. Goal not met.           PLAN  []  Upgrade activities as tolerated     [x]  Continue plan of care  []  Update interventions per flow sheet       []  Discharge due to:_  []  Other:_      JENNIFER Valencia  2/7/2023  9:02 AM        Future Appointments   Date Time Provider Olga Aragon   2/7/2023  9:30 AM Radha Re[facility]ledo[facility]   2/9/2023  9:00 AM Latasha Medina [id number] [facility] No

## 2023-06-22 RX ORDER — AMLODIPINE BESYLATE 10 MG/1
10 TABLET ORAL DAILY
Qty: 90 TABLET | Refills: 3 | Status: SHIPPED | OUTPATIENT
Start: 2023-06-22

## 2023-08-03 ENCOUNTER — HOSPITAL ENCOUNTER (OUTPATIENT)
Facility: HOSPITAL | Age: 88
Discharge: HOME | End: 2023-08-03
Payer: MEDICARE

## 2023-08-03 DIAGNOSIS — R19.03 RIGHT LOWER QUADRANT ABDOMINAL MASS: ICD-10-CM

## 2023-08-03 PROCEDURE — 6360000004 HC RX CONTRAST MEDICATION: Performed by: NURSE PRACTITIONER

## 2023-08-03 PROCEDURE — 82565 ASSAY OF CREATININE: CPT

## 2023-08-03 PROCEDURE — 74177 CT ABD & PELVIS W/CONTRAST: CPT

## 2023-08-03 RX ADMIN — IOPAMIDOL 80 ML: 612 INJECTION, SOLUTION INTRAVENOUS at 12:37

## 2023-08-04 LAB — CREAT UR-MCNC: 0.7 MG/DL (ref 0.6–1.3)

## 2024-01-09 ENCOUNTER — OFFICE VISIT (OUTPATIENT)
Age: 89
End: 2024-01-09
Payer: MEDICARE

## 2024-01-09 VITALS
DIASTOLIC BLOOD PRESSURE: 62 MMHG | SYSTOLIC BLOOD PRESSURE: 116 MMHG | HEIGHT: 62 IN | BODY MASS INDEX: 20.06 KG/M2 | WEIGHT: 109 LBS | HEART RATE: 79 BPM | OXYGEN SATURATION: 97 %

## 2024-01-09 DIAGNOSIS — I10 ESSENTIAL (PRIMARY) HYPERTENSION: ICD-10-CM

## 2024-01-09 DIAGNOSIS — E78.00 PURE HYPERCHOLESTEROLEMIA, UNSPECIFIED: ICD-10-CM

## 2024-01-09 DIAGNOSIS — R55 SYNCOPE AND COLLAPSE: ICD-10-CM

## 2024-01-09 DIAGNOSIS — I25.10 ATHEROSCLEROSIS OF NATIVE CORONARY ARTERY WITHOUT ANGINA PECTORIS, UNSPECIFIED WHETHER NATIVE OR TRANSPLANTED HEART: Primary | ICD-10-CM

## 2024-01-09 PROCEDURE — G8420 CALC BMI NORM PARAMETERS: HCPCS | Performed by: INTERNAL MEDICINE

## 2024-01-09 PROCEDURE — G8484 FLU IMMUNIZE NO ADMIN: HCPCS | Performed by: INTERNAL MEDICINE

## 2024-01-09 PROCEDURE — 1090F PRES/ABSN URINE INCON ASSESS: CPT | Performed by: INTERNAL MEDICINE

## 2024-01-09 PROCEDURE — 1036F TOBACCO NON-USER: CPT | Performed by: INTERNAL MEDICINE

## 2024-01-09 PROCEDURE — G8427 DOCREV CUR MEDS BY ELIG CLIN: HCPCS | Performed by: INTERNAL MEDICINE

## 2024-01-09 PROCEDURE — 1123F ACP DISCUSS/DSCN MKR DOCD: CPT | Performed by: INTERNAL MEDICINE

## 2024-01-09 PROCEDURE — 99214 OFFICE O/P EST MOD 30 MIN: CPT | Performed by: INTERNAL MEDICINE

## 2024-01-09 PROCEDURE — 93000 ELECTROCARDIOGRAM COMPLETE: CPT | Performed by: INTERNAL MEDICINE

## 2024-01-09 ASSESSMENT — PATIENT HEALTH QUESTIONNAIRE - PHQ9
SUM OF ALL RESPONSES TO PHQ9 QUESTIONS 1 & 2: 0
2. FEELING DOWN, DEPRESSED OR HOPELESS: 0
SUM OF ALL RESPONSES TO PHQ QUESTIONS 1-9: 0
1. LITTLE INTEREST OR PLEASURE IN DOING THINGS: 0
SUM OF ALL RESPONSES TO PHQ QUESTIONS 1-9: 0

## 2024-01-09 NOTE — PROGRESS NOTES
Vianney Galeas presents today for   Chief Complaint   Patient presents with    Follow-up     6 months       Vianney Galeas preferred language for health care discussion is english/other.    Is someone accompanying this pt? no    Is the patient using any DME equipment during OV? no    Depression Screening:  Depression: Not at risk (1/9/2024)    PHQ-2     PHQ-2 Score: 0        Learning Assessment:  Who is the primary learner? Patient    What is the preferred language for health care of the primary learner? ENGLISH    How does the primary learner prefer to learn new concepts? DEMONSTRATION    Answered By patient    Relationship to Learner SELF           Pt currently taking Anticoagulant therapy? no    Pt currently taking Antiplatelet therapy ? aspirin      Coordination of Care:  1. Have you been to the ER, urgent care clinic since your last visit? Hospitalized since your last visit? no    2. Have you seen or consulted any other health care providers outside of the [facility] since your last visit? Include any pap smears or colon screening. no

## 2024-01-09 NOTE — PROGRESS NOTES
HISTORY OF PRESENT ILLNESS  Vianney Galeas  89 y.o. female     Chief Complaint   Patient presents with    Follow-up     6 months       ASSESSMENT and PLAN    The primary encounter diagnosis was Atherosclerosis of native coronary artery without angina pectoris, unspecified whether native or transplanted heart. Diagnoses of Pure hypercholesterolemia, unspecified, Syncope and collapse, and Essential (primary) hypertension were also pertinent to this visit.    Ms. Vianney Galeas has history of CAD.  Because of abnormal testing back in 2009, she underwent coronary angiography at the [facility] showing proximal LAD disease, proximal circumflex/OM disease, and proximal dominant RCA disease.  At that time, options for percutaneous revascularization as well as surgical revascularization were given; she declined and chose to go with medical therapy.  Since 2009, she has done quite well.  Her home cuff correlates with our reading in December 2021.  In September 2022, she had a hip fracture.  She was hospitalized and had surgical repair.  Her echocardiogram during that hospitalization showed hyperdynamic LV function with EF greater than 70%.    CAD:    Clinically stable.  Continue medical therapy.  BP:    Well-controlled at 116/62.  Rhythm:    Stable sinus rhythm at 79 bpm.  CHF:    There is no evidence of decompensated CHF noted.  Weight:     Her weight today is 109 pounds.  Back in 2020, she weighed 135 pounds.  Her target weight is 120 pounds.  She has gained about 9 pounds since her last visit in May 2023 by eating more ice cream and milk.  Cholesterol:   Target LDL <70.  Her Lipitor was discontinued in 2020.  Anti-platelet:   Remains on ASA.     I will see her back in 6 months.  Thank you.    Orders Placed This Encounter   Procedures    EKG 12 Lead     Order Specific Question:   Reason for Exam?     Answer:   Other        HPI  Today, Ms. Galeas has no complaints of chest pains, increased shortness of breath

## 2024-03-20 NOTE — ED NOTES
Pt reminded that she cannot have anything else by mouth after 0700. Pt verbalizes understands. Detail Level: Generalized Detail Level: Zone

## 2024-06-07 RX ORDER — AMLODIPINE BESYLATE 10 MG/1
10 TABLET ORAL DAILY
Qty: 90 TABLET | Refills: 3 | Status: SHIPPED | OUTPATIENT
Start: 2024-06-07

## 2024-07-09 ENCOUNTER — OFFICE VISIT (OUTPATIENT)
Age: 89
End: 2024-07-09
Payer: MEDICARE

## 2024-07-09 VITALS
WEIGHT: 116 LBS | HEART RATE: 55 BPM | OXYGEN SATURATION: 98 % | SYSTOLIC BLOOD PRESSURE: 138 MMHG | HEIGHT: 62 IN | BODY MASS INDEX: 21.35 KG/M2 | DIASTOLIC BLOOD PRESSURE: 62 MMHG

## 2024-07-09 DIAGNOSIS — E78.00 PURE HYPERCHOLESTEROLEMIA, UNSPECIFIED: ICD-10-CM

## 2024-07-09 DIAGNOSIS — I10 ESSENTIAL (PRIMARY) HYPERTENSION: ICD-10-CM

## 2024-07-09 DIAGNOSIS — I25.10 ATHEROSCLEROSIS OF NATIVE CORONARY ARTERY WITHOUT ANGINA PECTORIS, UNSPECIFIED WHETHER NATIVE OR TRANSPLANTED HEART: Primary | ICD-10-CM

## 2024-07-09 PROCEDURE — 99214 OFFICE O/P EST MOD 30 MIN: CPT | Performed by: INTERNAL MEDICINE

## 2024-07-09 PROCEDURE — 1036F TOBACCO NON-USER: CPT | Performed by: INTERNAL MEDICINE

## 2024-07-09 PROCEDURE — 1123F ACP DISCUSS/DSCN MKR DOCD: CPT | Performed by: INTERNAL MEDICINE

## 2024-07-09 PROCEDURE — G8427 DOCREV CUR MEDS BY ELIG CLIN: HCPCS | Performed by: INTERNAL MEDICINE

## 2024-07-09 PROCEDURE — G8420 CALC BMI NORM PARAMETERS: HCPCS | Performed by: INTERNAL MEDICINE

## 2024-07-09 PROCEDURE — 93000 ELECTROCARDIOGRAM COMPLETE: CPT | Performed by: INTERNAL MEDICINE

## 2024-07-09 PROCEDURE — 1090F PRES/ABSN URINE INCON ASSESS: CPT | Performed by: INTERNAL MEDICINE

## 2024-07-09 ASSESSMENT — PATIENT HEALTH QUESTIONNAIRE - PHQ9
SUM OF ALL RESPONSES TO PHQ QUESTIONS 1-9: 0
2. FEELING DOWN, DEPRESSED OR HOPELESS: NOT AT ALL
SUM OF ALL RESPONSES TO PHQ QUESTIONS 1-9: 0
1. LITTLE INTEREST OR PLEASURE IN DOING THINGS: NOT AT ALL
SUM OF ALL RESPONSES TO PHQ QUESTIONS 1-9: 0
SUM OF ALL RESPONSES TO PHQ9 QUESTIONS 1 & 2: 0
SUM OF ALL RESPONSES TO PHQ QUESTIONS 1-9: 0

## 2024-07-09 ASSESSMENT — ANXIETY QUESTIONNAIRES
GAD7 TOTAL SCORE: 0
2. NOT BEING ABLE TO STOP OR CONTROL WORRYING: NOT AT ALL
4. TROUBLE RELAXING: NOT AT ALL
5. BEING SO RESTLESS THAT IT IS HARD TO SIT STILL: NOT AT ALL
1. FEELING NERVOUS, ANXIOUS, OR ON EDGE: NOT AT ALL
7. FEELING AFRAID AS IF SOMETHING AWFUL MIGHT HAPPEN: NOT AT ALL
3. WORRYING TOO MUCH ABOUT DIFFERENT THINGS: NOT AT ALL
6. BECOMING EASILY ANNOYED OR IRRITABLE: NOT AT ALL
IF YOU CHECKED OFF ANY PROBLEMS ON THIS QUESTIONNAIRE, HOW DIFFICULT HAVE THESE PROBLEMS MADE IT FOR YOU TO DO YOUR WORK, TAKE CARE OF THINGS AT HOME, OR GET ALONG WITH OTHER PEOPLE: NOT DIFFICULT AT ALL

## 2024-07-09 NOTE — PROGRESS NOTES
Vianney Galeas presents today for   Chief Complaint   Patient presents with    Follow-up     6 months       Vianney Galeas preferred language for health care discussion is english/other.    Is someone accompanying this pt? yes    Is the patient using any DME equipment during OV? yes    Depression Screening:  Depression: Not at risk (1/9/2024)    PHQ-2     PHQ-2 Score: 0        Learning Assessment:  Who is the primary learner? Patient    What is the preferred language for health care of the primary learner? ENGLISH    How does the primary learner prefer to learn new concepts? DEMONSTRATION    Answered By patient    Relationship to Learner SELF           Pt currently taking Anticoagulant therapy? no    Pt currently taking Antiplatelet therapy ? Aspirin 81mg daily      Coordination of Care:  1. Have you been to the ER, urgent care clinic since your last visit? Hospitalized since your last visit? no    2. Have you seen or consulted any other health care providers outside of the [facility] since your last visit? Include any pap smears or colon screening. no

## 2024-07-09 NOTE — PROGRESS NOTES
HISTORY OF PRESENT ILLNESS  Vianney Galeas  89 y.o. female     Chief Complaint   Patient presents with    Follow-up     6 months       ASSESSMENT and PLAN    The primary encounter diagnosis was Atherosclerosis of native coronary artery without angina pectoris, unspecified whether native or transplanted heart. Diagnoses of Pure hypercholesterolemia, unspecified and Essential (primary) hypertension were also pertinent to this visit.    Ms. Vianney Galeas has history of CAD.  Because of abnormal testing back in 2009, she underwent coronary angiography at the [facility] showing proximal LAD disease, proximal circumflex/OM disease, and proximal dominant RCA disease.  At that time, options for percutaneous revascularization as well as surgical revascularization were given; she declined and chose to go with medical therapy.  Since 2009, she has done quite well.  Her home cuff correlates with our reading in December 2021.  In September 2022, she had a hip fracture.  She was hospitalized and had surgical repair.  Her echocardiogram during that hospitalization showed hyperdynamic LV function with EF greater than 70%.    CAD:    Clinically stable.  She has tolerated medical therapy well since 2009.  BP:    Well-controlled at 138/62.  Rhythm:    Asymptomatic sinus bradycardia at 55 bpm.  CHF:    There is no evidence of decompensated CHF noted.  Weight:     Her weight today is 116 pounds.  Her baseline weight is 135 pounds.  I advised her not to lose any further weight.  Cholesterol:   Target LDL <70.  Her Lipitor was discontinued in 2020.  Anti-platelet:   Remains on ASA.     I will see her back in 6 months.  Thank you.    Orders Placed This Encounter   Procedures    EKG 12 Lead     Order Specific Question:   Reason for Exam?     Answer:   Other        HPI  Today, Ms. Galeas has no complaints of chest pains, increased shortness of breath or decreased exercise capacity.  She did have 1 episode of right back and

## 2024-08-16 RX ORDER — METOPROLOL SUCCINATE 25 MG/1
TABLET, EXTENDED RELEASE ORAL
Qty: 90 TABLET | Refills: 3 | Status: SHIPPED | OUTPATIENT
Start: 2024-08-16

## 2025-01-08 ENCOUNTER — OFFICE VISIT (OUTPATIENT)
Age: 89
End: 2025-01-08
Payer: MEDICARE

## 2025-01-08 VITALS
BODY MASS INDEX: 21.35 KG/M2 | HEART RATE: 53 BPM | WEIGHT: 116 LBS | SYSTOLIC BLOOD PRESSURE: 124 MMHG | DIASTOLIC BLOOD PRESSURE: 54 MMHG | OXYGEN SATURATION: 97 % | HEIGHT: 62 IN

## 2025-01-08 DIAGNOSIS — I10 ESSENTIAL (PRIMARY) HYPERTENSION: ICD-10-CM

## 2025-01-08 DIAGNOSIS — E78.00 PURE HYPERCHOLESTEROLEMIA, UNSPECIFIED: ICD-10-CM

## 2025-01-08 DIAGNOSIS — I25.10 ATHEROSCLEROSIS OF NATIVE CORONARY ARTERY WITHOUT ANGINA PECTORIS, UNSPECIFIED WHETHER NATIVE OR TRANSPLANTED HEART: Primary | ICD-10-CM

## 2025-01-08 PROCEDURE — 1126F AMNT PAIN NOTED NONE PRSNT: CPT | Performed by: INTERNAL MEDICINE

## 2025-01-08 PROCEDURE — 1036F TOBACCO NON-USER: CPT | Performed by: INTERNAL MEDICINE

## 2025-01-08 PROCEDURE — 1123F ACP DISCUSS/DSCN MKR DOCD: CPT | Performed by: INTERNAL MEDICINE

## 2025-01-08 PROCEDURE — 1160F RVW MEDS BY RX/DR IN RCRD: CPT | Performed by: INTERNAL MEDICINE

## 2025-01-08 PROCEDURE — 1090F PRES/ABSN URINE INCON ASSESS: CPT | Performed by: INTERNAL MEDICINE

## 2025-01-08 PROCEDURE — 1159F MED LIST DOCD IN RCRD: CPT | Performed by: INTERNAL MEDICINE

## 2025-01-08 PROCEDURE — 93000 ELECTROCARDIOGRAM COMPLETE: CPT | Performed by: INTERNAL MEDICINE

## 2025-01-08 PROCEDURE — G8420 CALC BMI NORM PARAMETERS: HCPCS | Performed by: INTERNAL MEDICINE

## 2025-01-08 PROCEDURE — G8427 DOCREV CUR MEDS BY ELIG CLIN: HCPCS | Performed by: INTERNAL MEDICINE

## 2025-01-08 PROCEDURE — M1308 PR FLU IMMUNIZE NO ADMIN: HCPCS | Performed by: INTERNAL MEDICINE

## 2025-01-08 PROCEDURE — 99214 OFFICE O/P EST MOD 30 MIN: CPT | Performed by: INTERNAL MEDICINE

## 2025-01-08 ASSESSMENT — PATIENT HEALTH QUESTIONNAIRE - PHQ9
SUM OF ALL RESPONSES TO PHQ QUESTIONS 1-9: 0
SUM OF ALL RESPONSES TO PHQ QUESTIONS 1-9: 0
1. LITTLE INTEREST OR PLEASURE IN DOING THINGS: NOT AT ALL
2. FEELING DOWN, DEPRESSED OR HOPELESS: NOT AT ALL
SUM OF ALL RESPONSES TO PHQ QUESTIONS 1-9: 0
SUM OF ALL RESPONSES TO PHQ QUESTIONS 1-9: 0
SUM OF ALL RESPONSES TO PHQ9 QUESTIONS 1 & 2: 0

## 2025-01-08 NOTE — PROGRESS NOTES
Vianney Galeas presents today for   Chief Complaint   Patient presents with    Follow-up     6 month f/u        Vianney Galeas preferred language for health care discussion is english/other.    Is someone accompanying this pt? yes    Is the patient using any DME equipment during OV? yes    Depression Screening:  Depression: Not at risk (1/8/2025)    PHQ-2     PHQ-2 Score: 0        Learning Assessment:  Who is the primary learner? Patient    What is the preferred language for health care of the primary learner? ENGLISH    How does the primary learner prefer to learn new concepts? DEMONSTRATION    Answered By patient    Relationship to Learner SELF           Pt currently taking Anticoagulant therapy? no    Pt currently taking Antiplatelet therapy ? ASA 81 mg QD       Coordination of Care:  1. Have you been to the ER, urgent care clinic since your last visit? Hospitalized since your last visit? no    2. Have you seen or consulted any other health care providers outside of the [facility] since your last visit? Include any pap smears or colon screening. no

## 2025-01-08 NOTE — PROGRESS NOTES
HISTORY OF PRESENT ILLNESS  Vianney Galeas  89 y.o. female     Chief Complaint   Patient presents with    Follow-up     6 month f/u        ASSESSMENT and PLAN    The primary encounter diagnosis was Atherosclerosis of native coronary artery without angina pectoris, unspecified whether native or transplanted heart. Diagnoses of Pure hypercholesterolemia, unspecified and Essential (primary) hypertension were also pertinent to this visit.    Ms. Vianney Galeas has history of CAD.  Because of abnormal testing back in 2009, she underwent coronary angiography at the [facility] showing proximal LAD disease, proximal circumflex/OM disease, and proximal dominant RCA disease.  At that time, options for percutaneous revascularization as well as surgical revascularization were given; she declined and chose to go with medical therapy.  Since 2009, she has done quite well.  Her home cuff correlates with our reading in December 2021.  In September 2022, she had a hip fracture.  She was hospitalized and had surgical repair.  Her echocardiogram during that hospitalization showed hyperdynamic LV function with EF greater than 70%.    Medication regimen reviewed and current cardiac regimen will be continued.  All new testing since the last office visit was independently reviewed by me.    CAD:    Clinically stable.  She has tolerated medical therapy well since 2009.  HTN:    Well-controlled at 124/54.  Continue current BP regimen.  Rhythm:    Asymptomatic sinus bradycardia 53 bpm.  CHF:    There is no evidence of decompensated CHF noted.  BMI:     Her weight today is 114 pounds.  In 2022, her baseline weight was 135 pounds.  Again, encouragement was given to maintain her current weight.  Hyperlipidemia:   Target LDL <70.  Her Lipitor was discontinued in 2020.  Anti-platelet:   Remains on ASA.     I will see her back in 6 months.  Thank you.    Orders Placed This Encounter   Procedures    EKG 12 Lead     Order Specific

## 2025-07-08 ENCOUNTER — OFFICE VISIT (OUTPATIENT)
Age: 89
End: 2025-07-08
Payer: MEDICARE

## 2025-07-08 VITALS
BODY MASS INDEX: 21.71 KG/M2 | HEART RATE: 57 BPM | SYSTOLIC BLOOD PRESSURE: 120 MMHG | OXYGEN SATURATION: 98 % | WEIGHT: 118 LBS | HEIGHT: 62 IN | DIASTOLIC BLOOD PRESSURE: 58 MMHG

## 2025-07-08 DIAGNOSIS — I10 ESSENTIAL (PRIMARY) HYPERTENSION: ICD-10-CM

## 2025-07-08 DIAGNOSIS — I25.10 ATHEROSCLEROSIS OF NATIVE CORONARY ARTERY WITHOUT ANGINA PECTORIS, UNSPECIFIED WHETHER NATIVE OR TRANSPLANTED HEART: Primary | ICD-10-CM

## 2025-07-08 DIAGNOSIS — R55 SYNCOPE AND COLLAPSE: ICD-10-CM

## 2025-07-08 DIAGNOSIS — E78.00 PURE HYPERCHOLESTEROLEMIA, UNSPECIFIED: ICD-10-CM

## 2025-07-08 PROCEDURE — 1126F AMNT PAIN NOTED NONE PRSNT: CPT | Performed by: INTERNAL MEDICINE

## 2025-07-08 PROCEDURE — 1159F MED LIST DOCD IN RCRD: CPT | Performed by: INTERNAL MEDICINE

## 2025-07-08 PROCEDURE — 1036F TOBACCO NON-USER: CPT | Performed by: INTERNAL MEDICINE

## 2025-07-08 PROCEDURE — G8420 CALC BMI NORM PARAMETERS: HCPCS | Performed by: INTERNAL MEDICINE

## 2025-07-08 PROCEDURE — G8427 DOCREV CUR MEDS BY ELIG CLIN: HCPCS | Performed by: INTERNAL MEDICINE

## 2025-07-08 PROCEDURE — 99214 OFFICE O/P EST MOD 30 MIN: CPT | Performed by: INTERNAL MEDICINE

## 2025-07-08 PROCEDURE — 1123F ACP DISCUSS/DSCN MKR DOCD: CPT | Performed by: INTERNAL MEDICINE

## 2025-07-08 PROCEDURE — 93000 ELECTROCARDIOGRAM COMPLETE: CPT | Performed by: INTERNAL MEDICINE

## 2025-07-08 PROCEDURE — 1160F RVW MEDS BY RX/DR IN RCRD: CPT | Performed by: INTERNAL MEDICINE

## 2025-07-08 PROCEDURE — 1090F PRES/ABSN URINE INCON ASSESS: CPT | Performed by: INTERNAL MEDICINE

## 2025-07-08 NOTE — PROGRESS NOTES
Vianney Galeas presents today for   Chief Complaint   Patient presents with    Follow-up     6 months       Vianney Galeas preferred language for health care discussion is english/other.    Is someone accompanying this pt? yes    Is the patient using any DME equipment during OV? yes    Depression Screening:  Depression: Not at risk (1/8/2025)    PHQ-2     PHQ-2 Score: 0        Learning Assessment:  No question data found.       Pt currently taking Anticoagulant therapy? no    Pt currently taking Antiplatelet therapy ? Aspirin 81 mg daily      Coordination of Care:  1. Have you been to the ER, urgent care clinic since your last visit? Hospitalized since your last visit? no    2. Have you seen or consulted any other health care providers outside of the [facility] since your last visit? Include any pap smears or colon screening. no    
EVERY DAY 90 tablet 3    amLODIPine (NORVASC) 10 MG tablet TAKE 1 TABLET BY MOUTH DAILY 90 tablet 3    aspirin 81 MG EC tablet Take 1 tablet by mouth daily      tamsulosin (FLOMAX) 0.4 MG capsule Take 1 capsule by mouth daily      vitamin D (CHOLECALCIFEROL) 25 MCG (1000 UT) TABS tablet Take 1 tablet by mouth daily      vitamin B-6 (PYRIDOXINE) 100 MG tablet Take 1 tablet by mouth daily      folic acid (FOLVITE) 400 MCG tablet Take 1 tablet by mouth daily      ascorbic acid (VITAMIN C) 500 MG tablet Take 1 tablet by mouth daily      atorvastatin (LIPITOR) 40 MG tablet Take 1 tablet by mouth daily      levothyroxine (SYNTHROID) 75 MCG tablet Take 1 tablet by mouth every morning (before breakfast)      nitroGLYCERIN (NITROSTAT) 0.4 MG SL tablet Place 1 tablet under the tongue every 5 minutes as needed      polyethylene glycol (GLYCOLAX) 17 GM/SCOOP powder Take 17 g by mouth as needed      senna-docusate (PERICOLACE) 8.6-50 MG per tablet Take 1 tablet by mouth as needed       No current facility-administered medications for this visit.       The patient has a family history of    Social History     Tobacco Use    Smoking status: Never    Smokeless tobacco: Never   Substance Use Topics    Alcohol use: No    Drug use: No       Lab Results   Component Value Date/Time    CHOL 165 08/20/2019 09:43 AM    HDL 47 08/20/2019 09:43 AM    LDL 86.8 08/20/2019 09:43 AM        BP Readings from Last 3 Encounters:   07/08/25 (!) 120/58   01/08/25 (!) 124/54   07/09/24 138/62        Pulse Readings from Last 3 Encounters:   07/08/25 57   01/08/25 53   07/09/24 55       Wt Readings from Last 3 Encounters:   07/08/25 53.5 kg (118 lb)   01/08/25 52.6 kg (116 lb)   07/09/24 52.6 kg (116 lb)         EKG: sinus bradycardia, poor R wave progression in precordial leads with late transition, unchanged from previous tracings.     Ken Damon MD   7/8/2025

## 2025-07-14 RX ORDER — METOPROLOL SUCCINATE 25 MG/1
25 TABLET, EXTENDED RELEASE ORAL DAILY
Qty: 90 TABLET | Refills: 3 | Status: SHIPPED | OUTPATIENT
Start: 2025-07-14

## 2025-07-25 RX ORDER — AMLODIPINE BESYLATE 10 MG/1
10 TABLET ORAL DAILY
Qty: 90 TABLET | Refills: 3 | Status: SHIPPED | OUTPATIENT
Start: 2025-07-25

## 2025-07-25 NOTE — TELEPHONE ENCOUNTER
PCP: Azul Miller, APRN - NP    Last appt:  Visit date not found   Future Appointments   Date Time Provider Department Center   1/14/2026 10:00 AM Ken Damon MD [facility] AMB       Requested Prescriptions     Pending Prescriptions Disp Refills    amLODIPine (NORVASC) 10 MG tablet [Pharmacy Med Name: AMLODIPINE BESYLATE 10MGTABLETS] 90 tablet 3     Sig: TAKE 1 TABLET BY MOUTH DAILY       Request for a 30 or 90 day supply? Provider Discretion    Pharmacy: Confirmed     Other Comments: Per your last office note on 7/8/2025: HTN:   Well-controlled at 120/58.  Continue current BP regimen.

## (undated) DEVICE — BOWL AND CEMENT CARTRIDGE WITH BREAKAWAY FEMORAL NOZZLE: Brand: ACM

## (undated) DEVICE — SUTURE VCRL SZ 2-0 L27IN ABSRB VLT L36MM CT-1 1/2 CIR J339H

## (undated) DEVICE — 3M™ TEGADERM™ TRANSPARENT FILM DRESSING FRAME STYLE, 1626W, 4 IN X 4-3/4 IN (10 CM X 12 CM), 50/CT 4CT/CASE: Brand: 3M™ TEGADERM™

## (undated) DEVICE — KIT CLN UP BON SECOURS MARYV

## (undated) DEVICE — Device

## (undated) DEVICE — LIMB HOLDER, WRIST/ANKLE: Brand: DEROYAL

## (undated) DEVICE — T4 HOOD

## (undated) DEVICE — SOLUTION IV 1000ML 0.9% SOD CHL

## (undated) DEVICE — 1 UNI PDO 30CM: Brand: 1 UNI PDO 30CM

## (undated) DEVICE — 3M™ IOBAN™ 2 ANTIMICROBIAL INCISE DRAPE 6651EZ: Brand: IOBAN™ 2

## (undated) DEVICE — Device: Brand: JELCO

## (undated) DEVICE — PACKING GZ W2INXL6FT WVN COT VAG RADPQ

## (undated) DEVICE — SYRINGE IRRIG 60ML SFT PLIABLE BLB EZ TO GRP 1 HND USE W/

## (undated) DEVICE — PILLOW POS W15XH6XL22IN RASPBERRY FOAM ABD W/ STRP DISP FOR

## (undated) DEVICE — SYR LR LCK 1ML GRAD NSAF 30ML --

## (undated) DEVICE — 4-PORT MANIFOLD: Brand: NEPTUNE 2

## (undated) DEVICE — TAPE,CLOTH/SILK,CURAD,3"X10YD,LF,40/CS: Brand: CURAD

## (undated) DEVICE — PREP IM ENCHANCED TOTAL HIP BONE                                    PREPARATION KIT: Brand: PREP-IM

## (undated) DEVICE — INTENDED FOR TISSUE SEPARATION, AND OTHER PROCEDURES THAT REQUIRE A SHARP SURGICAL BLADE TO PUNCTURE OR CUT.: Brand: BARD-PARKER SAFETY BLADES SIZE 15, STERILE

## (undated) DEVICE — SUTURE ETHBND EXCEL SZ 5 L30IN NONABSORBABLE GRN L40MM V-37 MB66G

## (undated) DEVICE — TRAY,URINE METER,100% SILICONE,16FR10ML: Brand: MEDLINE

## (undated) DEVICE — SOLUTION IRRIG 3000ML 0.9% SOD CHL FLX CONT 0797208] ICU MEDICAL INC]

## (undated) DEVICE — PREMIUM DRY TRAY LF: Brand: MEDLINE INDUSTRIES, INC.

## (undated) DEVICE — PACK PROCEDURE SURG TOT HIP BSHR LF

## (undated) DEVICE — SUTURE VCRL SZ 2 L27IN ABSRB VLT L65MM TP-1 1/2 CIR J649G

## (undated) DEVICE — INTENDED FOR TISSUE SEPARATION, AND OTHER PROCEDURES THAT REQUIRE A SHARP SURGICAL BLADE TO PUNCTURE OR CUT.: Brand: BARD-PARKER SAFETY BLADES SIZE 10, STERILE

## (undated) DEVICE — SUTURE MCRYL SZ 2-0 L36IN ABSRB UD L36MM CT-1 1/2 CIR Y945H

## (undated) DEVICE — KIT EVAC 400CC DIA1/4IN H PAT 12.5IN 3 SPR RND SHP PVC DRN

## (undated) DEVICE — REM POLYHESIVE ADULT PATIENT RETURN ELECTRODE: Brand: VALLEYLAB

## (undated) DEVICE — FEEDING TUBE • RADIOPAQUE: Brand: ARGYLE

## (undated) DEVICE — GOWN,REINFORCED,POLY,AURORA,XLARGE,STRL: Brand: MEDLINE

## (undated) DEVICE — 2DSM19 2-0 UND MONODERM 30X30: Brand: 2DSM19 2-0 UND MONODERM 30X30

## (undated) DEVICE — SUTURE MCRYL 2-0 L36IN ABSRB VLT CT-1 L36MM 1/2 CIR Y345H

## (undated) DEVICE — 2108 SERIES SAGITTAL BLADE (24.8 X 1.24 X 80.1MM)

## (undated) DEVICE — PREP SKN CHLRAPRP APL 26ML STR --

## (undated) DEVICE — SUTURE VCRL SZ 1 L36IN ABSRB VLT L36MM CT-1 1/2 CIR J347H

## (undated) DEVICE — HANDPIECE SET WITH HIGH FLOW TIP AND SUCTION TUBE: Brand: INTERPULSE

## (undated) DEVICE — DRESSING FOAM DISK DIA1IN H 7MM HYDRPHLC CHG IMPREG IN SL